# Patient Record
Sex: MALE | Race: WHITE | NOT HISPANIC OR LATINO | Employment: FULL TIME | URBAN - METROPOLITAN AREA
[De-identification: names, ages, dates, MRNs, and addresses within clinical notes are randomized per-mention and may not be internally consistent; named-entity substitution may affect disease eponyms.]

---

## 2015-01-19 LAB — CRC RECOMMENDATION EXT: NORMAL

## 2017-05-29 ENCOUNTER — OFFICE VISIT (OUTPATIENT)
Dept: FAMILY MEDICINE | Facility: CLINIC | Age: 62
End: 2017-05-29
Payer: COMMERCIAL

## 2017-05-29 VITALS — TEMPERATURE: 99 F | BODY MASS INDEX: 42.94 KG/M2 | WEIGHT: 299.94 LBS | HEIGHT: 70 IN

## 2017-05-29 DIAGNOSIS — Z00.00 ROUTINE CHECK-UP: Primary | ICD-10-CM

## 2017-05-29 PROCEDURE — 99396 PREV VISIT EST AGE 40-64: CPT | Mod: S$GLB,,, | Performed by: FAMILY MEDICINE

## 2017-05-29 PROCEDURE — 99999 PR PBB SHADOW E&M-EST. PATIENT-LVL II: CPT | Mod: PBBFAC,,, | Performed by: FAMILY MEDICINE

## 2017-05-29 RX ORDER — MONTELUKAST SODIUM 10 MG/1
10 TABLET ORAL NIGHTLY
Qty: 30 TABLET | Refills: 0 | Status: SHIPPED | OUTPATIENT
Start: 2017-05-29 | End: 2017-07-27 | Stop reason: SDUPTHER

## 2017-05-29 NOTE — PROGRESS NOTES
The patient presents today for general health evaluation and counseling      Past Medical History:  Past Medical History:   Diagnosis Date    Diabetes mellitus, type 2     Hypertension     Morbid obesity      Past Surgical History:   Procedure Laterality Date    HERNIA REPAIR      KNEE ARTHROSCOPY W/ DEBRIDEMENT       Review of patient's allergies indicates:   Allergen Reactions    Penicillins      Current Outpatient Prescriptions on File Prior to Visit   Medication Sig Dispense Refill    aspirin (ECOTRIN) 81 MG EC tablet Take 81 mg by mouth once daily.        enalapril (VASOTEC) 20 MG tablet TAKE 1 TABLET ONCE A DAY 30 tablet 11    metformin (GLUCOPHAGE) 500 MG tablet TAKE 2 TABLETS TWICE A DAY WITH FOOD 120 tablet 11    testosterone cypionate (DEPOTESTOTERONE CYPIONATE) 100 mg/mL injection Inject 200 mg into the muscle every 14 (fourteen) days.      hyoscyamine (ANASPAZ,LEVSIN) 0.125 mg Tab Take 1 tablet (125 mcg total) by mouth 3 (three) times daily. 30 tablet 0     No current facility-administered medications on file prior to visit.      Social History     Social History    Marital status:      Spouse name: N/A    Number of children: N/A    Years of education: N/A     Occupational History      u     Social History Main Topics    Smoking status: Never Smoker    Smokeless tobacco: Never Used    Alcohol use No    Drug use: No    Sexual activity: Yes     Partners: Female     Other Topics Concern    Not on file     Social History Narrative    . 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     Family History   Problem Relation Age of Onset    Heart disease Mother     Diabetes Father     Heart disease Father     Hypertension Father     Diabetes Maternal Grandmother     Cancer Maternal Grandfather          ROS:GENERAL: No fever, chills, fatigability or weight loss.  SKIN: No rashes, itching or changes in color or texture of skin.  HEAD: No headaches or recent head trauma.EYES: Visual  acuity fine. No photophobia, ocular pain or diplopia.EARS: Denies ear pain, discharge or vertigo.NOSE: No loss of smell, no epistaxis or postnasal drip.MOUTH & THROAT: No hoarseness or change in voice. No excessive gum bleeding.NODES: Denies swollen glands.  CHEST: Denies BATRES, cyanosis, wheezing, cough and sputum production.  CARDIOVASCULAR: Denies chest pain, PND, orthopnea or reduced exercise tolerance.  ABDOMEN: Appetite fine. No weight loss. Denies diarrhea, abdominal pain, hematemesis or blood in stool.  URINARY: No flank pain, dysuria or hematuria.  PERIPHERAL VASCULAR: No claudication or cyanosis.  MUSCULOSKELETAL: See above.  NEUROLOGIC: No history of seizures, paralysis, alteration of gait or coordination.  PE:   HEAD: Normocephalic, atraumatic.EYES: PERRL. EOMI.   EARS: TM's intact. Light reflex normal. No retraction or perforation.   NOSE: Mucosa pink. Airway clear.MOUTH & THROAT: No tonsillar enlargement. No pharyngeal erythema or exudate. No stridor.  NODES: No cervical, axillary or inguinal lymph node enlargement.  CHEST: Lungs clear to auscultation.  CARDIOVASCULAR: Normal S1, S2. No rubs, murmurs or gallops.  ABDOMEN: Bowel sounds normal. Not distended. Soft. No tenderness or masses.  MUSCULOSKELETAL: No palpable abnormality  NEUROLOGIC: Cranial Nerves: II-XII grossly intact.  Motor: 5/5 strength major flexors/extensors.  DTR's: Knees, Ankles 2+ and equal bilaterally; downgoing toes.  Sensory: Intact to light touch distally.  Gait & Posture: Normal gait and fine motion. No cerebellar signs.     Impression:Routine health check  Plan:Lab eval  Rec diet and ex recs  Rev age appropriate screenings      follows and checks ZHENG

## 2017-05-31 LAB
ALBUMIN SERPL-MCNC: 4.1 G/DL (ref 3.6–5.1)
ALBUMIN/GLOB SERPL: 1.8 (CALC) (ref 1–2.5)
ALP SERPL-CCNC: 58 U/L (ref 40–115)
ALT SERPL-CCNC: 18 U/L (ref 9–46)
AST SERPL-CCNC: 16 U/L (ref 10–35)
BASOPHILS # BLD AUTO: 58 CELLS/UL (ref 0–200)
BASOPHILS NFR BLD AUTO: 0.8 %
BILIRUB SERPL-MCNC: 0.5 MG/DL (ref 0.2–1.2)
BUN SERPL-MCNC: 20 MG/DL (ref 7–25)
BUN/CREAT SERPL: ABNORMAL (CALC) (ref 6–22)
CALCIUM SERPL-MCNC: 9.3 MG/DL (ref 8.6–10.3)
CHLORIDE SERPL-SCNC: 100 MMOL/L (ref 98–110)
CHOLEST SERPL-MCNC: 135 MG/DL (ref 125–200)
CHOLEST/HDLC SERPL: 3.2 (CALC)
CO2 SERPL-SCNC: 28 MMOL/L (ref 20–31)
CREAT SERPL-MCNC: 1.08 MG/DL (ref 0.7–1.25)
EOSINOPHIL # BLD AUTO: 79 CELLS/UL (ref 15–500)
EOSINOPHIL NFR BLD AUTO: 1.1 %
ERYTHROCYTE [DISTWIDTH] IN BLOOD BY AUTOMATED COUNT: 17.6 % (ref 11–15)
GFR SERPL CREATININE-BSD FRML MDRD: 73 ML/MIN/1.73M2
GLOBULIN SER CALC-MCNC: 2.3 G/DL (CALC) (ref 1.9–3.7)
GLUCOSE SERPL-MCNC: 126 MG/DL (ref 65–99)
HBA1C MFR BLD: 6.3 % OF TOTAL HGB
HCT VFR BLD AUTO: 47.6 % (ref 38.5–50)
HCV AB S/CO SERPL IA: 0.01
HCV AB SERPL QL IA: NORMAL
HDLC SERPL-MCNC: 42 MG/DL
HGB BLD-MCNC: 15.4 G/DL (ref 13.2–17.1)
LDLC SERPL CALC-MCNC: 83 MG/DL (CALC)
LYMPHOCYTES # BLD AUTO: 2765 CELLS/UL (ref 850–3900)
LYMPHOCYTES NFR BLD AUTO: 38.4 %
MCH RBC QN AUTO: 25.4 PG (ref 27–33)
MCHC RBC AUTO-ENTMCNC: 32.4 G/DL (ref 32–36)
MCV RBC AUTO: 78.3 FL (ref 80–100)
MONOCYTES # BLD AUTO: 792 CELLS/UL (ref 200–950)
MONOCYTES NFR BLD AUTO: 11 %
NEUTROPHILS # BLD AUTO: 3506 CELLS/UL (ref 1500–7800)
NEUTROPHILS NFR BLD AUTO: 48.7 %
NONHDLC SERPL-MCNC: 93 MG/DL (CALC)
PLATELET # BLD AUTO: 163 THOUSAND/UL (ref 140–400)
PMV BLD REES-ECKER: 8.1 FL (ref 7.5–12.5)
POTASSIUM SERPL-SCNC: 4.3 MMOL/L (ref 3.5–5.3)
PROT SERPL-MCNC: 6.4 G/DL (ref 6.1–8.1)
RBC # BLD AUTO: 6.08 MILLION/UL (ref 4.2–5.8)
SODIUM SERPL-SCNC: 135 MMOL/L (ref 135–146)
TRIGL SERPL-MCNC: 49 MG/DL
TSH SERPL-ACNC: 2.59 MIU/L (ref 0.4–4.5)
WBC # BLD AUTO: 7.2 THOUSAND/UL (ref 3.8–10.8)

## 2017-06-20 RX ORDER — METFORMIN HYDROCHLORIDE 500 MG/1
TABLET ORAL
Qty: 360 TABLET | Refills: 3 | Status: SHIPPED | OUTPATIENT
Start: 2017-06-20 | End: 2018-07-13 | Stop reason: SDUPTHER

## 2017-06-20 RX ORDER — ENALAPRIL MALEATE 20 MG/1
20 TABLET ORAL DAILY
Qty: 90 TABLET | Refills: 3 | Status: SHIPPED | OUTPATIENT
Start: 2017-06-20 | End: 2018-07-13 | Stop reason: SDUPTHER

## 2017-07-27 RX ORDER — MONTELUKAST SODIUM 10 MG/1
10 TABLET ORAL NIGHTLY
Qty: 30 TABLET | Refills: 12 | Status: SHIPPED | OUTPATIENT
Start: 2017-07-27 | End: 2017-08-07

## 2017-08-07 ENCOUNTER — OFFICE VISIT (OUTPATIENT)
Dept: FAMILY MEDICINE | Facility: CLINIC | Age: 62
End: 2017-08-07
Payer: COMMERCIAL

## 2017-08-07 VITALS
BODY MASS INDEX: 42.23 KG/M2 | SYSTOLIC BLOOD PRESSURE: 108 MMHG | WEIGHT: 295 LBS | DIASTOLIC BLOOD PRESSURE: 57 MMHG | HEART RATE: 70 BPM | HEIGHT: 70 IN | TEMPERATURE: 99 F

## 2017-08-07 DIAGNOSIS — R19.7 DIARRHEA, UNSPECIFIED TYPE: Primary | ICD-10-CM

## 2017-08-07 DIAGNOSIS — R10.9 ABDOMINAL CRAMPING: ICD-10-CM

## 2017-08-07 PROCEDURE — 99213 OFFICE O/P EST LOW 20 MIN: CPT | Mod: S$GLB,,, | Performed by: NURSE PRACTITIONER

## 2017-08-07 PROCEDURE — 99999 PR PBB SHADOW E&M-EST. PATIENT-LVL III: CPT | Mod: PBBFAC,,, | Performed by: NURSE PRACTITIONER

## 2017-08-07 PROCEDURE — 3008F BODY MASS INDEX DOCD: CPT | Mod: S$GLB,,, | Performed by: NURSE PRACTITIONER

## 2017-08-07 RX ORDER — DIPHENOXYLATE HYDROCHLORIDE AND ATROPINE SULFATE 2.5; .025 MG/1; MG/1
1 TABLET ORAL 4 TIMES DAILY PRN
Qty: 40 TABLET | Refills: 0 | Status: SHIPPED | OUTPATIENT
Start: 2017-08-07 | End: 2017-08-17

## 2017-08-07 NOTE — PROGRESS NOTES
Subjective:       Patient ID: Lazarus Zamudio is a 62 y.o. male.    Chief Complaint: Abdominal Pain and Diarrhea    Diarrhea    This is a new problem. The current episode started yesterday. The problem occurs 5 to 10 times per day. The problem has been unchanged. The patient states that diarrhea awakens him from sleep. Associated symptoms include abdominal pain (cramping). Pertinent negatives include no arthralgias, bloating, chills, coughing, fever, headaches, increased  flatus, myalgias, sweats, URI, vomiting or weight loss. Nothing aggravates the symptoms. There are no known risk factors. He has tried anti-motility drug for the symptoms. The treatment provided no relief. There is no history of bowel resection, inflammatory bowel disease, irritable bowel syndrome, malabsorption, a recent abdominal surgery or short gut syndrome.     Past Medical History:   Diagnosis Date    Diabetes mellitus, type 2     Hypertension     Morbid obesity      Social History     Social History    Marital status:      Spouse name: N/A    Number of children: N/A    Years of education: N/A     Occupational History      [employer]     Social History Main Topics    Smoking status: Never Smoker    Smokeless tobacco: Never Used    Alcohol use No    Drug use: No    Sexual activity: Yes     Partners: Female     Social History Narrative    . 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     Past Surgical History:   Procedure Laterality Date    HERNIA REPAIR      KNEE ARTHROSCOPY W/ DEBRIDEMENT         Review of Systems   Constitutional: Negative.  Negative for chills, fever and weight loss.   HENT: Negative.    Eyes: Negative.    Respiratory: Negative.  Negative for cough.    Cardiovascular: Negative.    Gastrointestinal: Positive for abdominal pain (cramping) and diarrhea. Negative for bloating, flatus and vomiting.   Endocrine: Negative.    Genitourinary: Negative.    Musculoskeletal: Negative.  Negative for arthralgias and  myalgias.   Skin: Negative.    Allergic/Immunologic: Negative.    Neurological: Negative.  Negative for headaches.   Psychiatric/Behavioral: Negative.        Objective:      Physical Exam   Constitutional: He is oriented to person, place, and time. He appears well-developed and well-nourished.   HENT:   Head: Normocephalic.   Right Ear: External ear normal.   Left Ear: External ear normal.   Nose: Nose normal.   Mouth/Throat: Oropharynx is clear and moist.   Eyes: Conjunctivae are normal. Pupils are equal, round, and reactive to light.   Neck: Normal range of motion. Neck supple.   Cardiovascular: Normal rate, regular rhythm and normal heart sounds.    Pulmonary/Chest: Effort normal and breath sounds normal.   Abdominal: Soft. Bowel sounds are normal. There is generalized tenderness. There is no rigidity, no rebound, no guarding, no CVA tenderness, no tenderness at McBurney's point and negative Fair's sign.   Musculoskeletal: Normal range of motion.   Neurological: He is alert and oriented to person, place, and time.   Skin: Skin is warm and dry. Capillary refill takes 2 to 3 seconds.   Psychiatric: He has a normal mood and affect. His behavior is normal. Judgment and thought content normal.   Nursing note and vitals reviewed.      Assessment:       1. Diarrhea, unspecified type    2. Abdominal cramping        Plan:           Lazarus was seen today for abdominal pain and diarrhea.    Diagnoses and all orders for this visit:  Abdominal cramping  Diarrhea, unspecified type  -     Stool Exam-Ova,Cysts,Parasites; Future  -     CULTURE, STOOL; Future  -     Giardia / Cryptosporidum, EIA; Future  -     WBC, Stool; Future  -     Rotavirus antigen, stool; Future  -     Occult blood x 1, stool; Future  Hydrate well  Lomotil request sent to PCP to approve  Report to ER immediately if symptoms worsen

## 2017-08-09 ENCOUNTER — OFFICE VISIT (OUTPATIENT)
Dept: FAMILY MEDICINE | Facility: CLINIC | Age: 62
End: 2017-08-09
Payer: COMMERCIAL

## 2017-08-09 VITALS
SYSTOLIC BLOOD PRESSURE: 139 MMHG | WEIGHT: 290.56 LBS | HEART RATE: 61 BPM | HEIGHT: 70 IN | TEMPERATURE: 98 F | BODY MASS INDEX: 41.6 KG/M2 | DIASTOLIC BLOOD PRESSURE: 75 MMHG

## 2017-08-09 DIAGNOSIS — R10.9 ABDOMINAL CRAMPING: Primary | ICD-10-CM

## 2017-08-09 DIAGNOSIS — R19.7 DIARRHEA, UNSPECIFIED TYPE: ICD-10-CM

## 2017-08-09 PROCEDURE — 87427 SHIGA-LIKE TOXIN AG IA: CPT | Mod: 59

## 2017-08-09 PROCEDURE — 87425 ROTAVIRUS AG IA: CPT

## 2017-08-09 PROCEDURE — 82272 OCCULT BLD FECES 1-3 TESTS: CPT

## 2017-08-09 PROCEDURE — 87209 SMEAR COMPLEX STAIN: CPT

## 2017-08-09 PROCEDURE — 3008F BODY MASS INDEX DOCD: CPT | Mod: S$GLB,,, | Performed by: NURSE PRACTITIONER

## 2017-08-09 PROCEDURE — 87046 STOOL CULTR AEROBIC BACT EA: CPT | Mod: 59

## 2017-08-09 PROCEDURE — 3075F SYST BP GE 130 - 139MM HG: CPT | Mod: S$GLB,,, | Performed by: NURSE PRACTITIONER

## 2017-08-09 PROCEDURE — 87329 GIARDIA AG IA: CPT

## 2017-08-09 PROCEDURE — 87045 FECES CULTURE AEROBIC BACT: CPT

## 2017-08-09 PROCEDURE — 89055 LEUKOCYTE ASSESSMENT FECAL: CPT

## 2017-08-09 PROCEDURE — 99213 OFFICE O/P EST LOW 20 MIN: CPT | Mod: S$GLB,,, | Performed by: NURSE PRACTITIONER

## 2017-08-09 PROCEDURE — 99999 PR PBB SHADOW E&M-EST. PATIENT-LVL III: CPT | Mod: PBBFAC,,, | Performed by: NURSE PRACTITIONER

## 2017-08-09 PROCEDURE — 3078F DIAST BP <80 MM HG: CPT | Mod: S$GLB,,, | Performed by: NURSE PRACTITIONER

## 2017-08-09 RX ORDER — DICYCLOMINE HYDROCHLORIDE 20 MG/1
20 TABLET ORAL 2 TIMES DAILY
Qty: 20 TABLET | Refills: 0 | Status: SHIPPED | OUTPATIENT
Start: 2017-08-09 | End: 2017-08-19

## 2017-08-09 RX ORDER — METRONIDAZOLE 500 MG/1
500 TABLET ORAL EVERY 8 HOURS
Qty: 21 TABLET | Refills: 0 | Status: SHIPPED | OUTPATIENT
Start: 2017-08-09 | End: 2017-08-16

## 2017-08-09 RX ORDER — ONDANSETRON HYDROCHLORIDE 8 MG/1
8 TABLET, FILM COATED ORAL EVERY 8 HOURS PRN
Qty: 15 TABLET | Refills: 0 | Status: SHIPPED | OUTPATIENT
Start: 2017-08-09 | End: 2017-08-14

## 2017-08-09 NOTE — PROGRESS NOTES
Subjective:       Patient ID: Lazarus Zamudio is a 62 y.o. male.    Chief Complaint: Diarrhea and Abdominal Pain    Diarrhea    This is a new problem. The current episode started in the past 7 days (Seen on Monday for this; has not brought stools ordered at previous visit). The problem occurs 2 to 4 times per day. The problem has been unchanged. The stool consistency is described as watery. The patient states that diarrhea does not awaken him from sleep. Associated symptoms include abdominal pain (cramping). Pertinent negatives include no arthralgias, bloating, chills, coughing, fever, headaches, increased  flatus, myalgias, sweats, URI, vomiting or weight loss. Associated symptoms comments: nausea. Nothing aggravates the symptoms. There are no known risk factors. He has tried anti-motility drug for the symptoms. The treatment provided mild relief. There is no history of bowel resection, inflammatory bowel disease, irritable bowel syndrome, malabsorption, a recent abdominal surgery or short gut syndrome.     Past Medical History:   Diagnosis Date    Diabetes mellitus, type 2     Hypertension     Morbid obesity      Social History     Social History    Marital status:      Spouse name: N/A    Number of children: N/A    Years of education: N/A     Occupational History      [employer]     Social History Main Topics    Smoking status: Never Smoker    Smokeless tobacco: Never Used    Alcohol use No    Drug use: No    Sexual activity: Yes     Partners: Female     Social History Narrative    . 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     Past Surgical History:   Procedure Laterality Date    HERNIA REPAIR      KNEE ARTHROSCOPY W/ DEBRIDEMENT         Review of Systems   Constitutional: Negative.  Negative for chills, fever and weight loss.   HENT: Negative.    Eyes: Negative.    Respiratory: Negative.  Negative for cough.    Cardiovascular: Negative.    Gastrointestinal: Positive for abdominal pain  (cramping) and nausea. Negative for bloating, flatus and vomiting.   Endocrine: Negative.    Genitourinary: Negative.    Musculoskeletal: Negative.  Negative for arthralgias and myalgias.   Skin: Negative.    Allergic/Immunologic: Negative.    Neurological: Negative.  Negative for headaches.   Psychiatric/Behavioral: Negative.        Objective:      Physical Exam   Constitutional: He is oriented to person, place, and time. He appears well-developed and well-nourished.   HENT:   Head: Normocephalic.   Right Ear: External ear normal.   Left Ear: External ear normal.   Nose: Nose normal.   Mouth/Throat: Oropharynx is clear and moist.   Eyes: Conjunctivae are normal. Pupils are equal, round, and reactive to light.   Neck: Normal range of motion. Neck supple.   Cardiovascular: Normal rate, regular rhythm and normal heart sounds.    Pulmonary/Chest: Effort normal and breath sounds normal.   Abdominal: Soft. Bowel sounds are normal. There is no tenderness.   Musculoskeletal: Normal range of motion.   Neurological: He is alert and oriented to person, place, and time.   Skin: Skin is warm and dry. Capillary refill takes 2 to 3 seconds.   Psychiatric: He has a normal mood and affect. His behavior is normal. Judgment and thought content normal.   Nursing note and vitals reviewed.      Assessment:       1. Abdominal cramping    2. Diarrhea, unspecified type        Plan:       Lazarus was seen today for diarrhea and abdominal pain.    Diagnoses and all orders for this visit:    Abdominal cramping  Diarrhea, unspecified type  -     metronidazole (FLAGYL) 500 MG tablet; Take 1 tablet (500 mg total) by mouth every 8 (eight) hours.  -     dicyclomine (BENTYL) 20 mg tablet; Take 1 tablet (20 mg total) by mouth 2 (two) times daily.  -     ondansetron (ZOFRAN) 8 MG tablet; Take 1 tablet (8 mg total) by mouth every 8 (eight) hours as needed.  Hydrate well  Report to ER immediately if symptoms worsen

## 2017-08-10 LAB
CRYPTOSP AG STL QL IA: NEGATIVE
G LAMBLIA AG STL QL IA: NEGATIVE
O+P STL TRI STN: NORMAL
OB PNL STL: NEGATIVE
RV AG STL QL IA.RAPID: NEGATIVE
WBC #/AREA STL HPF: NORMAL /[HPF]

## 2017-08-11 LAB
E COLI SXT1 STL QL IA: NEGATIVE
E COLI SXT2 STL QL IA: NEGATIVE

## 2017-08-12 LAB — BACTERIA STL CULT: NORMAL

## 2017-10-25 ENCOUNTER — OFFICE VISIT (OUTPATIENT)
Dept: FAMILY MEDICINE | Facility: CLINIC | Age: 62
End: 2017-10-25
Payer: COMMERCIAL

## 2017-10-25 VITALS
BODY MASS INDEX: 41.66 KG/M2 | WEIGHT: 291 LBS | DIASTOLIC BLOOD PRESSURE: 66 MMHG | HEART RATE: 59 BPM | SYSTOLIC BLOOD PRESSURE: 139 MMHG | HEIGHT: 70 IN

## 2017-10-25 DIAGNOSIS — M54.31 SCIATICA OF RIGHT SIDE: Primary | ICD-10-CM

## 2017-10-25 PROCEDURE — 99213 OFFICE O/P EST LOW 20 MIN: CPT | Mod: S$GLB,,, | Performed by: FAMILY MEDICINE

## 2017-10-25 PROCEDURE — 99999 PR PBB SHADOW E&M-EST. PATIENT-LVL II: CPT | Mod: PBBFAC,,, | Performed by: FAMILY MEDICINE

## 2017-10-25 RX ORDER — DICLOFENAC SODIUM 75 MG/1
75 TABLET, DELAYED RELEASE ORAL 2 TIMES DAILY
Qty: 60 TABLET | Refills: 2 | Status: SHIPPED | OUTPATIENT
Start: 2017-10-25 | End: 2018-03-19

## 2017-10-25 RX ORDER — METHYLPREDNISOLONE 4 MG/1
TABLET ORAL
Qty: 1 PACKAGE | Refills: 0 | Status: SHIPPED | OUTPATIENT
Start: 2017-10-25 | End: 2017-11-15

## 2017-10-25 NOTE — PROGRESS NOTES
The patient presents with tender painfulnrt lb rad to buttocks for the past 2 days    but no trauma but stepped out of tub and felt sudden pain   ROS:  General: No fever or sig wt change  HEENT:No other PND eye pain or dc  Respiratory: No cough wheezing  PE: vital signs noted  HEENT: Normocephalic,with no recent trauma,PERRLA,EOMI,conjunctiva injected with no exudate.Nasopharynx is injected and edematous.External otic canal edematous and injected TM dull  Neck:Supple without adenopathy  Chest:Clear bilateral breath sounds    Heart:Regular rhthym without murmer  Abdomen:Soft, non tender,no masses, no hepatosplenomegaly  Extremeties and Neurologic:Grossly within normal limits    Rt SI neg SLR   Impression:SCiaitica    Plan:  Nsaid, medrol dospk

## 2017-10-30 ENCOUNTER — OFFICE VISIT (OUTPATIENT)
Dept: FAMILY MEDICINE | Facility: CLINIC | Age: 62
End: 2017-10-30
Payer: COMMERCIAL

## 2017-10-30 VITALS
HEIGHT: 70 IN | SYSTOLIC BLOOD PRESSURE: 138 MMHG | WEIGHT: 293.19 LBS | DIASTOLIC BLOOD PRESSURE: 73 MMHG | BODY MASS INDEX: 41.97 KG/M2 | HEART RATE: 78 BPM

## 2017-10-30 DIAGNOSIS — M54.30 SCIATICA, UNSPECIFIED LATERALITY: Primary | ICD-10-CM

## 2017-10-30 PROCEDURE — 99999 PR PBB SHADOW E&M-EST. PATIENT-LVL II: CPT | Mod: PBBFAC,,, | Performed by: FAMILY MEDICINE

## 2017-10-30 PROCEDURE — 99213 OFFICE O/P EST LOW 20 MIN: CPT | Mod: S$GLB,,, | Performed by: FAMILY MEDICINE

## 2017-10-30 RX ORDER — GABAPENTIN 300 MG/1
300 CAPSULE ORAL 3 TIMES DAILY
Qty: 90 CAPSULE | Refills: 2 | Status: SHIPPED | OUTPATIENT
Start: 2017-10-30 | End: 2018-01-25 | Stop reason: SDUPTHER

## 2017-10-30 NOTE — PROGRESS NOTES
The patient presents with persistent tender painful rt lb rad to buttocks for the past 5 days    but no trauma but stepped out of tub and felt sudden pain   ROS:  General: No fever or sig wt change  HEENT:No other PND eye pain or dc  Respiratory: No cough wheezing  PE: vital signs noted  HEENT: Normocephalic,with no recent trauma,PERRLA,EOMI,conjunctiva injected with no exudate.Nasopharynx is injected and edematous.External otic canal edematous and injected TM dull  Neck:Supple without adenopathy  Chest:Clear bilateral breath sounds    Heart:Regular rhthym without murmer  Abdomen:Soft, non tender,no masses, no hepatosplenomegaly  Extremeties and Neurologic:Grossly within normal limits    Rt SI neg SLR   Impression:SCiaitica    Plan:  Nsaid, medrol dospk

## 2018-01-02 ENCOUNTER — OFFICE VISIT (OUTPATIENT)
Dept: FAMILY MEDICINE | Facility: CLINIC | Age: 63
End: 2018-01-02
Payer: COMMERCIAL

## 2018-01-02 VITALS
SYSTOLIC BLOOD PRESSURE: 126 MMHG | HEART RATE: 59 BPM | HEIGHT: 70 IN | BODY MASS INDEX: 41.97 KG/M2 | WEIGHT: 293.19 LBS | DIASTOLIC BLOOD PRESSURE: 59 MMHG | TEMPERATURE: 98 F

## 2018-01-02 DIAGNOSIS — I83.90 VARICOSE VEIN OF LEG: Primary | ICD-10-CM

## 2018-01-02 PROCEDURE — 99999 PR PBB SHADOW E&M-EST. PATIENT-LVL II: CPT | Mod: PBBFAC,,, | Performed by: FAMILY MEDICINE

## 2018-01-02 PROCEDURE — 99213 OFFICE O/P EST LOW 20 MIN: CPT | Mod: S$GLB,,, | Performed by: FAMILY MEDICINE

## 2018-01-02 NOTE — PROGRESS NOTES
The patient presents with non tender painful LE varicosies  p several week ROS:  General: No fever or sig wt change  HEENT:No other PND eye pain or dc  Respiratory: No cough wheezing  PE: vital signs noted  HEENT: Normocephalic,with no recent trauma,PERRLA,EOMI,conjunctiva injected with no exudate.Nasopharynx is injected and edematous.External otic canal edematous and injected TM dull  Neck:Supple without adenopathy  Chest:Clear bilateral breath sounds   Heart:Regular rhthym without murmer  Abdomen:Soft, non tender,no masses, no hepatosplenomegaly  Extremeties Large varicose veins    Impression:Varicosities   Plan:Rev exercise elevation etc and if worsens rec [facility] con

## 2018-01-25 RX ORDER — GABAPENTIN 300 MG/1
300 CAPSULE ORAL 3 TIMES DAILY
Qty: 90 CAPSULE | Refills: 4 | Status: SHIPPED | OUTPATIENT
Start: 2018-01-25 | End: 2018-03-19

## 2018-03-19 ENCOUNTER — OFFICE VISIT (OUTPATIENT)
Dept: FAMILY MEDICINE | Facility: CLINIC | Age: 63
End: 2018-03-19
Payer: COMMERCIAL

## 2018-03-19 VITALS
HEIGHT: 70 IN | DIASTOLIC BLOOD PRESSURE: 72 MMHG | BODY MASS INDEX: 42.23 KG/M2 | HEART RATE: 60 BPM | WEIGHT: 295 LBS | SYSTOLIC BLOOD PRESSURE: 138 MMHG | TEMPERATURE: 98 F

## 2018-03-19 DIAGNOSIS — J01.10 ACUTE NON-RECURRENT FRONTAL SINUSITIS: Primary | ICD-10-CM

## 2018-03-19 PROCEDURE — 99999 PR PBB SHADOW E&M-EST. PATIENT-LVL IV: CPT | Mod: PBBFAC,,, | Performed by: NURSE PRACTITIONER

## 2018-03-19 PROCEDURE — 96372 THER/PROPH/DIAG INJ SC/IM: CPT | Mod: S$GLB,,, | Performed by: FAMILY MEDICINE

## 2018-03-19 PROCEDURE — 99213 OFFICE O/P EST LOW 20 MIN: CPT | Mod: 25,S$GLB,, | Performed by: NURSE PRACTITIONER

## 2018-03-19 PROCEDURE — 3078F DIAST BP <80 MM HG: CPT | Mod: CPTII,S$GLB,, | Performed by: NURSE PRACTITIONER

## 2018-03-19 PROCEDURE — 3075F SYST BP GE 130 - 139MM HG: CPT | Mod: CPTII,S$GLB,, | Performed by: NURSE PRACTITIONER

## 2018-03-19 RX ORDER — AZITHROMYCIN 250 MG/1
250 TABLET, FILM COATED ORAL DAILY
Qty: 6 TABLET | Refills: 0 | Status: SHIPPED | OUTPATIENT
Start: 2018-03-19 | End: 2018-03-24

## 2018-03-19 RX ORDER — MONTELUKAST SODIUM 10 MG/1
10 TABLET ORAL NIGHTLY
Qty: 30 TABLET | Refills: 0 | Status: SHIPPED | OUTPATIENT
Start: 2018-03-19 | End: 2018-04-15 | Stop reason: SDUPTHER

## 2018-03-19 RX ORDER — CODEINE PHOSPHATE AND GUAIFENESIN 10; 100 MG/5ML; MG/5ML
5 SOLUTION ORAL EVERY 6 HOURS PRN
Qty: 240 ML | Refills: 0 | Status: SHIPPED | OUTPATIENT
Start: 2018-03-19 | End: 2018-03-29

## 2018-03-19 RX ORDER — DEXAMETHASONE SODIUM PHOSPHATE 4 MG/ML
8 INJECTION, SOLUTION INTRA-ARTICULAR; INTRALESIONAL; INTRAMUSCULAR; INTRAVENOUS; SOFT TISSUE
Status: COMPLETED | OUTPATIENT
Start: 2018-03-19 | End: 2018-03-19

## 2018-03-19 RX ADMIN — DEXAMETHASONE SODIUM PHOSPHATE 8 MG: 4 INJECTION, SOLUTION INTRA-ARTICULAR; INTRALESIONAL; INTRAMUSCULAR; INTRAVENOUS; SOFT TISSUE at 10:03

## 2018-03-19 NOTE — PROGRESS NOTES
"Subjective:      Patient ID: Lazarus Zamudio is a 62 y.o. male.    Chief Complaint: Cough and Nasal Congestion    Sinusitis   This is a new problem. Episode onset: 1 and a half weeks. The problem has been gradually worsening since onset. There has been no fever. Associated symptoms include congestion, coughing, ear pain, headaches, a hoarse voice, neck pain, sinus pressure, a sore throat and swollen glands. Pertinent negatives include no chills, diaphoresis or shortness of breath. Treatments tried: OTC meds. The treatment provided mild relief.     Review of Systems   Constitutional: Negative for chills, diaphoresis, fatigue and fever.   HENT: Positive for congestion, ear pain, hoarse voice, postnasal drip, rhinorrhea, sinus pain, sinus pressure and sore throat. Dental problem: dental pain.    Respiratory: Positive for cough. Negative for shortness of breath and wheezing.    Cardiovascular: Negative for chest pain, palpitations and leg swelling.   Musculoskeletal: Positive for neck pain.   Skin: Negative for rash and wound.   Neurological: Positive for headaches. Negative for weakness and numbness.   Psychiatric/Behavioral: The patient is not nervous/anxious.        Objective:     /72   Pulse 60   Temp 98.4 °F (36.9 °C) (Oral)   Ht 5' 10" (1.778 m)   Wt 133.8 kg (295 lb)   BMI 42.33 kg/m²     Physical Exam   Constitutional: He is oriented to person, place, and time. He appears well-developed and well-nourished.   HENT:   Head: Normocephalic.   Right Ear: Tympanic membrane is erythematous (dull).   Left Ear: Tympanic membrane is erythematous (dull).   Nose: Mucosal edema and rhinorrhea (nasal mucosa erythematous and boggy) present. Right sinus exhibits frontal sinus tenderness. Right sinus exhibits no maxillary sinus tenderness. Left sinus exhibits frontal sinus tenderness. Left sinus exhibits no maxillary sinus tenderness.   Mouth/Throat: Posterior oropharyngeal edema and posterior oropharyngeal " erythema (clear, post nasal drainage noted to posterior oropharynx) present. No oropharyngeal exudate.   Eyes: Conjunctivae and lids are normal. Pupils are equal, round, and reactive to light.   Neck: Normal range of motion and full passive range of motion without pain. Neck supple.   Cardiovascular: Normal rate, regular rhythm and normal heart sounds.    Pulmonary/Chest: Effort normal and breath sounds normal. No respiratory distress. He has no decreased breath sounds. He has no wheezes. He has no rhonchi. He has no rales.   Lymphadenopathy:     He has cervical adenopathy.   Neurological: He is alert and oriented to person, place, and time.   Skin: Skin is warm and dry. No rash noted.   Psychiatric: He has a normal mood and affect. His behavior is normal. Judgment and thought content normal.   Vitals reviewed.    Assessment:     1. Acute non-recurrent frontal sinusitis        Plan:     Problem List Items Addressed This Visit     None      Visit Diagnoses     Acute non-recurrent frontal sinusitis    -  Primary    Relevant Medications    guaifenesin-codeine 100-10 mg/5 ml (TUSSI-ORGANIDIN NR)  mg/5 mL syrup    montelukast (SINGULAIR) 10 mg tablet    dexamethasone injection 8 mg (Completed)    azithromycin (ZITHROMAX Z-MEGAN) 250 MG tablet      Increase fluid intake  Rest  Tylenol/Motrin as needed for headache/pain  Mucinex (guaifenesin) twice daily to loosen secretions  Humidified air  Lozenges and/or chloraseptic spray as needed for sore throat  Warm salt water gargles as needed for sore throat  Symptomatic care discussed and educational handout provided  Report to ER if symptoms worsen    Follow-up if symptoms worsen or fail to improve.

## 2018-03-19 NOTE — PATIENT INSTRUCTIONS

## 2018-04-15 DIAGNOSIS — J01.10 ACUTE NON-RECURRENT FRONTAL SINUSITIS: ICD-10-CM

## 2018-04-16 RX ORDER — MONTELUKAST SODIUM 10 MG/1
TABLET ORAL
Qty: 30 TABLET | Refills: 0 | Status: SHIPPED | OUTPATIENT
Start: 2018-04-16 | End: 2019-01-02

## 2018-04-20 ENCOUNTER — PATIENT OUTREACH (OUTPATIENT)
Dept: ADMINISTRATIVE | Facility: HOSPITAL | Age: 63
End: 2018-04-20

## 2018-07-13 ENCOUNTER — TELEPHONE (OUTPATIENT)
Dept: FAMILY MEDICINE | Facility: CLINIC | Age: 63
End: 2018-07-13

## 2018-07-13 DIAGNOSIS — Z00.00 ROUTINE ADULT HEALTH MAINTENANCE: Primary | ICD-10-CM

## 2018-07-13 NOTE — TELEPHONE ENCOUNTER
----- Message from Mona Koch sent at 7/13/2018  8:57 AM CDT -----  Contact: pt   Pt states that he need orders in for labs. Pt states that he need the whole work up and want orders sent to [facility].    [phone] (home)

## 2018-07-13 NOTE — TELEPHONE ENCOUNTER
Left message on voice mail that message will be sent to Dr Colindres and he will address once he returns to clinic on Monday

## 2018-07-16 RX ORDER — METFORMIN HYDROCHLORIDE 500 MG/1
TABLET ORAL
Qty: 360 TABLET | Refills: 3 | Status: SHIPPED | OUTPATIENT
Start: 2018-07-16 | End: 2019-07-09 | Stop reason: SDUPTHER

## 2018-07-16 RX ORDER — ENALAPRIL MALEATE 20 MG/1
20 TABLET ORAL DAILY
Qty: 90 TABLET | Refills: 3 | Status: SHIPPED | OUTPATIENT
Start: 2018-07-16 | End: 2019-07-09 | Stop reason: SDUPTHER

## 2018-07-18 ENCOUNTER — PATIENT OUTREACH (OUTPATIENT)
Dept: ADMINISTRATIVE | Facility: HOSPITAL | Age: 63
End: 2018-07-18

## 2018-07-19 ENCOUNTER — OFFICE VISIT (OUTPATIENT)
Dept: FAMILY MEDICINE | Facility: CLINIC | Age: 63
End: 2018-07-19
Payer: COMMERCIAL

## 2018-07-19 VITALS
BODY MASS INDEX: 42.14 KG/M2 | WEIGHT: 294.38 LBS | SYSTOLIC BLOOD PRESSURE: 115 MMHG | DIASTOLIC BLOOD PRESSURE: 66 MMHG | HEART RATE: 67 BPM | HEIGHT: 70 IN | TEMPERATURE: 99 F

## 2018-07-19 DIAGNOSIS — Z00.00 ROUTINE CHECK-UP: Primary | ICD-10-CM

## 2018-07-19 PROCEDURE — 99999 PR PBB SHADOW E&M-EST. PATIENT-LVL III: CPT | Mod: PBBFAC,,, | Performed by: FAMILY MEDICINE

## 2018-07-19 PROCEDURE — 3078F DIAST BP <80 MM HG: CPT | Mod: CPTII,S$GLB,, | Performed by: FAMILY MEDICINE

## 2018-07-19 PROCEDURE — 99396 PREV VISIT EST AGE 40-64: CPT | Mod: S$GLB,,, | Performed by: FAMILY MEDICINE

## 2018-07-19 PROCEDURE — 3074F SYST BP LT 130 MM HG: CPT | Mod: CPTII,S$GLB,, | Performed by: FAMILY MEDICINE

## 2018-07-19 NOTE — PROGRESS NOTES
The patient presents today for general health evaluation and counseling      Past Medical History:  Past Medical History:   Diagnosis Date    Diabetes mellitus, type 2     Hypertension     Morbid obesity      Past Surgical History:   Procedure Laterality Date    HERNIA REPAIR      KNEE ARTHROSCOPY W/ DEBRIDEMENT       Review of patient's allergies indicates:   Allergen Reactions    Penicillins      Current Outpatient Prescriptions on File Prior to Visit   Medication Sig Dispense Refill    aspirin (ECOTRIN) 81 MG EC tablet Take 81 mg by mouth once daily.        enalapril (VASOTEC) 20 MG tablet TAKE 1 TABLET (20 MG TOTAL) BY MOUTH ONCE DAILY. 90 tablet 3    metFORMIN (GLUCOPHAGE) 500 MG tablet TAKE 2 TABLETS TWICE A DAY WITH FOOD 360 tablet 3    multivitamin with minerals tablet Take 1 tablet by mouth once daily.      testosterone cypionate (DEPOTESTOTERONE CYPIONATE) 100 mg/mL injection Inject 200 mg into the muscle every 14 (fourteen) days.      montelukast (SINGULAIR) 10 mg tablet TAKE 1 TABLET EVERY EVENING 30 tablet 0     No current facility-administered medications on file prior to visit.      Social History     Social History    Marital status:      Spouse name: N/A    Number of children: N/A    Years of education: N/A     Occupational History      [employer]     Social History Main Topics    Smoking status: Never Smoker    Smokeless tobacco: Never Used    Alcohol use No    Drug use: No    Sexual activity: Yes     Partners: Female     Other Topics Concern    Not on file     Social History Narrative    . 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     Family History   Problem Relation Age of Onset    Heart disease Mother     Diabetes Father     Heart disease Father     Hypertension Father     Diabetes Maternal Grandmother     Cancer Maternal Grandfather          ROS:GENERAL: No fever, chills, fatigability or weight loss.  SKIN: No rashes, itching or changes in color or texture of  skin.  HEAD: No headaches or recent head trauma.EYES: Visual acuity fine. No photophobia, ocular pain or diplopia.EARS: Denies ear pain, discharge or vertigo.NOSE: No loss of smell, no epistaxis or postnasal drip.MOUTH & THROAT: No hoarseness or change in voice. No excessive gum bleeding.NODES: Denies swollen glands.  CHEST: Denies BATRES, cyanosis, wheezing, cough and sputum production.  CARDIOVASCULAR: Denies chest pain, PND, orthopnea or reduced exercise tolerance.  ABDOMEN: Appetite fine. No weight loss. Denies diarrhea, abdominal pain, hematemesis or blood in stool.  URINARY: No flank pain, dysuria or hematuria.  PERIPHERAL VASCULAR: No claudication or cyanosis.  MUSCULOSKELETAL: See above.  NEUROLOGIC: No history of seizures, paralysis, alteration of gait or coordination.  PE:   HEAD: Normocephalic, atraumatic.EYES: PERRL. EOMI.   EARS: TM's intact. Light reflex normal. No retraction or perforation.   NOSE: Mucosa pink. Airway clear.MOUTH & THROAT: No tonsillar enlargement. No pharyngeal erythema or exudate. No stridor.  NODES: No cervical, axillary or inguinal lymph node enlargement.  CHEST: Lungs clear to auscultation.  CARDIOVASCULAR: Normal S1, S2. No rubs, murmurs or gallops.  ABDOMEN: Bowel sounds normal. Not distended. Soft. No tenderness or masses.  MUSCULOSKELETAL: No palpable abnormality  NEUROLOGIC: Cranial Nerves: II-XII grossly intact.  Motor: 5/5 strength major flexors/extensors.  DTR's: Knees, Ankles 2+ and equal bilaterally; downgoing toes.  Sensory: Intact to light touch distally.  Gait & Posture: Normal gait and fine motion. No cerebellar signs.     Impression:Routine health check  Plan:Lab eval  Rec diet and ex recs  Rev age appropriate screenings    Had ZHENG at [facility]

## 2018-07-21 LAB
ALBUMIN SERPL-MCNC: 4.3 G/DL (ref 3.6–5.1)
ALBUMIN/GLOB SERPL: 1.7 (CALC) (ref 1–2.5)
ALP SERPL-CCNC: 64 U/L (ref 40–115)
ALT SERPL-CCNC: 23 U/L (ref 9–46)
AST SERPL-CCNC: 18 U/L (ref 10–35)
BASOPHILS # BLD AUTO: 52 CELLS/UL (ref 0–200)
BASOPHILS NFR BLD AUTO: 0.8 %
BILIRUB SERPL-MCNC: 0.6 MG/DL (ref 0.2–1.2)
BUN SERPL-MCNC: 23 MG/DL (ref 7–25)
BUN/CREAT SERPL: ABNORMAL (CALC) (ref 6–22)
CALCIUM SERPL-MCNC: 9.4 MG/DL (ref 8.6–10.3)
CHLORIDE SERPL-SCNC: 98 MMOL/L (ref 98–110)
CHOLEST SERPL-MCNC: 145 MG/DL
CHOLEST/HDLC SERPL: 3.1 (CALC)
CO2 SERPL-SCNC: 31 MMOL/L (ref 20–31)
CREAT SERPL-MCNC: 1.08 MG/DL (ref 0.7–1.25)
EOSINOPHIL # BLD AUTO: 111 CELLS/UL (ref 15–500)
EOSINOPHIL NFR BLD AUTO: 1.7 %
ERYTHROCYTE [DISTWIDTH] IN BLOOD BY AUTOMATED COUNT: 13.8 % (ref 11–15)
GFR SERPL CREATININE-BSD FRML MDRD: 72 ML/MIN/1.73M2
GLOBULIN SER CALC-MCNC: 2.5 G/DL (CALC) (ref 1.9–3.7)
GLUCOSE SERPL-MCNC: 135 MG/DL (ref 65–99)
HBA1C MFR BLD: 6.3 % OF TOTAL HGB
HCT VFR BLD AUTO: 48.3 % (ref 38.5–50)
HDLC SERPL-MCNC: 47 MG/DL
HGB BLD-MCNC: 15.2 G/DL (ref 13.2–17.1)
LDLC SERPL CALC-MCNC: 86 MG/DL (CALC)
LYMPHOCYTES # BLD AUTO: 2600 CELLS/UL (ref 850–3900)
LYMPHOCYTES NFR BLD AUTO: 40 %
MCH RBC QN AUTO: 25.6 PG (ref 27–33)
MCHC RBC AUTO-ENTMCNC: 31.5 G/DL (ref 32–36)
MCV RBC AUTO: 81.3 FL (ref 80–100)
MONOCYTES # BLD AUTO: 637 CELLS/UL (ref 200–950)
MONOCYTES NFR BLD AUTO: 9.8 %
NEUTROPHILS # BLD AUTO: 3101 CELLS/UL (ref 1500–7800)
NEUTROPHILS NFR BLD AUTO: 47.7 %
NONHDLC SERPL-MCNC: 98 MG/DL (CALC)
PLATELET # BLD AUTO: 176 THOUSAND/UL (ref 140–400)
PMV BLD REES-ECKER: 9.1 FL (ref 7.5–12.5)
POTASSIUM SERPL-SCNC: 4.4 MMOL/L (ref 3.5–5.3)
PROT SERPL-MCNC: 6.8 G/DL (ref 6.1–8.1)
RBC # BLD AUTO: 5.94 MILLION/UL (ref 4.2–5.8)
SODIUM SERPL-SCNC: 134 MMOL/L (ref 135–146)
TRIGL SERPL-MCNC: 41 MG/DL
TSH SERPL-ACNC: 2.3 MIU/L (ref 0.4–4.5)
WBC # BLD AUTO: 6.5 THOUSAND/UL (ref 3.8–10.8)

## 2018-08-29 ENCOUNTER — TELEPHONE (OUTPATIENT)
Dept: FAMILY MEDICINE | Facility: CLINIC | Age: 63
End: 2018-08-29

## 2018-08-29 NOTE — TELEPHONE ENCOUNTER
----- Message from Karen Koch sent at 8/29/2018 11:52 AM CDT -----  Contact: Patient   Patient wants to know if his paperwork is ready for . Please call him at [phone].    Thanks  Td

## 2019-01-02 ENCOUNTER — OFFICE VISIT (OUTPATIENT)
Dept: FAMILY MEDICINE | Facility: CLINIC | Age: 64
End: 2019-01-02
Payer: COMMERCIAL

## 2019-01-02 VITALS
TEMPERATURE: 98 F | DIASTOLIC BLOOD PRESSURE: 63 MMHG | SYSTOLIC BLOOD PRESSURE: 137 MMHG | BODY MASS INDEX: 42.8 KG/M2 | WEIGHT: 299 LBS | HEIGHT: 70 IN | HEART RATE: 58 BPM

## 2019-01-02 DIAGNOSIS — J01.10 ACUTE FRONTAL SINUSITIS, RECURRENCE NOT SPECIFIED: Primary | ICD-10-CM

## 2019-01-02 PROCEDURE — 3078F DIAST BP <80 MM HG: CPT | Mod: CPTII,S$GLB,, | Performed by: NURSE PRACTITIONER

## 2019-01-02 PROCEDURE — 3075F SYST BP GE 130 - 139MM HG: CPT | Mod: CPTII,S$GLB,, | Performed by: NURSE PRACTITIONER

## 2019-01-02 PROCEDURE — 99999 PR PBB SHADOW E&M-EST. PATIENT-LVL IV: CPT | Mod: PBBFAC,,, | Performed by: NURSE PRACTITIONER

## 2019-01-02 PROCEDURE — 3008F PR BODY MASS INDEX (BMI) DOCUMENTED: ICD-10-PCS | Mod: CPTII,S$GLB,, | Performed by: NURSE PRACTITIONER

## 2019-01-02 PROCEDURE — 99213 OFFICE O/P EST LOW 20 MIN: CPT | Mod: 25,S$GLB,, | Performed by: NURSE PRACTITIONER

## 2019-01-02 PROCEDURE — 99999 PR PBB SHADOW E&M-EST. PATIENT-LVL IV: ICD-10-PCS | Mod: PBBFAC,,, | Performed by: NURSE PRACTITIONER

## 2019-01-02 PROCEDURE — 3008F BODY MASS INDEX DOCD: CPT | Mod: CPTII,S$GLB,, | Performed by: NURSE PRACTITIONER

## 2019-01-02 PROCEDURE — 3075F PR MOST RECENT SYSTOLIC BLOOD PRESS GE 130-139MM HG: ICD-10-PCS | Mod: CPTII,S$GLB,, | Performed by: NURSE PRACTITIONER

## 2019-01-02 PROCEDURE — 3078F PR MOST RECENT DIASTOLIC BLOOD PRESSURE < 80 MM HG: ICD-10-PCS | Mod: CPTII,S$GLB,, | Performed by: NURSE PRACTITIONER

## 2019-01-02 PROCEDURE — 96372 THER/PROPH/DIAG INJ SC/IM: CPT | Mod: S$GLB,,, | Performed by: NURSE PRACTITIONER

## 2019-01-02 PROCEDURE — 96372 PR INJECTION,THERAP/PROPH/DIAG2ST, IM OR SUBCUT: ICD-10-PCS | Mod: S$GLB,,, | Performed by: NURSE PRACTITIONER

## 2019-01-02 PROCEDURE — 99213 PR OFFICE/OUTPT VISIT, EST, LEVL III, 20-29 MIN: ICD-10-PCS | Mod: 25,S$GLB,, | Performed by: NURSE PRACTITIONER

## 2019-01-02 RX ORDER — GUAIFENESIN 600 MG/1
1200 TABLET, EXTENDED RELEASE ORAL 2 TIMES DAILY
Qty: 40 TABLET | Refills: 0 | COMMUNITY
Start: 2019-01-02 | End: 2019-01-12

## 2019-01-02 RX ORDER — BENZONATATE 100 MG/1
100 CAPSULE ORAL 3 TIMES DAILY PRN
Qty: 30 CAPSULE | Refills: 0 | Status: SHIPPED | OUTPATIENT
Start: 2019-01-02 | End: 2019-01-12

## 2019-01-02 RX ORDER — AZITHROMYCIN 250 MG/1
250 TABLET, FILM COATED ORAL DAILY
Qty: 6 TABLET | Refills: 0 | Status: SHIPPED | OUTPATIENT
Start: 2019-01-02 | End: 2019-01-07

## 2019-01-02 RX ORDER — DEXAMETHASONE SODIUM PHOSPHATE 4 MG/ML
8 INJECTION, SOLUTION INTRA-ARTICULAR; INTRALESIONAL; INTRAMUSCULAR; INTRAVENOUS; SOFT TISSUE
Status: COMPLETED | OUTPATIENT
Start: 2019-01-02 | End: 2019-01-02

## 2019-01-02 RX ADMIN — DEXAMETHASONE SODIUM PHOSPHATE 8 MG: 4 INJECTION, SOLUTION INTRA-ARTICULAR; INTRALESIONAL; INTRAMUSCULAR; INTRAVENOUS; SOFT TISSUE at 11:01

## 2019-01-02 NOTE — PROGRESS NOTES
"Subjective:      Patient ID: Lazarus Zamudio is a 63 y.o. male.    Chief Complaint: Cough    Cough   This is a new problem. The current episode started 1 to 4 weeks ago (2 weeks). The problem has been gradually worsening. The cough is productive of sputum. Associated symptoms include ear congestion, ear pain, headaches, nasal congestion, postnasal drip, rhinorrhea and a sore throat. Pertinent negatives include no chest pain, chills, fever, rash, shortness of breath or wheezing. He has tried prescription cough suppressant (DayQuil NyQuil) for the symptoms. The treatment provided mild relief.     Review of Systems   Constitutional: Negative for chills, fatigue and fever.   HENT: Positive for congestion, ear pain, postnasal drip, rhinorrhea, sinus pressure, sinus pain and sore throat.    Eyes: Negative.    Respiratory: Positive for cough. Negative for shortness of breath and wheezing.    Cardiovascular: Negative for chest pain, palpitations and leg swelling.   Gastrointestinal: Negative.    Endocrine: Negative.    Genitourinary: Negative.    Musculoskeletal: Negative.    Skin: Negative for rash and wound.   Neurological: Positive for headaches.   Hematological: Negative.    Psychiatric/Behavioral: The patient is not nervous/anxious.        Objective:     /63   Pulse (!) 58   Temp 98.4 °F (36.9 °C) (Oral)   Ht 5' 10" (1.778 m)   Wt 135.6 kg (299 lb)   BMI 42.90 kg/m²     Physical Exam   Constitutional: He is oriented to person, place, and time. He appears well-developed and well-nourished.   HENT:   Head: Normocephalic.   Right Ear: Tympanic membrane is injected.   Left Ear: A middle ear effusion (serous) is present.   Nose: Mucosal edema and rhinorrhea (nasal mucosa erythematous and boggy) present. Right sinus exhibits frontal sinus tenderness. Right sinus exhibits no maxillary sinus tenderness. Left sinus exhibits frontal sinus tenderness. Left sinus exhibits no maxillary sinus tenderness. "   Mouth/Throat: Posterior oropharyngeal edema and posterior oropharyngeal erythema (clear, post nasal drainage noted to posterior oropharynx) present. No oropharyngeal exudate.   Eyes: Conjunctivae and lids are normal. Pupils are equal, round, and reactive to light.   Neck: Normal range of motion and full passive range of motion without pain. Neck supple.   Cardiovascular: Normal rate, regular rhythm and normal heart sounds.   Pulmonary/Chest: Effort normal and breath sounds normal. No respiratory distress. He has no decreased breath sounds. He has no wheezes. He has no rhonchi. He has no rales.   Lymphadenopathy:     He has cervical adenopathy.        Right cervical: Deep cervical adenopathy present.        Left cervical: Deep cervical adenopathy present.   Neurological: He is alert and oriented to person, place, and time.   Skin: Skin is warm and dry. No rash noted.   Psychiatric: He has a normal mood and affect. His behavior is normal. Judgment and thought content normal.     Assessment:     1. Acute frontal sinusitis, recurrence not specified        Plan:     Problem List Items Addressed This Visit     None      Visit Diagnoses     Acute frontal sinusitis, recurrence not specified    -  Primary    Relevant Medications    azithromycin (ZITHROMAX Z-MEGAN) 250 MG tablet    dexamethasone injection 8 mg (Completed)    guaiFENesin (MUCINEX) 600 mg 12 hr tablet    benzonatate (TESSALON) 100 MG capsule      Symptomatic care discussed   Report to ER if symptoms worsen    Follow-up if symptoms worsen or fail to improve.        Parts of this note was dictated using voice recognition software. Please excuse any grammatical or typographical errors.

## 2019-01-02 NOTE — PATIENT INSTRUCTIONS

## 2019-02-04 ENCOUNTER — HOSPITAL ENCOUNTER (OUTPATIENT)
Dept: RADIOLOGY | Facility: HOSPITAL | Age: 64
Discharge: HOME | End: 2019-02-04
Attending: NURSE PRACTITIONER
Payer: COMMERCIAL

## 2019-02-04 ENCOUNTER — TELEPHONE (OUTPATIENT)
Dept: FAMILY MEDICINE | Facility: CLINIC | Age: 64
End: 2019-02-04

## 2019-02-04 ENCOUNTER — OFFICE VISIT (OUTPATIENT)
Dept: FAMILY MEDICINE | Facility: CLINIC | Age: 64
End: 2019-02-04
Payer: COMMERCIAL

## 2019-02-04 VITALS
HEART RATE: 61 BPM | SYSTOLIC BLOOD PRESSURE: 142 MMHG | DIASTOLIC BLOOD PRESSURE: 72 MMHG | HEIGHT: 70 IN | WEIGHT: 299 LBS | TEMPERATURE: 98 F | BODY MASS INDEX: 42.8 KG/M2

## 2019-02-04 DIAGNOSIS — S49.92XD INJURY OF LEFT SHOULDER, SUBSEQUENT ENCOUNTER: ICD-10-CM

## 2019-02-04 DIAGNOSIS — M25.512 ACUTE PAIN OF LEFT SHOULDER: ICD-10-CM

## 2019-02-04 DIAGNOSIS — S49.92XD INJURY OF LEFT SHOULDER, SUBSEQUENT ENCOUNTER: Primary | ICD-10-CM

## 2019-02-04 DIAGNOSIS — T14.8XXA BRUISING: ICD-10-CM

## 2019-02-04 PROCEDURE — 73030 X-RAY EXAM OF SHOULDER: CPT | Mod: TC,PO,LT

## 2019-02-04 PROCEDURE — 99213 OFFICE O/P EST LOW 20 MIN: CPT | Mod: S$GLB,,, | Performed by: NURSE PRACTITIONER

## 2019-02-04 PROCEDURE — 99999 PR PBB SHADOW E&M-EST. PATIENT-LVL IV: ICD-10-PCS | Mod: PBBFAC,,, | Performed by: NURSE PRACTITIONER

## 2019-02-04 PROCEDURE — 3077F PR MOST RECENT SYSTOLIC BLOOD PRESSURE >= 140 MM HG: ICD-10-PCS | Mod: CPTII,S$GLB,, | Performed by: NURSE PRACTITIONER

## 2019-02-04 PROCEDURE — 3078F DIAST BP <80 MM HG: CPT | Mod: CPTII,S$GLB,, | Performed by: NURSE PRACTITIONER

## 2019-02-04 PROCEDURE — 3078F PR MOST RECENT DIASTOLIC BLOOD PRESSURE < 80 MM HG: ICD-10-PCS | Mod: CPTII,S$GLB,, | Performed by: NURSE PRACTITIONER

## 2019-02-04 PROCEDURE — 99213 PR OFFICE/OUTPT VISIT, EST, LEVL III, 20-29 MIN: ICD-10-PCS | Mod: S$GLB,,, | Performed by: NURSE PRACTITIONER

## 2019-02-04 PROCEDURE — 3008F BODY MASS INDEX DOCD: CPT | Mod: CPTII,S$GLB,, | Performed by: NURSE PRACTITIONER

## 2019-02-04 PROCEDURE — 73030 X-RAY EXAM OF SHOULDER: CPT | Mod: 26,LT,, | Performed by: RADIOLOGY

## 2019-02-04 PROCEDURE — 99999 PR PBB SHADOW E&M-EST. PATIENT-LVL IV: CPT | Mod: PBBFAC,,, | Performed by: NURSE PRACTITIONER

## 2019-02-04 PROCEDURE — 3077F SYST BP >= 140 MM HG: CPT | Mod: CPTII,S$GLB,, | Performed by: NURSE PRACTITIONER

## 2019-02-04 PROCEDURE — 73030 XR SHOULDER COMPLETE 2 OR MORE VIEWS LEFT: ICD-10-PCS | Mod: 26,LT,, | Performed by: RADIOLOGY

## 2019-02-04 PROCEDURE — 3008F PR BODY MASS INDEX (BMI) DOCUMENTED: ICD-10-PCS | Mod: CPTII,S$GLB,, | Performed by: NURSE PRACTITIONER

## 2019-02-04 RX ORDER — CYCLOBENZAPRINE HCL 10 MG
10 TABLET ORAL 3 TIMES DAILY PRN
Qty: 30 TABLET | Refills: 0 | Status: SHIPPED | OUTPATIENT
Start: 2019-02-04 | End: 2019-02-14

## 2019-02-04 RX ORDER — DICLOFENAC SODIUM 75 MG/1
75 TABLET, DELAYED RELEASE ORAL 2 TIMES DAILY
Qty: 28 TABLET | Refills: 0 | Status: SHIPPED | OUTPATIENT
Start: 2019-02-04 | End: 2019-02-18

## 2019-02-04 NOTE — PATIENT INSTRUCTIONS
No lifting, strenuous activity x 1 w  Alternate ice and heat to shoulder  Ice to bruised area  Consider MRI, orthopedics  Report to ER immediately if symptoms worsen

## 2019-02-04 NOTE — TELEPHONE ENCOUNTER
----- Message from Salome Arana NP sent at 2/4/2019  2:02 PM CST -----  Xray shows degenerative changes, possible calcifications. No evidence of fracture or dislocation. Recommend MRI shoulder for further evaluation.

## 2019-02-04 NOTE — PROGRESS NOTES
Subjective:       Patient ID: Lazarus Zamudio is a 63 y.o. male.    Chief Complaint: Shoulder Injury (left )    Shoulder Injury    The incident occurred at work. The left shoulder is affected. The incident occurred 2 days ago. The injury mechanism was overhead work. The quality of the pain is described as aching (pulling ). The pain does not radiate. The pain is moderate. Pertinent negatives include no chest pain, muscle weakness, numbness or tingling. Associated symptoms comments: Bruising to left upper arm. Nothing aggravates the symptoms. He has tried nothing (Declines medication for pain) for the symptoms. The treatment provided no relief.     Past Medical History:   Diagnosis Date    Diabetes mellitus, type 2     Hypertension     Morbid obesity      Social History     Socioeconomic History    Marital status:      Spouse name: Not on file    Number of children: Not on file    Years of education: Not on file    Highest education level: Not on file   Social Needs    Financial resource strain: Not on file    Food insecurity - worry: Not on file    Food insecurity - inability: Not on file    Transportation needs - medical: Not on file    Transportation needs - non-medical: Not on file   Occupational History     Employer:  [employer]   Tobacco Use    Smoking status: Never Smoker    Smokeless tobacco: Never Used   Substance and Sexual Activity    Alcohol use: No    Drug use: No    Sexual activity: Yes     Partners: Female   Other Topics Concern    Not on file   Social History Narrative    . 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     Past Surgical History:   Procedure Laterality Date    HERNIA REPAIR      KNEE ARTHROSCOPY W/ DEBRIDEMENT         Review of Systems   Constitutional: Negative.    HENT: Negative.    Eyes: Negative.    Respiratory: Negative.    Cardiovascular: Negative.  Negative for chest pain.   Gastrointestinal: Negative.    Endocrine: Negative.    Genitourinary: Negative.     Musculoskeletal: Positive for arthralgias (left shoulder injury, pain).   Skin: Negative.    Allergic/Immunologic: Negative.    Neurological: Negative.  Negative for tingling and numbness.   Psychiatric/Behavioral: Negative.        Objective:      Physical Exam   Constitutional: He is oriented to person, place, and time. He appears well-developed and well-nourished.   HENT:   Head: Normocephalic.   Right Ear: External ear normal.   Left Ear: External ear normal.   Mouth/Throat: Oropharynx is clear and moist.   Eyes: Conjunctivae are normal. Pupils are equal, round, and reactive to light.   Neck: Normal range of motion. Neck supple.   Cardiovascular: Normal rate, regular rhythm and normal heart sounds.   Pulmonary/Chest: Effort normal and breath sounds normal.   Abdominal: Soft. Bowel sounds are normal.   Musculoskeletal: Normal range of motion.        Left shoulder: He exhibits pain. He exhibits normal range of motion, no tenderness, no bony tenderness, no swelling, no effusion, no crepitus, no deformity, no laceration, no spasm, normal pulse and normal strength.        Arms:  Neurological: He is alert and oriented to person, place, and time.   Skin: Skin is warm and dry. Capillary refill takes 2 to 3 seconds.   Psychiatric: He has a normal mood and affect. His behavior is normal. Judgment and thought content normal.   Nursing note and vitals reviewed.      Assessment:       1. Injury of left shoulder, subsequent encounter    2. Acute pain of left shoulder    3. Bruising        Plan:           Lazarus was seen today for shoulder injury.    Diagnoses and all orders for this visit:    Injury of left shoulder, subsequent encounter  Acute pain of left shoulder  Bruising  -     X-Ray Shoulder 2 or More Views Left; Future  -     cyclobenzaprine (FLEXERIL) 10 MG tablet; Take 1 tablet (10 mg total) by mouth 3 (three) times daily as needed.  -     diclofenac (VOLTAREN) 75 MG EC tablet; Take 1 tablet (75 mg total) by mouth  2 (two) times daily. for 14 days  No lifting, strenuous activity x 1 w  Alternate ice and heat to shoulder  Ice to bruised area  Consider MRI, orthopedics  Report to ER immediately if symptoms worsen

## 2019-02-11 ENCOUNTER — HOSPITAL ENCOUNTER (OUTPATIENT)
Dept: RADIOLOGY | Facility: HOSPITAL | Age: 64
Discharge: HOME | End: 2019-02-11
Attending: NURSE PRACTITIONER
Payer: COMMERCIAL

## 2019-02-11 ENCOUNTER — TELEPHONE (OUTPATIENT)
Dept: FAMILY MEDICINE | Facility: CLINIC | Age: 64
End: 2019-02-11

## 2019-02-11 DIAGNOSIS — S49.92XD INJURY OF LEFT SHOULDER, SUBSEQUENT ENCOUNTER: ICD-10-CM

## 2019-02-11 DIAGNOSIS — M25.512 LEFT SHOULDER PAIN, UNSPECIFIED CHRONICITY: Primary | ICD-10-CM

## 2019-02-11 PROCEDURE — 73221 MRI JOINT UPR EXTREM W/O DYE: CPT | Mod: TC,PO,LT

## 2019-02-11 PROCEDURE — 73221 MRI SHOULDER WITHOUT CONTRAST LEFT: ICD-10-PCS | Mod: 26,LT,, | Performed by: RADIOLOGY

## 2019-02-11 PROCEDURE — 73221 MRI JOINT UPR EXTREM W/O DYE: CPT | Mod: 26,LT,, | Performed by: RADIOLOGY

## 2019-02-11 NOTE — TELEPHONE ENCOUNTER
----- Message from Salome Arana NP sent at 2/11/2019 12:46 PM CST -----  Study incomplete, however possible tendinosis or tear. Recommend orthopedics.

## 2019-02-12 ENCOUNTER — TELEPHONE (OUTPATIENT)
Dept: ORTHOPEDICS | Facility: CLINIC | Age: 64
End: 2019-02-12

## 2019-02-13 ENCOUNTER — OFFICE VISIT (OUTPATIENT)
Dept: ORTHOPEDICS | Facility: CLINIC | Age: 64
End: 2019-02-13
Payer: COMMERCIAL

## 2019-02-13 VITALS — WEIGHT: 299 LBS | HEIGHT: 70 IN | BODY MASS INDEX: 42.8 KG/M2

## 2019-02-13 DIAGNOSIS — S46.112A LABRAL TEAR OF LONG HEAD OF LEFT BICEPS TENDON, INITIAL ENCOUNTER: Primary | ICD-10-CM

## 2019-02-13 PROCEDURE — 99243 PR OFFICE CONSULTATION,LEVEL III: ICD-10-PCS | Mod: S$GLB,,, | Performed by: NURSE PRACTITIONER

## 2019-02-13 PROCEDURE — 99243 OFF/OP CNSLTJ NEW/EST LOW 30: CPT | Mod: S$GLB,,, | Performed by: NURSE PRACTITIONER

## 2019-02-13 PROCEDURE — 99999 PR PBB SHADOW E&M-EST. PATIENT-LVL III: ICD-10-PCS | Mod: PBBFAC,,, | Performed by: NURSE PRACTITIONER

## 2019-02-13 PROCEDURE — 99999 PR PBB SHADOW E&M-EST. PATIENT-LVL III: CPT | Mod: PBBFAC,,, | Performed by: NURSE PRACTITIONER

## 2019-02-13 RX ORDER — PIROXICAM 20 MG/1
CAPSULE ORAL
Refills: 0 | COMMUNITY
Start: 2019-01-15 | End: 2020-04-29

## 2019-02-13 NOTE — LETTER
February 13, 2019      [place]  [id number] [facility] [address]           [place] [facility]  [address] [facility] [address]  Phone: [phone]          Patient: Lazarus Zamudio   MR Number: [id number]   YOB: 1955   Date of Visit: 2/13/2019       Dear Salome Arana:    Thank you for referring Lazarus Zamudio to me for evaluation. Attached you will find relevant portions of my assessment and plan of care.    If you have questions, please do not hesitate to call me. I look forward to following Lazarus Zamudio along with you.    Sincerely,    Bruna Hinds, APRN    Enclosure  CC:  No Recipients    If you would like to receive this communication electronically, please contact [email or web address] or [phone] to request more information on [employer] Link access.    For providers and/or their staff who would like to refer a patient to [facility], please contact us through our one-stop-shop provider referral line, [place] [facility], at [phone].    If you feel you have received this communication in error or would no longer like to receive these types of communications, please e-mail [email or web address]

## 2019-02-13 NOTE — PROGRESS NOTES
DATE: 2/13/2019  PATIENT: Lazarus Zamudio  REFERRING MD:   CHIEF COMPLAINT:   Chief Complaint   Patient presents with    Left Shoulder - Pain       HISTORY:  Lazarus Zamudio is a 63 y.o. male  who presents for initial evaluation of his left shoulder pain, he is right hand dominant.  He is a new patient to me referred by Salome Arana NP for orthopedic evaluation.  He reports he was picking up a marble slab and felt a pop in his shoulder.  He noted bruising 2 days later.  He saw primary care and had xray and MRI which revealed a torn long head of the biceps tendon.  The MRI could not be fully completed as he was in too much pain to lay with his arm straight.  He has pain with flexion of his forearm and supination.  He reports ice makes the pain worse but heat provides moderate relief.  Patient's spouse is in attendance today and participating in the history portion of the exam.  He would like to avoid surgery.  He is retired but still working and does a lot of wrist repetitive motions.        PAST MEDICAL/SURGICAL HISTORY:  Past Medical History:   Diagnosis Date    Diabetes mellitus, type 2     Hypertension     Morbid obesity      Past Surgical History:   Procedure Laterality Date    HERNIA REPAIR      KNEE ARTHROSCOPY W/ DEBRIDEMENT         Current Medications:   Current Outpatient Medications:     aspirin (ECOTRIN) 81 MG EC tablet, Take 81 mg by mouth once daily.  , Disp: , Rfl:     cyclobenzaprine (FLEXERIL) 10 MG tablet, Take 1 tablet (10 mg total) by mouth 3 (three) times daily as needed., Disp: 30 tablet, Rfl: 0    diclofenac (VOLTAREN) 75 MG EC tablet, Take 1 tablet (75 mg total) by mouth 2 (two) times daily. for 14 days, Disp: 28 tablet, Rfl: 0    enalapril (VASOTEC) 20 MG tablet, TAKE 1 TABLET (20 MG TOTAL) BY MOUTH ONCE DAILY., Disp: 90 tablet, Rfl: 3    metFORMIN (GLUCOPHAGE) 500 MG tablet, TAKE 2 TABLETS TWICE A DAY WITH FOOD, Disp: 360 tablet, Rfl: 3    multivitamin with  "minerals tablet, Take 1 tablet by mouth once daily., Disp: , Rfl:     testosterone cypionate (DEPOTESTOTERONE CYPIONATE) 100 mg/mL injection, Inject 200 mg into the muscle every 14 (fourteen) days., Disp: , Rfl:     Family History: family history was reviewed and is noncontributory  Social History:   Social History     Socioeconomic History    Marital status:      Spouse name: Not on file    Number of children: Not on file    Years of education: Not on file    Highest education level: Not on file   Social Needs    Financial resource strain: Not on file    Food insecurity - worry: Not on file    Food insecurity - inability: Not on file    Transportation needs - medical: Not on file    Transportation needs - non-medical: Not on file   Occupational History     Employer:  [employer]   Tobacco Use    Smoking status: Never Smoker    Smokeless tobacco: Never Used   Substance and Sexual Activity    Alcohol use: No    Drug use: No    Sexual activity: Yes     Partners: Female   Other Topics Concern    Not on file   Social History Narrative    . 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       ROS:  Constitution: Negative for chills, fever, and sweats. Negative for unexplained weight loss.  Eyes: no redness, no discharge  Ears: no ear pain or tinnitus  Cardiovascular: Negative for chest pain, irregular heartbeat, leg swelling and palpitations.   Respiratory: Negative for cough and shortness of breath.   Gastrointestinal: Negative for abdominal pain, nausea and vomiting.   Genitourinary: Negative for bladder incontinence and dysuria.   Neurological: Negative for numbness.   Psychiatric/Behavioral: Negative for behavior changes.   Endocrine: Negative for palpitations.   Hematologic/Lymphatic: Negative for bleeding disorders.  Skin: Negative for pruritis or rash.       PHYSICAL EXAM:  Ht 5' 10" (1.778 m)   Wt 135.6 kg (299 lb)   BMI 42.90 kg/m²   Lazarus Zamudio is a well developed, well nourished male in no " acute distress. Physical examination of the left shoulder evaluated the following:    Inspection, palpation and ROM of the cervical spine  Disc compression testing bilaterally  Inspection for swelling, ecchymosis, erythema, deformity and atrophy  Tenderness to palpation of the soft tissue and bony structures  Active and passive range of motion  Sensation of the shoulder and upper extremity  Motor strength in the deltoid, supraspinatus, internal rotators and external rotators  Impingement, apprehension, relocation and Speed's tests  Upper extremity vascular exam (skin temp,color, capillary refill)  Inspection for pseudomotor signs    Remarkable findings included:  No edema, effusion, resolving ecchymosis about the inner upper arm,  no obvious deformity  Nontender to palpation   ROM full   Strength 5/5 with resisted flexion of elbow and supination  No gross instability  Sensation intact  Skin warm, dry, intact      IMAGING:   X-ray obtained Left shoulder performed 02/04/19 and MRI 02/11/19 personally reviewed with patient. Radiologist report as follows:   Degenerative changes at the acromioclavicular joint.  Glenohumeral joint is intact without evidence of fracture or dislocation.  There appears to be amorphous appearing calcification projecting along the inferior aspect of the glenohumeral articulation.  Soft tissues are normal.    MRI   The patient was unable to tolerate the examination and elected to terminate the study prematurely. Single axial sequence was obtained. Study is largely nondiagnostic.    The long head of the biceps tendon appears to be torn. There is increased signal noted at the insertion of the anterior leading edge of the supraspinatus which may represent tendinosis or tear. Probable subscapularis tendinosis. Some AC joint   arthrosis noted.     ASSESSMENT:   Left shoulder pain, tear of long head of biceps, DOI 01/31/19    PLAN:  The nature of the diagnosis, using models and diagrams when  appropriate, was explained to the patient and his wife in detail. Treatment option discussed included non-operative measures of rest,  modification of activities, over the counter pain/antiinflammatory relief and physica/occupational therapy.  More aggressive treatment options include referal to Dr Monsalve.  All questions answered and the patient wishes to proceed today with a referral to physical therapy, he has used [facility] in the past and would like to use them again.  He will follow up after 2 weeks of therapy to check on his progress and for reevaluation.  Call if no improvement or worsening of symptoms.

## 2019-03-04 ENCOUNTER — OFFICE VISIT (OUTPATIENT)
Dept: ORTHOPEDICS | Facility: CLINIC | Age: 64
End: 2019-03-04
Payer: COMMERCIAL

## 2019-03-04 VITALS — WEIGHT: 299 LBS | BODY MASS INDEX: 42.8 KG/M2 | HEIGHT: 70 IN

## 2019-03-04 DIAGNOSIS — S46.112A LABRAL TEAR OF LONG HEAD OF LEFT BICEPS TENDON, INITIAL ENCOUNTER: Primary | ICD-10-CM

## 2019-03-04 PROCEDURE — 99999 PR PBB SHADOW E&M-EST. PATIENT-LVL II: CPT | Mod: PBBFAC,,, | Performed by: NURSE PRACTITIONER

## 2019-03-04 PROCEDURE — 99212 PR OFFICE/OUTPT VISIT, EST, LEVL II, 10-19 MIN: ICD-10-PCS | Mod: S$GLB,,, | Performed by: NURSE PRACTITIONER

## 2019-03-04 PROCEDURE — 99999 PR PBB SHADOW E&M-EST. PATIENT-LVL II: ICD-10-PCS | Mod: PBBFAC,,, | Performed by: NURSE PRACTITIONER

## 2019-03-04 PROCEDURE — 99212 OFFICE O/P EST SF 10 MIN: CPT | Mod: S$GLB,,, | Performed by: NURSE PRACTITIONER

## 2019-03-04 NOTE — PROGRESS NOTES
"DATE: 3/4/2019  PATIENT: Lazarus Zamudio  REFERRING MD:   CHIEF COMPLAINT:   Chief Complaint   Patient presents with    Left Shoulder - Pain       HISTORY:  Lazarus Zamudio is a 63 y.o. male  who presents for follow up evaluation of his left shoulder pain, he is right hand dominant.  He reports no pain today, 0/10, he has finished his formal therapy.  He reports the swelling or "knot" in his shoulder has resolved.        (previous note)  He is a new patient to me referred by Salome Arana NP for orthopedic evaluation.  He reports he was picking up a marble slab and felt a pop in his shoulder.  He noted bruising 2 days later.  He saw primary care and had xray and MRI which revealed a torn long head of the biceps tendon.  The MRI could not be fully completed as he was in too much pain to lay with his arm straight.  He has pain with flexion of his forearm and supination.  He reports ice makes the pain worse but heat provides moderate relief.  Patient's spouse is in attendance today and participating in the history portion of the exam.  He would like to avoid surgery.  He is retired but still working and does a lot of wrist repetitive motions.        PAST MEDICAL/SURGICAL HISTORY:  Past Medical History:   Diagnosis Date    Diabetes mellitus, type 2     Hypertension     Morbid obesity      Past Surgical History:   Procedure Laterality Date    HERNIA REPAIR      KNEE ARTHROSCOPY W/ DEBRIDEMENT         Current Medications:   Current Outpatient Medications:     aspirin (ECOTRIN) 81 MG EC tablet, Take 81 mg by mouth once daily.  , Disp: , Rfl:     enalapril (VASOTEC) 20 MG tablet, TAKE 1 TABLET (20 MG TOTAL) BY MOUTH ONCE DAILY., Disp: 90 tablet, Rfl: 3    metFORMIN (GLUCOPHAGE) 500 MG tablet, TAKE 2 TABLETS TWICE A DAY WITH FOOD, Disp: 360 tablet, Rfl: 3    multivitamin with minerals tablet, Take 1 tablet by mouth once daily., Disp: , Rfl:     piroxicam (FELDENE) 20 MG capsule, TAKE 1 CAPSULE " "EVERY DAY WITH FOOD, Disp: , Rfl: 0    testosterone cypionate (DEPOTESTOTERONE CYPIONATE) 100 mg/mL injection, Inject 200 mg into the muscle every 14 (fourteen) days., Disp: , Rfl:     Family History: family history was reviewed and is noncontributory  Social History:   Social History     Socioeconomic History    Marital status:      Spouse name: Not on file    Number of children: Not on file    Years of education: Not on file    Highest education level: Not on file   Social Needs    Financial resource strain: Not on file    Food insecurity - worry: Not on file    Food insecurity - inability: Not on file    Transportation needs - medical: Not on file    Transportation needs - non-medical: Not on file   Occupational History     Employer:  [employer]   Tobacco Use    Smoking status: Never Smoker    Smokeless tobacco: Never Used   Substance and Sexual Activity    Alcohol use: No    Drug use: No    Sexual activity: Yes     Partners: Female   Other Topics Concern    Not on file   Social History Narrative    . 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       ROS:  Constitution: Negative for chills, fever, and sweats. Negative for unexplained weight loss.  Eyes: no redness, no discharge  Ears: no ear pain or tinnitus  Cardiovascular: Negative for chest pain, irregular heartbeat, leg swelling and palpitations.   Respiratory: Negative for cough and shortness of breath.   Gastrointestinal: Negative for abdominal pain, nausea and vomiting.   Genitourinary: Negative for bladder incontinence and dysuria.   Neurological: Negative for numbness.   Psychiatric/Behavioral: Negative for behavior changes.   Endocrine: Negative for palpitations.   Hematologic/Lymphatic: Negative for bleeding disorders.  Skin: Negative for pruritis or rash.       PHYSICAL EXAM:  Ht 5' 10" (1.778 m)   Wt 135.6 kg (299 lb)   BMI 42.90 kg/m²   Lazarus Zamudio is a well developed, well nourished male in no acute distress. Physical " examination of the left shoulder evaluated the following:    Inspection, palpation and ROM of the cervical spine  Disc compression testing bilaterally  Inspection for swelling, ecchymosis, erythema, deformity and atrophy  Tenderness to palpation of the soft tissue and bony structures  Active and passive range of motion  Sensation of the shoulder and upper extremity  Motor strength in the deltoid, supraspinatus, internal rotators and external rotators  Impingement, apprehension, relocation and Speed's tests  Upper extremity vascular exam (skin temp,color, capillary refill)  Inspection for pseudomotor signs    Remarkable findings included:  No edema, effusion, resolving ecchymosis about the inner upper arm,  no obvious deformity  Nontender to palpation   ROM full   Strength 5/5 with resisted flexion of elbow and supination  No gross instability  Sensation intact  Skin warm, dry, intact      IMAGING:   X-ray obtained Left shoulder performed 02/04/19 and MRI 02/11/19 personally reviewed with patient. Radiologist report as follows:   Degenerative changes at the acromioclavicular joint.  Glenohumeral joint is intact without evidence of fracture or dislocation.  There appears to be amorphous appearing calcification projecting along the inferior aspect of the glenohumeral articulation.  Soft tissues are normal.    MRI   The patient was unable to tolerate the examination and elected to terminate the study prematurely. Single axial sequence was obtained. Study is largely nondiagnostic.    The long head of the biceps tendon appears to be torn. There is increased signal noted at the insertion of the anterior leading edge of the supraspinatus which may represent tendinosis or tear. Probable subscapularis tendinosis. Some AC joint   arthrosis noted.     ASSESSMENT:   Resolved Left shoulder pain, tear of long head of biceps, DOI 01/31/19    PLAN:  The nature of the diagnosis, using models and diagrams when appropriate, was  explained to the patient and in detail.  Mr Zamudio's initial symptoms have completely resolved, he is happy with his outcome from formal therapy.  He may continue exercises at home. Return to clinic as needed.

## 2019-05-30 ENCOUNTER — TELEPHONE (OUTPATIENT)
Dept: FAMILY MEDICINE | Facility: CLINIC | Age: 64
End: 2019-05-30

## 2019-05-30 DIAGNOSIS — Z12.5 PROSTATE CANCER SCREENING: ICD-10-CM

## 2019-05-30 DIAGNOSIS — Z00.00 ROUTINE ADULT HEALTH MAINTENANCE: Primary | ICD-10-CM

## 2019-05-30 NOTE — TELEPHONE ENCOUNTER
----- Message from Tramaine Champagne sent at 5/30/2019 11:21 AM CDT -----  ..Type:  Patient Returning Call    Who Called:pt  Who Left Message for Patient:Kathe  Does the patient know what this is regarding?:orders for labs   Would the patient rather a call back or a response via [facility]? Call back   Best Call Back Number:[phone]  Additional Information: pt is requesting orders for labs .

## 2019-07-06 LAB
ALBUMIN SERPL-MCNC: 4.4 G/DL (ref 3.6–5.1)
ALBUMIN/GLOB SERPL: 1.8 (CALC) (ref 1–2.5)
ALP SERPL-CCNC: 73 U/L (ref 40–115)
ALT SERPL-CCNC: 26 U/L (ref 9–46)
AST SERPL-CCNC: 20 U/L (ref 10–35)
BASOPHILS # BLD AUTO: 59 CELLS/UL (ref 0–200)
BASOPHILS NFR BLD AUTO: 0.9 %
BILIRUB SERPL-MCNC: 0.4 MG/DL (ref 0.2–1.2)
BUN SERPL-MCNC: 23 MG/DL (ref 7–25)
BUN/CREAT SERPL: ABNORMAL (CALC) (ref 6–22)
CALCIUM SERPL-MCNC: 9.8 MG/DL (ref 8.6–10.3)
CHLORIDE SERPL-SCNC: 100 MMOL/L (ref 98–110)
CHOLEST SERPL-MCNC: 159 MG/DL
CHOLEST/HDLC SERPL: 3.5 (CALC)
CO2 SERPL-SCNC: 30 MMOL/L (ref 20–32)
CREAT SERPL-MCNC: 1.14 MG/DL (ref 0.7–1.25)
EOSINOPHIL # BLD AUTO: 132 CELLS/UL (ref 15–500)
EOSINOPHIL NFR BLD AUTO: 2 %
ERYTHROCYTE [DISTWIDTH] IN BLOOD BY AUTOMATED COUNT: 15.4 % (ref 11–15)
GFRSERPLBLD MDRD-ARVRAT: 67 ML/MIN/1.73M2
GLOBULIN SER CALC-MCNC: 2.5 G/DL (CALC) (ref 1.9–3.7)
GLUCOSE SERPL-MCNC: 131 MG/DL (ref 65–99)
HCT VFR BLD AUTO: 48.2 % (ref 38.5–50)
HDLC SERPL-MCNC: 45 MG/DL
HGB BLD-MCNC: 15.3 G/DL (ref 13.2–17.1)
LDLC SERPL CALC-MCNC: 100 MG/DL (CALC)
LYMPHOCYTES # BLD AUTO: 2482 CELLS/UL (ref 850–3900)
LYMPHOCYTES NFR BLD AUTO: 37.6 %
MCH RBC QN AUTO: 25 PG (ref 27–33)
MCHC RBC AUTO-ENTMCNC: 31.7 G/DL (ref 32–36)
MCV RBC AUTO: 78.9 FL (ref 80–100)
MONOCYTES # BLD AUTO: 772 CELLS/UL (ref 200–950)
MONOCYTES NFR BLD AUTO: 11.7 %
NEUTROPHILS # BLD AUTO: 3155 CELLS/UL (ref 1500–7800)
NEUTROPHILS NFR BLD AUTO: 47.8 %
NONHDLC SERPL-MCNC: 114 MG/DL (CALC)
PLATELET # BLD AUTO: 177 THOUSAND/UL (ref 140–400)
PMV BLD REES-ECKER: 9 FL (ref 7.5–12.5)
POTASSIUM SERPL-SCNC: 4.9 MMOL/L (ref 3.5–5.3)
PROT SERPL-MCNC: 6.9 G/DL (ref 6.1–8.1)
PSA SERPL-MCNC: 0.7 NG/ML
RBC # BLD AUTO: 6.11 MILLION/UL (ref 4.2–5.8)
SODIUM SERPL-SCNC: 136 MMOL/L (ref 135–146)
TRIGL SERPL-MCNC: 56 MG/DL
TSH SERPL-ACNC: 2.89 MIU/L (ref 0.4–4.5)
WBC # BLD AUTO: 6.6 THOUSAND/UL (ref 3.8–10.8)

## 2019-07-08 ENCOUNTER — TELEPHONE (OUTPATIENT)
Dept: FAMILY MEDICINE | Facility: CLINIC | Age: 64
End: 2019-07-08

## 2019-07-08 DIAGNOSIS — R73.01 ELEVATED FASTING GLUCOSE: Primary | ICD-10-CM

## 2019-07-08 NOTE — TELEPHONE ENCOUNTER
----- Message from Lazarus Colindres MD sent at 7/7/2019  9:00 AM CDT -----  PSA nl .7 sugar sl el if fasting but at some point we need to repeat a1c  kidney liver cholesterol and thyroid tests normal

## 2019-07-09 LAB — HBA1C MFR BLD: 6.6 % OF TOTAL HGB

## 2019-07-09 RX ORDER — METFORMIN HYDROCHLORIDE 500 MG/1
TABLET ORAL
Qty: 360 TABLET | Refills: 3 | Status: SHIPPED | OUTPATIENT
Start: 2019-07-09 | End: 2020-07-09

## 2019-07-09 RX ORDER — ENALAPRIL MALEATE 20 MG/1
20 TABLET ORAL DAILY
Qty: 90 TABLET | Refills: 3 | Status: SHIPPED | OUTPATIENT
Start: 2019-07-09 | End: 2020-07-09

## 2019-10-23 NOTE — TELEPHONE ENCOUNTER
Clear yellow drainage noted. Specimens obtained from lab as ordered.    Pt requested MRI to be done at [facility].

## 2020-01-06 ENCOUNTER — OFFICE VISIT (OUTPATIENT)
Dept: FAMILY MEDICINE | Facility: CLINIC | Age: 65
End: 2020-01-06
Payer: COMMERCIAL

## 2020-01-06 VITALS
WEIGHT: 301 LBS | BODY MASS INDEX: 43.09 KG/M2 | TEMPERATURE: 98 F | HEIGHT: 70 IN | DIASTOLIC BLOOD PRESSURE: 69 MMHG | SYSTOLIC BLOOD PRESSURE: 135 MMHG | HEART RATE: 68 BPM

## 2020-01-06 DIAGNOSIS — Z00.00 ROUTINE CHECK-UP: Primary | ICD-10-CM

## 2020-01-06 PROCEDURE — 99999 PR PBB SHADOW E&M-EST. PATIENT-LVL III: CPT | Mod: PBBFAC,,, | Performed by: FAMILY MEDICINE

## 2020-01-06 PROCEDURE — 99397 PER PM REEVAL EST PAT 65+ YR: CPT | Mod: S$PBB,,, | Performed by: FAMILY MEDICINE

## 2020-01-06 PROCEDURE — 99213 OFFICE O/P EST LOW 20 MIN: CPT | Mod: PBBFAC,PO | Performed by: FAMILY MEDICINE

## 2020-01-06 PROCEDURE — 99999 PR PBB SHADOW E&M-EST. PATIENT-LVL III: ICD-10-PCS | Mod: PBBFAC,,, | Performed by: FAMILY MEDICINE

## 2020-01-06 PROCEDURE — 99397 PR PREVENTIVE VISIT,EST,65 & OVER: ICD-10-PCS | Mod: S$PBB,,, | Performed by: FAMILY MEDICINE

## 2020-01-06 RX ORDER — MONTELUKAST SODIUM 10 MG/1
TABLET ORAL
COMMUNITY
End: 2020-04-29

## 2020-01-06 RX ORDER — HYDROCODONE BITARTRATE AND ACETAMINOPHEN 7.5; 325 MG/1; MG/1
TABLET ORAL
COMMUNITY
End: 2020-04-29

## 2020-01-06 NOTE — PROGRESS NOTES
The patient presents today for general health evaluation and counseling      Past Medical History:  Past Medical History:   Diagnosis Date    Diabetes mellitus, type 2     Hypertension     Morbid obesity      Past Surgical History:   Procedure Laterality Date    HERNIA REPAIR      KNEE ARTHROSCOPY W/ DEBRIDEMENT       Review of patient's allergies indicates:   Allergen Reactions    Penicillins      Current Outpatient Medications on File Prior to Visit   Medication Sig Dispense Refill    aspirin (ECOTRIN) 81 MG EC tablet Take 81 mg by mouth once daily.        enalapril (VASOTEC) 20 MG tablet TAKE 1 TABLET (20 MG TOTAL) BY MOUTH ONCE DAILY. 90 tablet 3    metFORMIN (GLUCOPHAGE) 500 MG tablet TAKE 2 TABLETS TWICE A DAY WITH FOOD 360 tablet 3    multivitamin with minerals tablet Take 1 tablet by mouth once daily.      piroxicam (FELDENE) 20 MG capsule TAKE 1 CAPSULE EVERY DAY WITH FOOD  0    testosterone cypionate (DEPOTESTOTERONE CYPIONATE) 100 mg/mL injection Inject 200 mg into the muscle every 14 (fourteen) days.       No current facility-administered medications on file prior to visit.      Social History     Socioeconomic History    Marital status:      Spouse name: Not on file    Number of children: Not on file    Years of education: Not on file    Highest education level: Not on file   Occupational History     Employer:  [employer]   Social Needs    Financial resource strain: Not on file    Food insecurity:     Worry: Not on file     Inability: Not on file    Transportation needs:     Medical: Not on file     Non-medical: Not on file   Tobacco Use    Smoking status: Never Smoker    Smokeless tobacco: Never Used   Substance and Sexual Activity    Alcohol use: No    Drug use: No    Sexual activity: Yes     Partners: Female   Lifestyle    Physical activity:     Days per week: Not on file     Minutes per session: Not on file    Stress: Not on file   Relationships    Social connections:      Talks on phone: Not on file     Gets together: Not on file     Attends Oriental orthodox service: Not on file     Active member of club or organization: Not on file     Attends meetings of clubs or organizations: Not on file     Relationship status: Not on file   Other Topics Concern    Not on file   Social History Narrative    . 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     Family History   Problem Relation Age of Onset    Heart disease Mother     Diabetes Father     Heart disease Father     Hypertension Father     Diabetes Maternal Grandmother     Cancer Maternal Grandfather          ROS:GENERAL: No fever, chills, fatigability or weight loss.  SKIN: No rashes, itching or changes in color or texture of skin.  HEAD: No headaches or recent head trauma.EYES: Visual acuity fine. No photophobia, ocular pain or diplopia.EARS: Denies ear pain, discharge or vertigo.NOSE: No loss of smell, no epistaxis or postnasal drip.MOUTH & THROAT: No hoarseness or change in voice. No excessive gum bleeding.NODES: Denies swollen glands.  CHEST: Denies BATRES, cyanosis, wheezing, cough and sputum production.  CARDIOVASCULAR: Denies chest pain, PND, orthopnea or reduced exercise tolerance.  ABDOMEN: Appetite fine. No weight loss. Denies diarrhea, abdominal pain, hematemesis or blood in stool.  URINARY: No flank pain, dysuria or hematuria.  PERIPHERAL VASCULAR: No claudication or cyanosis.  MUSCULOSKELETAL: See above.  NEUROLOGIC: No history of seizures, paralysis, alteration of gait or coordination.  PE:    HEAD: Normocephalic, atraumatic.EYES: PERRL. EOMI.   EARS: TM's intact. Light reflex normal. No retraction or perforation.   NOSE: Mucosa pink. Airway clear.MOUTH & THROAT: No tonsillar enlargement. No pharyngeal erythema or exudate. No stridor.  NODES: No cervical, axillary or inguinal lymph node enlargement.  CHEST: Lungs clear to auscultation.  CARDIOVASCULAR: Normal S1, S2. No rubs, murmurs or gallops.  ABDOMEN: Bowel sounds normal. Not  distended. Soft. No tenderness or masses.  MUSCULOSKELETAL: No palpable abnormality  NEUROLOGIC: Cranial Nerves: II-XII grossly intact.  Motor: 5/5 strength major flexors/extensors.  DTR's: Knees, Ankles 2+ and equal bilaterally; downgoing toes.  Sensory: Intact to light touch distally.  Gait & Posture: Normal gait and fine motion. No cerebellar signs.     Impression:Routine health check  Plan:Lab eval  Rec diet and ex recs  Rev age appropriate screenings    Health Maintenance Due   Topic Date Due    Pneumococcal Vaccine (65+ Low/Medium Risk) (1 of 2 - PCV13) 03/27/2019

## 2020-04-28 ENCOUNTER — TELEPHONE (OUTPATIENT)
Dept: FAMILY MEDICINE | Facility: CLINIC | Age: 65
End: 2020-04-28

## 2020-04-28 NOTE — TELEPHONE ENCOUNTER
Returned call to patient, patient states that he stepped on a nail this morning and needs a tetanus shot, an appointment was scheduled for the patient for tomorrow morning at 8 am with Salome Arana NP.  Patient verbalized understanding of date and time of appointment.

## 2020-04-28 NOTE — TELEPHONE ENCOUNTER
----- Message from Vee Ibanez sent at 4/28/2020 11:29 AM CDT -----  Contact: self/[phone]  Would like to consult with nurse regarding steeping on a nail, patient would like to know if he need to come in for a shot. Please call back at [phone]. Thanks/ar

## 2020-04-29 ENCOUNTER — OFFICE VISIT (OUTPATIENT)
Dept: FAMILY MEDICINE | Facility: CLINIC | Age: 65
End: 2020-04-29
Payer: MEDICARE

## 2020-04-29 VITALS
BODY MASS INDEX: 42.36 KG/M2 | HEIGHT: 70 IN | DIASTOLIC BLOOD PRESSURE: 75 MMHG | WEIGHT: 295.88 LBS | TEMPERATURE: 98 F | HEART RATE: 61 BPM | SYSTOLIC BLOOD PRESSURE: 150 MMHG

## 2020-04-29 DIAGNOSIS — T14.8XXA PUNCTURE WOUND: Primary | ICD-10-CM

## 2020-04-29 PROCEDURE — 99213 OFFICE O/P EST LOW 20 MIN: CPT | Mod: S$PBB,,, | Performed by: NURSE PRACTITIONER

## 2020-04-29 PROCEDURE — 99213 OFFICE O/P EST LOW 20 MIN: CPT | Mod: PBBFAC,PO | Performed by: NURSE PRACTITIONER

## 2020-04-29 PROCEDURE — 99999 PR PBB SHADOW E&M-EST. PATIENT-LVL III: ICD-10-PCS | Mod: PBBFAC,,, | Performed by: NURSE PRACTITIONER

## 2020-04-29 PROCEDURE — 99999 PR PBB SHADOW E&M-EST. PATIENT-LVL III: CPT | Mod: PBBFAC,,, | Performed by: NURSE PRACTITIONER

## 2020-04-29 PROCEDURE — 99213 PR OFFICE/OUTPT VISIT, EST, LEVL III, 20-29 MIN: ICD-10-PCS | Mod: S$PBB,,, | Performed by: NURSE PRACTITIONER

## 2020-04-29 RX ORDER — MUPIROCIN 20 MG/G
OINTMENT TOPICAL 3 TIMES DAILY
Qty: 22 G | Refills: 0 | Status: SHIPPED | OUTPATIENT
Start: 2020-04-29 | End: 2020-05-06

## 2020-04-29 NOTE — PROGRESS NOTES
Subjective:       Patient ID: Lazarus Zamudio is a 65 y.o. male.    Chief Complaint: Puncture Wound (left foot)    Foot Injury    The incident occurred 12 to 24 hours ago. Injury mechanism: stepped on nail yesteday; left foot. The pain is present in the left foot. The pain is at a severity of 0/10. The patient is experiencing no pain. Pertinent negatives include no inability to bear weight, loss of motion, loss of sensation, muscle weakness, numbness or tingling. He reports no foreign bodies present. Nothing aggravates the symptoms. He has tried nothing for the symptoms. The treatment provided significant relief.     Past Medical History:   Diagnosis Date    Diabetes mellitus, type 2     Hypertension     Morbid obesity      Social History     Socioeconomic History    Marital status:      Spouse name: Not on file    Number of children: Not on file    Years of education: Not on file    Highest education level: Not on file   Occupational History     Employer:  [employer]   Social Needs    Financial resource strain: Not on file    Food insecurity:     Worry: Not on file     Inability: Not on file    Transportation needs:     Medical: Not on file     Non-medical: Not on file   Tobacco Use    Smoking status: Never Smoker    Smokeless tobacco: Never Used   Substance and Sexual Activity    Alcohol use: No    Drug use: No    Sexual activity: Yes     Partners: Female   Lifestyle    Physical activity:     Days per week: Not on file     Minutes per session: Not on file    Stress: Not on file   Relationships    Social connections:     Talks on phone: Not on file     Gets together: Not on file     Attends Restorationism service: Not on file     Active member of club or organization: Not on file     Attends meetings of clubs or organizations: Not on file     Relationship status: Not on file   Other Topics Concern    Not on file   Social History Narrative    . 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     Past  Surgical History:   Procedure Laterality Date    HERNIA REPAIR      KNEE ARTHROSCOPY W/ DEBRIDEMENT         Review of Systems   Constitutional: Negative.    HENT: Negative.    Eyes: Negative.    Respiratory: Negative.    Cardiovascular: Negative.    Gastrointestinal: Negative.    Endocrine: Negative.    Genitourinary: Negative.    Musculoskeletal: Negative.    Skin: Positive for wound (Small puncture wound to left foot).   Allergic/Immunologic: Negative.    Neurological: Negative.  Negative for tingling and numbness.   Psychiatric/Behavioral: Negative.        Objective:      Physical Exam   Constitutional: He is oriented to person, place, and time. He appears well-developed and well-nourished.   HENT:   Head: Normocephalic.   Right Ear: External ear normal.   Left Ear: External ear normal.   Nose: Nose normal.   Mouth/Throat: Oropharynx is clear and moist.   Eyes: Pupils are equal, round, and reactive to light. Conjunctivae are normal.   Neck: Normal range of motion. Neck supple.   Cardiovascular: Normal rate, regular rhythm and normal heart sounds.   Pulmonary/Chest: Effort normal and breath sounds normal.   Abdominal: Soft. Bowel sounds are normal.   Musculoskeletal: Normal range of motion.        Feet:    Neurological: He is alert and oriented to person, place, and time.   Skin: Skin is warm and dry. Capillary refill takes 2 to 3 seconds.   Psychiatric: He has a normal mood and affect. His behavior is normal. Judgment and thought content normal.   Nursing note and vitals reviewed.      Assessment:       1. Puncture wound        Plan:           Lazarus was seen today for puncture wound.    Diagnoses and all orders for this visit:    Puncture wound  -     mupirocin (BACTROBAN) 2 % ointment; Apply topically 3 (three) times daily. for 7 days  Keep area clean and dry   Clean with mild antibacterial soap and water  Apply bactroban as prescribed  Tetanus UTD  Report to ER immediately if symptoms worsen

## 2020-04-29 NOTE — PATIENT INSTRUCTIONS
Keep area clean and dry   Clean with mild antibacterial soap and water  Apply bactroban as prescribed  Tetanus UTD  Report to ER immediately if symptoms worsen

## 2020-06-12 ENCOUNTER — OFFICE VISIT (OUTPATIENT)
Dept: FAMILY MEDICINE | Facility: CLINIC | Age: 65
End: 2020-06-12
Payer: MEDICARE

## 2020-06-12 VITALS
TEMPERATURE: 98 F | DIASTOLIC BLOOD PRESSURE: 80 MMHG | HEART RATE: 63 BPM | WEIGHT: 296.38 LBS | BODY MASS INDEX: 42.43 KG/M2 | HEIGHT: 70 IN | SYSTOLIC BLOOD PRESSURE: 160 MMHG

## 2020-06-12 DIAGNOSIS — T30.0 BURN: Primary | ICD-10-CM

## 2020-06-12 DIAGNOSIS — L08.9 SKIN INFECTION: ICD-10-CM

## 2020-06-12 DIAGNOSIS — I15.2 HYPERTENSION ASSOCIATED WITH DIABETES: ICD-10-CM

## 2020-06-12 DIAGNOSIS — E11.59 HYPERTENSION ASSOCIATED WITH DIABETES: ICD-10-CM

## 2020-06-12 PROCEDURE — 99214 OFFICE O/P EST MOD 30 MIN: CPT | Mod: PBBFAC,PO | Performed by: NURSE PRACTITIONER

## 2020-06-12 PROCEDURE — 99213 OFFICE O/P EST LOW 20 MIN: CPT | Mod: S$PBB,,, | Performed by: NURSE PRACTITIONER

## 2020-06-12 PROCEDURE — 99999 PR PBB SHADOW E&M-EST. PATIENT-LVL IV: ICD-10-PCS | Mod: PBBFAC,,, | Performed by: NURSE PRACTITIONER

## 2020-06-12 PROCEDURE — 99999 PR PBB SHADOW E&M-EST. PATIENT-LVL IV: CPT | Mod: PBBFAC,,, | Performed by: NURSE PRACTITIONER

## 2020-06-12 PROCEDURE — 99213 PR OFFICE/OUTPT VISIT, EST, LEVL III, 20-29 MIN: ICD-10-PCS | Mod: S$PBB,,, | Performed by: NURSE PRACTITIONER

## 2020-06-12 RX ORDER — MUPIROCIN 20 MG/G
OINTMENT TOPICAL 3 TIMES DAILY
COMMUNITY
End: 2020-08-12

## 2020-06-12 RX ORDER — SILVER SULFADIAZINE 10 G/1000G
CREAM TOPICAL DAILY
Qty: 50 G | Refills: 0 | Status: SHIPPED | OUTPATIENT
Start: 2020-06-12 | End: 2020-06-22

## 2020-06-12 RX ORDER — DOXYCYCLINE HYCLATE 100 MG
100 TABLET ORAL 2 TIMES DAILY
Qty: 14 TABLET | Refills: 0 | Status: SHIPPED | OUTPATIENT
Start: 2020-06-12 | End: 2020-06-19

## 2020-06-12 NOTE — PATIENT INSTRUCTIONS
Clean with wound /or mild antibacterial soap and water  Motrin OTC as directed  Monitor BP daily; keep log x 1 w  Return log to clinic for review  Report to ER immediately if symptoms worsen

## 2020-06-12 NOTE — PROGRESS NOTES
"Subjective:       Patient ID: Lazarus Zamudio is a 65 y.o. male.    Chief Complaint: Burn (left leg)    Burn   The incident occurred 3 to 5 days ago. The burns occurred at home. The burns occurred while working on a project. The burns were a result of contact with a hot surface. The burns are located on the left lower leg. The pain is moderate. He has tried salve for the symptoms. The treatment provided no relief.   Pt has HTN; BP elevated today. Pt states, "I just took my medicine 5 minutes before I got in here,"; states BP controlled at home. Denies CP, SOB, dizziness, HA. Pt has no other complaints today.  Past Medical History:   Diagnosis Date    Diabetes mellitus, type 2     Hypertension     Morbid obesity      Social History     Socioeconomic History    Marital status:      Spouse name: Not on file    Number of children: Not on file    Years of education: Not on file    Highest education level: Not on file   Occupational History     Employer:  [employer]   Social Needs    Financial resource strain: Not on file    Food insecurity:     Worry: Not on file     Inability: Not on file    Transportation needs:     Medical: Not on file     Non-medical: Not on file   Tobacco Use    Smoking status: Never Smoker    Smokeless tobacco: Never Used   Substance and Sexual Activity    Alcohol use: No    Drug use: No    Sexual activity: Yes     Partners: Female   Lifestyle    Physical activity:     Days per week: Not on file     Minutes per session: Not on file    Stress: Not on file   Relationships    Social connections:     Talks on phone: Not on file     Gets together: Not on file     Attends Yazidism service: Not on file     Active member of club or organization: Not on file     Attends meetings of clubs or organizations: Not on file     Relationship status: Not on file   Other Topics Concern    Not on file   Social History Narrative    . Student housing at [employer].     Past Surgical " History:   Procedure Laterality Date    HERNIA REPAIR      KNEE ARTHROSCOPY W/ DEBRIDEMENT         Review of Systems   Constitutional: Negative.    HENT: Negative.    Eyes: Negative.    Respiratory: Negative.    Cardiovascular: Negative.    Gastrointestinal: Negative.    Endocrine: Negative.    Genitourinary: Negative.    Musculoskeletal: Negative.    Skin: Positive for wound (Burn to left lower leg).   Allergic/Immunologic: Negative.    Neurological: Negative.    Psychiatric/Behavioral: Negative.        Objective:      Physical Exam   Constitutional: He is oriented to person, place, and time. He appears well-developed and well-nourished.   HENT:   Head: Normocephalic.   Right Ear: External ear normal.   Left Ear: External ear normal.   Nose: Nose normal.   Mouth/Throat: Oropharynx is clear and moist.   Eyes: Pupils are equal, round, and reactive to light. Conjunctivae are normal.   Neck: Normal range of motion. Neck supple.   Cardiovascular: Normal rate, regular rhythm and normal heart sounds.   Pulmonary/Chest: Effort normal and breath sounds normal.   Abdominal: Soft. Bowel sounds are normal.   Musculoskeletal: Normal range of motion.   Neurological: He is alert and oriented to person, place, and time.   Skin: Skin is warm and dry. Burn (left lower leg; refer to media tab photo) noted.   Psychiatric: He has a normal mood and affect. His behavior is normal. Judgment and thought content normal.   Nursing note and vitals reviewed.      Assessment:       1. Burn    2. Skin infection    3. Hypertension associated with diabetes        Plan:           Lazarus was seen today for burn.    Diagnoses and all orders for this visit:    Burn  Skin infection  -     silver sulfADIAZINE 1% (SILVADENE) 1 % cream; Apply topically once daily. for 10 days  -     doxycycline (VIBRA-TABS) 100 MG tablet; Take 1 tablet (100 mg total) by mouth 2 (two) times daily. for 7 days  TD UTD  Clean with wound /or mild antibacterial soap  and water  Motrin OTC as directed    Hypertension associated with diabetes  Continue current medication  Monitor BP daily; keep log x 1 w  Return log to clinic for review    Report to ER immediately if symptoms worsen

## 2020-07-02 ENCOUNTER — PATIENT OUTREACH (OUTPATIENT)
Dept: ADMINISTRATIVE | Facility: HOSPITAL | Age: 65
End: 2020-07-02

## 2020-07-09 RX ORDER — METFORMIN HYDROCHLORIDE 500 MG/1
TABLET ORAL
Qty: 360 TABLET | Refills: 0 | Status: SHIPPED | OUTPATIENT
Start: 2020-07-09 | End: 2020-08-12 | Stop reason: SINTOL

## 2020-07-09 RX ORDER — ENALAPRIL MALEATE 20 MG/1
20 TABLET ORAL DAILY
Qty: 90 TABLET | Refills: 0 | Status: SHIPPED | OUTPATIENT
Start: 2020-07-09 | End: 2020-10-05

## 2020-07-12 RX ORDER — ENALAPRIL MALEATE 20 MG/1
20 TABLET ORAL DAILY
Qty: 90 TABLET | Refills: 0 | Status: CANCELLED | OUTPATIENT
Start: 2020-07-12

## 2020-07-13 NOTE — TELEPHONE ENCOUNTER
Message sent to patient, meds were filled on 7/9/20 receipt confirmed by [facility], also to ask patient to change pcp at pharmacy to Dr Barrett  
no

## 2020-07-14 ENCOUNTER — PATIENT OUTREACH (OUTPATIENT)
Dept: ADMINISTRATIVE | Facility: OTHER | Age: 65
End: 2020-07-14

## 2020-08-11 NOTE — PROGRESS NOTES
This note is specifically for wellness visit performed today.     WELLNESS EXAM      Patient ID: Lazarus Zamudio is a 65 y.o. male.  has a past medical history of Diabetes mellitus, type 2, Hypertension, and Morbid obesity.     Chief Complaint:  Encounter for wellness exam    Well Adult Physical: Patient here for a comprehensive physical exam.The patient reports metformin problems.  Patient having GI upset and is intolerant to the lower dose.  August 2020:  Reviewed  Diabetes Management Status  Statin: Not taking  ACE/ARB: Taking    Screening or Prevention Patient's value Goal Complete/Controlled?   HgA1C Testing and Control   Lab Results   Component Value Date    HGBA1C 6.6 (H) 07/09/2019      Annually/Less than 8% No   Lipid profile : 07/05/2019 Annually No   LDL control Lab Results   Component Value Date    LDLCALC 100 (H) 07/05/2019    Annually/Less than 100 mg/dl  No   Nephropathy screening Lab Results   Component Value Date    LABMICR 7.0 08/20/2015     Lab Results   Component Value Date    PROTEINUA Negative 11/01/2007    Annually No   Blood pressure BP Readings from Last 1 Encounters:   08/12/20 138/88    Less than 140/90 Yes   Dilated retinal exam : 06/23/2020 Annually Yes   Foot exam   Most Recent Foot Exam Date: Not Found Annually No       Do you take any herbs or supplements that were not prescribed by a doctor? no   Are you taking calcium supplements? no      History:  Dr. Melvin for hypotestosteronism.    Date last PSA: Reviewed  Lab Results   Component Value Date    PSA 0.61 11/18/2014      No history of STDs    Health Maintenance Topics with due status: Not Due       Topic Last Completion Date    TETANUS VACCINE 10/18/2011    Colorectal Cancer Screening 08/09/2017    Eye Exam 06/23/2020    Influenza Vaccine Not Due        ==============================================  History reviewed.     Health Maintenance Due   Topic Date Due    Low Dose Statin  03/27/1975    Shingles Vaccine (1 of 2)  03/27/2004    Foot Exam  04/11/2017    Pneumococcal Vaccine (65+ Low/Medium Risk) (1 of 2 - PCV13) 03/27/2019    Hemoglobin A1c  01/09/2020    PROSTATE-SPECIFIC ANTIGEN  07/05/2020    Lipid Panel  07/05/2020    Patient refused vaccines today.    Past Medical History:  Past Medical History:   Diagnosis Date    Diabetes mellitus, type 2     Hypertension     Morbid obesity      Past Surgical History:   Procedure Laterality Date    HERNIA REPAIR      KNEE ARTHROSCOPY W/ DEBRIDEMENT       Review of patient's allergies indicates:   Allergen Reactions    Metformin     Penicillins      Current Outpatient Medications on File Prior to Visit   Medication Sig Dispense Refill    aspirin (ECOTRIN) 81 MG EC tablet Take 81 mg by mouth once daily.        enalapril (VASOTEC) 20 MG tablet TAKE 1 TABLET (20 MG TOTAL) BY MOUTH ONCE DAILY. 90 tablet 0    multivitamin with minerals tablet Take 1 tablet by mouth once daily.      testosterone cypionate (DEPOTESTOTERONE CYPIONATE) 100 mg/mL injection Inject 200 mg into the muscle every 14 (fourteen) days.      [DISCONTINUED] metFORMIN (GLUCOPHAGE) 500 MG tablet TAKE 2 TABLETS TWICE A DAY WITH FOOD 360 tablet 0    [DISCONTINUED] mupirocin (BACTROBAN) 2 % ointment Apply topically 3 (three) times daily.       No current facility-administered medications on file prior to visit.      Social History     Socioeconomic History    Marital status:      Spouse name: Not on file    Number of children: Not on file    Years of education: Not on file    Highest education level: Not on file   Occupational History     Employer:  [employer]   Social Needs    Financial resource strain: Not very hard    Food insecurity     Worry: Never true     Inability: Never true    Transportation needs     Medical: No     Non-medical: No   Tobacco Use    Smoking status: Never Smoker    Smokeless tobacco: Never Used   Substance and Sexual Activity    Alcohol use: No    Drug use: No    Sexual  activity: Yes     Partners: Female   Lifestyle    Physical activity     Days per week: 5 days     Minutes per session: 30 min    Stress: Only a little   Relationships    Social connections     Talks on phone: More than three times a week     Gets together: More than three times a week     Attends Confucianist service: More than 4 times per year     Active member of club or organization: No     Attends meetings of clubs or organizations: Never     Relationship status:    Other Topics Concern    Not on file   Social History Narrative    . 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     Family History   Problem Relation Age of Onset    Heart disease Mother     Diabetes Father     Heart disease Father     Hypertension Father     Diabetes Maternal Grandmother     Cancer Maternal Grandfather        Vitals:    08/12/20 0859   BP: 138/88   Pulse:    Temp:       Body mass index is 42.47 kg/m².     Review of Systems   Constitutional: Negative for chills, fatigue, fever and unexpected weight change.   HENT: Negative for ear pain and sore throat.    Eyes: Negative for redness and visual disturbance.   Respiratory: Negative for cough and shortness of breath.    Cardiovascular: Negative for chest pain and palpitations.   Gastrointestinal: Positive for diarrhea (With metformin) and nausea. Negative for vomiting.   Endocrine: Negative for cold intolerance and heat intolerance.   Genitourinary: Negative for difficulty urinating and hematuria.   Musculoskeletal: Negative for arthralgias and myalgias.   Skin: Negative for rash and wound.   Allergic/Immunologic: Negative for environmental allergies and food allergies.   Neurological: Negative for weakness and headaches.   Hematological: Negative for adenopathy. Does not bruise/bleed easily.   Psychiatric/Behavioral: Negative for sleep disturbance. The patient is not nervous/anxious.           Objective:      Physical Exam  Vitals signs and nursing note reviewed.    Constitutional:       General: He is not in acute distress.     Appearance: He is well-developed.   HENT:      Head: Normocephalic and atraumatic.   Eyes:      Pupils: Pupils are equal, round, and reactive to light.   Neck:      Musculoskeletal: Normal range of motion and neck supple.   Cardiovascular:      Rate and Rhythm: Normal rate and regular rhythm.      Heart sounds: Normal heart sounds. No murmur.   Pulmonary:      Effort: Pulmonary effort is normal. No respiratory distress.      Breath sounds: Normal breath sounds. No wheezing.   Abdominal:      General: Bowel sounds are normal. There is no distension.      Palpations: Abdomen is soft.   Musculoskeletal: Normal range of motion.   Skin:     General: Skin is warm and dry.      Capillary Refill: Capillary refill takes less than 2 seconds.   Neurological:      Mental Status: He is alert and oriented to person, place, and time.      Cranial Nerves: No cranial nerve deficit.   Psychiatric:         Behavior: Behavior normal.          Assessment / Plan:      1.  Routine health exam-patient here for annual wellness exam.  Labs ordered.  Health maintenance was reviewed and ordered.    Diabetes:  Stop metformin.  Start OZEMPIC.  Update labs today. Monitor hemoglobin A1c.  Discussed diabetic diet and exercise protocol.  Continue medications.  Monitor for side effects.  Discussed checking blood glucose.  Discussed symptoms to monitor for and to notify me immediately if persistent or worsening.  Follow up with Ophthalmology/Optometry and Podiatry.    Complete history and physical was completed today.  Complete and thorough medication reconciliation was performed.  Discussed risks and benefits of medications.  Advised patient on orders and health maintenance.  We discussed old records and old labs if available.  Will request any records not available through epic.  Continue current medications listed on your summary sheet.    All questions were answered. Patient had no  further concerns. Advised of diagnoses and plan. Follow up as planned or return sooner if symptoms persist or worsen.     Orders Placed This Encounter   Procedures    CBC Without Differential     Standing Status:   Future     Number of Occurrences:   1     Standing Expiration Date:   10/11/2021    Comprehensive metabolic panel     Standing Status:   Future     Number of Occurrences:   1     Standing Expiration Date:   10/11/2021    TSH     Standing Status:   Future     Number of Occurrences:   1     Standing Expiration Date:   10/11/2021    Urinalysis    PSA, Screening     Standing Status:   Future     Number of Occurrences:   1     Standing Expiration Date:   10/11/2021    Hemoglobin A1C     Standing Status:   Future     Number of Occurrences:   1     Standing Expiration Date:   10/11/2021    Testosterone, Free     Standing Status:   Future     Number of Occurrences:   1     Standing Expiration Date:   8/13/2021    Lipid Panel     Standing Status:   Future     Number of Occurrences:   1     Standing Expiration Date:   10/11/2021       Medications Ordered This Encounter   Medications    semaglutide (OZEMPIC) 0.25 mg or 0.5 mg(2 mg/1.5 mL) PnIj     Sig: Inject 0.25 mg into the skin once a week for 30 days, THEN 0.5 mg once a week.     Dispense:  1 Syringe     Refill:  1        aCsper Barrett MD

## 2020-08-12 ENCOUNTER — OFFICE VISIT (OUTPATIENT)
Dept: FAMILY MEDICINE | Facility: CLINIC | Age: 65
End: 2020-08-12
Payer: MEDICARE

## 2020-08-12 VITALS
HEIGHT: 70 IN | TEMPERATURE: 98 F | WEIGHT: 296 LBS | DIASTOLIC BLOOD PRESSURE: 88 MMHG | HEART RATE: 61 BPM | BODY MASS INDEX: 42.37 KG/M2 | SYSTOLIC BLOOD PRESSURE: 138 MMHG

## 2020-08-12 DIAGNOSIS — Z13.220 ENCOUNTER FOR LIPID SCREENING FOR CARDIOVASCULAR DISEASE: ICD-10-CM

## 2020-08-12 DIAGNOSIS — E11.59 HYPERTENSION ASSOCIATED WITH DIABETES: ICD-10-CM

## 2020-08-12 DIAGNOSIS — Z00.01 ENCOUNTER FOR GENERAL ADULT MEDICAL EXAMINATION WITH ABNORMAL FINDINGS: Primary | ICD-10-CM

## 2020-08-12 DIAGNOSIS — Z79.899 ENCOUNTER FOR LONG-TERM (CURRENT) USE OF MEDICATIONS: ICD-10-CM

## 2020-08-12 DIAGNOSIS — I15.2 HYPERTENSION ASSOCIATED WITH DIABETES: ICD-10-CM

## 2020-08-12 DIAGNOSIS — Z13.6 ENCOUNTER FOR LIPID SCREENING FOR CARDIOVASCULAR DISEASE: ICD-10-CM

## 2020-08-12 DIAGNOSIS — Z12.5 PROSTATE CANCER SCREENING: ICD-10-CM

## 2020-08-12 PROBLEM — G47.33 OBSTRUCTIVE SLEEP APNEA SYNDROME: Status: ACTIVE | Noted: 2020-08-12

## 2020-08-12 PROBLEM — G47.10 HYPERSOMNIA WITH SLEEP APNEA: Status: ACTIVE | Noted: 2020-08-12

## 2020-08-12 PROBLEM — I10 ESSENTIAL HYPERTENSION: Status: ACTIVE | Noted: 2018-10-03

## 2020-08-12 PROBLEM — G47.30 HYPERSOMNIA WITH SLEEP APNEA: Status: ACTIVE | Noted: 2020-08-12

## 2020-08-12 PROCEDURE — 99397 PER PM REEVAL EST PAT 65+ YR: CPT | Mod: S$PBB,,, | Performed by: FAMILY MEDICINE

## 2020-08-12 PROCEDURE — 99999 PR PBB SHADOW E&M-EST. PATIENT-LVL IV: ICD-10-PCS | Mod: PBBFAC,,, | Performed by: FAMILY MEDICINE

## 2020-08-12 PROCEDURE — 99397 PR PREVENTIVE VISIT,EST,65 & OVER: ICD-10-PCS | Mod: S$PBB,,, | Performed by: FAMILY MEDICINE

## 2020-08-12 PROCEDURE — 99999 PR PBB SHADOW E&M-EST. PATIENT-LVL IV: CPT | Mod: PBBFAC,,, | Performed by: FAMILY MEDICINE

## 2020-08-12 PROCEDURE — 99214 OFFICE O/P EST MOD 30 MIN: CPT | Mod: PBBFAC,PO | Performed by: FAMILY MEDICINE

## 2020-08-12 RX ORDER — SEMAGLUTIDE 1.34 MG/ML
INJECTION, SOLUTION SUBCUTANEOUS
Qty: 1 SYRINGE | Refills: 1 | Status: SHIPPED | OUTPATIENT
Start: 2020-08-12 | End: 2020-08-17 | Stop reason: SDUPTHER

## 2020-08-12 NOTE — PATIENT INSTRUCTIONS
Follow up in about 6 months (around 2/12/2021), or if symptoms worsen or fail to improve, for Med refills, LAB RESULTS.     If no improvement in symptoms or symptoms worsen, please be advised to call MD, follow-up at clinic and/or go to ER if becomes severe.    Casper Barrett M.D.        We Offer TELEHEALTH & Same Day Appointments!   Book your Telehealth appointment with me through my nurse or   Clinic appointments on [facility]!    [address] [place] [address]    Office: [phone]   FAX: [phone]    Check out my Facebook Page and Follow Me at: [email or web address]    Check out my website at [employer] by clicking on: [email or web address]    To Schedule appointments online, go to [facility]: [email or web address]

## 2020-08-12 NOTE — PROGRESS NOTES
Patient did not leave urine specimen this morning stating that he would leave a specimen once he has his labs drawn.

## 2020-08-14 DIAGNOSIS — R71.8 MICROCYTOSIS: ICD-10-CM

## 2020-08-14 DIAGNOSIS — E11.65 TYPE 2 DIABETES MELLITUS WITH HYPERGLYCEMIA, WITHOUT LONG-TERM CURRENT USE OF INSULIN: Primary | ICD-10-CM

## 2020-08-14 LAB
ALBUMIN SERPL-MCNC: 4.3 G/DL (ref 3.6–5.1)
ALBUMIN/GLOB SERPL: 1.8 (CALC) (ref 1–2.5)
ALP SERPL-CCNC: 64 U/L (ref 35–144)
ALT SERPL-CCNC: 24 U/L (ref 9–46)
AST SERPL-CCNC: 20 U/L (ref 10–35)
BILIRUB SERPL-MCNC: 0.6 MG/DL (ref 0.2–1.2)
BUN SERPL-MCNC: 21 MG/DL (ref 7–25)
BUN/CREAT SERPL: ABNORMAL (CALC) (ref 6–22)
CALCIUM SERPL-MCNC: 9.6 MG/DL (ref 8.6–10.3)
CHLORIDE SERPL-SCNC: 101 MMOL/L (ref 98–110)
CHOLEST SERPL-MCNC: 160 MG/DL
CHOLEST/HDLC SERPL: 3.6 (CALC)
CO2 SERPL-SCNC: 27 MMOL/L (ref 20–32)
CREAT SERPL-MCNC: 0.98 MG/DL (ref 0.7–1.25)
GFRSERPLBLD MDRD-ARVRAT: 80 ML/MIN/1.73M2
GLOBULIN SER CALC-MCNC: 2.4 G/DL (CALC) (ref 1.9–3.7)
GLUCOSE SERPL-MCNC: 123 MG/DL (ref 65–99)
HBA1C MFR BLD: 6.8 % OF TOTAL HGB
HDLC SERPL-MCNC: 44 MG/DL
LDLC SERPL CALC-MCNC: 102 MG/DL (CALC)
NONHDLC SERPL-MCNC: 116 MG/DL (CALC)
POTASSIUM SERPL-SCNC: 4.6 MMOL/L (ref 3.5–5.3)
PROT SERPL-MCNC: 6.7 G/DL (ref 6.1–8.1)
PSA SERPL-MCNC: 1.7 NG/ML
SODIUM SERPL-SCNC: 137 MMOL/L (ref 135–146)
TRIGL SERPL-MCNC: 58 MG/DL
TSH SERPL-ACNC: 2.42 MIU/L (ref 0.4–4.5)

## 2020-08-14 NOTE — PROGRESS NOTES
#CALL THE PATIENT# to discuss results/see if they have questions and document verification. Make FU appt if needed.    #Pend me the orders in my interpretation below.#    I have sent a message to them with the interpretation (see below).  See if they have any questions and make a follow-up appointment if not already schedule and if needed.    Also please address any outstanding health maintenance that may be due: Low Dose Statin due on 03/27/1975  Shingles Vaccine(1 of 2) due on 03/27/2004  Foot Exam due on 04/11/2017  Pneumococcal Vaccine (65+ Low/Medium Risk)(1 of 2 - PCV13) due on 03/27/2019  Hemoglobin A1c due on 01/09/2020  ====================================    My interpretation that was sent to them through [facility]:    Lazarus, I have reviewed your recent blood work.     Your complete blood count is abnormal.  Further evaluation with iron studies are recommended.  However no anemia which is a good thing.  Your metabolic panel which shows your glucose, kidney function, electrolytes, and liver function is within normal limits except for elevated glucose, see A1c below.   Thyroid studies are normal.   Your cholesterol is stable and within normal.    Your PSA screening is normal.  Your hemoglobin A1c is elevated from previous year ago at 6.8 which correlates to diabetes.  Please start OZEMPIC as discussed.  This test is gold standard screening test for diabetes.  It is a measures 3 months of your average blood sugar.    Repeat A1c in three months.  Will check iron studies at that time.  Make sure patient has follow-up appointment scheduled    Repeat all other labs in one year.

## 2020-08-17 DIAGNOSIS — I15.2 HYPERTENSION ASSOCIATED WITH DIABETES: ICD-10-CM

## 2020-08-17 DIAGNOSIS — E11.59 HYPERTENSION ASSOCIATED WITH DIABETES: ICD-10-CM

## 2020-08-17 LAB
ERYTHROCYTE [DISTWIDTH] IN BLOOD BY AUTOMATED COUNT: 15.6 % (ref 11–15)
HCT VFR BLD AUTO: 46.3 % (ref 38.5–50)
HGB BLD-MCNC: 14.3 G/DL (ref 13.2–17.1)
MCH RBC QN AUTO: 24.3 PG (ref 27–33)
MCHC RBC AUTO-ENTMCNC: 30.9 G/DL (ref 32–36)
MCV RBC AUTO: 78.6 FL (ref 80–100)
PLATELET # BLD AUTO: 166 THOUSAND/UL (ref 140–400)
PMV BLD REES-ECKER: 9.4 FL (ref 7.5–12.5)
RBC # BLD AUTO: 5.89 MILLION/UL (ref 4.2–5.8)
TESTOST FREE SERPL-MCNC: 57.4 PG/ML (ref 46–224)
WBC # BLD AUTO: 6.9 THOUSAND/UL (ref 3.8–10.8)

## 2020-08-17 RX ORDER — SEMAGLUTIDE 1.34 MG/ML
INJECTION, SOLUTION SUBCUTANEOUS
Qty: 9 ML | Refills: 3 | Status: SHIPPED | OUTPATIENT
Start: 2020-08-17 | End: 2020-09-07

## 2020-09-04 DIAGNOSIS — E11.59 HYPERTENSION ASSOCIATED WITH DIABETES: ICD-10-CM

## 2020-09-04 DIAGNOSIS — I15.2 HYPERTENSION ASSOCIATED WITH DIABETES: ICD-10-CM

## 2020-09-07 RX ORDER — SEMAGLUTIDE 1.34 MG/ML
0.5 INJECTION, SOLUTION SUBCUTANEOUS WEEKLY
Qty: 1 SYRINGE | Refills: 2 | Status: SHIPPED | OUTPATIENT
Start: 2020-09-07 | End: 2020-10-07

## 2020-09-08 NOTE — TELEPHONE ENCOUNTER
Spoke with patient and he states that he has been on vacation, so he has not yet started this medication.  I told him to let us know if he has any issues once he begins this medication.

## 2020-10-05 ENCOUNTER — TELEPHONE (OUTPATIENT)
Dept: FAMILY MEDICINE | Facility: CLINIC | Age: 65
End: 2020-10-05

## 2020-10-05 DIAGNOSIS — I15.2 HYPERTENSION ASSOCIATED WITH DIABETES: ICD-10-CM

## 2020-10-05 DIAGNOSIS — E11.59 HYPERTENSION ASSOCIATED WITH DIABETES: ICD-10-CM

## 2020-10-05 DIAGNOSIS — E11.65 TYPE 2 DIABETES MELLITUS WITH HYPERGLYCEMIA, WITHOUT LONG-TERM CURRENT USE OF INSULIN: Primary | ICD-10-CM

## 2020-10-05 RX ORDER — METFORMIN HYDROCHLORIDE 500 MG/1
TABLET ORAL
Qty: 360 TABLET | Refills: 0 | OUTPATIENT
Start: 2020-10-05

## 2020-10-05 RX ORDER — ENALAPRIL MALEATE 20 MG/1
20 TABLET ORAL DAILY
Qty: 90 TABLET | Refills: 0 | Status: SHIPPED | OUTPATIENT
Start: 2020-10-05 | End: 2021-01-05

## 2020-10-05 NOTE — TELEPHONE ENCOUNTER
----- Message from Karen Koch sent at 10/5/2020  7:59 AM CDT -----  Contact: Lazarus  Type:  Sooner Apoointment Request    Caller is requesting a sooner appointment.  Caller declined first available appointment listed below.  Caller will not accept being placed on the waitlist and is requesting a message be sent to doctor.  Name of Caller: Lazarus  When is the first available appointment? 11/3/2020  Symptoms: ER follow up  Would the patient rather a call back or a response via [facility]? Call back   Best Call Back Number: Please call him at [phone]  Additional Information: Patient only wants to see Dr. Barrett

## 2020-10-05 NOTE — TELEPHONE ENCOUNTER
Called and spoke with the patient, patient reports that he had a varicose vein burst on Sunday and went to [facility] er and was told to be seen by his PCP today, unable to scheduled with Dr. Barrett due to no available appointment today, patient accepted an appointment with Salome Arana NP for tomorrow.

## 2020-10-06 ENCOUNTER — OFFICE VISIT (OUTPATIENT)
Dept: FAMILY MEDICINE | Facility: CLINIC | Age: 65
End: 2020-10-06
Payer: MEDICARE

## 2020-10-06 ENCOUNTER — TELEPHONE (OUTPATIENT)
Dept: FAMILY MEDICINE | Facility: CLINIC | Age: 65
End: 2020-10-06

## 2020-10-06 VITALS
WEIGHT: 295.81 LBS | TEMPERATURE: 98 F | HEIGHT: 70 IN | BODY MASS INDEX: 42.35 KG/M2 | DIASTOLIC BLOOD PRESSURE: 84 MMHG | SYSTOLIC BLOOD PRESSURE: 138 MMHG | HEART RATE: 64 BPM

## 2020-10-06 DIAGNOSIS — I83.899 RUPTURED VARICOSE VEIN: ICD-10-CM

## 2020-10-06 DIAGNOSIS — I83.892 BLEEDING FROM VARICOSE VEINS OF LEFT LOWER EXTREMITY: ICD-10-CM

## 2020-10-06 DIAGNOSIS — Z09 FOLLOW UP: Primary | ICD-10-CM

## 2020-10-06 PROCEDURE — 99999 PR PBB SHADOW E&M-EST. PATIENT-LVL V: ICD-10-PCS | Mod: PBBFAC,,, | Performed by: NURSE PRACTITIONER

## 2020-10-06 PROCEDURE — 99213 OFFICE O/P EST LOW 20 MIN: CPT | Mod: S$PBB,,, | Performed by: NURSE PRACTITIONER

## 2020-10-06 PROCEDURE — 99213 PR OFFICE/OUTPT VISIT, EST, LEVL III, 20-29 MIN: ICD-10-PCS | Mod: S$PBB,,, | Performed by: NURSE PRACTITIONER

## 2020-10-06 PROCEDURE — 99215 OFFICE O/P EST HI 40 MIN: CPT | Mod: PBBFAC,PO | Performed by: NURSE PRACTITIONER

## 2020-10-06 PROCEDURE — 99999 PR PBB SHADOW E&M-EST. PATIENT-LVL V: CPT | Mod: PBBFAC,,, | Performed by: NURSE PRACTITIONER

## 2020-10-06 RX ORDER — METFORMIN HYDROCHLORIDE 500 MG/1
1000 TABLET ORAL
COMMUNITY
End: 2020-10-19 | Stop reason: SDUPTHER

## 2020-10-06 RX ORDER — SILDENAFIL 100 MG/1
20 TABLET, FILM COATED ORAL
COMMUNITY
End: 2023-02-14

## 2020-10-06 NOTE — PATIENT INSTRUCTIONS
Keep area clean and dry  Apply pressure to area if bleeding restarts and report to ER immediately  Report to ER immediately if symptoms worsen

## 2020-10-06 NOTE — PROGRESS NOTES
"Subjective:       Patient ID: Lazarus Zamudio is a 65 y.o. male.    Chief Complaint: Varicose Veins (left leg varicose vein ruptured on sunday, went to [facility] er placed dermabond on it)  Pt scheduled visit today for ER follow up, varicose vein rupture. Pt states spontaneous bleeding to left lower leg began on Sunday; denies any injury, pain; states, "I was just putting on my socks and blood started shooting out of my leg." Pt states went to ER, however bleeded had ceased; states began to bleed again while in the ER; states pressure applied to stop bleeding then dermabond, ACE wrap; states has had no bleeding since ER discharge. Pt has no other complaints today.  Past Medical History:   Diagnosis Date    Diabetes mellitus, type 2     Hypertension     Morbid obesity      Social History     Socioeconomic History    Marital status:      Spouse name: Not on file    Number of children: Not on file    Years of education: Not on file    Highest education level: Not on file   Occupational History     Employer:  [employer]   Social Needs    Financial resource strain: Not very hard    Food insecurity     Worry: Never true     Inability: Never true    Transportation needs     Medical: No     Non-medical: No   Tobacco Use    Smoking status: Never Smoker    Smokeless tobacco: Never Used   Substance and Sexual Activity    Alcohol use: No    Drug use: No    Sexual activity: Yes     Partners: Female   Lifestyle    Physical activity     Days per week: 5 days     Minutes per session: 30 min    Stress: Only a little   Relationships    Social connections     Talks on phone: More than three times a week     Gets together: More than three times a week     Attends Congregational service: More than 4 times per year     Active member of club or organization: No     Attends meetings of clubs or organizations: Never     Relationship status:    Other Topics Concern    Not on file   Social History Narrative    . 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     Past Surgical History:   Procedure Laterality Date    HERNIA REPAIR      KNEE ARTHROSCOPY W/ DEBRIDEMENT         HPI  Review of Systems   Constitutional: Negative.    HENT: Negative.    Eyes: Negative.    Respiratory: Negative.    Cardiovascular:        Varicose vein rupture to LLE   Gastrointestinal: Negative.    Endocrine: Negative.    Genitourinary: Negative.    Musculoskeletal: Negative.    Integumentary:  Negative.   Allergic/Immunologic: Negative.    Neurological: Negative.    Psychiatric/Behavioral: Negative.          Objective:      Physical Exam  Vitals signs and nursing note reviewed.   Constitutional:       Appearance: Normal appearance.   HENT:      Head: Normocephalic.      Right Ear: Tympanic membrane, ear canal and external ear normal.      Left Ear: Tympanic membrane, ear canal and external ear normal.      Nose: Nose normal.      Mouth/Throat:      Mouth: Mucous membranes are moist.      Pharynx: Oropharynx is clear.   Eyes:      Conjunctiva/sclera: Conjunctivae normal.      Pupils: Pupils are equal, round, and reactive to light.   Neck:      Musculoskeletal: Normal range of motion and neck supple.   Cardiovascular:      Rate and Rhythm: Normal rate and regular rhythm.      Pulses: Normal pulses.      Heart sounds: Normal heart sounds.   Pulmonary:      Effort: Pulmonary effort is normal.      Breath sounds: Normal breath sounds.   Abdominal:      General: Bowel sounds are normal.      Palpations: Abdomen is soft.   Musculoskeletal: Normal range of motion.        Legs:    Skin:     General: Skin is warm and dry.      Capillary Refill: Capillary refill takes 2 to 3 seconds.   Neurological:      Mental Status: He is alert and oriented to person, place, and time.   Psychiatric:         Mood and Affect: Mood normal.         Behavior: Behavior normal.         Thought Content: Thought content normal.         Judgment: Judgment normal.         Assessment:       1.  Follow up    2. Bleeding from varicose veins of left lower extremity    3. Ruptured varicose vein        Plan:         Lazarus was seen today for varicose veins.    Diagnoses and all orders for this visit:    Follow up  Bleeding from varicose veins of left lower extremity  Ruptured varicose vein  -     Ambulatory referral/consult to Vascular Surgery; Future  Report to ER immediately if symptoms return or worsen

## 2020-10-06 NOTE — TELEPHONE ENCOUNTER
Patient has referral for vascular surgery, for bleeding varicose veins, was scheduled at [facility] but patient does not want to go that far.  He reports he called the [facility] and was told he can see Dr Greenwood, message sent to Maritza Feldman MA, with Vascular, for information on provider seeing patient for veins.

## 2020-10-06 NOTE — TELEPHONE ENCOUNTER
Patient informed vein patients are only seen in [place], or with the provider he is scheduled with, he will keep 11/5 appointment

## 2020-10-06 NOTE — TELEPHONE ENCOUNTER
----- Message from Ella Dia sent at 10/6/2020 11:18 AM CDT -----  Contact: [phone] self  Would like to consult with nurse regarding an fax over for another doctors office. Please call back [phone]. Thanks

## 2020-10-07 ENCOUNTER — TELEPHONE (OUTPATIENT)
Dept: VASCULAR SURGERY | Facility: CLINIC | Age: 65
End: 2020-10-07

## 2020-10-07 NOTE — TELEPHONE ENCOUNTER
----- Message from Pretty Grimaldo sent at 10/7/2020  1:50 PM CDT -----  Contact: Self  Patient had a referral in to see vas surg but [place].  He [place] in [place] and wants to see our doctor, I couldn't flip the appt from the other doctor.  Please call this patient back at [phone] if no answer please call [phone] and leave a message.  Thank you

## 2020-10-09 NOTE — TELEPHONE ENCOUNTER
Reason for visit on [place] is varicose veins,   Dr Alvarado is not seeing vein patients at this time.   Left message on voicemail requesting a call back.  [facility] Seo also an option.

## 2020-10-12 ENCOUNTER — PATIENT MESSAGE (OUTPATIENT)
Dept: FAMILY MEDICINE | Facility: CLINIC | Age: 65
End: 2020-10-12

## 2020-10-12 DIAGNOSIS — I83.899 RUPTURED VARICOSE VEIN: ICD-10-CM

## 2020-10-12 DIAGNOSIS — I83.892 BLEEDING FROM VARICOSE VEINS OF LEFT LOWER EXTREMITY: Primary | ICD-10-CM

## 2020-10-12 NOTE — TELEPHONE ENCOUNTER
Spoke with patient to let him know that we faxed his referral to Dr. Ihsan Cuello's office as requested.  I also gave the patient the phone number so he can call and schedule his appointment.

## 2020-10-12 NOTE — TELEPHONE ENCOUNTER
Patient wants to see a vascular doctor in [place] or [place].  See pended referral for a doctor he is requesting.  We got him scheduled on the [facility], but patient wants sooner and closer appointment.

## 2020-10-13 ENCOUNTER — TELEPHONE (OUTPATIENT)
Dept: FAMILY MEDICINE | Facility: CLINIC | Age: 65
End: 2020-10-13

## 2020-10-13 NOTE — TELEPHONE ENCOUNTER
----- Message from Aure Culver sent at 10/13/2020  9:44 AM CDT -----  Please call pt @ [phone] regarding order for Vascular Surgery, please fax order to [phone]/Anisha

## 2020-10-13 NOTE — TELEPHONE ENCOUNTER
Returned call to patient, patient requests taht his last office visit be faxed to the vascular surgeon's office at the number listed below, last office visit faxed to [facility] at  [phone].

## 2020-10-16 ENCOUNTER — TELEPHONE (OUTPATIENT)
Dept: VASCULAR SURGERY | Facility: CLINIC | Age: 65
End: 2020-10-16

## 2020-10-16 DIAGNOSIS — I87.2 VENOUS INSUFFICIENCY: Primary | ICD-10-CM

## 2020-10-19 DIAGNOSIS — E11.65 TYPE 2 DIABETES MELLITUS WITH HYPERGLYCEMIA, WITHOUT LONG-TERM CURRENT USE OF INSULIN: Primary | ICD-10-CM

## 2020-10-19 RX ORDER — SEMAGLUTIDE 1.34 MG/ML
INJECTION, SOLUTION SUBCUTANEOUS
COMMUNITY
Start: 2020-10-14 | End: 2021-08-08

## 2020-10-19 RX ORDER — METFORMIN HYDROCHLORIDE 500 MG/1
1000 TABLET ORAL
Qty: 180 TABLET | Refills: 4 | Status: SHIPPED | OUTPATIENT
Start: 2020-10-19 | End: 2021-02-12

## 2020-10-19 NOTE — TELEPHONE ENCOUNTER
Is patient supposed to continue Metformin now that he is on Ozempic?  If yes see pended prescription.

## 2020-11-20 ENCOUNTER — LAB VISIT (OUTPATIENT)
Dept: LAB | Facility: HOSPITAL | Age: 65
End: 2020-11-20
Attending: FAMILY MEDICINE
Payer: MEDICARE

## 2020-11-20 DIAGNOSIS — R71.8 MICROCYTOSIS: ICD-10-CM

## 2020-11-20 DIAGNOSIS — E11.65 TYPE 2 DIABETES MELLITUS WITH HYPERGLYCEMIA, WITHOUT LONG-TERM CURRENT USE OF INSULIN: ICD-10-CM

## 2020-11-20 LAB
BASOPHILS # BLD AUTO: 0.06 K/UL (ref 0–0.2)
BASOPHILS NFR BLD: 0.7 % (ref 0–1.9)
DIFFERENTIAL METHOD: ABNORMAL
EOSINOPHIL # BLD AUTO: 0.1 K/UL (ref 0–0.5)
EOSINOPHIL NFR BLD: 0.9 % (ref 0–8)
ERYTHROCYTE [DISTWIDTH] IN BLOOD BY AUTOMATED COUNT: 17.4 % (ref 11.5–14.5)
HCT VFR BLD AUTO: 49.4 % (ref 40–54)
HGB BLD-MCNC: 14.5 G/DL (ref 14–18)
IMM GRANULOCYTES # BLD AUTO: 0.02 K/UL (ref 0–0.04)
IMM GRANULOCYTES NFR BLD AUTO: 0.2 % (ref 0–0.5)
LYMPHOCYTES # BLD AUTO: 2.8 K/UL (ref 1–4.8)
LYMPHOCYTES NFR BLD: 31.1 % (ref 18–48)
MCH RBC QN AUTO: 24 PG (ref 27–31)
MCHC RBC AUTO-ENTMCNC: 29.4 G/DL (ref 32–36)
MCV RBC AUTO: 82 FL (ref 82–98)
MONOCYTES # BLD AUTO: 0.7 K/UL (ref 0.3–1)
MONOCYTES NFR BLD: 8.4 % (ref 4–15)
NEUTROPHILS # BLD AUTO: 5.2 K/UL (ref 1.8–7.7)
NEUTROPHILS NFR BLD: 58.7 % (ref 38–73)
NRBC BLD-RTO: 0 /100 WBC
PLATELET # BLD AUTO: 202 K/UL (ref 150–350)
PMV BLD AUTO: 10.2 FL (ref 9.2–12.9)
RBC # BLD AUTO: 6.04 M/UL (ref 4.6–6.2)
WBC # BLD AUTO: 8.83 K/UL (ref 3.9–12.7)

## 2020-11-20 PROCEDURE — 83036 HEMOGLOBIN GLYCOSYLATED A1C: CPT

## 2020-11-20 PROCEDURE — 82728 ASSAY OF FERRITIN: CPT

## 2020-11-20 PROCEDURE — 36415 COLL VENOUS BLD VENIPUNCTURE: CPT | Mod: PO

## 2020-11-20 PROCEDURE — 85025 COMPLETE CBC W/AUTO DIFF WBC: CPT

## 2020-11-20 PROCEDURE — 83540 ASSAY OF IRON: CPT

## 2020-11-21 DIAGNOSIS — E61.1 IRON DEFICIENCY: Primary | ICD-10-CM

## 2020-11-21 DIAGNOSIS — Z79.899 ENCOUNTER FOR LONG-TERM (CURRENT) USE OF MEDICATIONS: ICD-10-CM

## 2020-11-21 LAB
ESTIMATED AVG GLUCOSE: 160 MG/DL (ref 68–131)
FERRITIN SERPL-MCNC: 11 NG/ML (ref 20–300)
HBA1C MFR BLD HPLC: 7.2 % (ref 4–5.6)
IRON SERPL-MCNC: 43 UG/DL (ref 45–160)
SATURATED IRON: 8 % (ref 20–50)
TOTAL IRON BINDING CAPACITY: 562 UG/DL (ref 250–450)
TRANSFERRIN SERPL-MCNC: 380 MG/DL (ref 200–375)

## 2020-11-21 RX ORDER — FERROUS SULFATE 325(65) MG
325 TABLET ORAL
Qty: 90 TABLET | Refills: 4 | Status: SHIPPED | OUTPATIENT
Start: 2020-11-21 | End: 2022-01-23 | Stop reason: SDUPTHER

## 2020-11-21 RX ORDER — ASPIRIN AND DIPYRIDAMOLE 25; 200 MG/1; MG/1
CAPSULE, EXTENDED RELEASE ORAL
COMMUNITY
End: 2022-02-14

## 2020-11-21 RX ORDER — TESTOSTERONE CYPIONATE 200 MG/ML
INJECTION, SOLUTION INTRAMUSCULAR
COMMUNITY
Start: 2020-10-19 | End: 2020-11-21

## 2020-11-21 NOTE — PROGRESS NOTES
Please CALL patient with results and Document verification.   [phone]  IRON LEVELS ARE LOW.  START SUPPLEMENTATION WITH OVER-THE-COUNTER IRON 325 MG DAILY.    RECHECK LEVELS IN THREE MONTHS WITH CBC, IRON.    A1C IS SLIGHTLY ELEVATED FROM PREVIOUS.  HOWEVER STILL BELOW GOAL OF LESS THAN EIGHT.    RECHECK A1C ALSO IN THREE MONTHS.

## 2021-01-05 DIAGNOSIS — E11.59 HYPERTENSION ASSOCIATED WITH DIABETES: ICD-10-CM

## 2021-01-05 DIAGNOSIS — I15.2 HYPERTENSION ASSOCIATED WITH DIABETES: ICD-10-CM

## 2021-01-05 RX ORDER — ENALAPRIL MALEATE 20 MG/1
TABLET ORAL
Qty: 90 TABLET | Refills: 0 | Status: SHIPPED | OUTPATIENT
Start: 2021-01-05 | End: 2021-04-05 | Stop reason: SDUPTHER

## 2021-02-12 ENCOUNTER — OFFICE VISIT (OUTPATIENT)
Dept: FAMILY MEDICINE | Facility: CLINIC | Age: 66
End: 2021-02-12
Payer: MEDICARE

## 2021-02-12 VITALS
DIASTOLIC BLOOD PRESSURE: 72 MMHG | WEIGHT: 295 LBS | HEART RATE: 61 BPM | HEIGHT: 70 IN | SYSTOLIC BLOOD PRESSURE: 138 MMHG | BODY MASS INDEX: 42.23 KG/M2 | TEMPERATURE: 98 F

## 2021-02-12 DIAGNOSIS — J34.89 NASAL DRAINAGE: ICD-10-CM

## 2021-02-12 DIAGNOSIS — I15.2 HYPERTENSION ASSOCIATED WITH DIABETES: Chronic | ICD-10-CM

## 2021-02-12 DIAGNOSIS — Z79.899 ENCOUNTER FOR LONG-TERM (CURRENT) USE OF MEDICATIONS: ICD-10-CM

## 2021-02-12 DIAGNOSIS — E11.65 TYPE 2 DIABETES MELLITUS WITH HYPERGLYCEMIA, WITHOUT LONG-TERM CURRENT USE OF INSULIN: Primary | ICD-10-CM

## 2021-02-12 DIAGNOSIS — E11.59 HYPERTENSION ASSOCIATED WITH DIABETES: Chronic | ICD-10-CM

## 2021-02-12 PROBLEM — I87.2 VENOUS STASIS DERMATITIS OF LEFT LOWER EXTREMITY: Status: ACTIVE | Noted: 2021-02-04

## 2021-02-12 PROCEDURE — 99214 OFFICE O/P EST MOD 30 MIN: CPT | Mod: S$PBB,,, | Performed by: FAMILY MEDICINE

## 2021-02-12 PROCEDURE — 99214 PR OFFICE/OUTPT VISIT, EST, LEVL IV, 30-39 MIN: ICD-10-PCS | Mod: S$PBB,,, | Performed by: FAMILY MEDICINE

## 2021-02-12 PROCEDURE — 99999 PR PBB SHADOW E&M-EST. PATIENT-LVL IV: ICD-10-PCS | Mod: PBBFAC,,, | Performed by: FAMILY MEDICINE

## 2021-02-12 PROCEDURE — 99999 PR PBB SHADOW E&M-EST. PATIENT-LVL IV: CPT | Mod: PBBFAC,,, | Performed by: FAMILY MEDICINE

## 2021-02-12 PROCEDURE — 99214 OFFICE O/P EST MOD 30 MIN: CPT | Mod: PBBFAC,PO | Performed by: FAMILY MEDICINE

## 2021-02-12 RX ORDER — MONTELUKAST SODIUM 10 MG/1
10 TABLET ORAL NIGHTLY
Qty: 30 TABLET | Refills: 0 | Status: SHIPPED | OUTPATIENT
Start: 2021-02-12 | End: 2021-03-07

## 2021-02-17 LAB
ALBUMIN SERPL-MCNC: 4.4 G/DL (ref 3.6–5.1)
ALBUMIN/CREAT UR: 10 MCG/MG CREAT
ALBUMIN/GLOB SERPL: 1.7 (CALC) (ref 1–2.5)
ALP SERPL-CCNC: 66 U/L (ref 35–144)
ALT SERPL-CCNC: 30 U/L (ref 9–46)
APPEARANCE UR: ABNORMAL
AST SERPL-CCNC: 22 U/L (ref 10–35)
BACTERIA #/AREA URNS HPF: ABNORMAL /HPF
BILIRUB SERPL-MCNC: 0.6 MG/DL (ref 0.2–1.2)
BILIRUB UR QL STRIP: NEGATIVE
BUN SERPL-MCNC: 24 MG/DL (ref 7–25)
BUN/CREAT SERPL: ABNORMAL (CALC) (ref 6–22)
CALCIUM SERPL-MCNC: 9.4 MG/DL (ref 8.6–10.3)
CAOX CRY #/AREA URNS HPF: ABNORMAL /HPF
CHLORIDE SERPL-SCNC: 100 MMOL/L (ref 98–110)
CO2 SERPL-SCNC: 31 MMOL/L (ref 20–32)
COLOR UR: YELLOW
CREAT SERPL-MCNC: 0.99 MG/DL (ref 0.7–1.25)
CREAT UR-MCNC: 173 MG/DL (ref 20–320)
GFRSERPLBLD MDRD-ARVRAT: 79 ML/MIN/1.73M2
GLOBULIN SER CALC-MCNC: 2.6 G/DL (CALC) (ref 1.9–3.7)
GLUCOSE SERPL-MCNC: 148 MG/DL (ref 65–99)
GLUCOSE UR QL STRIP: NEGATIVE
HBA1C MFR BLD: 6.6 % OF TOTAL HGB
HGB UR QL STRIP: NEGATIVE
HYALINE CASTS #/AREA URNS LPF: ABNORMAL /LPF
KETONES UR QL STRIP: NEGATIVE
LEUKOCYTE ESTERASE UR QL STRIP: NEGATIVE
MICROALBUMIN UR-MCNC: 1.7 MG/DL
NITRITE UR QL STRIP: NEGATIVE
PH UR STRIP: 5.5 [PH] (ref 5–8)
POTASSIUM SERPL-SCNC: 4.6 MMOL/L (ref 3.5–5.3)
PROT SERPL-MCNC: 7 G/DL (ref 6.1–8.1)
PROT UR QL STRIP: NEGATIVE
RBC #/AREA URNS HPF: ABNORMAL /HPF
SODIUM SERPL-SCNC: 137 MMOL/L (ref 135–146)
SP GR UR STRIP: 1.03 (ref 1–1.03)
SQUAMOUS #/AREA URNS HPF: ABNORMAL /HPF
WBC #/AREA URNS HPF: ABNORMAL /HPF

## 2021-03-07 DIAGNOSIS — J34.89 NASAL DRAINAGE: ICD-10-CM

## 2021-03-07 RX ORDER — MONTELUKAST SODIUM 10 MG/1
TABLET ORAL
Qty: 30 TABLET | Refills: 12 | Status: SHIPPED | OUTPATIENT
Start: 2021-03-07 | End: 2022-02-14

## 2021-04-05 DIAGNOSIS — E11.59 HYPERTENSION ASSOCIATED WITH DIABETES: ICD-10-CM

## 2021-04-05 DIAGNOSIS — I15.2 HYPERTENSION ASSOCIATED WITH DIABETES: ICD-10-CM

## 2021-04-05 RX ORDER — ENALAPRIL MALEATE 20 MG/1
20 TABLET ORAL DAILY
Qty: 90 TABLET | Refills: 4 | Status: SHIPPED | OUTPATIENT
Start: 2021-04-05 | End: 2022-06-27

## 2021-05-13 ENCOUNTER — PATIENT MESSAGE (OUTPATIENT)
Dept: FAMILY MEDICINE | Facility: CLINIC | Age: 66
End: 2021-05-13

## 2021-05-14 ENCOUNTER — LAB VISIT (OUTPATIENT)
Dept: LAB | Facility: HOSPITAL | Age: 66
End: 2021-05-14
Attending: FAMILY MEDICINE
Payer: MEDICARE

## 2021-05-14 DIAGNOSIS — E61.1 IRON DEFICIENCY: ICD-10-CM

## 2021-05-14 DIAGNOSIS — Z79.899 ENCOUNTER FOR LONG-TERM (CURRENT) USE OF MEDICATIONS: ICD-10-CM

## 2021-05-14 LAB
BASOPHILS # BLD AUTO: 0.06 K/UL (ref 0–0.2)
BASOPHILS NFR BLD: 0.9 % (ref 0–1.9)
DIFFERENTIAL METHOD: ABNORMAL
EOSINOPHIL # BLD AUTO: 0.1 K/UL (ref 0–0.5)
EOSINOPHIL NFR BLD: 2 % (ref 0–8)
ERYTHROCYTE [DISTWIDTH] IN BLOOD BY AUTOMATED COUNT: 14.4 % (ref 11.5–14.5)
ESTIMATED AVG GLUCOSE: 151 MG/DL (ref 68–131)
FERRITIN SERPL-MCNC: 57 NG/ML (ref 20–300)
HBA1C MFR BLD: 6.9 % (ref 4–5.6)
HCT VFR BLD AUTO: 58.3 % (ref 40–54)
HGB BLD-MCNC: 18.7 G/DL (ref 14–18)
IMM GRANULOCYTES # BLD AUTO: 0.05 K/UL (ref 0–0.04)
IMM GRANULOCYTES NFR BLD AUTO: 0.7 % (ref 0–0.5)
IRON SERPL-MCNC: 114 UG/DL (ref 45–160)
LYMPHOCYTES # BLD AUTO: 2.6 K/UL (ref 1–4.8)
LYMPHOCYTES NFR BLD: 37.1 % (ref 18–48)
MCH RBC QN AUTO: 30.3 PG (ref 27–31)
MCHC RBC AUTO-ENTMCNC: 32.1 G/DL (ref 32–36)
MCV RBC AUTO: 94 FL (ref 82–98)
MONOCYTES # BLD AUTO: 0.7 K/UL (ref 0.3–1)
MONOCYTES NFR BLD: 10.1 % (ref 4–15)
NEUTROPHILS # BLD AUTO: 3.5 K/UL (ref 1.8–7.7)
NEUTROPHILS NFR BLD: 49.2 % (ref 38–73)
NRBC BLD-RTO: 0 /100 WBC
PLATELET # BLD AUTO: 141 K/UL (ref 150–450)
PMV BLD AUTO: 9.4 FL (ref 9.2–12.9)
RBC # BLD AUTO: 6.18 M/UL (ref 4.6–6.2)
SATURATED IRON: 26 % (ref 20–50)
TOTAL IRON BINDING CAPACITY: 441 UG/DL (ref 250–450)
TRANSFERRIN SERPL-MCNC: 298 MG/DL (ref 200–375)
WBC # BLD AUTO: 7.04 K/UL (ref 3.9–12.7)

## 2021-05-14 PROCEDURE — 36415 COLL VENOUS BLD VENIPUNCTURE: CPT | Mod: PO | Performed by: FAMILY MEDICINE

## 2021-05-14 PROCEDURE — 83540 ASSAY OF IRON: CPT | Performed by: FAMILY MEDICINE

## 2021-05-14 PROCEDURE — 82728 ASSAY OF FERRITIN: CPT | Performed by: FAMILY MEDICINE

## 2021-05-14 PROCEDURE — 85025 COMPLETE CBC W/AUTO DIFF WBC: CPT | Performed by: FAMILY MEDICINE

## 2021-05-14 PROCEDURE — 83036 HEMOGLOBIN GLYCOSYLATED A1C: CPT | Performed by: FAMILY MEDICINE

## 2021-05-17 ENCOUNTER — TELEPHONE (OUTPATIENT)
Dept: FAMILY MEDICINE | Facility: CLINIC | Age: 66
End: 2021-05-17

## 2021-07-05 ENCOUNTER — PATIENT MESSAGE (OUTPATIENT)
Dept: FAMILY MEDICINE | Facility: CLINIC | Age: 66
End: 2021-07-05

## 2021-07-06 ENCOUNTER — HOSPITAL ENCOUNTER (OUTPATIENT)
Dept: RADIOLOGY | Facility: HOSPITAL | Age: 66
Discharge: HOME | End: 2021-07-06
Attending: PODIATRIST
Payer: MEDICARE

## 2021-07-06 ENCOUNTER — OFFICE VISIT (OUTPATIENT)
Dept: FAMILY MEDICINE | Facility: CLINIC | Age: 66
End: 2021-07-06
Payer: MEDICARE

## 2021-07-06 ENCOUNTER — OFFICE VISIT (OUTPATIENT)
Dept: PODIATRY | Facility: CLINIC | Age: 66
End: 2021-07-06
Payer: MEDICARE

## 2021-07-06 VITALS
HEART RATE: 63 BPM | WEIGHT: 295 LBS | SYSTOLIC BLOOD PRESSURE: 164 MMHG | DIASTOLIC BLOOD PRESSURE: 85 MMHG | BODY MASS INDEX: 42.23 KG/M2 | HEIGHT: 70 IN

## 2021-07-06 VITALS
DIASTOLIC BLOOD PRESSURE: 80 MMHG | RESPIRATION RATE: 20 BRPM | SYSTOLIC BLOOD PRESSURE: 154 MMHG | OXYGEN SATURATION: 99 % | TEMPERATURE: 98 F | HEIGHT: 70 IN | BODY MASS INDEX: 42.36 KG/M2 | HEART RATE: 59 BPM | WEIGHT: 295.88 LBS

## 2021-07-06 DIAGNOSIS — T14.8XXA HEMATOMA: ICD-10-CM

## 2021-07-06 DIAGNOSIS — S90.121A HEMATOMA OF TOE OF RIGHT FOOT, INITIAL ENCOUNTER: Primary | ICD-10-CM

## 2021-07-06 DIAGNOSIS — S90.211A CONTUSION OF RIGHT GREAT TOE WITH DAMAGE TO NAIL, INITIAL ENCOUNTER: Primary | ICD-10-CM

## 2021-07-06 DIAGNOSIS — Z09 FOLLOW UP: Primary | ICD-10-CM

## 2021-07-06 DIAGNOSIS — S90.211A CONTUSION OF RIGHT GREAT TOE WITH DAMAGE TO NAIL, INITIAL ENCOUNTER: ICD-10-CM

## 2021-07-06 DIAGNOSIS — S99.921D INJURY OF TOE ON RIGHT FOOT, SUBSEQUENT ENCOUNTER: ICD-10-CM

## 2021-07-06 PROCEDURE — 73630 XR FOOT COMPLETE 3 VIEW RIGHT: ICD-10-PCS | Mod: 26,RT,, | Performed by: RADIOLOGY

## 2021-07-06 PROCEDURE — 11730 PR REMOVAL OF NAIL PLATE: ICD-10-PCS | Mod: T5,S$PBB,, | Performed by: PODIATRIST

## 2021-07-06 PROCEDURE — 11730 AVULSION NAIL PLATE SIMPLE 1: CPT | Mod: T5,S$PBB,, | Performed by: PODIATRIST

## 2021-07-06 PROCEDURE — 73630 X-RAY EXAM OF FOOT: CPT | Mod: 26,RT,, | Performed by: RADIOLOGY

## 2021-07-06 PROCEDURE — 73630 X-RAY EXAM OF FOOT: CPT | Mod: TC,PO,RT

## 2021-07-06 PROCEDURE — 99215 OFFICE O/P EST HI 40 MIN: CPT | Mod: PBBFAC,PO | Performed by: NURSE PRACTITIONER

## 2021-07-06 PROCEDURE — 99213 OFFICE O/P EST LOW 20 MIN: CPT | Mod: PBBFAC,27,PO | Performed by: PODIATRIST

## 2021-07-06 PROCEDURE — 99213 OFFICE O/P EST LOW 20 MIN: CPT | Mod: S$PBB,,, | Performed by: NURSE PRACTITIONER

## 2021-07-06 PROCEDURE — 99999 PR PBB SHADOW E&M-EST. PATIENT-LVL III: CPT | Mod: PBBFAC,,, | Performed by: PODIATRIST

## 2021-07-06 PROCEDURE — 99999 PR PBB SHADOW E&M-EST. PATIENT-LVL V: ICD-10-PCS | Mod: PBBFAC,,, | Performed by: NURSE PRACTITIONER

## 2021-07-06 PROCEDURE — 99999 PR PBB SHADOW E&M-EST. PATIENT-LVL III: ICD-10-PCS | Mod: PBBFAC,,, | Performed by: PODIATRIST

## 2021-07-06 PROCEDURE — 11730 AVULSION NAIL PLATE SIMPLE 1: CPT | Mod: T5,PBBFAC,PO | Performed by: PODIATRIST

## 2021-07-06 PROCEDURE — 99204 OFFICE O/P NEW MOD 45 MIN: CPT | Mod: 25,S$PBB,, | Performed by: PODIATRIST

## 2021-07-06 PROCEDURE — 99213 PR OFFICE/OUTPT VISIT, EST, LEVL III, 20-29 MIN: ICD-10-PCS | Mod: S$PBB,,, | Performed by: NURSE PRACTITIONER

## 2021-07-06 PROCEDURE — 99204 PR OFFICE/OUTPT VISIT, NEW, LEVL IV, 45-59 MIN: ICD-10-PCS | Mod: 25,S$PBB,, | Performed by: PODIATRIST

## 2021-07-06 PROCEDURE — 99999 PR PBB SHADOW E&M-EST. PATIENT-LVL V: CPT | Mod: PBBFAC,,, | Performed by: NURSE PRACTITIONER

## 2021-07-06 RX ORDER — CEPHALEXIN 500 MG/1
500 CAPSULE ORAL 4 TIMES DAILY
COMMUNITY
Start: 2021-07-04 | End: 2021-08-12

## 2021-07-06 RX ORDER — HYDROCODONE BITARTRATE AND ACETAMINOPHEN 7.5; 325 MG/1; MG/1
1 TABLET ORAL
COMMUNITY
Start: 2021-07-04 | End: 2021-08-12

## 2021-07-20 ENCOUNTER — PATIENT OUTREACH (OUTPATIENT)
Dept: ADMINISTRATIVE | Facility: OTHER | Age: 66
End: 2021-07-20

## 2021-07-21 ENCOUNTER — OFFICE VISIT (OUTPATIENT)
Dept: PODIATRY | Facility: CLINIC | Age: 66
End: 2021-07-21
Payer: MEDICARE

## 2021-07-21 DIAGNOSIS — S90.121D: ICD-10-CM

## 2021-07-21 DIAGNOSIS — Z87.828: Primary | ICD-10-CM

## 2021-07-21 PROCEDURE — 99999 PR PBB SHADOW E&M-EST. PATIENT-LVL II: CPT | Mod: PBBFAC,,, | Performed by: PODIATRIST

## 2021-07-21 PROCEDURE — 99212 PR OFFICE/OUTPT VISIT, EST, LEVL II, 10-19 MIN: ICD-10-PCS | Mod: S$PBB,,, | Performed by: PODIATRIST

## 2021-07-21 PROCEDURE — 99999 PR PBB SHADOW E&M-EST. PATIENT-LVL II: ICD-10-PCS | Mod: PBBFAC,,, | Performed by: PODIATRIST

## 2021-07-21 PROCEDURE — 99212 OFFICE O/P EST SF 10 MIN: CPT | Mod: S$PBB,,, | Performed by: PODIATRIST

## 2021-07-21 PROCEDURE — 99212 OFFICE O/P EST SF 10 MIN: CPT | Mod: PBBFAC | Performed by: PODIATRIST

## 2021-08-12 ENCOUNTER — OFFICE VISIT (OUTPATIENT)
Dept: FAMILY MEDICINE | Facility: CLINIC | Age: 66
End: 2021-08-12
Payer: MEDICARE

## 2021-08-12 VITALS
HEIGHT: 70 IN | SYSTOLIC BLOOD PRESSURE: 138 MMHG | BODY MASS INDEX: 42.49 KG/M2 | WEIGHT: 296.81 LBS | OXYGEN SATURATION: 97 % | DIASTOLIC BLOOD PRESSURE: 80 MMHG | RESPIRATION RATE: 16 BRPM | TEMPERATURE: 98 F | HEART RATE: 62 BPM

## 2021-08-12 DIAGNOSIS — Z79.899 ENCOUNTER FOR LONG-TERM (CURRENT) USE OF MEDICATIONS: ICD-10-CM

## 2021-08-12 DIAGNOSIS — Z13.220 ENCOUNTER FOR LIPID SCREENING FOR CARDIOVASCULAR DISEASE: ICD-10-CM

## 2021-08-12 DIAGNOSIS — E11.65 TYPE 2 DIABETES MELLITUS WITH HYPERGLYCEMIA, WITHOUT LONG-TERM CURRENT USE OF INSULIN: Chronic | ICD-10-CM

## 2021-08-12 DIAGNOSIS — E11.59 HYPERTENSION ASSOCIATED WITH DIABETES: Chronic | ICD-10-CM

## 2021-08-12 DIAGNOSIS — Z86.010 HISTORY OF COLON POLYPS: ICD-10-CM

## 2021-08-12 DIAGNOSIS — Z12.5 ENCOUNTER FOR PROSTATE CANCER SCREENING: ICD-10-CM

## 2021-08-12 DIAGNOSIS — I15.2 HYPERTENSION ASSOCIATED WITH DIABETES: Chronic | ICD-10-CM

## 2021-08-12 DIAGNOSIS — Z00.00 WELL ADULT EXAM: Primary | ICD-10-CM

## 2021-08-12 DIAGNOSIS — Z13.6 ENCOUNTER FOR LIPID SCREENING FOR CARDIOVASCULAR DISEASE: ICD-10-CM

## 2021-08-12 DIAGNOSIS — Z12.11 COLON CANCER SCREENING: ICD-10-CM

## 2021-08-12 DIAGNOSIS — E66.2 CLASS 3 OBESITY WITH ALVEOLAR HYPOVENTILATION, SERIOUS COMORBIDITY, AND BODY MASS INDEX (BMI) OF 40.0 TO 44.9 IN ADULT: ICD-10-CM

## 2021-08-12 PROBLEM — E66.813 CLASS 3 OBESITY WITH ALVEOLAR HYPOVENTILATION, SERIOUS COMORBIDITY, AND BODY MASS INDEX (BMI) OF 40.0 TO 44.9 IN ADULT: Status: ACTIVE | Noted: 2021-08-12

## 2021-08-12 PROCEDURE — 99999 PR PBB SHADOW E&M-EST. PATIENT-LVL IV: CPT | Mod: PBBFAC,,, | Performed by: FAMILY MEDICINE

## 2021-08-12 PROCEDURE — 99397 PR PREVENTIVE VISIT,EST,65 & OVER: ICD-10-PCS | Mod: S$PBB,,, | Performed by: FAMILY MEDICINE

## 2021-08-12 PROCEDURE — 99397 PER PM REEVAL EST PAT 65+ YR: CPT | Mod: S$PBB,,, | Performed by: FAMILY MEDICINE

## 2021-08-12 PROCEDURE — 99214 OFFICE O/P EST MOD 30 MIN: CPT | Mod: PBBFAC,PO | Performed by: FAMILY MEDICINE

## 2021-08-12 PROCEDURE — 99999 PR PBB SHADOW E&M-EST. PATIENT-LVL IV: ICD-10-PCS | Mod: PBBFAC,,, | Performed by: FAMILY MEDICINE

## 2021-08-19 LAB
ALBUMIN SERPL-MCNC: 4.5 G/DL (ref 3.6–5.1)
ALBUMIN/GLOB SERPL: 1.9 (CALC) (ref 1–2.5)
ALP SERPL-CCNC: 72 U/L (ref 35–144)
ALT SERPL-CCNC: 33 U/L (ref 9–46)
AST SERPL-CCNC: 27 U/L (ref 10–35)
BILIRUB SERPL-MCNC: 0.9 MG/DL (ref 0.2–1.2)
BUN SERPL-MCNC: 18 MG/DL (ref 7–25)
BUN/CREAT SERPL: ABNORMAL (CALC) (ref 6–22)
CALCIUM SERPL-MCNC: 9.7 MG/DL (ref 8.6–10.3)
CHLORIDE SERPL-SCNC: 99 MMOL/L (ref 98–110)
CHOLEST SERPL-MCNC: 174 MG/DL
CHOLEST/HDLC SERPL: 3.6 (CALC)
CO2 SERPL-SCNC: 29 MMOL/L (ref 20–32)
CREAT SERPL-MCNC: 1.06 MG/DL (ref 0.7–1.25)
ERYTHROCYTE [DISTWIDTH] IN BLOOD BY AUTOMATED COUNT: 12.3 % (ref 11–15)
GLOBULIN SER CALC-MCNC: 2.4 G/DL (CALC) (ref 1.9–3.7)
GLUCOSE SERPL-MCNC: 141 MG/DL (ref 65–99)
HBA1C MFR BLD: 7.1 % OF TOTAL HGB
HCT VFR BLD AUTO: 54.9 % (ref 38.5–50)
HDLC SERPL-MCNC: 48 MG/DL
HGB BLD-MCNC: 18.7 G/DL (ref 13.2–17.1)
LDLC SERPL CALC-MCNC: 110 MG/DL (CALC)
MCH RBC QN AUTO: 31.2 PG (ref 27–33)
MCHC RBC AUTO-ENTMCNC: 34.1 G/DL (ref 32–36)
MCV RBC AUTO: 91.5 FL (ref 80–100)
NONHDLC SERPL-MCNC: 126 MG/DL (CALC)
PLATELET # BLD AUTO: 134 THOUSAND/UL (ref 140–400)
PMV BLD REES-ECKER: 9 FL (ref 7.5–12.5)
POTASSIUM SERPL-SCNC: 4.5 MMOL/L (ref 3.5–5.3)
PROT SERPL-MCNC: 6.9 G/DL (ref 6.1–8.1)
PSA SERPL-MCNC: 0.6 NG/ML
RBC # BLD AUTO: 6 MILLION/UL (ref 4.2–5.8)
SODIUM SERPL-SCNC: 136 MMOL/L (ref 135–146)
TRIGL SERPL-MCNC: 71 MG/DL
TSH SERPL-ACNC: 3.35 MIU/L (ref 0.4–4.5)
WBC # BLD AUTO: 6.5 THOUSAND/UL (ref 3.8–10.8)

## 2021-08-21 DIAGNOSIS — E11.69 HYPERLIPIDEMIA ASSOCIATED WITH TYPE 2 DIABETES MELLITUS: Primary | ICD-10-CM

## 2021-08-21 DIAGNOSIS — E78.5 HYPERLIPIDEMIA ASSOCIATED WITH TYPE 2 DIABETES MELLITUS: Primary | ICD-10-CM

## 2021-08-21 RX ORDER — ATORVASTATIN CALCIUM 40 MG/1
40 TABLET, FILM COATED ORAL DAILY
Qty: 90 TABLET | Refills: 4 | Status: SHIPPED | OUTPATIENT
Start: 2021-08-21 | End: 2022-10-19

## 2021-11-02 ENCOUNTER — OFFICE VISIT (OUTPATIENT)
Dept: PODIATRY | Facility: CLINIC | Age: 66
End: 2021-11-02
Payer: MEDICARE

## 2021-11-02 VITALS — HEIGHT: 70 IN | WEIGHT: 296 LBS | BODY MASS INDEX: 42.37 KG/M2

## 2021-11-02 DIAGNOSIS — L60.8 NONTRAUMATIC AVULSION OF TOENAIL: Primary | ICD-10-CM

## 2021-11-02 DIAGNOSIS — E11.65 TYPE 2 DIABETES MELLITUS WITH HYPERGLYCEMIA, WITHOUT LONG-TERM CURRENT USE OF INSULIN: ICD-10-CM

## 2021-11-02 PROCEDURE — 99213 PR OFFICE/OUTPT VISIT, EST, LEVL III, 20-29 MIN: ICD-10-PCS | Mod: 25,S$PBB,, | Performed by: PODIATRIST

## 2021-11-02 PROCEDURE — 11730 AVULSION NAIL PLATE SIMPLE 1: CPT | Mod: PBBFAC | Performed by: PODIATRIST

## 2021-11-02 PROCEDURE — 11730 NAIL REMOVAL: ICD-10-PCS | Mod: TA,S$PBB,, | Performed by: PODIATRIST

## 2021-11-02 PROCEDURE — 99213 OFFICE O/P EST LOW 20 MIN: CPT | Mod: 25,S$PBB,, | Performed by: PODIATRIST

## 2021-11-02 PROCEDURE — 99999 PR PBB SHADOW E&M-EST. PATIENT-LVL III: CPT | Mod: PBBFAC,,, | Performed by: PODIATRIST

## 2021-11-02 PROCEDURE — 99999 PR PBB SHADOW E&M-EST. PATIENT-LVL III: ICD-10-PCS | Mod: PBBFAC,,, | Performed by: PODIATRIST

## 2021-11-02 PROCEDURE — 99213 OFFICE O/P EST LOW 20 MIN: CPT | Mod: PBBFAC | Performed by: PODIATRIST

## 2022-01-12 ENCOUNTER — PATIENT MESSAGE (OUTPATIENT)
Dept: ENDOSCOPY | Facility: HOSPITAL | Age: 67
End: 2022-01-12
Payer: COMMERCIAL

## 2022-01-23 DIAGNOSIS — E61.1 IRON DEFICIENCY: ICD-10-CM

## 2022-01-23 RX ORDER — FERROUS SULFATE 325(65) MG
TABLET ORAL
Qty: 90 TABLET | Refills: 4 | Status: SHIPPED | OUTPATIENT
Start: 2022-01-23 | End: 2022-02-14

## 2022-02-03 ENCOUNTER — OFFICE VISIT (OUTPATIENT)
Dept: FAMILY MEDICINE | Facility: CLINIC | Age: 67
End: 2022-02-03
Payer: MEDICARE

## 2022-02-03 VITALS
WEIGHT: 286.19 LBS | HEART RATE: 63 BPM | BODY MASS INDEX: 40.97 KG/M2 | DIASTOLIC BLOOD PRESSURE: 71 MMHG | HEIGHT: 70 IN | SYSTOLIC BLOOD PRESSURE: 134 MMHG | OXYGEN SATURATION: 99 % | TEMPERATURE: 99 F

## 2022-02-03 DIAGNOSIS — R05.9 COUGH: ICD-10-CM

## 2022-02-03 DIAGNOSIS — J32.9 SINUSITIS, UNSPECIFIED CHRONICITY, UNSPECIFIED LOCATION: Primary | ICD-10-CM

## 2022-02-03 DIAGNOSIS — R51.9 HEAD ACHE: ICD-10-CM

## 2022-02-03 LAB
SARS-COV-2 RNA RESP QL NAA+PROBE: DETECTED
SARS-COV-2- CYCLE NUMBER: 24

## 2022-02-03 PROCEDURE — U0005 INFEC AGEN DETEC AMPLI PROBE: HCPCS | Performed by: NURSE PRACTITIONER

## 2022-02-03 PROCEDURE — 99214 OFFICE O/P EST MOD 30 MIN: CPT | Mod: PBBFAC,PO | Performed by: NURSE PRACTITIONER

## 2022-02-03 PROCEDURE — 99213 OFFICE O/P EST LOW 20 MIN: CPT | Mod: S$PBB,CR,, | Performed by: NURSE PRACTITIONER

## 2022-02-03 PROCEDURE — 99999 PR PBB SHADOW E&M-EST. PATIENT-LVL IV: ICD-10-PCS | Mod: PBBFAC,,, | Performed by: NURSE PRACTITIONER

## 2022-02-03 PROCEDURE — 96372 THER/PROPH/DIAG INJ SC/IM: CPT | Mod: PBBFAC,PO

## 2022-02-03 PROCEDURE — U0003 INFECTIOUS AGENT DETECTION BY NUCLEIC ACID (DNA OR RNA); SEVERE ACUTE RESPIRATORY SYNDROME CORONAVIRUS 2 (SARS-COV-2) (CORONAVIRUS DISEASE [COVID-19]), AMPLIFIED PROBE TECHNIQUE, MAKING USE OF HIGH THROUGHPUT TECHNOLOGIES AS DESCRIBED BY CMS-2020-01-R: HCPCS | Performed by: NURSE PRACTITIONER

## 2022-02-03 PROCEDURE — 99213 PR OFFICE/OUTPT VISIT, EST, LEVL III, 20-29 MIN: ICD-10-PCS | Mod: S$PBB,CR,, | Performed by: NURSE PRACTITIONER

## 2022-02-03 PROCEDURE — 99999 PR PBB SHADOW E&M-EST. PATIENT-LVL IV: CPT | Mod: PBBFAC,,, | Performed by: NURSE PRACTITIONER

## 2022-02-03 RX ORDER — AZITHROMYCIN 250 MG/1
TABLET, FILM COATED ORAL
Qty: 6 TABLET | Refills: 0 | Status: SHIPPED | OUTPATIENT
Start: 2022-02-03 | End: 2022-02-08

## 2022-02-03 RX ORDER — DEXAMETHASONE SODIUM PHOSPHATE 4 MG/ML
4 INJECTION, SOLUTION INTRA-ARTICULAR; INTRALESIONAL; INTRAMUSCULAR; INTRAVENOUS; SOFT TISSUE ONCE
Status: COMPLETED | OUTPATIENT
Start: 2022-02-03 | End: 2022-02-03

## 2022-02-03 RX ORDER — AZELASTINE 1 MG/ML
1 SPRAY, METERED NASAL 2 TIMES DAILY
Qty: 30 ML | Refills: 0 | Status: SHIPPED | OUTPATIENT
Start: 2022-02-03 | End: 2022-02-25

## 2022-02-03 RX ORDER — BENZONATATE 200 MG/1
200 CAPSULE ORAL 3 TIMES DAILY PRN
Qty: 30 CAPSULE | Refills: 0 | Status: SHIPPED | OUTPATIENT
Start: 2022-02-03 | End: 2022-02-13

## 2022-02-03 RX ADMIN — DEXAMETHASONE SODIUM PHOSPHATE 4 MG: 4 INJECTION INTRA-ARTICULAR; INTRALESIONAL; INTRAMUSCULAR; INTRAVENOUS; SOFT TISSUE at 11:02

## 2022-02-03 NOTE — PROGRESS NOTES
Subjective:       Patient ID: Lazarus Zamudio is a 66 y.o. male.    Chief Complaint: Sinus Problem (X2 weeks) and Cough (X2 weeks)    Sinus Problem  This is a new problem. The current episode started 1 to 4 weeks ago. The problem is unchanged. There has been no fever. The pain is mild. Associated symptoms include congestion, headaches and sinus pressure. Pertinent negatives include no chills, coughing, diaphoresis, ear pain, hoarse voice, neck pain, shortness of breath, sneezing, sore throat or swollen glands. Past treatments include nothing. The treatment provided no relief.     Past Medical History:   Diagnosis Date    Diabetes mellitus, type 2     Hypertension     Morbid obesity      Social History     Socioeconomic History    Marital status:    Occupational History     Employer:  [employer]   Tobacco Use    Smoking status: Never Smoker    Smokeless tobacco: Never Used   Substance and Sexual Activity    Alcohol use: No    Drug use: No    Sexual activity: Yes     Partners: Female   Social History Narrative    . 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     Past Surgical History:   Procedure Laterality Date    HERNIA REPAIR      KNEE ARTHROSCOPY W/ DEBRIDEMENT         Review of Systems   Constitutional: Negative.  Negative for chills and diaphoresis.   HENT: Positive for nasal congestion, postnasal drip, rhinorrhea and sinus pressure/congestion. Negative for ear pain, hoarse voice, sneezing and sore throat.    Eyes: Negative.    Respiratory: Negative.  Negative for cough and shortness of breath.    Cardiovascular: Negative.    Gastrointestinal: Negative.    Endocrine: Negative.    Genitourinary: Negative.    Musculoskeletal: Negative.  Negative for neck pain.   Integumentary:  Negative.   Allergic/Immunologic: Negative.    Neurological: Positive for headaches.   Psychiatric/Behavioral: Negative.          Objective:      Physical Exam  Vitals and nursing note reviewed.   Constitutional:        Appearance: Normal appearance.   HENT:      Head: Normocephalic.      Right Ear: Hearing, tympanic membrane, ear canal and external ear normal.      Left Ear: Hearing, tympanic membrane, ear canal and external ear normal.      Nose: Congestion and rhinorrhea present.      Right Sinus: Maxillary sinus tenderness and frontal sinus tenderness present.      Left Sinus: Maxillary sinus tenderness and frontal sinus tenderness present.      Mouth/Throat:      Mouth: Mucous membranes are moist.      Pharynx: Oropharynx is clear.   Eyes:      Conjunctiva/sclera: Conjunctivae normal.      Pupils: Pupils are equal, round, and reactive to light.   Cardiovascular:      Rate and Rhythm: Normal rate and regular rhythm.      Pulses: Normal pulses.      Heart sounds: Normal heart sounds.   Pulmonary:      Effort: Pulmonary effort is normal.      Breath sounds: Normal breath sounds.   Abdominal:      General: Bowel sounds are normal.      Palpations: Abdomen is soft.   Musculoskeletal:         General: Normal range of motion.      Cervical back: Normal range of motion and neck supple.   Skin:     General: Skin is warm and dry.      Capillary Refill: Capillary refill takes 2 to 3 seconds.   Neurological:      Mental Status: He is alert and oriented to person, place, and time.   Psychiatric:         Mood and Affect: Mood normal.         Behavior: Behavior normal.         Thought Content: Thought content normal.         Judgment: Judgment normal.         Assessment:       Problem List Items Addressed This Visit    None     Visit Diagnoses     Sinusitis, unspecified chronicity, unspecified location    -  Primary    Cough        Relevant Orders    COVID-19 Routine Screening    Head ache        Relevant Orders    COVID-19 Routine Screening          Plan:           Lazarus was seen today for sinus problem and cough.    Diagnoses and all orders for this visit:    Sinusitis, unspecified chronicity, unspecified location  Cough  Head ache  -      COVID-19 Routine Screening; Future  -     azithromycin (Z-MEGAN) 250 MG tablet; Take 2 tablets by mouth on day 1; Take 1 tablet by mouth on days 2-5  -     dexamethasone injection 4 mg  -     azelastine (ASTELIN) 137 mcg (0.1 %) nasal spray; 1 spray (137 mcg total) by Nasal route 2 (two) times daily. for 7 days  -     benzonatate (TESSALON) 200 MG capsule; Take 1 capsule (200 mg total) by mouth 3 (three) times daily as needed.  Hydrate well  Rest  COVID-19 home instructions given  Report to ER immediately if symptoms worsen

## 2022-02-03 NOTE — PATIENT INSTRUCTIONS
Hydrate well  Rest  Decadron can increase BG; monitor carefully  Report to ER immediately if symptoms worsen

## 2022-02-04 DIAGNOSIS — U07.1 COVID-19 VIRUS DETECTED: ICD-10-CM

## 2022-02-14 ENCOUNTER — LAB VISIT (OUTPATIENT)
Dept: LAB | Facility: HOSPITAL | Age: 67
End: 2022-02-14
Attending: FAMILY MEDICINE
Payer: MEDICARE

## 2022-02-14 ENCOUNTER — OFFICE VISIT (OUTPATIENT)
Dept: FAMILY MEDICINE | Facility: CLINIC | Age: 67
End: 2022-02-14
Payer: MEDICARE

## 2022-02-14 VITALS
RESPIRATION RATE: 17 BRPM | BODY MASS INDEX: 40.32 KG/M2 | OXYGEN SATURATION: 94 % | HEIGHT: 70 IN | HEART RATE: 79 BPM | SYSTOLIC BLOOD PRESSURE: 136 MMHG | TEMPERATURE: 99 F | WEIGHT: 281.63 LBS | DIASTOLIC BLOOD PRESSURE: 82 MMHG

## 2022-02-14 DIAGNOSIS — Z79.899 ENCOUNTER FOR LONG-TERM (CURRENT) USE OF MEDICATIONS: ICD-10-CM

## 2022-02-14 DIAGNOSIS — I15.2 HYPERTENSION ASSOCIATED WITH DIABETES: ICD-10-CM

## 2022-02-14 DIAGNOSIS — Z13.220 ENCOUNTER FOR LIPID SCREENING FOR CARDIOVASCULAR DISEASE: ICD-10-CM

## 2022-02-14 DIAGNOSIS — E11.59 HYPERTENSION ASSOCIATED WITH DIABETES: ICD-10-CM

## 2022-02-14 DIAGNOSIS — Z13.6 ENCOUNTER FOR LIPID SCREENING FOR CARDIOVASCULAR DISEASE: ICD-10-CM

## 2022-02-14 DIAGNOSIS — M54.32 SCIATICA, LEFT SIDE: ICD-10-CM

## 2022-02-14 DIAGNOSIS — E11.65 TYPE 2 DIABETES MELLITUS WITH HYPERGLYCEMIA, WITHOUT LONG-TERM CURRENT USE OF INSULIN: ICD-10-CM

## 2022-02-14 DIAGNOSIS — Z00.00 WELL ADULT EXAM: Primary | ICD-10-CM

## 2022-02-14 DIAGNOSIS — Z00.00 WELL ADULT EXAM: ICD-10-CM

## 2022-02-14 DIAGNOSIS — Z23 NEED FOR PNEUMOCOCCAL VACCINE: ICD-10-CM

## 2022-02-14 LAB
ALBUMIN SERPL BCP-MCNC: 3.7 G/DL (ref 3.5–5.2)
ALP SERPL-CCNC: 98 U/L (ref 55–135)
ALT SERPL W/O P-5'-P-CCNC: 36 U/L (ref 10–44)
ANION GAP SERPL CALC-SCNC: 10 MMOL/L (ref 8–16)
AST SERPL-CCNC: 25 U/L (ref 10–40)
BILIRUB SERPL-MCNC: 0.9 MG/DL (ref 0.1–1)
BUN SERPL-MCNC: 20 MG/DL (ref 8–23)
CALCIUM SERPL-MCNC: 9.7 MG/DL (ref 8.7–10.5)
CHLORIDE SERPL-SCNC: 101 MMOL/L (ref 95–110)
CHOLEST SERPL-MCNC: 120 MG/DL (ref 120–199)
CHOLEST/HDLC SERPL: 2.7 {RATIO} (ref 2–5)
CO2 SERPL-SCNC: 26 MMOL/L (ref 23–29)
CREAT SERPL-MCNC: 0.8 MG/DL (ref 0.5–1.4)
EST. GFR  (AFRICAN AMERICAN): >60 ML/MIN/1.73 M^2
EST. GFR  (NON AFRICAN AMERICAN): >60 ML/MIN/1.73 M^2
ESTIMATED AVG GLUCOSE: 166 MG/DL (ref 68–131)
GLUCOSE SERPL-MCNC: 137 MG/DL (ref 70–110)
HBA1C MFR BLD: 7.4 % (ref 4–5.6)
HDLC SERPL-MCNC: 44 MG/DL (ref 40–75)
HDLC SERPL: 36.7 % (ref 20–50)
HGB BLD-MCNC: 15.7 G/DL (ref 14–18)
LDLC SERPL CALC-MCNC: 68 MG/DL (ref 63–159)
NONHDLC SERPL-MCNC: 76 MG/DL
POTASSIUM SERPL-SCNC: 4.8 MMOL/L (ref 3.5–5.1)
PROT SERPL-MCNC: 6.8 G/DL (ref 6–8.4)
SODIUM SERPL-SCNC: 137 MMOL/L (ref 136–145)
TRIGL SERPL-MCNC: 40 MG/DL (ref 30–150)

## 2022-02-14 PROCEDURE — 99999 PR PBB SHADOW E&M-EST. PATIENT-LVL IV: ICD-10-PCS | Mod: PBBFAC,,, | Performed by: FAMILY MEDICINE

## 2022-02-14 PROCEDURE — 80053 COMPREHEN METABOLIC PANEL: CPT | Performed by: FAMILY MEDICINE

## 2022-02-14 PROCEDURE — 99397 PR PREVENTIVE VISIT,EST,65 & OVER: ICD-10-PCS | Mod: S$PBB,,, | Performed by: FAMILY MEDICINE

## 2022-02-14 PROCEDURE — 99397 PER PM REEVAL EST PAT 65+ YR: CPT | Mod: S$PBB,,, | Performed by: FAMILY MEDICINE

## 2022-02-14 PROCEDURE — 83036 HEMOGLOBIN GLYCOSYLATED A1C: CPT | Performed by: FAMILY MEDICINE

## 2022-02-14 PROCEDURE — 80061 LIPID PANEL: CPT | Performed by: FAMILY MEDICINE

## 2022-02-14 PROCEDURE — 99214 OFFICE O/P EST MOD 30 MIN: CPT | Mod: PBBFAC,PO,25 | Performed by: FAMILY MEDICINE

## 2022-02-14 PROCEDURE — 85018 HEMOGLOBIN: CPT | Performed by: FAMILY MEDICINE

## 2022-02-14 PROCEDURE — G0009 ADMIN PNEUMOCOCCAL VACCINE: HCPCS | Mod: PBBFAC,PO

## 2022-02-14 PROCEDURE — 36415 COLL VENOUS BLD VENIPUNCTURE: CPT | Mod: PO | Performed by: FAMILY MEDICINE

## 2022-02-14 PROCEDURE — 99999 PR PBB SHADOW E&M-EST. PATIENT-LVL IV: CPT | Mod: PBBFAC,,, | Performed by: FAMILY MEDICINE

## 2022-02-14 RX ORDER — CYCLOBENZAPRINE HCL 10 MG
10 TABLET ORAL NIGHTLY
Qty: 30 TABLET | Refills: 0 | Status: SHIPPED | OUTPATIENT
Start: 2022-02-14 | End: 2022-03-14

## 2022-02-14 NOTE — PROGRESS NOTES
This note is specifically for wellness visit performed today.     WELLNESS EXAM      Patient ID: Lazarus Zamudio is a 66 y.o. male.  has a past medical history of Diabetes mellitus, type 2, Hypertension, and Morbid obesity.     Chief Complaint:  Encounter for wellness exam    Well Adult Physical: Patient here for a comprehensive physical exam.The patient reports chronic conditions.  Well controlled on current medication regimen.  The patient's last visit with me was on 8/12/2021.    February 2022:  Patient is doing well on Ozempic.  He reports he continues to have weight loss.  He did have a recent episode of COVID that was mild.  Patient recovered with only residual cough and some fatigue.  He is currently having some left-sided buttock pain that is radiating into the left leg.  It is worse at night.  Denies any injury or trauma.  He has tried some over-the-counter medication without relief.  He recently had steroids last month.  BMI Readings from Last 10 Encounters:   02/14/22 40.41 kg/m²   02/03/22 41.07 kg/m²   11/02/21 42.47 kg/m²   08/12/21 42.59 kg/m²   07/06/21 42.33 kg/m²   07/06/21 42.46 kg/m²   02/12/21 42.33 kg/m²   10/06/20 42.44 kg/m²   08/12/20 42.47 kg/m²   06/12/20 42.53 kg/m²       He reports previously on metformin with problems.  Patient having GI upset and is intolerant to the lower dose.  August 2021:  Reviewed.  Denies history of personal or family history of thyroid cancer, MEN syndrome, pancreatitis.  August 2020:  Reviewed  Diabetes Management Status   Diabetes Management Status  Statin: Taking  ACE/ARB: Taking  Screening or Prevention Patient's value Goal Complete/Controlled?   HgA1C Testing and Control   Lab Results   Component Value Date    HGBA1C 7.1 (H) 08/18/2021      Annually/Less than 8% Yes   Lipid profile : 08/18/2021 Annually Yes   LDL control Lab Results   Component Value Date    LDLCALC 110 (H) 08/18/2021    Annually/Less than 100 mg/dl  No   Nephropathy screening Lab  Results   Component Value Date    LABMICR 7.0 08/20/2015     Lab Results   Component Value Date    PROTEINUA NEGATIVE 02/15/2021     No results found for: UTPCR   Annually Yes   Blood pressure BP Readings from Last 1 Encounters:   02/14/22 136/82    Less than 140/90 Yes   Dilated retinal exam : 06/23/2020 Annually No   Foot exam   : 02/12/2021 Annually No       Hypertension:  CHRONIC. STABLE. BP Reviewed.  Compliant with BP medications. No SE reported.   (-) CP, SOB, palpitations, dizziness, lightheadedness, HA, arm numbness, tingling or weakness, syncope.  Creatinine   Date Value Ref Range Status   08/18/2021 1.06 0.70 - 1.25 mg/dL Final     Comment:     For patients >49 years of age, the reference limit  for Creatinine is approximately 13% higher for people  identified as -American.          Hyperlipidemia:  Compliant with Lipitor.  CHRONIC. STABLE. Lab analysis reviewed.   (-) CP, SOB, abdominal pain, N/V/D, constipation, jaundice, skin changes.  (-) Myalgias  Lab Results   Component Value Date    CHOL 174 08/18/2021    CHOL 160 08/13/2020    CHOL 159 07/05/2019     Lab Results   Component Value Date    HDL 48 08/18/2021    HDL 44 08/13/2020    HDL 45 07/05/2019     Lab Results   Component Value Date    LDLCALC 110 (H) 08/18/2021    LDLCALC 102 (H) 08/13/2020    LDLCALC 100 (H) 07/05/2019     Lab Results   Component Value Date    TRIG 71 08/18/2021    TRIG 58 08/13/2020    TRIG 56 07/05/2019     Lab Results   Component Value Date    CHOLHDL 3.6 08/18/2021    CHOLHDL 3.6 08/13/2020    CHOLHDL 3.5 07/05/2019     Lab Results   Component Value Date    TOTALCHOLEST 3.8 11/18/2014    TOTALCHOLEST 4.0 12/31/2013    TOTALCHOLEST 3.8 03/11/2005     Lab Results   Component Value Date    ALT 33 08/18/2021    AST 27 08/18/2021    ALKPHOS 73 07/05/2019    BILITOT 0.9 08/18/2021     ======================================================  The 10-year ASCVD risk score (Hannah CLEARY Jr., et al., 2013) is: 31.3%    Values used  to calculate the score:      Age: 66 years      Sex: Male      Is Non- : No      Diabetic: Yes      Tobacco smoker: No      Systolic Blood Pressure: 136 mmHg      Is BP treated: Yes      HDL Cholesterol: 48 mg/dL      Total Cholesterol: 174 mg/dL    Do you take any herbs or supplements that were not prescribed by a doctor? no   Are you taking calcium supplements? no    History:  Dr. Melvin for hypotestosteronism.  Date last PSA: Reviewed.  Managed Dr. Melvin  Lab Results   Component Value Date    PSA 0.61 11/18/2014      No history of STDs    Colorectal cancer screening:  Patient had colonoscopy in 2015 by Dr. Alfred  Tissue Pathology (01/19/2015 8:02 AM CST)  Tissue Pathology (01/19/2015 8:02 AM CST)   Component Value Ref Range Performed At Pathologist Signature   Test requested see belowComment: TISSUE PATHOLOGY   [facility]     Pathologist SEE BELOW  Comment:   Mary Cantor MD,  board certified in Anatomic Pathology,  [phone]  (electronic signature)     [facility]     Notes SEE BELOW  Comment:   The CPT codes provided are for informational  purposes only.  [facility] assumes no  responsibility or liability for the accuracy of  the CPT codes provided.  The CPT codes listed are  not payor specific and should not be relied on  for billing purposes.  CPT coding is the sole  responsibility of the billing entity.     [facility]     A) Source SEE BELOWComment: Ascending colon   [facility]     A) DESCRIPTION SEE BELOW  Comment:   Ascending colon polyp, received in formalin,  verified as to patient name and consists of  multiple pieces of granular, tan-brown material  and fecal material that are 1.3 x 0.7 x 0.2 cm in  aggregate.  The specimen is entirely submitted in  one cassette.     [facility]     A) Diagnosis SEE BELOW  Comment:   Tubular adenoma.  No high grade dysplasia or malignancy identified.     [facility]     A) Micro Description SEE BELOW  Comment:   Microscopic  examination supports the above  diagnosis.     [facility]     B) Source SEE BELOWComment: Descending colon   [facility]     B) Description SEE BELOW  Comment:   Descending colon, received in formalin, verified  as to patient name and consists of a tan-brown  polyp, that is .2 x .2 x .2 cm and green pieces  of fecal material.  The specimen is entirely  submitted in one cassette.     [facility]     B) Diagnosis SEE BELOW  Comment:   Cauterized colonic mucosa with no pathologic  alteration.  No dysplasia or malignancy identified.     [facility]     B) Micro Description SEE BELOW  Comment:   Microscopic examination supports the above  diagnosis.     [facility]     C) Source SEE BELOWComment: Recto-sigmoid polyp   [facility]     C) Description SEE BELOW  Comment:   Recto-sigmoid, received in formalin, verified as  to patient name and consists of two tan-brown  pieces of tissue, that are .4 x .2 x .2 cm in  aggregate. The specimen is entirely submitted in  one cassette.  CT     [facility]     C) Diagnosis SEE BELOW  Comment:   Cauterized colonic mucosa with no pathologic  alteration,  No dysplasia or malignancy seen.  Code 0  88305 x 3     [facility]) Micro Description SEE BELOW  Comment:   Microscopic examination supports the above  diagnosis.     [facility]           Health Maintenance Topics with due status: Not Due       Topic Last Completion Date    Colorectal Cancer Screening 08/09/2017    PROSTATE-SPECIFIC ANTIGEN 08/18/2021    Lipid Panel 08/18/2021    Low Dose Statin 02/14/2022        ==============================================  History reviewed.     Health Maintenance Due   Topic Date Due    Shingles Vaccine (1 of 2) Never done    COVID-19 Vaccine (2 - Booster for [place] series) 06/03/2021    Eye Exam  06/23/2021    TETANUS VACCINE  10/18/2021    Foot Exam  02/12/2022    Diabetes Urine Screening  02/15/2022    Hemoglobin A1c  02/18/2022    Patient refused vaccines today.    Past Medical  History:  Past Medical History:   Diagnosis Date    Diabetes mellitus, type 2     Hypertension     Morbid obesity      Past Surgical History:   Procedure Laterality Date    HERNIA REPAIR      KNEE ARTHROSCOPY W/ DEBRIDEMENT      VASECTOMY       Review of patient's allergies indicates:   Allergen Reactions    Metformin      Intolerant     Penicillins Rash     Current Outpatient Medications on File Prior to Visit   Medication Sig Dispense Refill    aspirin (ECOTRIN) 81 MG EC tablet Take 81 mg by mouth once daily.      atorvastatin (LIPITOR) 40 MG tablet Take 1 tablet (40 mg total) by mouth once daily. 90 tablet 4    azelastine (ASTELIN) 137 mcg (0.1 %) nasal spray 1 spray (137 mcg total) by Nasal route 2 (two) times daily. for 7 days 30 mL 0    [] benzonatate (TESSALON) 200 MG capsule Take 1 capsule (200 mg total) by mouth 3 (three) times daily as needed. 30 capsule 0    enalapril (VASOTEC) 20 MG tablet Take 1 tablet (20 mg total) by mouth once daily. 90 tablet 4    multivitamin with minerals tablet Take 1 tablet by mouth once daily.      semaglutide (OZEMPIC) 1 mg/dose (4 mg/3 mL) Inject 1 mg into the skin every 7 days. 3 pen 4    sildenafiL (VIAGRA) 100 MG tablet Take 20 mg by mouth.      testosterone cypionate (DEPOTESTOTERONE CYPIONATE) 100 mg/mL injection Inject 200 mg into the muscle every 14 (fourteen) days.      [DISCONTINUED] dipyridamole-aspirin 200-25 mg (AGGRENOX)  mg CM12 aspirin      [DISCONTINUED] ferrous sulfate (FEOSOL) 325 mg (65 mg iron) Tab tablet TAKE 1 TABLET BY MOUTH EVERY DAY WITH BREAKFAST 90 tablet 4    [DISCONTINUED] montelukast (SINGULAIR) 10 mg tablet TAKE 1 TABLET BY MOUTH EVERY DAY IN THE EVENING 30 tablet 12     No current facility-administered medications on file prior to visit.     Social History     Socioeconomic History    Marital status:    Occupational History     Employer:  [employer]   Tobacco Use    Smoking status: Never Smoker    Smokeless  tobacco: Never Used   Substance and Sexual Activity    Alcohol use: No    Drug use: No    Sexual activity: Yes     Partners: Female   Social History Narrative    . 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     Family History   Problem Relation Age of Onset    Heart disease Mother     Vision loss Mother     Diabetes Father     Heart disease Father     Hypertension Father     Diabetes Maternal Grandmother     Cancer Maternal Grandfather        Vitals:    02/14/22 1012   BP: 136/82   Pulse:    Resp:    Temp:       Body mass index is 40.41 kg/m².     Review of Systems   Constitutional: Negative for chills, fatigue, fever and unexpected weight change.   HENT: Negative for ear pain and sore throat.    Eyes: Negative for redness and visual disturbance.   Respiratory: Negative for cough and shortness of breath.    Cardiovascular: Negative for chest pain and palpitations.   Gastrointestinal: Positive for diarrhea resolved with stopping metformin and nausea. Negative for vomiting.   Endocrine: Negative for cold intolerance and heat intolerance.   Genitourinary: Negative for difficulty urinating and hematuria.   Musculoskeletal: Negative for arthralgias and myalgias.   Skin: Negative for rash and wound.   Allergic/Immunologic: Negative for environmental allergies and food allergies.   Neurological: Negative for weakness and headaches.   Hematological: Negative for adenopathy. Does not bruise/bleed easily.   Psychiatric/Behavioral: Negative for sleep disturbance. The patient is not nervous/anxious.           Objective:      Physical Exam  Vitals signs and nursing note reviewed.   Constitutional:       General: He is not in acute distress.     Appearance: He is well-developed.   HENT:      Head: Normocephalic and atraumatic.   Eyes:      Pupils: Pupils are equal, round, and reactive to light.   Neck:      Musculoskeletal: Normal range of motion and neck supple.   Cardiovascular:      Rate and Rhythm: Normal rate and  regular rhythm.      Heart sounds: Normal heart sounds. No murmur.   Pulmonary:      Effort: Pulmonary effort is normal. No respiratory distress.      Breath sounds: Normal breath sounds. No wheezing.   Abdominal:      General: Bowel sounds are normal. There is no distension.      Palpations: Abdomen is soft.   Musculoskeletal: Normal range of motion.   Skin:     General: Skin is warm and dry.      Capillary Refill: Capillary refill takes less than 2 seconds.   Neurological:      Mental Status: He is alert and oriented to person, place, and time.      Cranial Nerves: No cranial nerve deficit.   Psychiatric:         Behavior: Behavior normal.          Assessment / Plan:      1.  Routine health exam-patient here for annual wellness exam.  Labs ordered.  Health maintenance was reviewed and ordered.  Hyperlipidemia with ASCVD:  Update fasting lipid panel to see if improved.  Continue statin medication.  LDL goal less than 70 with his comorbid conditions.Counseled on hyperlipidemia disease course, healthy diet and increased need for exercise.  Please be advised of the risk of cardiovascular disease, increase stroke and heart attack risk with uncontrolled/untreated hyperlipidemia.     Patient voiced understanding and understood the treatment plan. All questions were answered.     Left-sided sciatica:  Avoid steroids at this time.  Start Flexeril at bedtime.  Continue with anti-inflammatory over-the-counter.  Consider physical therapy if no improvement.  Patient may ultimately need Medrol Dosepak or imaging with x-ray.  He will message me if no improvement.   - risk of corticosteroids reviewed (elevated BP/glucose, insomnia, psychosis, bone loss, etc) and patient expressed understanding      BMI 40:  Continue Ozempic.  patient has lost approximately 30 pounds.  Continue lifestyle modification with diet and exercise.    Diabetes:  Stop metformin.  Continue OZEMPIC to 1mg.  Update labs today. Monitor hemoglobin A1c.   Discussed diabetic diet and exercise protocol.  Continue medications.  Monitor for side effects.  Discussed checking blood glucose.  Discussed symptoms to monitor for and to notify me immediately if persistent or worsening.  Follow up with Ophthalmology/Optometry and Podiatry.    Hypertension:  Well controlled.  Continue current medication regimen.Counseled on importance of hypertension disease course, I recommend ongoing Education for DASH-diet and exercise.  Counseled on medication regimen importance of treating high blood pressure.  Please be advised of risk of untreated blood pressure as discussed.  Please advised of ER precautions were given for symptoms of hypertensive urgency and emergency.    Hypogonadism:  Chronic.  Stable.  Managed by Urology.  Discussed risk and benefits of TR T.     Complete history and physical was completed today.  Complete and thorough medication reconciliation was performed.  Discussed risks and benefits of medications.  Advised patient on orders and health maintenance.  We discussed old records and old labs if available.  Will request any records not available through epic.  Continue current medications listed on your summary sheet.    All questions were answered. Patient had no further concerns. Advised of diagnoses and plan. Follow up as planned or return sooner if symptoms persist or worsen.     Orders Placed This Encounter   Procedures    (In Office Administered) Pneumococcal Polysaccharide Vaccine (23 Valent) (SQ/IM)    Hemoglobin     Standing Status:   Standing     Number of Occurrences:   99     Standing Expiration Date:   3/12/2041    Comprehensive Metabolic Panel     Standing Status:   Standing     Number of Occurrences:   99     Standing Expiration Date:   3/12/2041    Lipid Panel     Standing Status:   Standing     Number of Occurrences:   99     Standing Expiration Date:   3/12/2041    Hemoglobin A1C     Standing Status:   Standing     Number of Occurrences:   99      Standing Expiration Date:   3/12/2041    Microalbumin/Creatinine Ratio, Urine     Standing Status:   Future     Number of Occurrences:   1     Standing Expiration Date:   4/15/2023     Order Specific Question:   Specimen Source     Answer:   Urine       Medications Ordered This Encounter   Medications    cyclobenzaprine (FLEXERIL) 10 MG tablet     Sig: Take 1 tablet (10 mg total) by mouth every evening.     Dispense:  30 tablet     Refill:  0      Future Appointments     Date Provider Specialty Appt Notes    2/14/2022  Lab ,    8/12/2022  Lab .    2/14/2023 Casper Barrett MD Family Medicine 1 year f/u          Casper Barrett MD

## 2022-02-14 NOTE — PATIENT INSTRUCTIONS
Follow up in about 1 year (around 2/14/2023), or if symptoms worsen or fail to improve, for Annual Wellness Exam.   Patient Education       Semaglutide (humberto a GLOO tide)   Brand Names: US Ozempic (0.25 or 0.5 MG/DOSE); Ozempic (1 MG/DOSE); Rybelsus; Wegovy   Brand Names: Shyla Ozempic (0.25 or 0.5 MG/DOSE); Ozempic (1 MG/DOSE); Rybelsus   Warning   · This drug has been shown to cause thyroid cancer in some animals. It is not known if this happens in humans. If thyroid cancer happens, it may be deadly if not found and treated early. Call your doctor right away if you have a neck mass, trouble breathing, trouble swallowing, or have hoarseness that will not go away.  · Do not use this drug if you have a health problem called Multiple Endocrine Neoplasia syndrome type 2 (MEN 2), or if you or a family member have had thyroid cancer.    What is this drug used for?   Ozempic prefilled pens and Rybelsus tablets:   · It is used to lower blood sugar in patients with high blood sugar (diabetes).  Ozempic prefilled pens:   · It is used to lower the chance of heart attack, stroke, and death in some people.  Wegovy prefilled pens:   · It is used to help with weight loss in certain people.  What do I need to tell my doctor BEFORE I take this drug?   For all uses of this drug:   · If you are allergic to this drug; any part of this drug; or any other drugs, foods, or substances. Tell your doctor about the allergy and what signs you had.  · If you have ever had pancreatitis.  · If you have or have ever had depression or thoughts of suicide.  · If you are using another drug that has the same drug in it.  · If you are using another drug like this one. If you are not sure, ask your doctor or pharmacist.  If using for high blood sugar:   · If you have type 1 diabetes. Do not use this drug to treat type 1 diabetes.  This is not a list of all drugs or health problems that interact with this drug.  Tell your doctor and pharmacist about all  of your drugs (prescription or OTC, natural products, vitamins) and health problems. You must check to make sure that it is safe for you to take this drug with all of your drugs and health problems. Do not start, stop, or change the dose of any drug without checking with your doctor.  What are some things I need to know or do while I take this drug?   For all uses of this drug:   · Tell all of your health care providers that you take this drug. This includes your doctors, nurses, pharmacists, and dentists.  · Follow the diet and workout plan that your doctor told you about.  · Have blood work checked as you have been told by the doctor. Talk with the doctor.  · Talk with your doctor before you drink alcohol.  · Kidney problems have happened. Sometimes, these may need to be treated in the hospital or with dialysis.  · If you cannot drink liquids by mouth or if you have upset stomach, throwing up, or diarrhea that does not go away; you need to avoid getting dehydrated. Contact your doctor to find out what to do. Dehydration may lead to new or worse kidney problems.  · Do not share pen or cartridge devices with another person even if the needle has been changed. Sharing these devices may pass infections from one person to another. This includes infections you may not know you have.  · If you are planning on getting pregnant, talk with your doctor. You may need to stop taking this drug at least 2 months before getting pregnant.  If using for high blood sugar:   · Wear disease medical alert ID (identification).  · Check your blood sugar as you have been told by your doctor.  · Do not drive if your blood sugar has been low. There is a greater chance of you having a crash.  · It may be harder to control blood sugar during times of stress such as fever, infection, injury, or surgery. A change in physical activity, exercise, or diet may also affect blood sugar.  · Tell your doctor if you are pregnant, plan on getting  pregnant, or are breast-feeding. You will need to talk about the benefits and risks to you and the baby.  If using for weight loss:   · If you have high blood sugar (diabetes), you will need to watch your blood sugar closely.  · Weight loss during pregnancy may cause harm to the unborn baby. If you get pregnant while taking this drug or if you want to get pregnant, call your doctor right away.  · Tell your doctor if you are breast-feeding. You will need to talk about any risks to your baby.  What are some side effects that I need to call my doctor about right away?   WARNING/CAUTION: Even though it may be rare, some people may have very bad and sometimes deadly side effects when taking a drug. Tell your doctor or get medical help right away if you have any of the following signs or symptoms that may be related to a very bad side effect:  For all uses of this drug:   · Signs of an allergic reaction, like rash; hives; itching; red, swollen, blistered, or peeling skin with or without fever; wheezing; tightness in the chest or throat; trouble breathing, swallowing, or talking; unusual hoarseness; or swelling of the mouth, face, lips, tongue, or throat.  · Signs of kidney problems like unable to pass urine, change in how much urine is passed, blood in the urine, or a big weight gain.  · Signs of gallstones like sudden pain in the upper right belly area, right shoulder area, or between the shoulder blades; yellow skin or eyes; or fever with chills.  · Very bad dizziness or passing out.  · A fast heartbeat.  · Change in eyesight.  · Low blood sugar can happen. The chance may be raised when this drug is used with other drugs for diabetes. Signs may be dizziness, headache, feeling sleepy or weak, shaking, fast heartbeat, confusion, hunger, or sweating. Call your doctor right away if you have any of these signs. Follow what you have been told to do for low blood sugar. This may include taking glucose tablets, liquid glucose,  or some fruit juices.  · Severe and sometimes deadly pancreas problems (pancreatitis) have happened with this drug. Call your doctor right away if you have severe stomach pain, severe back pain, or severe upset stomach or throwing up.  If using for weight loss:   · New or worse behavior or mood changes like depression or thoughts of suicide.  What are some other side effects of this drug?   All drugs may cause side effects. However, many people have no side effects or only have minor side effects. Call your doctor or get medical help if any of these side effects or any other side effects bother you or do not go away:  If using for high blood sugar:   · Constipation, diarrhea, stomach pain, upset stomach, or throwing up.  Tablets:   · Not hungry.  If using for weight loss:   · Constipation, diarrhea, stomach pain, upset stomach, or throwing up.  · Headache.  · Feeling dizzy, tired, or weak.  · Burping.  · Gas.  These are not all of the side effects that may occur. If you have questions about side effects, call your doctor. Call your doctor for medical advice about side effects.  You may report side effects to your national health agency.  You may report side effects to the FDA at 1-454.259.4687. You may also report side effects at https://www.fda.gov/medwatch.  How is this drug best taken?   Use this drug as ordered by your doctor. Read all information given to you. Follow all instructions closely.  Tablets:   · Take at least 30 minutes before the first food, drink, or drugs of the day.  · Take with plain water only. Do not take with more than 4 ounces (120 mL) of water.  · Swallow whole. Do not chew, break, or crush.  · Keep taking this drug as you have been told by your doctor or other health care provider, even if you feel well.  Prefilled syringes or pen:   · It is given as a shot into the fatty part of the skin on the top of the thigh, belly area, or upper arm.  · If you will be giving yourself the shot, your  doctor or nurse will teach you how to give the shot.  · Take with or without food.  · Take the same day each week.  · Move the site where you give the shot with each shot.  · Do not use if the solution is cloudy, leaking, or has particles.  · Do not use if solution changes color.  · Wash your hands before and after use.  · Keep taking this drug as you have been told by your doctor or other health care provider, even if you feel well.  · Throw away needles in a needle/sharp disposal box. Do not reuse needles or other items. When the box is full, follow all local rules for getting rid of it. Talk with a doctor or pharmacist if you have any questions.  If using for high blood sugar:   · Attach new needle before each dose.  · Take off the needle after each shot. Do not store this device with the needle on it.  · Put the cap back on after you are done using your dose.  · If you are also using insulin, you may inject this drug and the insulin in the same area of the body but not right next to each other.  · Do not mix this drug in the same syringe with insulin.  If using for weight loss:   · Each container is for one use only. Use right after opening. Throw away any part of the opened container after the dose is given.  What do I do if I miss a dose?   Tablets:   · Skip the missed dose and go back to your normal time.  · Do not take 2 doses at the same time or extra doses.  Prefilled syringes or pen:   · Take a missed dose as soon as you think about it and go back to your normal time.  · If it is less than 48 hours until your next dose, skip the missed and go back to your normal time.  · Do not take 2 doses within 48 hours of each other.  · If you miss 2 doses, call your doctor.  How do I store and/or throw out this drug?   Tablets:   · Store in the original container at room temperature.  · Store in a dry place. Do not store in a bathroom.  Prefilled syringes or pen:   · Store unopened pens in a refrigerator. Do not  freeze.  · Do not use if it has been frozen.  Ozempic prefilled pens:   · After opening, store in the refrigerator or at room temperature. Throw away any part not used after 56 days.  · Protect from heat and light.  Wegovy prefilled pens:   · You may store unopened containers at room temperature. If you store at room temperature, throw away any part not used after 28 days.  · Store in the original container to protect from light.  All products:   · Keep all drugs in a safe place. Keep all drugs out of the reach of children and pets.  · Throw away unused or  drugs. Do not flush down a toilet or pour down a drain unless you are told to do so. Check with your pharmacist if you have questions about the best way to throw out drugs. There may be drug take-back programs in your area.  General drug facts   · If your symptoms or health problems do not get better or if they become worse, call your doctor.  · Do not share your drugs with others and do not take anyone else's drugs.  · Some drugs may have another patient information leaflet. If you have any questions about this drug, please talk with your doctor, nurse, pharmacist, or other health care provider.  · This drug comes with an extra patient fact sheet called a Medication Guide. Read it with care. Read it again each time this drug is refilled. If you have any questions about this drug, please talk with the doctor, pharmacist, or other health care provider.  · If you think there has been an overdose, call your poison control center or get medical care right away. Be ready to tell or show what was taken, how much, and when it happened.    Consumer Information Use and Disclaimer   This generalized information is a limited summary of diagnosis, treatment, and/or medication information. It is not meant to be comprehensive and should be used as a tool to help the user understand and/or assess potential diagnostic and treatment options. It does NOT include all  information about conditions, treatments, medications, side effects, or risks that may apply to a specific patient. It is not intended to be medical advice or a substitute for the medical advice, diagnosis, or treatment of a health care provider based on the health care provider's examination and assessment of a patient's specific and unique circumstances. Patients must speak with a health care provider for complete information about their health, medical questions, and treatment options, including any risks or benefits regarding use of medications. This information does not endorse any treatments or medications as safe, effective, or approved for treating a specific patient. UpTo[employer], Inc. and its affiliates disclaim any warranty or liability relating to this information or the use thereof. The use of this information is governed by the Terms of Use, available at https://www.Splash.Mozido/en/solutions/betNOWicomp/about/doretha.  Last Reviewed Date   2021-06-15  Copyright   © 2021 UpToDate, Inc. and its affiliates and/or licensors. All rights reserved.  Patient Education       Cyclobenzaprine (sye kloe KILLIAN za preen)   Brand Names: US Amrix; Fexmid   Brand Names: Shyla AG-Cyclobenzaprine; APO-Cyclobenzaprine; Auro-Cyclobenzaprine; DOM-Cyclobenzaprine; Flexeril; JAMP-Cyclobenzaprine; MYLAN-Cyclobenzaprine [DSC]; PMS-Cyclobenzaprine; ELIDIA-Cyclobenzaprine; TEVA-Cyclobenzaprine   What is this drug used for?   · It is used to relax muscles.    What do I need to tell my doctor BEFORE I take this drug?   · If you are allergic to this drug; any part of this drug; or any other drugs, foods, or substances. Tell your doctor about the allergy and what signs you had.  · If you have any of these health problems: Heart block or other heartbeat that is not normal, heart failure (weak heart), liver disease, or an overactive thyroid gland.  · If you have had a recent heart attack.  · If you have taken certain drugs for depression or  Parkinson's disease in the last 14 days. This includes isocarboxazid, phenelzine, tranylcypromine, selegiline, or rasagiline. Very high blood pressure may happen.  · If you are taking any of these drugs: Linezolid or methylene blue.  This is not a list of all drugs or health problems that interact with this drug.  Tell your doctor and pharmacist about all of your drugs (prescription or OTC, natural products, vitamins) and health problems. You must check to make sure that it is safe for you to take this drug with all of your drugs and health problems. Do not start, stop, or change the dose of any drug without checking with your doctor.  What are some things I need to know or do while I take this drug?   All products:   · Tell all of your health care providers that you take this drug. This includes your doctors, nurses, pharmacists, and dentists.  · Avoid driving and doing other tasks or actions that call for you to be alert until you see how this drug affects you.  · Be careful in hot weather or while being active. Drink lots of fluids to stop fluid loss.  · Talk with your doctor before you use alcohol, marijuana or other forms of cannabis, or prescription or OTC drugs that may slow your actions.  · Do not take this drug for longer than you were told by your doctor.  · This drug is used with rest, PT (physical therapy), pain drugs, and other therapies.  · Tell your doctor if you are pregnant, plan on getting pregnant, or are breast-feeding. You will need to talk about the benefits and risks to you and the baby.  Tablets:   · If you are 65 or older, use this drug with care. You could have more side effects.  Extended-release capsules:   · Do not take this drug if you are 65 or older. Talk with your doctor.  What are some side effects that I need to call my doctor about right away?   WARNING/CAUTION: Even though it may be rare, some people may have very bad and sometimes deadly side effects when taking a drug. Tell  your doctor or get medical help right away if you have any of the following signs or symptoms that may be related to a very bad side effect:  · Signs of an allergic reaction, like rash; hives; itching; red, swollen, blistered, or peeling skin with or without fever; wheezing; tightness in the chest or throat; trouble breathing, swallowing, or talking; unusual hoarseness; or swelling of the mouth, face, lips, tongue, or throat.  · Fast or abnormal heartbeat.  · A severe and sometimes deadly problem called serotonin syndrome may happen if you take this drug with certain other drugs. Call your doctor right away if you have agitation; change in balance; confusion; hallucinations; fever; fast or abnormal heartbeat; flushing; muscle twitching or stiffness; seizures; shivering or shaking; sweating a lot; severe diarrhea, upset stomach, or throwing up; or severe headache.  What are some other side effects of this drug?   All drugs may cause side effects. However, many people have no side effects or only have minor side effects. Call your doctor or get medical help if any of these side effects or any other side effects bother you or do not go away:  · Feeling dizzy, sleepy, tired, or weak.  · Dry mouth.  · Constipation.  · Upset stomach.  These are not all of the side effects that may occur. If you have questions about side effects, call your doctor. Call your doctor for medical advice about side effects.  You may report side effects to your national health agency.  You may report side effects to the FDA at 1-268.139.7973. You may also report side effects at https://www.fda.gov/medwatch.  How is this drug best taken?   Use this drug as ordered by your doctor. Read all information given to you. Follow all instructions closely.  All products:   · Take with or without food.  Extended-release capsules:   · Take this drug at the same time of day.  · Swallow whole. Do not chew or crush.  · If you cannot swallow this drug whole, you  may sprinkle the contents on applesauce. If you do this, swallow the mixture right away without chewing.  · Rinse mouth to make sure all contents have been swallowed.  · Throw away any part of opened capsule left over after the dose is given.  What do I do if I miss a dose?   Tablets:   · If you take this drug on a regular basis, take a missed dose as soon as you think about it.  · If it is close to the time for your next dose, skip the missed dose and go back to your normal time.  · Do not take 2 doses at the same time or extra doses.  · Many times this drug is taken on an as needed basis. Do not take more often than told by the doctor.  Extended-release capsules:   · Take a missed dose as soon as you think about it.  · If it is close to the time for your next dose, skip the missed dose and go back to your normal time.  · Do not take 2 doses at the same time or extra doses.  How do I store and/or throw out this drug?   · Store at room temperature in a dry place. Do not store in a bathroom.  · Keep all drugs in a safe place. Keep all drugs out of the reach of children and pets.  · Throw away unused or  drugs. Do not flush down a toilet or pour down a drain unless you are told to do so. Check with your pharmacist if you have questions about the best way to throw out drugs. There may be drug take-back programs in your area.    General drug facts   · If your symptoms or health problems do not get better or if they become worse, call your doctor.  · Do not share your drugs with others and do not take anyone else's drugs.  · Some drugs may have another patient information leaflet. If you have any questions about this drug, please talk with your doctor, nurse, pharmacist, or other health care provider.  · Some drugs may have another patient information leaflet. Check with your pharmacist. If you have any questions about this drug, please talk with your doctor, nurse, pharmacist, or other health care provider.  · If  you think there has been an overdose, call your poison control center or get medical care right away. Be ready to tell or show what was taken, how much, and when it happened.    Consumer Information Use and Disclaimer   This generalized information is a limited summary of diagnosis, treatment, and/or medication information. It is not meant to be comprehensive and should be used as a tool to help the user understand and/or assess potential diagnostic and treatment options. It does NOT include all information about conditions, treatments, medications, side effects, or risks that may apply to a specific patient. It is not intended to be medical advice or a substitute for the medical advice, diagnosis, or treatment of a health care provider based on the health care provider's examination and assessment of a patient's specific and unique circumstances. Patients must speak with a health care provider for complete information about their health, medical questions, and treatment options, including any risks or benefits regarding use of medications. This information does not endorse any treatments or medications as safe, effective, or approved for treating a specific patient. UpToDate, Inc. and its affiliates disclaim any warranty or liability relating to this information or the use thereof. The use of this information is governed by the Terms of Use, available at https://www.St[employer] Sail[employer]/en/solutions/lexicomp/about/doretha.  Last Reviewed Date   2020-09-29  Copyright   © 2021 UpToDate, Inc. and its affiliates and/or licensors. All rights reserved.    Dear patient,   As a result of recent federal legislation (The Federal Cures Act), you may receive lab or pathology results from your visit in your [facility] account before your physician is able to contact you. Your physician or their representative will relay the results to you with their recommendations at their soonest availability.     If no improvement in symptoms or  symptoms worsen, please be advised to call MD, follow-up at clinic and/or go to ER if becomes severe.    Casper Barrett M.D.        We Offer TELEHEALTH & Same Day Appointments!   Book your Telehealth appointment with me through my nurse or   Clinic appointments on [employer]!    [address] [place] [address]    Office: [phone]   FAX: [phone]    Check out my Facebook Page and Follow Me at: [email or web address]    Check out my website at [employer] by clicking on: [email or web address]    To Schedule appointments online, go to [employer]: [email or web address]

## 2022-02-15 ENCOUNTER — PATIENT MESSAGE (OUTPATIENT)
Dept: FAMILY MEDICINE | Facility: CLINIC | Age: 67
End: 2022-02-15
Payer: COMMERCIAL

## 2022-02-15 NOTE — PROGRESS NOTES
Make follow-up lab appointment per recommendation below.  Check to see if patient has seen the results through my chart.  If not then,  #CALL THE PATIENT# to discuss results/see if they have questions and document verification of contact. Make F/U appt if needed. [phone]    #My interpretation that was sent to them through [employer]:  Lazarus, I have reviewed your recent blood work.     Your hemoglobin is normal, no anemia.    Your metabolic panel which shows your glucose, kidney function, electrolytes, and liver function is normal except for elevated glucose.  See A1c..   Your cholesterol is improved on atorvastatin.    Your hemoglobin A1c is elevated from previous.  Continue Ozempic at 1 milligram weekly.  Start a low carb diet for better glucose control.  Increase exercise.  This test is gold standard screening test for diabetes.  It is a measures 3 months of your average blood sugar.    Repeat A1c in three months.  Repeat annual wellness labs in one year.  =========================  Also please address any outstanding health maintenance that may be due: Shingles Vaccine(1 of 2) Never done  COVID-19 Vaccine(2 - Booster for [place] series) due on 06/03/2021  Eye Exam due on 06/23/2021  TETANUS VACCINE due on 10/18/2021  Foot Exam due on 02/12/2022

## 2022-02-28 ENCOUNTER — PATIENT MESSAGE (OUTPATIENT)
Dept: FAMILY MEDICINE | Facility: CLINIC | Age: 67
End: 2022-02-28
Payer: COMMERCIAL

## 2022-03-01 ENCOUNTER — OFFICE VISIT (OUTPATIENT)
Dept: FAMILY MEDICINE | Facility: CLINIC | Age: 67
End: 2022-03-01
Payer: MEDICARE

## 2022-03-01 VITALS
HEIGHT: 70 IN | BODY MASS INDEX: 40.27 KG/M2 | HEART RATE: 77 BPM | WEIGHT: 281.31 LBS | DIASTOLIC BLOOD PRESSURE: 76 MMHG | TEMPERATURE: 98 F | OXYGEN SATURATION: 100 % | SYSTOLIC BLOOD PRESSURE: 130 MMHG

## 2022-03-01 DIAGNOSIS — K21.9 GASTROESOPHAGEAL REFLUX DISEASE, UNSPECIFIED WHETHER ESOPHAGITIS PRESENT: ICD-10-CM

## 2022-03-01 DIAGNOSIS — R10.32 LLQ PAIN: ICD-10-CM

## 2022-03-01 DIAGNOSIS — R19.7 DIARRHEA, UNSPECIFIED TYPE: ICD-10-CM

## 2022-03-01 DIAGNOSIS — K57.92 DIVERTICULITIS: Primary | ICD-10-CM

## 2022-03-01 DIAGNOSIS — N50.812 PAIN IN LEFT TESTICLE: ICD-10-CM

## 2022-03-01 PROCEDURE — 99214 OFFICE O/P EST MOD 30 MIN: CPT | Mod: S$PBB,,, | Performed by: NURSE PRACTITIONER

## 2022-03-01 PROCEDURE — 99999 PR PBB SHADOW E&M-EST. PATIENT-LVL V: CPT | Mod: PBBFAC,,, | Performed by: NURSE PRACTITIONER

## 2022-03-01 PROCEDURE — 87086 URINE CULTURE/COLONY COUNT: CPT | Performed by: NURSE PRACTITIONER

## 2022-03-01 PROCEDURE — 81002 URINALYSIS NONAUTO W/O SCOPE: CPT | Mod: PO | Performed by: NURSE PRACTITIONER

## 2022-03-01 PROCEDURE — 99215 OFFICE O/P EST HI 40 MIN: CPT | Mod: PBBFAC,PO | Performed by: NURSE PRACTITIONER

## 2022-03-01 PROCEDURE — 99999 PR PBB SHADOW E&M-EST. PATIENT-LVL V: ICD-10-PCS | Mod: PBBFAC,,, | Performed by: NURSE PRACTITIONER

## 2022-03-01 PROCEDURE — 99214 PR OFFICE/OUTPT VISIT, EST, LEVL IV, 30-39 MIN: ICD-10-PCS | Mod: S$PBB,,, | Performed by: NURSE PRACTITIONER

## 2022-03-01 RX ORDER — SUCRALFATE 1 G/1
1 TABLET ORAL 4 TIMES DAILY
Qty: 28 TABLET | Refills: 0 | Status: SHIPPED | OUTPATIENT
Start: 2022-03-01 | End: 2022-03-08

## 2022-03-01 RX ORDER — METRONIDAZOLE 500 MG/1
500 TABLET ORAL EVERY 8 HOURS
Qty: 21 TABLET | Refills: 0 | Status: SHIPPED | OUTPATIENT
Start: 2022-03-01 | End: 2022-03-08

## 2022-03-01 RX ORDER — CIPROFLOXACIN 500 MG/1
500 TABLET ORAL 2 TIMES DAILY
Qty: 14 TABLET | Refills: 0 | Status: SHIPPED | OUTPATIENT
Start: 2022-03-01 | End: 2022-03-08

## 2022-03-01 NOTE — PROGRESS NOTES
Subjective:       Patient ID: Lazarus Zamudio is a 66 y.o. male.    Chief Complaint: Leg Pain (Left leg, inside groin ) and Diarrhea (sunday)    Abdominal Pain  This is a new problem. The current episode started in the past 7 days. The onset quality is sudden. The problem occurs daily. The problem has been waxing and waning. The pain is located in the LLQ. The pain is at a severity of 5/10. The pain is moderate. The quality of the pain is aching. The abdominal pain radiates to the scrotum. Associated symptoms include belching and diarrhea. Pertinent negatives include no anorexia, arthralgias, constipation, dysuria, fever, flatus, frequency, headaches, hematochezia, hematuria, melena, myalgias, nausea, vomiting or weight loss. Nothing aggravates the pain. The pain is relieved by nothing. He has tried nothing for the symptoms. The treatment provided no relief. There is no history of abdominal surgery, colon cancer, Crohn's disease, gallstones, GERD, irritable bowel syndrome, pancreatitis, PUD or ulcerative colitis. Patient's medical history includes kidney stones. Patient's medical history does not include UTI.   Testicle Pain  The patient's primary symptoms include scrotal swelling and testicular pain. The patient's pertinent negatives include no genital injury, genital itching, genital lesions, pelvic pain, penile discharge, penile pain or priapism. Primary symptoms comment: Left testicle. This is a new problem. The current episode started in the past 7 days. The problem occurs daily. The problem has been unchanged. Associated symptoms include abdominal pain and diarrhea. Pertinent negatives include no anorexia, chest pain, chills, constipation, coughing, discolored urine, dysuria, fever, flank pain, frequency, headaches, hematuria, hesitancy, joint pain, joint swelling, nausea, painful intercourse, rash, shortness of breath, sore throat, urgency, urinary retention or vomiting. The testicular pain affects the  left testicle. There is swelling in the left testicle. The color of the testicles is normal. Nothing aggravates the symptoms. He has tried nothing for the symptoms. The treatment provided no relief. He is sexually active. No, his partner does not have an STD. His past medical history is significant for kidney stones. There is no history of BPH, chlamydia, cryptorchidism, erectile aid use, erectile dysfunction, a femoral hernia, gonorrhea, herpes simplex, HIV, an inguinal hernia, prostatitis, sickle cell disease, syphilis or varicocele.     Past Medical History:   Diagnosis Date    Diabetes mellitus, type 2     Hypertension     Morbid obesity      Social History     Socioeconomic History    Marital status:    Occupational History     Employer:  [employer]   Tobacco Use    Smoking status: Never Smoker    Smokeless tobacco: Never Used   Substance and Sexual Activity    Alcohol use: No    Drug use: No    Sexual activity: Yes     Partners: Female   Social History Narrative    . 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     Past Surgical History:   Procedure Laterality Date    HERNIA REPAIR      KNEE ARTHROSCOPY W/ DEBRIDEMENT      VASECTOMY         Review of Systems   Constitutional: Negative.  Negative for chills, fever and weight loss.   HENT: Negative.  Negative for sore throat.    Eyes: Negative.    Respiratory: Negative.  Negative for cough and shortness of breath.    Cardiovascular: Negative.  Negative for chest pain.   Gastrointestinal: Positive for abdominal pain and diarrhea. Negative for anorexia, constipation, flatus, hematochezia, melena, nausea and vomiting.   Endocrine: Negative.    Genitourinary: Positive for scrotal swelling and testicular pain. Negative for discharge, dysuria, flank pain, frequency, hematuria, hesitancy, pelvic pain, penile pain and urgency.   Musculoskeletal: Negative.  Negative for arthralgias, joint pain and myalgias.   Integumentary:  Negative for rash. Negative.    Allergic/Immunologic: Negative.    Neurological: Negative.  Negative for headaches.   Psychiatric/Behavioral: Negative.          Objective:      Physical Exam  Vitals and nursing note reviewed.   Constitutional:       Appearance: Normal appearance.   HENT:      Head: Normocephalic.      Right Ear: Tympanic membrane, ear canal and external ear normal.      Left Ear: Tympanic membrane, ear canal and external ear normal.      Nose: Nose normal.      Mouth/Throat:      Mouth: Mucous membranes are moist.      Pharynx: Oropharynx is clear.   Eyes:      Conjunctiva/sclera: Conjunctivae normal.      Pupils: Pupils are equal, round, and reactive to light.   Cardiovascular:      Rate and Rhythm: Normal rate and regular rhythm.      Pulses: Normal pulses.      Heart sounds: Normal heart sounds.   Pulmonary:      Effort: Pulmonary effort is normal.      Breath sounds: Normal breath sounds.   Abdominal:      General: Bowel sounds are normal.      Palpations: Abdomen is soft.      Tenderness: There is abdominal tenderness in the left lower quadrant. There is no right CVA tenderness, left CVA tenderness, guarding or rebound. Negative signs include Fair's sign, Rovsing's sign, McBurney's sign, psoas sign and obturator sign.   Genitourinary:     Testes:         Left: Mass, tenderness, swelling, testicular hydrocele or varicocele not present. Left testis is descended. Cremasteric reflex is present.    Musculoskeletal:         General: Normal range of motion.      Cervical back: Normal range of motion and neck supple.   Skin:     General: Skin is warm and dry.      Capillary Refill: Capillary refill takes 2 to 3 seconds.   Neurological:      Mental Status: He is alert and oriented to person, place, and time.   Psychiatric:         Mood and Affect: Mood normal.         Behavior: Behavior normal.         Thought Content: Thought content normal.         Judgment: Judgment normal.         Assessment:       Problem List Items Addressed  This Visit    None     Visit Diagnoses     Diverticulitis    -  Primary    LLQ pain        Diarrhea, unspecified type        Relevant Orders    CULTURE, STOOL    Stool Exam-Ova,Cysts,Parasites    WBC, Stool    Occult blood x 1, stool    Giardia / Cryptosporidum, EIA    H. pylori antigen, stool    Gastroesophageal reflux disease, unspecified whether esophagitis present        Pain in left testicle        Relevant Orders    Urine culture    Urinalysis    US Scrotum And Testicles          Plan:           Lazarus was seen today for leg pain and diarrhea.    Diagnoses and all orders for this visit:    Diverticulitis  LLQ pain  Diarrhea, unspecified type  -     CULTURE, STOOL; Future  -     Stool Exam-Ova,Cysts,Parasites; Future  -     WBC, Stool; Future  -     Occult blood x 1, stool; Future  -     Giardia / Cryptosporidum, EIA; Future  -     H. pylori antigen, stool; Future  -     ciprofloxacin HCl (CIPRO) 500 MG tablet; Take 1 tablet (500 mg total) by mouth 2 (two) times daily. for 7 days  -     metroNIDAZOLE (FLAGYL) 500 MG tablet; Take 1 tablet (500 mg total) by mouth every 8 (eight) hours. for 7 days  Hydrate well  Rest  Imodium OTC as directed    Gastroesophageal reflux disease, unspecified whether esophagitis present       -     sucralfate (CARAFATE) 1 gram tablet; Take 1 tablet (1 g total) by mouth 4 (four) times daily. for 7 days  -     H. pylori antigen, stool; Future    Pain in left testicle  -     Urine culture; Future  -     Urinalysis  -     US Scrotum And Testicles; Future  -     Urine culture    Report to ER immediately if symptoms worsen or persist

## 2022-03-01 NOTE — PATIENT INSTRUCTIONS
Hydrate well  Rest  Imodium OTC as directed  Report to ER immediately if symptoms worsen or persist

## 2022-03-03 ENCOUNTER — LAB VISIT (OUTPATIENT)
Dept: LAB | Facility: HOSPITAL | Age: 67
End: 2022-03-03
Attending: NURSE PRACTITIONER
Payer: MEDICARE

## 2022-03-03 DIAGNOSIS — R19.7 DIARRHEA, UNSPECIFIED TYPE: ICD-10-CM

## 2022-03-03 LAB
BILIRUB UR QL STRIP: NEGATIVE
CLARITY UR: CLEAR
COLOR UR: YELLOW
GLUCOSE UR QL STRIP: NEGATIVE
HGB UR QL STRIP: ABNORMAL
KETONES UR QL STRIP: NEGATIVE
LEUKOCYTE ESTERASE UR QL STRIP: NEGATIVE
NITRITE UR QL STRIP: NEGATIVE
OB PNL STL: POSITIVE
PH UR STRIP: 6 [PH] (ref 5–8)
PROT UR QL STRIP: NEGATIVE
SP GR UR STRIP: 1.02 (ref 1–1.03)
URN SPEC COLLECT METH UR: ABNORMAL
WBC #/AREA STL HPF: NORMAL /[HPF]

## 2022-03-03 PROCEDURE — 87427 SHIGA-LIKE TOXIN AG IA: CPT | Mod: 59 | Performed by: NURSE PRACTITIONER

## 2022-03-03 PROCEDURE — 87329 GIARDIA AG IA: CPT | Performed by: NURSE PRACTITIONER

## 2022-03-03 PROCEDURE — 87045 FECES CULTURE AEROBIC BACT: CPT | Performed by: NURSE PRACTITIONER

## 2022-03-03 PROCEDURE — 87046 STOOL CULTR AEROBIC BACT EA: CPT | Performed by: NURSE PRACTITIONER

## 2022-03-03 PROCEDURE — 82272 OCCULT BLD FECES 1-3 TESTS: CPT | Performed by: NURSE PRACTITIONER

## 2022-03-03 PROCEDURE — 87338 HPYLORI STOOL AG IA: CPT | Performed by: NURSE PRACTITIONER

## 2022-03-03 PROCEDURE — 89055 LEUKOCYTE ASSESSMENT FECAL: CPT | Performed by: NURSE PRACTITIONER

## 2022-03-04 ENCOUNTER — HOSPITAL ENCOUNTER (OUTPATIENT)
Dept: RADIOLOGY | Facility: HOSPITAL | Age: 67
Discharge: HOME | End: 2022-03-04
Attending: NURSE PRACTITIONER
Payer: MEDICARE

## 2022-03-04 ENCOUNTER — TELEPHONE (OUTPATIENT)
Dept: FAMILY MEDICINE | Facility: CLINIC | Age: 67
End: 2022-03-04
Payer: COMMERCIAL

## 2022-03-04 DIAGNOSIS — R19.5 POSITIVE OCCULT STOOL BLOOD TEST: Primary | ICD-10-CM

## 2022-03-04 DIAGNOSIS — N50.812 PAIN IN LEFT TESTICLE: ICD-10-CM

## 2022-03-04 LAB
BACTERIA UR CULT: NO GROWTH
CRYPTOSP AG STL QL IA: NEGATIVE
G LAMBLIA AG STL QL IA: NEGATIVE

## 2022-03-04 PROCEDURE — 76870 US EXAM SCROTUM: CPT | Mod: 26,,, | Performed by: RADIOLOGY

## 2022-03-04 PROCEDURE — 76870 US SCROTUM AND TESTICLES: ICD-10-PCS | Mod: 26,,, | Performed by: RADIOLOGY

## 2022-03-04 PROCEDURE — 76870 US EXAM SCROTUM: CPT | Mod: TC,PO

## 2022-03-04 NOTE — TELEPHONE ENCOUNTER
----- Message from Salome Arana NP sent at 3/4/2022 12:21 PM CST -----  Stool positive for blood; GI referral in for further evaluation. Otherwise, stools negative so far. Awaiting additional results.

## 2022-03-07 ENCOUNTER — TELEPHONE (OUTPATIENT)
Dept: FAMILY MEDICINE | Facility: CLINIC | Age: 67
End: 2022-03-07
Payer: COMMERCIAL

## 2022-03-07 LAB
BACTERIA STL CULT: NORMAL
BACTERIA STL CULT: NORMAL
O+P STL MICRO: NORMAL

## 2022-03-07 NOTE — TELEPHONE ENCOUNTER
----- Message from Elisha Briceno sent at 3/7/2022  2:19 PM CST -----  Type:  Patient Returning Call    Who Called:pt  Who Left Message for Patient:nurse  Does the patient know what this is regarding?:he doesn't know  Would the patient rather a call back or a response via [facility]? Call   Best Call Back Number:[phone]  Additional Information: #    Thank you

## 2022-03-08 LAB
H PYLORI AG STL QL IA: NORMAL
SPECIMEN SOURCE: NORMAL

## 2022-03-13 DIAGNOSIS — M54.32 SCIATICA, LEFT SIDE: ICD-10-CM

## 2022-03-13 NOTE — TELEPHONE ENCOUNTER
No new care gaps identified.  Powered by [employer]. Reference number: [id number].   3/13/2022 9:43:19 AM CDT

## 2022-03-14 RX ORDER — CYCLOBENZAPRINE HCL 10 MG
TABLET ORAL
Qty: 30 TABLET | Refills: 0 | Status: SHIPPED | OUTPATIENT
Start: 2022-03-14 | End: 2022-03-18 | Stop reason: SDUPTHER

## 2022-03-18 ENCOUNTER — HOSPITAL ENCOUNTER (OUTPATIENT)
Dept: RADIOLOGY | Facility: HOSPITAL | Age: 67
Discharge: HOME | End: 2022-03-18
Attending: FAMILY MEDICINE
Payer: MEDICARE

## 2022-03-18 ENCOUNTER — PATIENT MESSAGE (OUTPATIENT)
Dept: FAMILY MEDICINE | Facility: CLINIC | Age: 67
End: 2022-03-18
Payer: COMMERCIAL

## 2022-03-18 ENCOUNTER — OFFICE VISIT (OUTPATIENT)
Dept: FAMILY MEDICINE | Facility: CLINIC | Age: 67
End: 2022-03-18
Payer: MEDICARE

## 2022-03-18 VITALS
HEART RATE: 80 BPM | WEIGHT: 279.63 LBS | TEMPERATURE: 98 F | OXYGEN SATURATION: 97 % | DIASTOLIC BLOOD PRESSURE: 86 MMHG | SYSTOLIC BLOOD PRESSURE: 138 MMHG | HEIGHT: 70 IN | BODY MASS INDEX: 40.03 KG/M2

## 2022-03-18 DIAGNOSIS — M79.605 LEFT LEG PAIN: ICD-10-CM

## 2022-03-18 DIAGNOSIS — M54.32 SCIATICA, LEFT SIDE: Primary | ICD-10-CM

## 2022-03-18 DIAGNOSIS — M54.32 SCIATICA, LEFT SIDE: ICD-10-CM

## 2022-03-18 PROCEDURE — 99999 PR PBB SHADOW E&M-EST. PATIENT-LVL V: ICD-10-PCS | Mod: PBBFAC,,, | Performed by: FAMILY MEDICINE

## 2022-03-18 PROCEDURE — 72100 XR LUMBAR SPINE AP AND LATERAL: ICD-10-PCS | Mod: 26,,, | Performed by: RADIOLOGY

## 2022-03-18 PROCEDURE — 99214 OFFICE O/P EST MOD 30 MIN: CPT | Mod: S$PBB,,, | Performed by: FAMILY MEDICINE

## 2022-03-18 PROCEDURE — 99999 PR PBB SHADOW E&M-EST. PATIENT-LVL V: CPT | Mod: PBBFAC,,, | Performed by: FAMILY MEDICINE

## 2022-03-18 PROCEDURE — 93971 US LOWER EXTREMITY VEINS LEFT: ICD-10-PCS | Mod: 26,LT,, | Performed by: RADIOLOGY

## 2022-03-18 PROCEDURE — 93971 EXTREMITY STUDY: CPT | Mod: 26,LT,, | Performed by: RADIOLOGY

## 2022-03-18 PROCEDURE — 72100 X-RAY EXAM L-S SPINE 2/3 VWS: CPT | Mod: TC,PO

## 2022-03-18 PROCEDURE — 93971 EXTREMITY STUDY: CPT | Mod: TC,PO,LT

## 2022-03-18 PROCEDURE — 99214 PR OFFICE/OUTPT VISIT, EST, LEVL IV, 30-39 MIN: ICD-10-PCS | Mod: S$PBB,,, | Performed by: FAMILY MEDICINE

## 2022-03-18 PROCEDURE — 72100 X-RAY EXAM L-S SPINE 2/3 VWS: CPT | Mod: 26,,, | Performed by: RADIOLOGY

## 2022-03-18 PROCEDURE — 99215 OFFICE O/P EST HI 40 MIN: CPT | Mod: PBBFAC,25,PO | Performed by: FAMILY MEDICINE

## 2022-03-18 RX ORDER — IBUPROFEN 800 MG/1
800 TABLET ORAL EVERY 8 HOURS PRN
Qty: 60 TABLET | Refills: 0 | Status: SHIPPED | OUTPATIENT
Start: 2022-03-18 | End: 2022-04-18

## 2022-03-18 RX ORDER — METHYLPREDNISOLONE 4 MG/1
TABLET ORAL
Qty: 21 TABLET | Refills: 0 | Status: SHIPPED | OUTPATIENT
Start: 2022-03-18 | End: 2022-08-15

## 2022-03-18 RX ORDER — CYCLOBENZAPRINE HCL 10 MG
10 TABLET ORAL NIGHTLY
Qty: 30 TABLET | Refills: 0 | Status: SHIPPED | OUTPATIENT
Start: 2022-03-18 | End: 2022-08-15

## 2022-03-18 NOTE — TELEPHONE ENCOUNTER
I have signed for the following orders AND/OR meds.  Please call the patient and ask the patient to schedule the testing AND/OR inform about any medications that were sent.      Orders Placed This Encounter   Procedures    X-Ray Lumbar Spine AP And Lateral     Standing Status:   Future     Standing Expiration Date:   3/18/2023     Order Specific Question:   May the Radiologist modify the order per protocol to meet the clinical needs of the patient?     Answer:   Yes

## 2022-03-18 NOTE — ASSESSMENT & PLAN NOTE
Stat ultrasound of the left leg is negative for DVT.  X-ray shows degenerative disc disease.  I recommend patient start ibuprofen, Medrol Dosepak and attend physical therapy.  Follow-up sooner if no improvement.  Referral to pain clinic just in case he needs further imaging with MRI and or interventional pain techniques for steroid injections etc..    Blood pressure and blood sugar are well controlled at this time. - risk of corticosteroids reviewed (elevated BP/glucose, insomnia, psychosis, bone loss, etc) and patient expressed understanding

## 2022-03-18 NOTE — PROGRESS NOTES
PLAN:      Problem List Items Addressed This Visit     Sciatica, left side - Primary     Stat ultrasound of the left leg is negative for DVT.  X-ray shows degenerative disc disease.  I recommend patient start ibuprofen, Medrol Dosepak and attend physical therapy.  Follow-up sooner if no improvement.  Referral to pain clinic just in case he needs further imaging with MRI and or interventional pain techniques for steroid injections etc..    Blood pressure and blood sugar are well controlled at this time. - risk of corticosteroids reviewed (elevated BP/glucose, insomnia, psychosis, bone loss, etc) and patient expressed understanding             Relevant Medications    methylPREDNISolone (MEDROL DOSEPACK) 4 mg tablet    ibuprofen (ADVIL,MOTRIN) 800 MG tablet    cyclobenzaprine (FLEXERIL) 10 MG tablet    Other Relevant Orders    Ambulatory referral/consult to Physical/Occupational Therapy    Ambulatory referral/consult to Pain Clinic    Left leg pain    Relevant Orders    US Lower Extremity Veins Left (Completed)        Future Appointments     Date Provider Specialty Appt Notes    4/29/2022 Satya Solo MD Pain Medicine Sciatica, left side     8/12/2022  Lab .    2/14/2023 Casper Barrett MD Family Medicine 1 year f/u         Medication Management for assessment above:   Medication List with Changes/Refills   New Medications    IBUPROFEN (ADVIL,MOTRIN) 800 MG TABLET    Take 1 tablet (800 mg total) by mouth every 8 (eight) hours as needed for Pain.    METHYLPREDNISOLONE (MEDROL DOSEPACK) 4 MG TABLET    follow package directions   Current Medications    ASPIRIN (ECOTRIN) 81 MG EC TABLET    Take 81 mg by mouth once daily.    ATORVASTATIN (LIPITOR) 40 MG TABLET    Take 1 tablet (40 mg total) by mouth once daily.    AZELASTINE (ASTELIN) 137 MCG (0.1 %) NASAL SPRAY    USE 1 SPRAY (137 MCG TOTAL) BY NASAL ROUTE 2 (TWO) TIMES DAILY. FOR 7 DAYS    ENALAPRIL (VASOTEC) 20 MG TABLET    Take 1 tablet (20 mg total) by mouth  once daily.    MULTIVITAMIN WITH MINERALS TABLET    Take 1 tablet by mouth once daily.    SEMAGLUTIDE (OZEMPIC) 1 MG/DOSE (4 MG/3 ML)    Inject 1 mg into the skin every 7 days.    SILDENAFIL (VIAGRA) 100 MG TABLET    Take 20 mg by mouth.    TESTOSTERONE CYPIONATE (DEPOTESTOTERONE CYPIONATE) 100 MG/ML INJECTION    Inject 200 mg into the muscle every 14 (fourteen) days.   Changed and/or Refilled Medications    Modified Medication Previous Medication    CYCLOBENZAPRINE (FLEXERIL) 10 MG TABLET cyclobenzaprine (FLEXERIL) 10 MG tablet       Take 1 tablet (10 mg total) by mouth every evening.    TAKE 1 TABLET BY MOUTH EVERY DAY IN THE EVENING       Casper Barrett M.D.  ==========================================================================  Subjective:   Patient ID: Lazarus Zamudio is a 66 y.o. male.  has a past medical history of Diabetes mellitus, type 2, Hypertension, and Morbid obesity.   Chief Complaint: discuss xray results       Problem List Items Addressed This Visit     Sciatica, left side - Primary    Overview     Subacute.  Approximately four weeks.  Patient tried Flexeril with some improvement.  Still having significant left-sided pain radiating from his back/hip down into his knee area.  X-ray today is abnormal in reviewed with the patient.  Patient is also reporting some left inner groin pain related to a previous vein surgery.           Current Assessment & Plan     Stat ultrasound of the left leg is negative for DVT.  X-ray shows degenerative disc disease.  I recommend patient start ibuprofen, Medrol Dosepak and attend physical therapy.  Follow-up sooner if no improvement.  Referral to pain clinic just in case he needs further imaging with MRI and or interventional pain techniques for steroid injections etc..    Blood pressure and blood sugar are well controlled at this time. - risk of corticosteroids reviewed (elevated BP/glucose, insomnia, psychosis, bone loss, etc) and patient expressed  understanding             Left leg pain           Review of patient's allergies indicates:   Allergen Reactions    Metformin      Intolerant     Penicillins Rash     Current Outpatient Medications   Medication Instructions    aspirin (ECOTRIN) 81 mg, Oral, Daily,      atorvastatin (LIPITOR) 40 mg, Oral, Daily    azelastine (ASTELIN) 137 mcg (0.1 %) nasal spray USE 1 SPRAY (137 MCG TOTAL) BY NASAL ROUTE 2 (TWO) TIMES DAILY. FOR 7 DAYS    cyclobenzaprine (FLEXERIL) 10 mg, Oral, Nightly    enalapril (VASOTEC) 20 mg, Oral, Daily    ibuprofen (ADVIL,MOTRIN) 800 mg, Oral, Every 8 hours PRN    methylPREDNISolone (MEDROL DOSEPACK) 4 mg tablet follow package directions    multivitamin with minerals tablet 1 tablet, Oral, Daily    semaglutide (OZEMPIC) 1 mg, Subcutaneous, Every 7 days    sildenafiL (VIAGRA) 20 mg, Oral    testosterone cypionate (DEPOTESTOTERONE CYPIONATE) 200 mg, Every 14 days      I have reviewed the PMH, social history, FamilyHx, surgical history, allergies and medications documented / confirmed by the patient at the time of this visit.  Review of Systems   Constitutional: Negative for chills, fatigue, fever and unexpected weight change.   HENT: Negative for ear pain and sore throat.    Eyes: Negative for redness and visual disturbance.   Respiratory: Negative for cough and shortness of breath.    Cardiovascular: Negative for chest pain and palpitations.   Gastrointestinal: Negative for nausea and vomiting.   Endocrine: Negative for cold intolerance and heat intolerance.   Genitourinary: Negative for difficulty urinating and hematuria.   Musculoskeletal: Positive for arthralgias, back pain and myalgias.   Skin: Negative for rash and wound.   Allergic/Immunologic: Negative for environmental allergies and food allergies.   Neurological: Negative for weakness and headaches.   Hematological: Negative for adenopathy. Does not bruise/bleed easily.   Psychiatric/Behavioral: Negative for sleep  "disturbance. The patient is not nervous/anxious.      Objective:   /86   Pulse 80   Temp 97.8 °F (36.6 °C) (Other (see comments))   Ht 5' 10" (1.778 m)   Wt 126.8 kg (279 lb 9.6 oz)   SpO2 97%   BMI 40.12 kg/m²   Physical Exam  Vitals and nursing note reviewed.   Constitutional:       General: He is not in acute distress.     Appearance: He is well-developed. He is obese. He is not diaphoretic.   HENT:      Head: Normocephalic and atraumatic.      Right Ear: External ear normal.      Left Ear: External ear normal.      Nose: Nose normal. No rhinorrhea.   Eyes:      Extraocular Movements: Extraocular movements intact.      Pupils: Pupils are equal, round, and reactive to light.   Cardiovascular:      Rate and Rhythm: Normal rate.      Pulses: Normal pulses.   Pulmonary:      Effort: Pulmonary effort is normal. No respiratory distress.      Breath sounds: Normal breath sounds.   Abdominal:      General: Bowel sounds are normal.      Palpations: Abdomen is soft.   Musculoskeletal:         General: Tenderness and deformity present. Normal range of motion.      Cervical back: Normal range of motion and neck supple.      Thoracic back: No spasms, tenderness or bony tenderness.      Lumbar back: Spasms and tenderness present. No bony tenderness. Negative right straight leg raise test and negative left straight leg raise test.      Left hip: Bony tenderness and crepitus present. No tenderness. Normal strength.      Right lower leg: Edema present.      Left lower leg: Edema present.        Legs:       Comments: Area of tenderness to palpation   Skin:     General: Skin is warm and dry.      Capillary Refill: Capillary refill takes less than 2 seconds.      Findings: No rash.   Neurological:      General: No focal deficit present.      Mental Status: He is alert and oriented to person, place, and time.   Psychiatric:         Attention and Perception: He is attentive.         Mood and Affect: Mood normal. Mood is " not anxious or depressed. Affect is not labile, blunt, angry or inappropriate.         Speech: He is communicative. Speech is not rapid and pressured, delayed, slurred or tangential.         Behavior: Behavior normal. Behavior is not agitated, slowed, aggressive, withdrawn, hyperactive or combative.         Thought Content: Thought content normal. Thought content is not paranoid or delusional. Thought content does not include homicidal or suicidal ideation. Thought content does not include homicidal or suicidal plan.         Cognition and Memory: Memory is not impaired.         Judgment: Judgment normal. Judgment is not impulsive or inappropriate.        Patient is wearing a mask which limits physical exam due to COVID restrictions.   US Lower Extremity Veins Left  Narrative: EXAMINATION:  US LOWER EXTREMITY VEINS LEFT    CLINICAL HISTORY:  Pain in left leg    TECHNIQUE:  Duplex and color flow Doppler evaluation and graded compression of the left lower extremity veins was performed.    COMPARISON:  None    FINDINGS:  Left thigh veins: The common femoral, femoral, popliteal, upper greater saphenous, and deep femoral veins are patent and free of thrombus. The veins are normally compressible and have normal phasic flow and augmentation response.    Left calf veins: The visualized calf veins are patent.    Contralateral CFV: The contralateral (right) common femoral vein is patent and free of thrombus.    Miscellaneous: None  Impression: No evidence of deep venous thrombosis in the left lower extremity.    Electronically signed by: Johnson Saucedo MD  Date:    03/18/2022  Time:    15:40  X-Ray Lumbar Spine AP And Lateral  Narrative: EXAMINATION:  XR LUMBAR SPINE AP AND LATERAL    CLINICAL HISTORY:  Sciatica, left side    TECHNIQUE:  AP, lateral and spot images were performed of the lumbar spine.    COMPARISON:  None    FINDINGS:  Vertebral body heights and alignment are within normal limits.  Prominent multilevel bridging  disc osteophyte complexes are present suggesting diffuse idiopathic skeletal hyperostosis.  There is mild generalized disc height loss seen throughout the lumbar spine.  No acute fractures or subluxations are demonstrated.  The remaining visualized osseous and soft tissue structures demonstrate no appreciable abnormality.  Impression: Degenerative findings as above    Electronically signed by: John Maddox MD  Date:    03/18/2022  Time:    14:47      Assessment:     1. Sciatica, left side    2. Left leg pain      MDM:   Moderate complexity.  Moderate risk.  Total time: 31 minutes.  This includes total time spent on the encounter, which includes face to face time and non-face to face time preparing to see the patient (eg, review of previous medical records, tests), Obtaining and/or reviewing separately obtained history, documenting clinical information in the electronic or other health record, independently interpreting results (not separately reported)/communicating results to the patient/family/caregiver, and/or care coordination (not separately reported).    I have Reviewed and summarized old records.  I have performed thorough medication reconciliation today and discussed risk and benefits of medications.  I have reviewed ultrasound, x-ray, labs and discussed with patient.  All questions were answered.  I am requesting old records and will review them once they are available.    I have signed for the following orders AND/OR meds.  Orders Placed This Encounter   Procedures    US Lower Extremity Veins Left     Standing Status:   Future     Number of Occurrences:   1     Standing Expiration Date:   3/18/2023    Ambulatory referral/consult to Physical/Occupational Therapy     Standing Status:   Future     Standing Expiration Date:   4/18/2023     Referral Priority:   Routine     Referral Type:   Physical Medicine     Referral Reason:   Specialty Services Required     Number of Visits Requested:   1     Ambulatory referral/consult to Pain Clinic     Standing Status:   Future     Standing Expiration Date:   4/18/2023     Referral Priority:   Routine     Referral Type:   Consultation     Referral Reason:   Specialty Services Required     Referred to Provider:   Satya Solo MD     Requested Specialty:   Pain Medicine     Number of Visits Requested:   1     Medications Ordered This Encounter   Medications    cyclobenzaprine (FLEXERIL) 10 MG tablet     Sig: Take 1 tablet (10 mg total) by mouth every evening.     Dispense:  30 tablet     Refill:  0     DX Code Needed  .    ibuprofen (ADVIL,MOTRIN) 800 MG tablet     Sig: Take 1 tablet (800 mg total) by mouth every 8 (eight) hours as needed for Pain.     Dispense:  60 tablet     Refill:  0    methylPREDNISolone (MEDROL DOSEPACK) 4 mg tablet     Sig: follow package directions     Dispense:  21 tablet     Refill:  0        Follow up if symptoms worsen or fail to improve.    If no improvement in symptoms or symptoms worsen, advised to call/follow-up at clinic or go to ER. Patient voiced understanding and all questions/concerns were addressed.   DISCLAIMER: This note was compiled by using a speech recognition dictation system and therefore please be aware that typographical / speech recognition errors can and do occur.  Please contact me if you see any errors specifically.    Casper Barrett M.D.       Office: [phone] [address] [place] [address]  FAX: [phone]

## 2022-03-18 NOTE — PROGRESS NOTES
1st check to see if patient has seen the results.  If not then  CALL patient with results and Document verification.  Schedule follow-up if needed.  [phone]  Multiple degenerative findings noted in the x-ray of the lumbar spine as discussed at office visit.  Proceed with treatment with anti-inflammatory, steroid pack and physical therapy.  Follow-up sooner if no improvement.  Appointment has been made with Dr. Solo in one month if it is not better.

## 2022-03-18 NOTE — PROGRESS NOTES
1st check to see if patient has seen the results.  If not then  CALL patient with results and Document verification.  Schedule follow-up if needed.  [phone]  There is no blood clot in the left leg.

## 2022-03-18 NOTE — PATIENT INSTRUCTIONS
Follow up if symptoms worsen or fail to improve.     Dear patient,   As a result of recent federal legislation (The Federal Cures Act), you may receive lab or pathology results from your visit in your [facility] account before your physician is able to contact you. Your physician or their representative will relay the results to you with their recommendations at their soonest availability.     If no improvement in symptoms or symptoms worsen, please be advised to call MD, follow-up at clinic and/or go to ER if becomes severe.    Casper Barrett M.D.        We Offer TELEHEALTH & Same Day Appointments!   Book your Telehealth appointment with me through my nurse or   Clinic appointments on [employer]!    [address] [place] [address]    Office: [phone]   FAX: [phone]    Check out my Facebook Page and Follow Me at: [email or web address]    Check out my website at [facility] by clicking on: [email or web address]    To Schedule appointments online, go to [facility] [employer] [facility]: [email or web address]

## 2022-03-22 ENCOUNTER — CLINICAL SUPPORT (OUTPATIENT)
Dept: REHABILITATION | Facility: HOSPITAL | Age: 67
End: 2022-03-22
Attending: FAMILY MEDICINE
Payer: MEDICARE

## 2022-03-22 DIAGNOSIS — M54.50 CHRONIC LEFT-SIDED LOW BACK PAIN WITHOUT SCIATICA: ICD-10-CM

## 2022-03-22 DIAGNOSIS — M53.86 DECREASED ROM OF LUMBAR SPINE: ICD-10-CM

## 2022-03-22 DIAGNOSIS — M62.81 MUSCLE WEAKNESS OF LOWER EXTREMITY: ICD-10-CM

## 2022-03-22 DIAGNOSIS — M54.32 SCIATICA, LEFT SIDE: ICD-10-CM

## 2022-03-22 DIAGNOSIS — G89.29 CHRONIC LEFT-SIDED LOW BACK PAIN WITHOUT SCIATICA: ICD-10-CM

## 2022-03-22 DIAGNOSIS — R26.89 DECREASED FUNCTIONAL MOBILITY: ICD-10-CM

## 2022-03-22 PROCEDURE — 97162 PT EVAL MOD COMPLEX 30 MIN: CPT | Mod: PN

## 2022-03-22 NOTE — PLAN OF CARE
[facility]   Physical Therapy Initial Evaluation     Date: 3/22/2022   Name: Lazarus Zamudio  [facility] Number: [id number]    Therapy Diagnosis:   Encounter Diagnoses   Name Primary?    Sciatica, left side     Chronic left-sided low back pain without sciatica     Decreased functional mobility     Muscle weakness of lower extremity     Decreased ROM of lumbar spine      Physician: Casper Barrett MD    Physician Orders: PT Eval and Treat   Medical Diagnosis from Referral: M54.32 (ICD-10-CM) - Sciatica, left side  Evaluation Date: 3/22/2022  Authorization Period Expiration: 03/18/2023  Plan of Care Expiration: 5/22/2022  Progress Note Due: 4/22/2022  Visit # / Visits authorized: 1/ 1   FOTO: 1/ 3 (eval)     Precautions: Standard and Diabetes    Time In: 9:15 am  Time Out: 10:00 am  Total Appointment Time (timed & untimed codes): 45 minutes      SUBJECTIVE   Date of onset: 3 weeks ago    History of current condition - Lazarus reports: he was awaken by pain in left hip reported throbbing/shooting pain that radiated down anterior thigh and groin region region 3 weeks ago.  Patient had ultrasound to rule out blood clots and received x-rays and prescribed steroids which has helped decrease intensity of his pain.  At this time complains of intermittent pain in left hip/buttock and adductor region especially when stepping up or flexing hip or lying on right side. Would like to return to working in his yard, walking, going to casino.      Falls: no    Imaging, x-rays: 3/18/2022  FINDINGS:  Vertebral body heights and alignment are within normal limits.  Prominent multilevel bridging disc osteophyte complexes are present suggesting diffuse idiopathic skeletal hyperostosis.  There is mild generalized disc height loss seen throughout the lumbar spine.  No acute fractures or subluxations are demonstrated.  The remaining visualized osseous and soft tissue structures demonstrate no appreciable  abnormality.    Prior Therapy: yes, for same symptoms years ago  Social History: 4 steps to enter home lives with their family  Occupation: retired  Prior Level of Function: independent  Current Level of Function: limited tolerance to walking    Pain:  Current 0/10, worst 4/10, best 0/10   Location: left buttock and left thigh, low back  Description: Aching, Dull, Grabbing and Tight  Aggravating Factors: lying on right side, hip flexion  Easing Factors: rest    Patients goals: return to prior level of function     Medical History:   Past Medical History:   Diagnosis Date    Diabetes mellitus, type 2     Hypertension     Morbid obesity        Surgical History:   Lazarus Zamudio  has a past surgical history that includes Hernia repair; Knee arthroscopy w/ debridement; and Vasectomy.    Medications:   Lazarus has a current medication list which includes the following prescription(s): aspirin, atorvastatin, azelastine, cyclobenzaprine, enalapril, ibuprofen, methylprednisolone, multivitamin with minerals, semaglutide, sildenafil, and testosterone cypionate.    Allergies:   Review of patient's allergies indicates:   Allergen Reactions    Metformin      Intolerant     Penicillins Rash          OBJECTIVE     Observation: Patient presents to therapy ambulating with slight antalgic gait, decreased LROM    Lumbar Range of Motion:    Degrees Pain   Flexion Reach mid shin   tightness        Extension 5 degrees   tightness        Left Side Bending Reach upper thigh tightness        Right Side Bending Reach upper thigh tightness        Left rotation   25 deg no        Right Rotation   25 deg Tightness/pain along medial aspect of left knee             Lower Extremity Strength  Right LE  Left LE    Knee extension: 5/5 Knee extension: 4/5   Knee flexion: 5/5 Knee flexion: 5/5   Hip flexion: 4+/5 Hip flexion: 4-/5   Hip extension:  4-/5 Hip extension: 4-/5   Hip abduction: 4-/5 Hip abduction: 3+/5   Ankle dorsiflexion: 5/5  Ankle dorsiflexion: 5/5   Ankle plantarflexion: 5/5 Ankle plantarflexion: 5/5         Special Tests:  -Repeated Flexion: negative  -Repeated Ext: negative  -Piriformis Test: positive on left  -GREGOR: positive bilaterally      DTR:   Right Left Comment   Patellar (L3-4) 2+ 2+    Achilles (S1) 2+ 2+        Neuro Dynamic Testing:    Sciatic nerve:      SLR: R = negative     L = negative      Slump: R = negative     L = negative       Femoral Nerve:    Femoral nerve test: positive on left      Joint Mobility: hypomobile PA testing throughout lumbar spine and sacrum    Palpation: tightness and tenderness noted in lumbar paraspinals, gluteus medius, richard, and piriformis, left hip flexors    Sensation: intact to light touch    Flexibility:    90/90 hamstring test: R = 30 degrees ; L = 30 degrees   Son test: R = tightness in quads ; L = tightness in quads and psoas      Limitation/Restriction for FOTO Lumbar Spine Survey    Therapist reviewed FOTO scores for Lazarus Zamudio on 3/22/2022.   FOTO documents entered into [employer] - see Media section.    Limitation Score: 40%         TREATMENT     Total Treatment time (time-based codes) separate from Evaluation: 0 minutes     Lazarus received the treatments listed below:       therapeutic exercises to develop strength, endurance, ROM, flexibility, posture and core stabilization for (0)  minutes including:  Instructed on HEP    Possible next visit: recumbent bike, hamstring stretch, piriformis stretch, hip flexor stretch, pelvic tilts, bridging, hip abduction, hip adduction, sit to stand, pallof press     manual therapy techniques applied for (0) minutes, including:        PATIENT EDUCATION AND HOME EXERCISES     Education provided:   - role of PT and goals for therapy  - HEP    Written Home Exercises Provided: yes. Exercises were reviewed and Lazarus was able to demonstrate them prior to the end of the session.  Lazarus demonstrated good  understanding of the education  provided. See EMR under Patient Instructions for exercises provided during therapy sessions.    ASSESSMENT     Lazarus is a 66 y.o. male referred to outpatient Physical Therapy with a medical diagnosis of left sided sciatica. Patient presents with signs and symptoms consistent with the diagnosis.  He is noted to have decreased LROM, decreased LE strength, decreased core stabilization strength, decreased LE flexibility, decreased joint mobility, and tight/tender surrounding musculature causing difficulty performing step ups and prolonged walking.  Pt would benefit from manual therapy, LE stretching and strengthening, core stabilization exercises, and modalities to decrease pain, improve functional mobility, and return to prior level of function.        Patient prognosis is Good.   Patientt will benefit from skilled outpatient Physical Therapy to address the deficits stated above and in the chart below, provide patient /family education, and to maximize patientt's level of independence.     Plan of care discussed with patient: Yes  Patient's spiritual, cultural and educational needs considered and patient is agreeable to the plan of care and goals as stated below:     Anticipated Barriers for therapy: none    Medical Necessity is demonstrated by the following  History  Co-morbidities and personal factors that may impact the plan of care Co-morbidities:   diabetes, high BMI and HTN    Personal Factors:   no deficits     moderate   Examination  Body Structures and Functions, activity limitations and participation restrictions that may impact the plan of care Body Regions:   back  lower extremities    Body Systems:    ROM  strength  gait  transfers  transitions    Participation Restrictions:   Climbing steps, prolonged standing and walking      Activity limitations:   Learning and applying knowledge  no deficits    General Tasks and Commands  no deficits    Communication  no deficits    Mobility  lifting and carrying  objects    Self care  no deficits    Domestic Life  shopping  doing house work (cleaning house, washing dishes, laundry)    Interactions/Relationships  no deficits    Life Areas  no deficits    Community and Social Life  community life  recreation and leisure         moderate   Clinical Presentation evolving clinical presentation with changing clinical characteristics moderate   Decision Making/ Complexity Score: moderate     Goals:  Short Term Goals: 4 weeks  1. Patient will be compliant with HEP to promote the independent management of current diagnosis.  2. Patient will increase lumbar forward bending to reach lower shin level for function of dressing.  3. Patient will improve hip flexor flexibility to normal during Son testing.    4. Patient will report a decrease in complaints of left hip and leg pain to 4/10 during ascending steps.      Long Term Goals:  8 weeks  1. Patient will improve bilateral hip strength to 5/5 to improve tolerance to normal house work activities for self care independence.  2. Patient will increase lumbar forward bending to reach ankle level for function of dressing.  3. Patient will report a decrease in complaints of left hip and leg pain to 1/10 during ascending steps.  4. Patient will improve FOTO limitation status from 40% to 27% placing the patient in the 20-40% impaired, limited, or restricted category indicating increased functional mobility.      PLAN   Plan of care Certification: 3/22/2022 to 5/22/2022.    Outpatient Physical Therapy 2 times weekly for 8 weeks to include the following interventions: Electrical Stimulation IFC, Gait Training, Manual Therapy, Moist Heat/ Ice, Neuromuscular Re-ed, Patient Education, Therapeutic Activities, Therapeutic Exercise and IASTM, vacuum cupping, dry needling, and kinesiotaping.     Lazarus Segundo, PT      I CERTIFY THE NEED FOR THESE SERVICES FURNISHED UNDER THIS PLAN OF TREATMENT AND WHILE UNDER MY CARE   Physician's comments:      Physician's Signature: ___________________________________________________

## 2022-03-28 ENCOUNTER — CLINICAL SUPPORT (OUTPATIENT)
Dept: REHABILITATION | Facility: HOSPITAL | Age: 67
End: 2022-03-28
Payer: MEDICARE

## 2022-03-28 DIAGNOSIS — M53.86 DECREASED ROM OF LUMBAR SPINE: ICD-10-CM

## 2022-03-28 DIAGNOSIS — G89.29 CHRONIC LEFT-SIDED LOW BACK PAIN WITHOUT SCIATICA: Primary | ICD-10-CM

## 2022-03-28 DIAGNOSIS — R26.89 DECREASED FUNCTIONAL MOBILITY: ICD-10-CM

## 2022-03-28 DIAGNOSIS — M62.81 MUSCLE WEAKNESS OF LOWER EXTREMITY: ICD-10-CM

## 2022-03-28 DIAGNOSIS — M54.50 CHRONIC LEFT-SIDED LOW BACK PAIN WITHOUT SCIATICA: Primary | ICD-10-CM

## 2022-03-28 PROCEDURE — 97110 THERAPEUTIC EXERCISES: CPT | Mod: PN

## 2022-03-28 PROCEDURE — 97140 MANUAL THERAPY 1/> REGIONS: CPT | Mod: PN

## 2022-03-28 NOTE — PROGRESS NOTES
[facility]   Physical Therapy Treatment Note     Name: Lazarus Zamudio  Clinic Number: [id number]    Therapy Diagnosis:   Encounter Diagnoses   Name Primary?    Chronic left-sided low back pain without sciatica Yes    Decreased functional mobility     Muscle weakness of lower extremity     Decreased ROM of lumbar spine      Physician: Casper Barrett MD    Visit Date: 3/28/2022    Physician Orders: PT Eval and Treat   Medical Diagnosis from Referral: M54.32 (ICD-10-CM) - Sciatica, left side  Evaluation Date: 3/22/2022  Authorization Period Expiration: 12/31/2022  Plan of Care Expiration: 5/22/2022  Progress Note Due: 4/22/2022  Visit # / Visits authorized: 1/ 20 (1/1 eval)   FOTO: 1/ 3 (eval)   PTA Visit #: 0/5     Precautions: Standard and Diabetes    Time In: 9:15 am  Time Out: 10:00 am  Total Billable Time: 45 minutes      SUBJECTIVE     Pt reports: he has been able to perform HEP with only complaint is tightness with hip flexor/quad stretch.  He was compliant with home exercise program.  Response to previous treatment: initial evaluation  Functional change: too soon to tell    Pain: 4/10  Location: medial aspect of left thigh, knee     OBJECTIVE     Objective Measures updated at progress report unless specified.       TREATMENT       Lazarus received the treatments listed below:      therapeutic exercises to develop strength, endurance, ROM, flexibility, posture and core stabilization for (35)  minutes including:  Nu-Step x 5 minutes (seat 5, level 2)  Hamstring stretch long sitting 2 x 20 seconds  Supine figure 4 stretch 2 x 20 seconds  Hip flexor/quad stretch 2 x 20 seconds  Pelvic tilt 15 x 3 second hold  Bridging 2 x 10  Supine hip adduction with ball squeeze 2 x 10 with 3 second hold  pallof press BTB 2 x 10  Sit to stand from chair 2 x 10 with 3kg ball     Possible next visit: recumbent bike, hip abduction, hip adduction, sit to stand, shuttle exercises, lat pull  down, side stepping      manual therapy techniques applied for (8) minutes, including:   -STM and IASTM to left hip adductors, quads, and hamstrings      PATIENT EDUCATION AND HOME EXERCISES     Home Exercises Provided and Patient Education Provided     Education/Self-Care provided:   Patient educated on biomechanical justification for therapeutic exercise and importance of compliance with HEP in order to improve overall impairments and QOL    Patient was educated on all the above exercise prior/during/after for proper posture, positioning, and execution for safe performance with home exercise program.    Patient educated on the importance of improved core and lower extremity strength in order to improve alignment of the spine and lower extremities with static positions and dynamic movement.    Patient educated on the importance of strong core and lower extremity musculature in order to improve both static and dynamic balance, improve gait mechanics, reduce fall risk and improve household and community mobility.       Written Home Exercises Provided: Patient instructed to cont prior HEP. Exercises were reviewed and Lazarus was able to demonstrate them prior to the end of the session.  Lazarus demonstrated good  understanding of the education provided. See EMR under Patient Instructions for exercises provided during therapy sessions    ASSESSMENT     Lazarus is able to begin progression towards goals with addition of therapeutic exercises for LE flexibility and strengthening as well as core stabilization exercises.  He is noted to have tightness and tenderness along left hip adductors during manual therapy.  Patient continues to have complaints of increased left LE pain during prolonged walking activities this weekend while at [employer].      Lazarus Is progressing well towards his goals.   Pt prognosis is Good.     Pt will continue to benefit from skilled outpatient physical therapy to address the deficits listed  in the problem list box on initial evaluation, provide pt/family education and to maximize pt's level of independence in the home and community environment.     Pt's spiritual, cultural and educational needs considered and pt agreeable to plan of care and goals.     Anticipated barriers to physical therapy: none    Goals:   Short Term Goals: 4 weeks  1. Patient will be compliant with HEP to promote the independent management of current diagnosis. In Progress, Not Met  2. Patient will increase lumbar forward bending to reach lower shin level for function of dressing. In Progress, Not Met  3. Patient will improve hip flexor flexibility to normal during Son testing. In Progress, Not Met  4. Patient will report a decrease in complaints of left hip and leg pain to 4/10 during ascending steps. In Progress, Not Met        Long Term Goals:  8 weeks  1. Patient will improve bilateral hip strength to 5/5 to improve tolerance to normal house work activities for self care independence. In Progress, Not Met  2. Patient will increase lumbar forward bending to reach ankle level for function of dressing. In Progress, Not Met  3. Patient will report a decrease in complaints of left hip and leg pain to 1/10 during ascending steps. In Progress, Not Met  4. Patient will improve FOTO limitation status from 40% to 27% placing the patient in the 20-40% impaired, limited, or restricted category indicating increased functional mobility. In Progress, Not Met         PLAN     Continue with PT POC and progress exercises as tolerated.     Lazarus Segundo, PT

## 2022-03-30 ENCOUNTER — CLINICAL SUPPORT (OUTPATIENT)
Dept: REHABILITATION | Facility: HOSPITAL | Age: 67
End: 2022-03-30
Payer: MEDICARE

## 2022-03-30 DIAGNOSIS — G89.29 CHRONIC LEFT-SIDED LOW BACK PAIN WITHOUT SCIATICA: Primary | ICD-10-CM

## 2022-03-30 DIAGNOSIS — M62.81 MUSCLE WEAKNESS OF LOWER EXTREMITY: ICD-10-CM

## 2022-03-30 DIAGNOSIS — R26.89 DECREASED FUNCTIONAL MOBILITY: ICD-10-CM

## 2022-03-30 DIAGNOSIS — M53.86 DECREASED ROM OF LUMBAR SPINE: ICD-10-CM

## 2022-03-30 DIAGNOSIS — M54.50 CHRONIC LEFT-SIDED LOW BACK PAIN WITHOUT SCIATICA: Primary | ICD-10-CM

## 2022-03-30 PROCEDURE — 97110 THERAPEUTIC EXERCISES: CPT | Mod: PN

## 2022-03-30 PROCEDURE — 97140 MANUAL THERAPY 1/> REGIONS: CPT | Mod: PN

## 2022-03-30 NOTE — PROGRESS NOTES
[facility]   Physical Therapy Treatment Note     Name: Lazarus Zamudio  Clinic Number: [id number]    Therapy Diagnosis:   Encounter Diagnoses   Name Primary?    Chronic left-sided low back pain without sciatica Yes    Decreased functional mobility     Muscle weakness of lower extremity     Decreased ROM of lumbar spine      Physician: Casper Barrett MD    Visit Date: 3/30/2022    Physician Orders: PT Eval and Treat   Medical Diagnosis from Referral: M54.32 (ICD-10-CM) - Sciatica, left side  Evaluation Date: 3/22/2022  Authorization Period Expiration: 12/31/2022  Plan of Care Expiration: 5/22/2022  Progress Note Due: 4/22/2022  Visit # / Visits authorized: 2/ 20 (1/1 eval)   FOTO: 1/ 3 (eval)   PTA Visit #: 0/5     Precautions: Standard and Diabetes    Time In: 8:20 am  Time Out: 9:00 am  Total Billable Time: 45 minutes      SUBJECTIVE     Pt reports: he is having minimal pain this morning, but did have some discomfort along adductors while transferring his left leg into his truck this morning.  He was compliant with home exercise program.  Response to previous treatment: decreased pain  Functional change: too soon to tell    Pain: 2/10  Location: medial aspect of left thigh, knee     OBJECTIVE     Objective Measures updated at progress report unless specified.       TREATMENT       Lazarus received the treatments listed below:      therapeutic exercises to develop strength, endurance, ROM, flexibility, posture and core stabilization for (35)  minutes including:  Nu-Step x 5 minutes (seat 5, level 3)  Seated Hamstring stretch  2 x 30 seconds  Supine figure 4 stretch 2 x 20 seconds  Hip flexor/quad stretch 2 x 30 seconds  Pelvic tilt 20 x 3 second hold  Bridging 3 x 10  Supine hip adduction with ball squeeze 3 x 10 with 3 second hold  pallof press BTB 2 x 10  Sit to stand from chair 3 x 10 with 3kg ball  Lat pull down 20# 3 x 10     Possible next visit: recumbent bike, hip  abduction, hip adduction, sit to stand, shuttle exercises, lat pull down, side stepping      manual therapy techniques applied for (10) minutes, including:   -STM and IASTM to left hip adductors, quads, and hamstrings      PATIENT EDUCATION AND HOME EXERCISES     Home Exercises Provided and Patient Education Provided     Education/Self-Care provided:   Patient educated on biomechanical justification for therapeutic exercise and importance of compliance with HEP in order to improve overall impairments and QOL    Patient was educated on all the above exercise prior/during/after for proper posture, positioning, and execution for safe performance with home exercise program.    Patient educated on the importance of improved core and lower extremity strength in order to improve alignment of the spine and lower extremities with static positions and dynamic movement.    Patient educated on the importance of strong core and lower extremity musculature in order to improve both static and dynamic balance, improve gait mechanics, reduce fall risk and improve household and community mobility.       Written Home Exercises Provided: Patient instructed to cont prior HEP. Exercises were reviewed and Lazarus was able to demonstrate them prior to the end of the session.  Lazarus demonstrated good  understanding of the education provided. See EMR under Patient Instructions for exercises provided during therapy sessions    ASSESSMENT     Lazarus has improved tolerance to LE stretching exercises and is progressed with strengthening exercises with addition of lat pull downs and progression of repetitions to established exercises.  Patient will continue with progression with addition of standing exercises next visit to improve functional strength.      Lazarus Is progressing well towards his goals.   Pt prognosis is Good.     Pt will continue to benefit from skilled outpatient physical therapy to address the deficits listed in the  problem list box on initial evaluation, provide pt/family education and to maximize pt's level of independence in the home and community environment.     Pt's spiritual, cultural and educational needs considered and pt agreeable to plan of care and goals.     Anticipated barriers to physical therapy: none    Goals:   Short Term Goals: 4 weeks  1. Patient will be compliant with HEP to promote the independent management of current diagnosis. In Progress, Not Met  2. Patient will increase lumbar forward bending to reach lower shin level for function of dressing. In Progress, Not Met  3. Patient will improve hip flexor flexibility to normal during Son testing. In Progress, Not Met  4. Patient will report a decrease in complaints of left hip and leg pain to 4/10 during ascending steps. In Progress, Not Met        Long Term Goals:  8 weeks  1. Patient will improve bilateral hip strength to 5/5 to improve tolerance to normal house work activities for self care independence. In Progress, Not Met  2. Patient will increase lumbar forward bending to reach ankle level for function of dressing. In Progress, Not Met  3. Patient will report a decrease in complaints of left hip and leg pain to 1/10 during ascending steps. In Progress, Not Met  4. Patient will improve FOTO limitation status from 40% to 27% placing the patient in the 20-40% impaired, limited, or restricted category indicating increased functional mobility. In Progress, Not Met         PLAN     Continue with PT POC and progress exercises as tolerated.     Lazarus Segundo, PT

## 2022-04-04 ENCOUNTER — CLINICAL SUPPORT (OUTPATIENT)
Dept: REHABILITATION | Facility: HOSPITAL | Age: 67
End: 2022-04-04
Payer: MEDICARE

## 2022-04-04 DIAGNOSIS — M62.81 MUSCLE WEAKNESS OF LOWER EXTREMITY: ICD-10-CM

## 2022-04-04 DIAGNOSIS — R26.89 DECREASED FUNCTIONAL MOBILITY: ICD-10-CM

## 2022-04-04 DIAGNOSIS — M54.50 CHRONIC LEFT-SIDED LOW BACK PAIN WITHOUT SCIATICA: Primary | ICD-10-CM

## 2022-04-04 DIAGNOSIS — M53.86 DECREASED ROM OF LUMBAR SPINE: ICD-10-CM

## 2022-04-04 DIAGNOSIS — G89.29 CHRONIC LEFT-SIDED LOW BACK PAIN WITHOUT SCIATICA: Primary | ICD-10-CM

## 2022-04-04 PROCEDURE — 97110 THERAPEUTIC EXERCISES: CPT | Mod: PN

## 2022-04-04 NOTE — PROGRESS NOTES
[facility]   Physical Therapy Treatment Note     Name: Lazarus Zamudio  Clinic Number: [id number]    Therapy Diagnosis:   Encounter Diagnoses   Name Primary?    Chronic left-sided low back pain without sciatica Yes    Decreased functional mobility     Muscle weakness of lower extremity     Decreased ROM of lumbar spine      Physician: Casper Barrett MD    Visit Date: 4/4/2022    Physician Orders: PT Eval and Treat   Medical Diagnosis from Referral: M54.32 (ICD-10-CM) - Sciatica, left side  Evaluation Date: 3/22/2022  Authorization Period Expiration: 12/31/2022  Plan of Care Expiration: 5/22/2022  Progress Note Due: 4/22/2022  Visit # / Visits authorized: 3/ 20    FOTO: 1/ 3 (eval)   PTA Visit #: 0/5     Precautions: Standard and Diabetes    Time In: 10:45 am  Time Out: 11:30 am  Total Billable Time: 45 minutes      SUBJECTIVE     Pt reports: he had improved tolerance to transferring leg into truck this morning.  He did a lot of standing this weekend and was able to tolerate activity well.   He was compliant with home exercise program.  Response to previous treatment: decreased pain  Functional change: improved tolerance to transferring leg into truck    Pain: 3/10  Location: medial aspect of left thigh, knee     OBJECTIVE     Objective Measures updated at progress report unless specified.       TREATMENT       Lazarus received the treatments listed below:      therapeutic exercises to develop strength, endurance, ROM, flexibility, posture and core stabilization for (40)  minutes including:  Nu-Step x 5 minutes (seat 5, level 3)  Seated Hamstring stretch  2 x 30 seconds  Supine figure 4 stretch 3 x 20 seconds  Hip flexor/quad stretch 2 x 30 seconds  Pelvic tilt 20 x 3 second hold  Bridging 3 x 10  Supine hip adduction with ball squeeze 3 x 10 with 3 second hold  Supine clamshell BTB 3 x 10  Seated march with TA 2 x 10  pallof press BTB 2 x 10  Sit to stand from chair 3 x 10  with 3kg ball  Lat pull down 23# 3 x 10     Possible next visit: recumbent bike, hip abduction, hip adduction, sit to stand, shuttle exercises, lat pull down, side stepping      manual therapy techniques applied for (5) minutes, including:   -STM  to left hip adductors, quads, and hamstrings      PATIENT EDUCATION AND HOME EXERCISES     Home Exercises Provided and Patient Education Provided     Education/Self-Care provided:   Patient educated on biomechanical justification for therapeutic exercise and importance of compliance with HEP in order to improve overall impairments and QOL    Patient was educated on all the above exercise prior/during/after for proper posture, positioning, and execution for safe performance with home exercise program.    Patient educated on the importance of improved core and lower extremity strength in order to improve alignment of the spine and lower extremities with static positions and dynamic movement.    Patient educated on the importance of strong core and lower extremity musculature in order to improve both static and dynamic balance, improve gait mechanics, reduce fall risk and improve household and community mobility.       Written Home Exercises Provided: Patient instructed to cont prior HEP. Exercises were reviewed and Lazarus was able to demonstrate them prior to the end of the session.  Lazarus demonstrated good  understanding of the education provided. See EMR under Patient Instructions for exercises provided during therapy sessions    ASSESSMENT     Lazarus is reporting improved tolerance to transferring his leg into his truck as well as no radicular symptoms in left LE or buttock.  He continues to have tightness and tenderness along adductors of left LE.  Patient performs above exercises and progressions with no increase in pain reported prior to leaving the clinic.     Lazarus Is progressing well towards his goals.   Pt prognosis is Good.     Pt will continue to  benefit from skilled outpatient physical therapy to address the deficits listed in the problem list box on initial evaluation, provide pt/family education and to maximize pt's level of independence in the home and community environment.     Pt's spiritual, cultural and educational needs considered and pt agreeable to plan of care and goals.     Anticipated barriers to physical therapy: none    Goals:   Short Term Goals: 4 weeks  1. Patient will be compliant with HEP to promote the independent management of current diagnosis. In Progress, Not Met  2. Patient will increase lumbar forward bending to reach lower shin level for function of dressing. In Progress, Not Met  3. Patient will improve hip flexor flexibility to normal during Son testing. In Progress, Not Met  4. Patient will report a decrease in complaints of left hip and leg pain to 4/10 during ascending steps. In Progress, Not Met        Long Term Goals:  8 weeks  1. Patient will improve bilateral hip strength to 5/5 to improve tolerance to normal house work activities for self care independence. In Progress, Not Met  2. Patient will increase lumbar forward bending to reach ankle level for function of dressing. In Progress, Not Met  3. Patient will report a decrease in complaints of left hip and leg pain to 1/10 during ascending steps. In Progress, Not Met  4. Patient will improve FOTO limitation status from 40% to 27% placing the patient in the 20-40% impaired, limited, or restricted category indicating increased functional mobility. In Progress, Not Met         PLAN     Continue with PT POC and progress exercises as tolerated.     Lazarus Segundo, PT

## 2022-04-06 ENCOUNTER — CLINICAL SUPPORT (OUTPATIENT)
Dept: REHABILITATION | Facility: HOSPITAL | Age: 67
End: 2022-04-06
Payer: MEDICARE

## 2022-04-06 DIAGNOSIS — M54.50 CHRONIC LEFT-SIDED LOW BACK PAIN WITHOUT SCIATICA: Primary | ICD-10-CM

## 2022-04-06 DIAGNOSIS — M53.86 DECREASED ROM OF LUMBAR SPINE: ICD-10-CM

## 2022-04-06 DIAGNOSIS — R26.89 DECREASED FUNCTIONAL MOBILITY: ICD-10-CM

## 2022-04-06 DIAGNOSIS — M62.81 MUSCLE WEAKNESS OF LOWER EXTREMITY: ICD-10-CM

## 2022-04-06 DIAGNOSIS — G89.29 CHRONIC LEFT-SIDED LOW BACK PAIN WITHOUT SCIATICA: Primary | ICD-10-CM

## 2022-04-06 PROCEDURE — 97110 THERAPEUTIC EXERCISES: CPT | Mod: PN,CQ

## 2022-04-06 NOTE — PROGRESS NOTES
[facility]   Physical Therapy Treatment Note     Name: Lazarus Zamudio  [facility] Number: [id number]    Therapy Diagnosis:   Encounter Diagnoses   Name Primary?    Chronic left-sided low back pain without sciatica Yes    Decreased functional mobility     Muscle weakness of lower extremity     Decreased ROM of lumbar spine      Physician: Casper Barrett MD    Visit Date: 4/6/2022    Physician Orders: PT Eval and Treat   Medical Diagnosis from Referral: M54.32 (ICD-10-CM) - Sciatica, left side  Evaluation Date: 3/22/2022  Authorization Period Expiration: 12/31/2022  Plan of Care Expiration: 5/22/2022  Progress Note Due: 4/22/2022  Visit # / Visits authorized: 4/ 20    FOTO: 1/ 3 (eval)   PTA Visit #: 1/5     Precautions: Standard and Diabetes    Time In: 905 am  Time Out: 945 am  Total Billable Time: 40 minutes    SUBJECTIVE     Pt reports: he slipped yesterday and felt like he tweaked it a bit but its better now. He also sat in the baseball stand for a bit which caused increased pain which subsided when he stood up. He's having no pain this morning.    He was compliant with home exercise program.  Response to previous treatment: decreased pain  Functional change: improved tolerance to transferring leg into truck    Pain: 0/10  Location: medial aspect of left thigh, knee    OBJECTIVE     Objective Measures updated at progress report unless specified.     TREATMENT     Lazarus received the treatments listed below:      Therapeutic exercises to develop strength, endurance, ROM, flexibility, posture and core stabilization for (40) minutes including:    Nu-Step x 5 minutes (seat 5, level 3)  Seated Hamstring stretch  2 x 30 seconds    Supine figure 4 stretch 3 x 20 seconds L   Hip flexor/quad stretch 2 x 30 seconds L  Pelvic tilt 30 x 3 second hold  Bridging 3 x 10  Supine hip adduction with ball squeeze 3 x 10 with 3 second hold  Supine clamshell BTB 3 x 10  Seated march with TA 2 x  10  Pallof press BTB 2 x 10  Side stepping x2 laps in crosswalk  Sit to stand from chair 3 x 10 with 3kg (blue) ball  Lat pull down 23# 3 x 10    Bilateral shuttle 2 bands 3 x10   Single leg shuttle 2 bands 3 x10 reps each     Possible next visit: recumbent bike     Manual therapy techniques applied for (00) minutes, including:    STM to left hip adductors, quads, and hamstrings    PATIENT EDUCATION AND HOME EXERCISES     Home Exercises Provided and Patient Education Provided     Education/Self-Care provided:   Patient educated on biomechanical justification for therapeutic exercise and importance of compliance with HEP in order to improve overall impairments and QOL    Patient was educated on all the above exercise prior/during/after for proper posture, positioning, and execution for safe performance with home exercise program.    Patient educated on the importance of improved core and lower extremity strength in order to improve alignment of the spine and lower extremities with static positions and dynamic movement.    Patient educated on the importance of strong core and lower extremity musculature in order to improve both static and dynamic balance, improve gait mechanics, reduce fall risk and improve household and community mobility.       Written Home Exercises Provided: Patient instructed to cont prior HEP. Exercises were reviewed and Lazarus was able to demonstrate them prior to the end of the session.  Lazarus demonstrated good  understanding of the education provided. See EMR under Patient Instructions for exercises provided during therapy sessions    ASSESSMENT     Patient did well with addition of shuttle exercises and had slight instability with side stepping. Overall he had no increase in pain or symptoms noted. Will continue to progress strengthening as able.     Lazarus Is progressing well towards his goals.   Pt prognosis is Good.     Pt will continue to benefit from skilled outpatient physical  therapy to address the deficits listed in the problem list box on initial evaluation, provide pt/family education and to maximize pt's level of independence in the home and community environment.     Pt's spiritual, cultural and educational needs considered and pt agreeable to plan of care and goals.     Anticipated barriers to physical therapy: none    Goals:   Short Term Goals: 4 weeks  1. Patient will be compliant with HEP to promote the independent management of current diagnosis. In Progress, Not Met  2. Patient will increase lumbar forward bending to reach lower shin level for function of dressing. In Progress, Not Met  3. Patient will improve hip flexor flexibility to normal during Son testing. In Progress, Not Met  4. Patient will report a decrease in complaints of left hip and leg pain to 4/10 during ascending steps. In Progress, Not Met        Long Term Goals:  8 weeks  1. Patient will improve bilateral hip strength to 5/5 to improve tolerance to normal house work activities for self care independence. In Progress, Not Met  2. Patient will increase lumbar forward bending to reach ankle level for function of dressing. In Progress, Not Met  3. Patient will report a decrease in complaints of left hip and leg pain to 1/10 during ascending steps. In Progress, Not Met  4. Patient will improve FOTO limitation status from 40% to 27% placing the patient in the 20-40% impaired, limited, or restricted category indicating increased functional mobility. In Progress, Not Met         PLAN     Continue with PT POC and progress exercises as tolerated.     Nadeem Valdivia, PTA

## 2022-04-11 ENCOUNTER — CLINICAL SUPPORT (OUTPATIENT)
Dept: REHABILITATION | Facility: HOSPITAL | Age: 67
End: 2022-04-11
Payer: MEDICARE

## 2022-04-11 DIAGNOSIS — M62.81 MUSCLE WEAKNESS OF LOWER EXTREMITY: ICD-10-CM

## 2022-04-11 DIAGNOSIS — R26.89 DECREASED FUNCTIONAL MOBILITY: ICD-10-CM

## 2022-04-11 DIAGNOSIS — M53.86 DECREASED ROM OF LUMBAR SPINE: ICD-10-CM

## 2022-04-11 DIAGNOSIS — G89.29 CHRONIC LEFT-SIDED LOW BACK PAIN WITHOUT SCIATICA: Primary | ICD-10-CM

## 2022-04-11 DIAGNOSIS — M54.50 CHRONIC LEFT-SIDED LOW BACK PAIN WITHOUT SCIATICA: Primary | ICD-10-CM

## 2022-04-11 PROCEDURE — 97110 THERAPEUTIC EXERCISES: CPT | Mod: PN

## 2022-04-11 NOTE — PROGRESS NOTES
[facility]   Physical Therapy Treatment Note     Name: Lazarus Zamudio  Clinic Number: [id number]    Therapy Diagnosis:   Encounter Diagnoses   Name Primary?    Chronic left-sided low back pain without sciatica Yes    Decreased functional mobility     Muscle weakness of lower extremity     Decreased ROM of lumbar spine      Physician: Casper Barrett MD    Visit Date: 4/11/2022    Physician Orders: PT Eval and Treat   Medical Diagnosis from Referral: M54.32 (ICD-10-CM) - Sciatica, left side  Evaluation Date: 3/22/2022  Authorization Period Expiration: 12/31/2022  Plan of Care Expiration: 5/22/2022  Progress Note Due: 4/22/2022  Visit # / Visits authorized: 5/ 20    FOTO: 2/ 3 (eval, 4/11/22)   PTA Visit #: 0/5     Precautions: Standard and Diabetes    Time In: 9:10 am  Time Out: 9:55 am  Total Billable Time: 45 minutes    SUBJECTIVE     Pt reports: he went to [employer] Saturday morning and had to stand for long period of time.  He reports having some muscle spasms in left adductors during standing at parade, but overall fair tolerance.    He was compliant with home exercise program.  Response to previous treatment: decreased pain  Functional change: improved tolerance to transferring leg into truck    Pain: 0/10  Location: medial aspect of left thigh, knee    OBJECTIVE     Objective Measures updated at progress report unless specified.         Limitation/Restriction for FOTO Lumbar Spine Survey     Therapist reviewed FOTO scores for Lazarus Zamudio on 4/11/2022.   FOTO documents entered into [employer] - see Media section.     Limitation Score: 35%             TREATMENT     Lazarus received the treatments listed below:      Therapeutic exercises to develop strength, endurance, ROM, flexibility, posture and core stabilization for (45) minutes including:    Nu-Step x 5 minutes (seat 5, level 3)  Seated Hamstring stretch  2 x 30 seconds    Supine figure 4 stretch 3  x 20 seconds L   Hip flexor/quad stretch 2 x 30 seconds L  Pelvic tilt 30 x 3 second hold  Bridging 3 x 10  Supine hip adduction with ball squeeze 3 x 10 with 3 second hold  Supine clamshell BTB 3 x 10  Seated march with TA 2 x 10  Pallof press 7# 3 x 10  Side stepping x2 laps in crosswalk YTB around ankles  Sit to stand from chair 3 x 10 with 3kg (blue) ball  Lat pull down 23# 3 x 10    Bilateral shuttle 3 bands 3 x10   Single leg shuttle 3 bands 3 x10 reps each  Step up 1 x 10 each     Possible next visit: recumbent bike     Manual therapy techniques applied for (00) minutes, including:    STM to left hip adductors, quads, and hamstrings    PATIENT EDUCATION AND HOME EXERCISES     Home Exercises Provided and Patient Education Provided     Education/Self-Care provided:   Patient educated on biomechanical justification for therapeutic exercise and importance of compliance with HEP in order to improve overall impairments and QOL    Patient was educated on all the above exercise prior/during/after for proper posture, positioning, and execution for safe performance with home exercise program.    Patient educated on the importance of improved core and lower extremity strength in order to improve alignment of the spine and lower extremities with static positions and dynamic movement.    Patient educated on the importance of strong core and lower extremity musculature in order to improve both static and dynamic balance, improve gait mechanics, reduce fall risk and improve household and community mobility.       Written Home Exercises Provided: Patient instructed to cont prior HEP. Exercises were reviewed and Lazarus was able to demonstrate them prior to the end of the session.  Lazarus demonstrated good  understanding of the education provided. See EMR under Patient Instructions for exercises provided during therapy sessions    ASSESSMENT     Lazarus has decreased tightness and tenderness noted along left adductors  and reporting improved tolerance to transferring leg in/out of vehicle, prolonged standing, but continues to have some pain while climbing on/off tractor or steps.  Patient is progressed to step up exercise and progressed LE strengthening exercises with no increase in pain or muscle spasms reported during or following today's treatment.   Patient is noted to have improved FOTO status as compared to his initial evaluation.      Lazarus Is progressing well towards his goals.   Pt prognosis is Good.     Pt will continue to benefit from skilled outpatient physical therapy to address the deficits listed in the problem list box on initial evaluation, provide pt/family education and to maximize pt's level of independence in the home and community environment.     Pt's spiritual, cultural and educational needs considered and pt agreeable to plan of care and goals.     Anticipated barriers to physical therapy: none    Goals:   Short Term Goals: 4 weeks  1. Patient will be compliant with HEP to promote the independent management of current diagnosis. Met  2. Patient will increase lumbar forward bending to reach lower shin level for function of dressing. In Progress, Not Met  3. Patient will improve hip flexor flexibility to normal during Son testing. In Progress, Not Met  4. Patient will report a decrease in complaints of left hip and leg pain to 4/10 during ascending steps. In Progress, Not Met        Long Term Goals:  8 weeks  1. Patient will improve bilateral hip strength to 5/5 to improve tolerance to normal house work activities for self care independence. In Progress, Not Met  2. Patient will increase lumbar forward bending to reach ankle level for function of dressing. In Progress, Not Met  3. Patient will report a decrease in complaints of left hip and leg pain to 1/10 during ascending steps. In Progress, Not Met  4. Patient will improve FOTO limitation status from 40% to 27% placing the patient in the 20-40%  impaired, limited, or restricted category indicating increased functional mobility. In Progress, Not Met         PLAN     Continue with PT POC and progress exercises as tolerated.     Lazarus Segundo, PT

## 2022-04-12 NOTE — PROGRESS NOTES
[facility]   Physical Therapy Treatment Note     Name: Lazarus Zamudio  [facility] Number: [id number]    Therapy Diagnosis:   Encounter Diagnoses   Name Primary?    Chronic left-sided low back pain without sciatica Yes    Decreased functional mobility     Muscle weakness of lower extremity     Decreased ROM of lumbar spine      Physician: Casper Barrett MD    Visit Date: 4/13/2022    Physician Orders: PT Eval and Treat   Medical Diagnosis from Referral: M54.32 (ICD-10-CM) - Sciatica, left side  Evaluation Date: 3/22/2022  Authorization Period Expiration: 12/31/2022  Plan of Care Expiration: 5/22/2022  Progress Note Due: 4/22/2022  Visit # / Visits authorized: 6/ 20    FOTO: 2/ 3 (eval, 4/11/22)   PTA Visit #: 1/5     Precautions: Standard and Diabetes    Time In: 908 am  Time Out: 946 am  Total Billable Time: 38 minutes    SUBJECTIVE     Pt reports: yesterday evening he picked up some stuff he knows he shouldn't have picked up, which has caused some pain today at a 1/10 to 2/10.    He was compliant with home exercise program.  Response to previous treatment: decreased pain  Functional change: improved tolerance to transferring leg into truck    Pain: 2/10  Location: medial aspect of left thigh, knee    OBJECTIVE     Objective Measures updated at progress report unless specified.      TREATMENT     Lazarus received the treatments listed below:      Therapeutic exercises to develop strength, endurance, ROM, flexibility, posture and core stabilization for (38) minutes including:    Nu-Step x 5 minutes (seat 5, level 3)  Seated hamstring stretch 2 x 30 seconds B    Supine figure 4 stretch 3 x 20 seconds L   Hip flexor/quad stretch 2 x 30 seconds L  Pelvic tilt 30 x 3 second hold  Bridging 3 x 10  Supine hip adduction with ball squeeze 3s x30  Supine clamshell BTB 3 x 10  Long sitting hip adductor stretch 10s x10  Standing march with TA 2 x 10  Sit to stand from chair 3 x 10 with 3kg  (blue) ball  Side stepping x2 laps in crosswalk YTB around ankles  Pallof press 7# 3 x 10  Lat pull down 23# 3 x 10    Bilateral shuttle 3 bands 3 x10 (NP today - pain)  Single leg shuttle 3 bands 3 x10 reps each (NP today - pain)  Step up 2 x 10 each     Possible next visit: recumbent bike     Manual therapy techniques applied for (00) minutes, including:    STM to left hip adductors, quads, and hamstrings    PATIENT EDUCATION AND HOME EXERCISES     Home Exercises Provided and Patient Education Provided     Education/Self-Care provided:   Patient educated on biomechanical justification for therapeutic exercise and importance of compliance with HEP in order to improve overall impairments and QOL    Patient was educated on all the above exercise prior/during/after for proper posture, positioning, and execution for safe performance with home exercise program.    Patient educated on the importance of improved core and lower extremity strength in order to improve alignment of the spine and lower extremities with static positions and dynamic movement.    Patient educated on the importance of strong core and lower extremity musculature in order to improve both static and dynamic balance, improve gait mechanics, reduce fall risk and improve household and community mobility.       Written Home Exercises Provided: Patient instructed to cont prior HEP. Exercises were reviewed and Lazarus was able to demonstrate them prior to the end of the session.  Lazarus demonstrated good  understanding of the education provided. See EMR under Patient Instructions for exercises provided during therapy sessions    ASSESSMENT     Long sitting hip adductor stretch was added this session to address increased pain and tension patient presented to therapy with this session. Patient stated that he would like to not perform the shuttle this date due to increased pain. He did well with progression to standing marching as well as increased  repetitions of step ups. Will progress as tolerated next session.    Lazarus Is progressing well towards his goals.   Pt prognosis is Good.     Pt will continue to benefit from skilled outpatient physical therapy to address the deficits listed in the problem list box on initial evaluation, provide pt/family education and to maximize pt's level of independence in the home and community environment.     Pt's spiritual, cultural and educational needs considered and pt agreeable to plan of care and goals.     Anticipated barriers to physical therapy: none    Goals:   Short Term Goals: 4 weeks  1. Patient will be compliant with HEP to promote the independent management of current diagnosis. Met  2. Patient will increase lumbar forward bending to reach lower shin level for function of dressing. In Progress, Not Met  3. Patient will improve hip flexor flexibility to normal during Son testing. In Progress, Not Met  4. Patient will report a decrease in complaints of left hip and leg pain to 4/10 during ascending steps. In Progress, Not Met        Long Term Goals:  8 weeks  1. Patient will improve bilateral hip strength to 5/5 to improve tolerance to normal house work activities for self care independence. In Progress, Not Met  2. Patient will increase lumbar forward bending to reach ankle level for function of dressing. In Progress, Not Met  3. Patient will report a decrease in complaints of left hip and leg pain to 1/10 during ascending steps. In Progress, Not Met  4. Patient will improve FOTO limitation status from 40% to 27% placing the patient in the 20-40% impaired, limited, or restricted category indicating increased functional mobility. In Progress, Not Met         PLAN     Continue with PT POC and progress exercises as tolerated.     Nadeem Valdivia, PTA

## 2022-04-13 ENCOUNTER — CLINICAL SUPPORT (OUTPATIENT)
Dept: REHABILITATION | Facility: HOSPITAL | Age: 67
End: 2022-04-13
Payer: MEDICARE

## 2022-04-13 DIAGNOSIS — R26.89 DECREASED FUNCTIONAL MOBILITY: ICD-10-CM

## 2022-04-13 DIAGNOSIS — G89.29 CHRONIC LEFT-SIDED LOW BACK PAIN WITHOUT SCIATICA: Primary | ICD-10-CM

## 2022-04-13 DIAGNOSIS — M62.81 MUSCLE WEAKNESS OF LOWER EXTREMITY: ICD-10-CM

## 2022-04-13 DIAGNOSIS — M53.86 DECREASED ROM OF LUMBAR SPINE: ICD-10-CM

## 2022-04-13 DIAGNOSIS — M54.50 CHRONIC LEFT-SIDED LOW BACK PAIN WITHOUT SCIATICA: Primary | ICD-10-CM

## 2022-04-13 PROCEDURE — 97110 THERAPEUTIC EXERCISES: CPT | Mod: PN,CQ

## 2022-04-14 NOTE — PROGRESS NOTES
[facility]   Physical Therapy Treatment Note     Name: Lazarus Zamudio  Clinic Number: [id number]    Therapy Diagnosis:   Encounter Diagnoses   Name Primary?    Chronic left-sided low back pain without sciatica Yes    Decreased functional mobility     Muscle weakness of lower extremity     Decreased ROM of lumbar spine      Physician: Casper Barrett MD    Visit Date: 4/18/2022    Physician Orders: PT Eval and Treat   Medical Diagnosis from Referral: M54.32 (ICD-10-CM) - Sciatica, left side  Evaluation Date: 3/22/2022  Authorization Period Expiration: 12/31/2022  Plan of Care Expiration: 5/22/2022  Progress Note Due: 4/22/2022  Visit # / Visits authorized: 7/ 20    FOTO: 2/ 3 (eval, 4/11/22)   PTA Visit #: 2/5     Precautions: Standard and Diabetes    Time In: 917 am  Time Out: 1000 am  Total Billable Time: 43 minutes    SUBJECTIVE     Pt reports: his inner thigh isn't hurting as bad as it was but it is still hurting toward the knee.    He was compliant with home exercise program.  Response to previous treatment: decreased pain  Functional change: improved tolerance to transferring leg into truck    Pain: 2/10  Location: medial aspect of left thigh, knee     OBJECTIVE     Objective Measures updated at progress report unless specified.      TREATMENT     Lazarus received the treatments listed below:      Therapeutic exercises to develop strength, endurance, ROM, flexibility, posture and core stabilization for (43) minutes including:    Nu-Step x 5 minutes (seat 5, level 3) (NP today - recumbent bike)  Recumbent bike 5 min (Seat: 5, Level: 1)  Seated hamstring stretch 2 x 30 seconds B    Long sitting hip adductor stretch 10s x10  Supine figure 4 stretch 3 x 20 seconds L   Hip flexor/quad stretch 2 x 30 seconds L  Pelvic tilt 30 x 3 second hold  Bridging 3 x 10  Supine hip adduction with ball squeeze 3s x30  Supine clamshell BTB 3 x 10 (NP today)    Standing march with TA 2 x  10  Sit to stand from chair 3 x 10 with 3kg (blue) ball -increase resistance next session  Side stepping x2 laps in crosswalk YTB around ankles   Pallof press 7# 3 x 10  Lat pull down 23# 3 x 10  Bilateral shuttle 3 bands 3 x10  Single leg shuttle 3 bands 3 x10 reps each   Step up 2 x 10 each (NP today)     Possible next visit: progression of current therex     Manual therapy techniques applied for (00) minutes, including:    STM to left hip adductors, quads, and hamstrings    PATIENT EDUCATION AND HOME EXERCISES     Home Exercises Provided and Patient Education Provided     Education/Self-Care provided:   Patient educated on biomechanical justification for therapeutic exercise and importance of compliance with HEP in order to improve overall impairments and QOL    Patient was educated on all the above exercise prior/during/after for proper posture, positioning, and execution for safe performance with home exercise program.    Patient educated on the importance of improved core and lower extremity strength in order to improve alignment of the spine and lower extremities with static positions and dynamic movement.    Patient educated on the importance of strong core and lower extremity musculature in order to improve both static and dynamic balance, improve gait mechanics, reduce fall risk and improve household and community mobility.     Written Home Exercises Provided: Patient instructed to cont prior HEP. Exercises were reviewed and Lazarus was able to demonstrate them prior to the end of the session.  Lazarus demonstrated good  understanding of the education provided. See EMR under Patient Instructions for exercises provided during therapy sessions    ASSESSMENT     Patient was able to resume shuttle this session secondary to decrease in pain/tension. He will be out of therapy for a couple of weeks on vacation but intends to do his exercises while he is away. He may benefit from increased weight with sit to  stands when returning.    Lazarus Is progressing well towards his goals.   Pt prognosis is Good.     Pt will continue to benefit from skilled outpatient physical therapy to address the deficits listed in the problem list box on initial evaluation, provide pt/family education and to maximize pt's level of independence in the home and community environment.     Pt's spiritual, cultural and educational needs considered and pt agreeable to plan of care and goals.     Anticipated barriers to physical therapy: none    Goals:   Short Term Goals: 4 weeks  1. Patient will be compliant with HEP to promote the independent management of current diagnosis. Met  2. Patient will increase lumbar forward bending to reach lower shin level for function of dressing. In Progress, Not Met  3. Patient will improve hip flexor flexibility to normal during Son testing. In Progress, Not Met  4. Patient will report a decrease in complaints of left hip and leg pain to 4/10 during ascending steps. In Progress, Not Met        Long Term Goals:  8 weeks  1. Patient will improve bilateral hip strength to 5/5 to improve tolerance to normal house work activities for self care independence. In Progress, Not Met  2. Patient will increase lumbar forward bending to reach ankle level for function of dressing. In Progress, Not Met  3. Patient will report a decrease in complaints of left hip and leg pain to 1/10 during ascending steps. In Progress, Not Met  4. Patient will improve FOTO limitation status from 40% to 27% placing the patient in the 20-40% impaired, limited, or restricted category indicating increased functional mobility. In Progress, Not Met         PLAN     Continue with PT POC and progress exercises as tolerated.     Nadeem Valdivia, PTA

## 2022-04-18 ENCOUNTER — CLINICAL SUPPORT (OUTPATIENT)
Dept: REHABILITATION | Facility: HOSPITAL | Age: 67
End: 2022-04-18
Payer: MEDICARE

## 2022-04-18 DIAGNOSIS — R26.89 DECREASED FUNCTIONAL MOBILITY: ICD-10-CM

## 2022-04-18 DIAGNOSIS — M62.81 MUSCLE WEAKNESS OF LOWER EXTREMITY: ICD-10-CM

## 2022-04-18 DIAGNOSIS — M53.86 DECREASED ROM OF LUMBAR SPINE: ICD-10-CM

## 2022-04-18 DIAGNOSIS — G89.29 CHRONIC LEFT-SIDED LOW BACK PAIN WITHOUT SCIATICA: Primary | ICD-10-CM

## 2022-04-18 DIAGNOSIS — M54.50 CHRONIC LEFT-SIDED LOW BACK PAIN WITHOUT SCIATICA: Primary | ICD-10-CM

## 2022-04-18 PROCEDURE — 97110 THERAPEUTIC EXERCISES: CPT | Mod: PN,CQ

## 2022-04-22 ENCOUNTER — TELEPHONE (OUTPATIENT)
Dept: PAIN MEDICINE | Facility: CLINIC | Age: 67
End: 2022-04-22
Payer: COMMERCIAL

## 2022-04-26 ENCOUNTER — PATIENT MESSAGE (OUTPATIENT)
Dept: ADMINISTRATIVE | Facility: HOSPITAL | Age: 67
End: 2022-04-26
Payer: COMMERCIAL

## 2022-05-02 ENCOUNTER — CLINICAL SUPPORT (OUTPATIENT)
Dept: REHABILITATION | Facility: HOSPITAL | Age: 67
End: 2022-05-02
Attending: FAMILY MEDICINE
Payer: MEDICARE

## 2022-05-02 DIAGNOSIS — G89.29 CHRONIC LEFT-SIDED LOW BACK PAIN WITHOUT SCIATICA: Primary | ICD-10-CM

## 2022-05-02 DIAGNOSIS — M53.86 DECREASED ROM OF LUMBAR SPINE: ICD-10-CM

## 2022-05-02 DIAGNOSIS — M62.81 MUSCLE WEAKNESS OF LOWER EXTREMITY: ICD-10-CM

## 2022-05-02 DIAGNOSIS — M54.50 CHRONIC LEFT-SIDED LOW BACK PAIN WITHOUT SCIATICA: Primary | ICD-10-CM

## 2022-05-02 DIAGNOSIS — R26.89 DECREASED FUNCTIONAL MOBILITY: ICD-10-CM

## 2022-05-02 PROCEDURE — 97110 THERAPEUTIC EXERCISES: CPT | Mod: PN

## 2022-05-02 NOTE — PROGRESS NOTES
[facility] OUTPATIENT THERAPY AND WELLNESS   Physical Therapy Treatment Note     Name: Lazarus Zamudio  [facility] Number: [id number]    Therapy Diagnosis:   Encounter Diagnoses   Name Primary?    Chronic left-sided low back pain without sciatica Yes    Decreased functional mobility     Muscle weakness of lower extremity     Decreased ROM of lumbar spine      Physician: Casper Barrett MD    Visit Date: 5/2/2022    Physician Orders: PT Eval and Treat   Medical Diagnosis from Referral: M54.32 (ICD-10-CM) - Sciatica, left side  Evaluation Date: 3/22/2022  Authorization Period Expiration: 12/31/2022  Plan of Care Expiration: 5/22/2022  Progress Note Due: 4/22/2022  Visit # / Visits authorized: 8/ 20    FOTO: 3/ 3 (eval, 4/11/22, 5/2/22)   PTA Visit #: 0/5     Precautions: Standard and Diabetes    Time In: 8:30 am  Time Out: 9:15 am  Total Billable Time: 43 minutes    SUBJECTIVE     Pt reports: he went on vacation last week and did a lot of walking.  Patient reports having improved tolerance to prolonged walking but continues to experience intermittent locking and pain in left adductors.      He was compliant with home exercise program.  Response to previous treatment: decreased pain  Functional change: improved tolerance to transferring leg into truck    Pain: 4/10  Location: medial aspect of left thigh, knee     OBJECTIVE     Objective Measures updated at progress report unless specified.     Observation: Patient presents to therapy on this date ambulating with normal gait      Lumbar Range of Motion:     Degrees Pain   Flexion Reach lower shin    Tightness in adductors         Extension 5 degrees    Tightness in adductors         Left Side Bending Reach upper thigh tightness         Right Side Bending Reach upper thigh tightness         Left rotation    35 deg no         Right Rotation    35 deg no               Lower Extremity Strength  Right LE   Left LE     Knee extension: 5/5 Knee extension: 4/5 with pain    Knee flexion: 5/5 Knee flexion: 5/5   Hip flexion: 4+/5 Hip flexion: 4/5   Hip extension:  4-/5 Hip extension: 4-/5   Hip abduction: 4-/5 Hip abduction: 4-/5   Ankle dorsiflexion: 5/5 Ankle dorsiflexion: 5/5   Ankle plantarflexion: 5/5 Ankle plantarflexion: 5/5            Special Tests:  -Repeated Flexion: negative  -Repeated Ext: negative  -Piriformis Test: positive on left  -GREGOR: positive bilaterally        DTR:    Right Left Comment   Patellar (L3-4) 2+ 2+     Achilles (S1) 2+ 2+           Neuro Dynamic Testing:               Sciatic nerve:                                       SLR:    R = negative                                      L = negative                             Slump: R = negative                                      L = negative                                        Femoral Nerve:                          Femoral nerve test: positive on left        Joint Mobility: hypomobile PA testing throughout lumbar spine and sacrum     Palpation: tightness and tenderness noted in left hip flexors, adductors     Sensation: intact to light touch     Flexibility:               90/90 hamstring test: R = 20 degrees ; L = 20 degrees              Son test: R = tightness in quads ; L = tightness in quads and psoas        Limitation/Restriction for FOTO Lumbar Spine Survey     Therapist reviewed FOTO scores for Lazarus Zamudio on 5/2/2022.   FOTO documents entered into [employer] - see Media section.     Limitation Score: 50%              TREATMENT     Lazarus received the treatments listed below:      Therapeutic exercises to develop strength, endurance, ROM, flexibility, posture and core stabilization for (45) minutes including: re-assessment time included    Recumbent bike 5 min (Seat: 7, Level: 1)  Seated hamstring stretch 2 x 30 seconds B    Long sitting hip adductor stretch 10s x10 (NP today)  Supine figure 4 stretch 3 x 20 seconds L   Hip flexor/quad stretch 2 x 30 seconds L  Pelvic tilt 30 x 3 second  hold (NP today)  Bridging 3 x 10  Seated hip adduction with green swiss ball squeeze 3s x30  Supine clamshell BTB 3 x 10 (NP today)    Seated march with TA 2 x 10  Sit to stand from chair 3 x 10 with 4kg (blue) ball   Side stepping x2 laps in crosswalk YTB around ankles (NP today)  Pallof press 7# 3 x 10 (NP today)  Lat pull down 23# 3 x 10 (NP today)  Bilateral shuttle 3 bands 3 x10 (NP today)  Single leg shuttle 3 bands 3 x10 reps each (NP today)  Step up 8 inch step 2 x 10 each   Standing hip adduction RTB 3 x 10  Standing hip extension RTB 3 x 10     Possible next visit: progression of current therex     Manual therapy techniques applied for (00) minutes, including:    STM to left hip adductors, quads, and hamstrings    PATIENT EDUCATION AND HOME EXERCISES     Home Exercises Provided and Patient Education Provided     Education/Self-Care provided:   Patient educated on biomechanical justification for therapeutic exercise and importance of compliance with HEP in order to improve overall impairments and QOL    Patient was educated on all the above exercise prior/during/after for proper posture, positioning, and execution for safe performance with home exercise program.    Patient educated on the importance of improved core and lower extremity strength in order to improve alignment of the spine and lower extremities with static positions and dynamic movement.    Patient educated on the importance of strong core and lower extremity musculature in order to improve both static and dynamic balance, improve gait mechanics, reduce fall risk and improve household and community mobility.     Written Home Exercises Provided: Patient instructed to cont prior HEP. Exercises were reviewed and Lazarus was able to demonstrate them prior to the end of the session.  Lazarus demonstrated good  understanding of the education provided. See EMR under Patient Instructions for exercises provided during therapy sessions    ASSESSMENT      Lazarus returns to therapy on this date following being on vacation last week in which he had increased pain due to prolonged walking.  He is noted to have decreased FOTO status, improved lumbar forward bending, improved hamstring flexibility, continued tightness in hip flexors and adductors, and decreased strength.  He has met STG's #1 and 2.  Patient will continue with current treatment plan with progression of left hip adductor strengthening as tolerated.      Lazarus Is progressing well towards his goals.   Pt prognosis is Good.     Pt will continue to benefit from skilled outpatient physical therapy to address the deficits listed in the problem list box on initial evaluation, provide pt/family education and to maximize pt's level of independence in the home and community environment.     Pt's spiritual, cultural and educational needs considered and pt agreeable to plan of care and goals.     Anticipated barriers to physical therapy: none    Goals:   Short Term Goals: 4 weeks  1. Patient will be compliant with HEP to promote the independent management of current diagnosis. Met  2. Patient will increase lumbar forward bending to reach lower shin level for function of dressing.  Met  3. Patient will improve hip flexor flexibility to normal during Son testing. In Progress, Not Met  4. Patient will report a decrease in complaints of left hip and leg pain to 4/10 during ascending steps. In Progress, Not Met        Long Term Goals:  8 weeks  1. Patient will improve bilateral hip strength to 5/5 to improve tolerance to normal house work activities for self care independence. In Progress, Not Met  2. Patient will increase lumbar forward bending to reach ankle level for function of dressing. In Progress, Not Met  3. Patient will report a decrease in complaints of left hip and leg pain to 1/10 during ascending steps. In Progress, Not Met  4. Patient will improve FOTO limitation status from 40% to 27% placing the  patient in the 20-40% impaired, limited, or restricted category indicating increased functional mobility. In Progress, Not Met         PLAN     Continue with PT POC and progress exercises as tolerated.     Lazarus Segundo, PT

## 2022-05-02 NOTE — PLAN OF CARE
Outpatient Therapy Updated Plan of Care     Visit Date: 5/2/2022  Name: Lazarus Zamudio  Clinic Number: [id number]    Therapy Diagnosis:   Encounter Diagnoses   Name Primary?    Chronic left-sided low back pain without sciatica Yes    Decreased functional mobility     Muscle weakness of lower extremity     Decreased ROM of lumbar spine      Physician: Casper Barrett MD    Physician Orders: PT Eval and Treat   Medical Diagnosis from Referral: M54.32 (ICD-10-CM) - Sciatica, left side  Evaluation Date: 3/22/2022    Total Visits Received: 9  Cancelled Visits: 3  No Show Visits: 0    Current Certification Period:  3/22/22 to 5/22/22  Precautions:  Standard and diabetes  Visits from Evaluation Date:  8  Functional Level Prior to Evaluation:  independent    Subjective     Update: he went on vacation last week and did a lot of walking.  Patient reports having improved tolerance to prolonged walking but continues to experience intermittent locking and pain in left adductors    Objective     Update:   Observation: Patient presents to therapy on this date ambulating with normal gait      Lumbar Range of Motion:     Degrees Pain   Flexion Reach lower shin    Tightness in adductors         Extension 5 degrees    Tightness in adductors         Left Side Bending Reach upper thigh tightness         Right Side Bending Reach upper thigh tightness         Left rotation    35 deg no         Right Rotation    35 deg no               Lower Extremity Strength  Right LE   Left LE     Knee extension: 5/5 Knee extension: 4/5 with pain   Knee flexion: 5/5 Knee flexion: 5/5   Hip flexion: 4+/5 Hip flexion: 4/5   Hip extension:  4-/5 Hip extension: 4-/5   Hip abduction: 4-/5 Hip abduction: 4-/5   Ankle dorsiflexion: 5/5 Ankle dorsiflexion: 5/5   Ankle plantarflexion: 5/5 Ankle plantarflexion: 5/5            Special Tests:  -Repeated Flexion: negative  -Repeated Ext: negative  -Piriformis Test: positive on left  -GREGOR: positive  bilaterally        DTR:    Right Left Comment   Patellar (L3-4) 2+ 2+     Achilles (S1) 2+ 2+           Neuro Dynamic Testing:               Sciatic nerve:                                       SLR:    R = negative                                      L = negative                             Slump: R = negative                                      L = negative                                        Femoral Nerve:                          Femoral nerve test: positive on left        Joint Mobility: hypomobile PA testing throughout lumbar spine and sacrum     Palpation: tightness and tenderness noted in left hip flexors, adductors     Sensation: intact to light touch     Flexibility:               90/90 hamstring test: R = 20 degrees ; L = 20 degrees              Son test: R = tightness in quads ; L = tightness in quads and psoas        Limitation/Restriction for FOTO Lumbar Spine Survey     Therapist reviewed FOTO scores for Lazarus Zamudio on 5/2/2022.   FOTO documents entered into [employer] - see Media section.     Limitation Score: 50%           Assessment     Update: Lazarus returns to therapy on this date following being on vacation last week in which he had increased pain due to prolonged walking.  He is noted to have decreased FOTO status, improved lumbar forward bending, improved hamstring flexibility, continued tightness in hip flexors and adductors, and decreased strength.  He has met STG's #1 and 2.  Patient will continue with current treatment plan with progression of left hip adductor strengthening as tolerated.      Previous Short Term Goals Status:   Initial evaluation  New Short Term Goals Status:     Short Term Goals: 4 weeks  1. Patient will be compliant with HEP to promote the independent management of current diagnosis. Met  2. Patient will increase lumbar forward bending to reach lower shin level for function of dressing.  Met  3. Patient will improve hip flexor flexibility to normal during  Son testing. In Progress, Not Met  4. Patient will report a decrease in complaints of left hip and leg pain to 4/10 during ascending steps. In Progress, Not Met        Long Term Goals:  8 weeks  1. Patient will improve bilateral hip strength to 5/5 to improve tolerance to normal house work activities for self care independence. In Progress, Not Met  2. Patient will increase lumbar forward bending to reach ankle level for function of dressing. In Progress, Not Met  3. Patient will report a decrease in complaints of left hip and leg pain to 1/10 during ascending steps. In Progress, Not Met  4. Patient will improve FOTO limitation status from 40% to 27% placing the patient in the 20-40% impaired, limited, or restricted category indicating increased functional mobility. In Progress, Not Met    Long Term Goal Status:   continue per initial plan of care.  Reasons for Recertification of Therapy:   Continued strength and ROM deficits and limited functional mobility during ADL's    Plan     Updated Certification Period: 5/2/2022 to 7/2/2022  Recommended Treatment Plan: 2 times per week for 8 weeks: Gait Training, Manual Therapy, Moist Heat/ Ice, Neuromuscular Re-ed, Patient Education, Therapeutic Activities, Therapeutic Exercise and IASTM, vacuum cupping, dry needling, and kinesiotaping  Other Recommendations: none    Lazarus Segundo, PT  5/2/2022      I CERTIFY THE NEED FOR THESE SERVICES FURNISHED UNDER THIS PLAN OF TREATMENT AND WHILE UNDER MY CARE    Physician's comments:        Physician's Signature: ___________________________________________________

## 2022-05-04 ENCOUNTER — CLINICAL SUPPORT (OUTPATIENT)
Dept: REHABILITATION | Facility: HOSPITAL | Age: 67
End: 2022-05-04
Attending: FAMILY MEDICINE
Payer: MEDICARE

## 2022-05-04 DIAGNOSIS — M54.50 CHRONIC LEFT-SIDED LOW BACK PAIN WITHOUT SCIATICA: Primary | ICD-10-CM

## 2022-05-04 DIAGNOSIS — R26.89 DECREASED FUNCTIONAL MOBILITY: ICD-10-CM

## 2022-05-04 DIAGNOSIS — G89.29 CHRONIC LEFT-SIDED LOW BACK PAIN WITHOUT SCIATICA: Primary | ICD-10-CM

## 2022-05-04 DIAGNOSIS — M62.81 MUSCLE WEAKNESS OF LOWER EXTREMITY: ICD-10-CM

## 2022-05-04 DIAGNOSIS — M53.86 DECREASED ROM OF LUMBAR SPINE: ICD-10-CM

## 2022-05-04 PROCEDURE — 97110 THERAPEUTIC EXERCISES: CPT | Mod: PN

## 2022-05-04 NOTE — PROGRESS NOTES
[facility]   Physical Therapy Treatment Note     Name: Lazarus Zamudio  [facility] Number: [id number]    Therapy Diagnosis:   Encounter Diagnoses   Name Primary?    Chronic left-sided low back pain without sciatica Yes    Decreased functional mobility     Muscle weakness of lower extremity     Decreased ROM of lumbar spine      Physician: Casper Barrett MD    Visit Date: 5/4/2022    Physician Orders: PT Eval and Treat   Medical Diagnosis from Referral: M54.32 (ICD-10-CM) - Sciatica, left side  Evaluation Date: 3/22/2022  Authorization Period Expiration: 12/31/2022  Plan of Care Expiration: 5/22/2022  Progress Note Due: 4/22/2022  Visit # / Visits authorized: 9/ 20    FOTO: 3/ 3 (eval, 4/11/22, 5/2/22)   PTA Visit #: 0/5     Precautions: Standard and Diabetes    Time In: 8:30 am  Time Out: 9:20 am  Total Billable Time: 50 minutes    SUBJECTIVE     Pt reports: he has decreased pain on this date.  He has improved tolerance to transferring leg in and out of vehicles and prolonged walking.        He was compliant with home exercise program.  Response to previous treatment: decreased pain  Functional change: improved tolerance to transferring leg into truck    Pain: 0/10  Location: medial aspect of left thigh, knee     OBJECTIVE     Objective Measures updated at progress report unless specified.          TREATMENT     Lazarus received the treatments listed below:      Therapeutic exercises to develop strength, endurance, ROM, flexibility, posture and core stabilization for (40) minutes including:     Recumbent bike 5 min (Seat: 7, Level: 3)  Seated hamstring stretch 2 x 30 seconds B    Long sitting hip adductor stretch 10s x10 (NP today)  Supine figure 4 stretch 3 x 20 seconds L   Hip flexor/quad stretch 3 x 30 seconds L  Pelvic tilt 30 x 3 second hold (NP today)  Bridging 3 x 10  Seated hip adduction with green swiss ball squeeze 3s x30  Supine clamshell BTB 3 x 10 (NP today)    Seated  march with TA 2 x 10  Sit to stand from chair 3 x 10 with 4kg (pink) ball   Side stepping x2 laps in crosswalk YTB around ankles (NP today)  Pallof press 7# 3 x 10 (NP today)  Lat pull down 23# 3 x 10 (NP today)  Bilateral shuttle 3 bands 3 x10 (NP today)  Single leg shuttle 3 bands 3 x10 reps each (NP today)  Step up 8 inch step 2 x 10 each   Standing hip adduction RTB 3 x 10  Standing hip extension RTB 3 x 10  Standing hip abduction RTB 3 x 10  Standing hamstring curl (L) 3# 3 x 10     Possible next visit: progression of current therex     Manual therapy techniques applied for (10) minutes, including:    STM to left hip adductors, quads, and hamstrings  PROM to left hip  MWM to improve hip flexion and internal rotation    PATIENT EDUCATION AND HOME EXERCISES     Home Exercises Provided and Patient Education Provided     Education/Self-Care provided:   Patient educated on biomechanical justification for therapeutic exercise and importance of compliance with HEP in order to improve overall impairments and QOL    Patient was educated on all the above exercise prior/during/after for proper posture, positioning, and execution for safe performance with home exercise program.    Patient educated on the importance of improved core and lower extremity strength in order to improve alignment of the spine and lower extremities with static positions and dynamic movement.    Patient educated on the importance of strong core and lower extremity musculature in order to improve both static and dynamic balance, improve gait mechanics, reduce fall risk and improve household and community mobility.     Written Home Exercises Provided: Patient instructed to cont prior HEP. Exercises were reviewed and Lazarus was able to demonstrate them prior to the end of the session.  Lazarus demonstrated good  understanding of the education provided. See EMR under Patient Instructions for exercises provided during therapy sessions    ASSESSMENT      Lazarus continues with progression of standing exercises to improve functional strength of left lower extremity.  He receives manual therapy on this date to improve flexibility of hamstrings, adductors, as well as joint mobilizations and mobilizations with movement to improve joint mobility of left hip.  Patient is progressing towards goals and will continue with current treatment plan.       Lazarus Is progressing well towards his goals.   Pt prognosis is Good.     Pt will continue to benefit from skilled outpatient physical therapy to address the deficits listed in the problem list box on initial evaluation, provide pt/family education and to maximize pt's level of independence in the home and community environment.     Pt's spiritual, cultural and educational needs considered and pt agreeable to plan of care and goals.     Anticipated barriers to physical therapy: none    Goals:   Short Term Goals: 4 weeks  1. Patient will be compliant with HEP to promote the independent management of current diagnosis. Met  2. Patient will increase lumbar forward bending to reach lower shin level for function of dressing.  Met  3. Patient will improve hip flexor flexibility to normal during Son testing. In Progress, Not Met  4. Patient will report a decrease in complaints of left hip and leg pain to 4/10 during ascending steps. In Progress, Not Met        Long Term Goals:  8 weeks  1. Patient will improve bilateral hip strength to 5/5 to improve tolerance to normal house work activities for self care independence. In Progress, Not Met  2. Patient will increase lumbar forward bending to reach ankle level for function of dressing. In Progress, Not Met  3. Patient will report a decrease in complaints of left hip and leg pain to 1/10 during ascending steps. In Progress, Not Met  4. Patient will improve FOTO limitation status from 40% to 27% placing the patient in the 20-40% impaired, limited, or restricted category indicating  increased functional mobility. In Progress, Not Met         PLAN     Continue with PT POC and progress exercises as tolerated.     Lazarus Segundo, PT

## 2022-05-09 ENCOUNTER — CLINICAL SUPPORT (OUTPATIENT)
Dept: REHABILITATION | Facility: HOSPITAL | Age: 67
End: 2022-05-09
Attending: FAMILY MEDICINE
Payer: MEDICARE

## 2022-05-09 DIAGNOSIS — G89.29 CHRONIC LEFT-SIDED LOW BACK PAIN WITHOUT SCIATICA: Primary | ICD-10-CM

## 2022-05-09 DIAGNOSIS — M54.50 CHRONIC LEFT-SIDED LOW BACK PAIN WITHOUT SCIATICA: Primary | ICD-10-CM

## 2022-05-09 DIAGNOSIS — M53.86 DECREASED ROM OF LUMBAR SPINE: ICD-10-CM

## 2022-05-09 DIAGNOSIS — M62.81 MUSCLE WEAKNESS OF LOWER EXTREMITY: ICD-10-CM

## 2022-05-09 DIAGNOSIS — R26.89 DECREASED FUNCTIONAL MOBILITY: ICD-10-CM

## 2022-05-09 PROCEDURE — 97110 THERAPEUTIC EXERCISES: CPT | Mod: PN

## 2022-05-09 NOTE — PROGRESS NOTES
[facility]   Physical Therapy Treatment Note     Name: Lazarus Zamudio  [facility] Number: [id number]    Therapy Diagnosis:   Encounter Diagnoses   Name Primary?    Chronic left-sided low back pain without sciatica Yes    Decreased functional mobility     Muscle weakness of lower extremity     Decreased ROM of lumbar spine      Physician: Casper Barrett MD    Visit Date: 5/9/2022    Physician Orders: PT Eval and Treat   Medical Diagnosis from Referral: M54.32 (ICD-10-CM) - Sciatica, left side  Evaluation Date: 3/22/2022  Authorization Period Expiration: 12/31/2022  Plan of Care Expiration: 7/02/2022  Progress Note Due: 6/02/2022  Visit # / Visits authorized: 10/ 20    FOTO: 3/ 3 (eval, 4/11/22, 5/2/22)   PTA Visit #: 0/5     Precautions: Standard and Diabetes    Time In: 8:20 am  Time Out: 9:10 am  Total Billable Time: 50 minutes    SUBJECTIVE     Pt reports: he built a ramp to enter his house this weekend and had to take his daughter to emergency room.  Patient reports he was seated in emergency room for 5 hours.  He had increased leg pain following these activities.          He was compliant with home exercise program.  Response to previous treatment: decreased pain  Functional change: improved tolerance to transferring leg into truck    Pain: 3/10  Location: medial aspect of left thigh, knee     OBJECTIVE     Objective Measures updated at progress report unless specified.          TREATMENT     Lazarus received the treatments listed below:      Therapeutic exercises to develop strength, endurance, ROM, flexibility, posture and core stabilization for (40) minutes including:     Recumbent bike 5 min (Seat: 7, Level: 3) (NP today)  Treadmill x 5 minutes at 2.0 mph  Seated hamstring stretch 2 x 30 seconds B    Long sitting hip adductor stretch 10s x10 (NP today)  Supine figure 4 stretch 3 x 30 seconds L   Hip flexor/quad stretch 3 x 30 seconds L  Pelvic tilt 30 x 3 second hold (NP  today)  Bridging 3 x 10  Seated hip adduction with green swiss ball squeeze 3s x30  Supine clamshell BTB 3 x 10 (NP today)    Seated march with TA 3 x 10  Sit to stand from chair 3 x 10 with 4kg (pink) ball   Side stepping x2 laps in crosswalk YTB around ankles (NP today)  Pallof press 7# 3 x 10   Lat pull down 23# 3 x 10   Bilateral shuttle 3 bands 3 x10 (NP today)  Single leg shuttle 3 bands 3 x10 reps each (NP today)  Step up 8 inch step 3 x 10   Standing hip adduction RTB 3 x 10  Standing hip extension RTB 3 x 10  Standing hip abduction RTB 3 x 10  Standing hamstring curl (L) 3# 3 x 10     Possible next visit: progression of current therex     Manual therapy techniques applied for (10) minutes, including:    STM to left hip adductors, quads, and hamstrings  PROM to left hip    Long leg distractions      PATIENT EDUCATION AND HOME EXERCISES     Home Exercises Provided and Patient Education Provided     Education/Self-Care provided:   Patient educated on biomechanical justification for therapeutic exercise and importance of compliance with HEP in order to improve overall impairments and QOL    Patient was educated on all the above exercise prior/during/after for proper posture, positioning, and execution for safe performance with home exercise program.    Patient educated on the importance of improved core and lower extremity strength in order to improve alignment of the spine and lower extremities with static positions and dynamic movement.    Patient educated on the importance of strong core and lower extremity musculature in order to improve both static and dynamic balance, improve gait mechanics, reduce fall risk and improve household and community mobility.     Written Home Exercises Provided: Patient instructed to cont prior HEP. Exercises were reviewed and Lazarus was able to demonstrate them prior to the end of the session.  Lazarus demonstrated good  understanding of the education provided. See EMR  under Patient Instructions for exercises provided during therapy sessions    ASSESSMENT     Lazarus has increased pain levels on this date due to building a ramp to his house as well as prolonged sitting at emergency room.  Patient has very limited hip mobility with internal rotation the most limited and increased tenderness and tissue tension noted along adductors.  Patient responds well to manual therapy and exercises with improved gait noted following today's treatment.         Lazarus Is progressing well towards his goals.   Pt prognosis is Good.     Pt will continue to benefit from skilled outpatient physical therapy to address the deficits listed in the problem list box on initial evaluation, provide pt/family education and to maximize pt's level of independence in the home and community environment.     Pt's spiritual, cultural and educational needs considered and pt agreeable to plan of care and goals.     Anticipated barriers to physical therapy: none    Goals:   Short Term Goals: 4 weeks  1. Patient will be compliant with HEP to promote the independent management of current diagnosis. Met  2. Patient will increase lumbar forward bending to reach lower shin level for function of dressing.  Met  3. Patient will improve hip flexor flexibility to normal during Son testing. In Progress, Not Met  4. Patient will report a decrease in complaints of left hip and leg pain to 4/10 during ascending steps. In Progress, Not Met        Long Term Goals:  8 weeks  1. Patient will improve bilateral hip strength to 5/5 to improve tolerance to normal house work activities for self care independence. In Progress, Not Met  2. Patient will increase lumbar forward bending to reach ankle level for function of dressing. In Progress, Not Met  3. Patient will report a decrease in complaints of left hip and leg pain to 1/10 during ascending steps. In Progress, Not Met  4. Patient will improve FOTO limitation status from 40% to 27%  placing the patient in the 20-40% impaired, limited, or restricted category indicating increased functional mobility. In Progress, Not Met         PLAN     Continue with PT POC and progress exercises as tolerated.     Lazarus Segundo, PT

## 2022-05-11 ENCOUNTER — CLINICAL SUPPORT (OUTPATIENT)
Dept: REHABILITATION | Facility: HOSPITAL | Age: 67
End: 2022-05-11
Attending: FAMILY MEDICINE
Payer: MEDICARE

## 2022-05-11 DIAGNOSIS — G89.29 CHRONIC LEFT-SIDED LOW BACK PAIN WITHOUT SCIATICA: Primary | ICD-10-CM

## 2022-05-11 DIAGNOSIS — M53.86 DECREASED ROM OF LUMBAR SPINE: ICD-10-CM

## 2022-05-11 DIAGNOSIS — R26.89 DECREASED FUNCTIONAL MOBILITY: ICD-10-CM

## 2022-05-11 DIAGNOSIS — M54.50 CHRONIC LEFT-SIDED LOW BACK PAIN WITHOUT SCIATICA: Primary | ICD-10-CM

## 2022-05-11 DIAGNOSIS — M62.81 MUSCLE WEAKNESS OF LOWER EXTREMITY: ICD-10-CM

## 2022-05-11 PROCEDURE — 97110 THERAPEUTIC EXERCISES: CPT | Mod: PN

## 2022-05-11 NOTE — PROGRESS NOTES
[facility]   Physical Therapy Treatment Note     Name: Lazarus Zamudio  [facility] Number: [id number]    Therapy Diagnosis:   Encounter Diagnoses   Name Primary?    Chronic left-sided low back pain without sciatica Yes    Decreased functional mobility     Muscle weakness of lower extremity     Decreased ROM of lumbar spine      Physician: Casper Barrett MD    Visit Date: 5/11/2022    Physician Orders: PT Eval and Treat   Medical Diagnosis from Referral: M54.32 (ICD-10-CM) - Sciatica, left side  Evaluation Date: 3/22/2022  Authorization Period Expiration: 12/31/2022  Plan of Care Expiration: 7/02/2022  Progress Note Due: 6/02/2022  Visit # / Visits authorized: 11/ 20    FOTO: 3/ 3 (eval, 4/11/22, 5/2/22)   PTA Visit #: 0/5     Precautions: Standard and Diabetes    Time In: 8:30 am  Time Out: 9:20 am  Total Billable Time: 50 minutes    SUBJECTIVE     Pt reports: having decreased intensity of pain as compared to last visit and reports pain is mainly around medial joint line of left knee and into left adductors.       He was compliant with home exercise program.  Response to previous treatment: no pain in hip for 2-3 hours following last visit  Functional change: improved tolerance to transferring leg into truck    Pain: 2/10  Location: medial aspect of left thigh, knee     OBJECTIVE     Objective Measures updated at progress report unless specified.          TREATMENT     Lazarus received the treatments listed below:      Therapeutic exercises to develop strength, endurance, ROM, flexibility, posture and core stabilization for (40) minutes including:     Recumbent bike 5 min (Seat: 7, Level: 3) (NP today)  Treadmill x 5 minutes at 2.0 mph  Seated hamstring stretch 2 x 30 seconds B    Long sitting hip adductor stretch 10s x10 (NP today)  Supine figure 4 stretch 3 x 30 seconds L   Hip flexor/quad stretch 3 x 30 seconds L  Pelvic tilt 30 x 3 second hold (NP today)  Bridging 3 x  10  Seated hip adduction with green swiss ball squeeze 3s x30  Supine clamshell BTB 3 x 10 (NP today)    Seated march with TA 3 x 10  Sit to stand from chair 3 x 10 with 10# db  Side stepping x2 laps in crosswalk YTB around ankles (NP today)  Pallof press 10# 3 x 10   Lat pull down 27# 3 x 10   Bilateral shuttle 3 bands 3 x10 (NP today)  Single leg shuttle 3 bands 3 x10 reps each (NP today)  Step up 8 inch step 3 x 10   Standing hip adduction RTB 3 x 10  Standing hip extension RTB 3 x 10  Standing hip abduction RTB 3 x 10  Standing hamstring curl (L) 4# 3 x 10     Possible next visit: progression of current therex     Manual therapy techniques applied for (10) minutes, including:    STM to left hip adductors, quads, and hamstrings  PROM to left hip    Long leg distractions      PATIENT EDUCATION AND HOME EXERCISES     Home Exercises Provided and Patient Education Provided     Education/Self-Care provided:   Patient educated on biomechanical justification for therapeutic exercise and importance of compliance with HEP in order to improve overall impairments and QOL    Patient was educated on all the above exercise prior/during/after for proper posture, positioning, and execution for safe performance with home exercise program.    Patient educated on the importance of improved core and lower extremity strength in order to improve alignment of the spine and lower extremities with static positions and dynamic movement.    Patient educated on the importance of strong core and lower extremity musculature in order to improve both static and dynamic balance, improve gait mechanics, reduce fall risk and improve household and community mobility.     Written Home Exercises Provided: Patient instructed to cont prior HEP. Exercises were reviewed and Lazarus was able to demonstrate them prior to the end of the session.  Lazarus demonstrated good  understanding of the education provided. See EMR under Patient Instructions for  exercises provided during therapy sessions    ASSESSMENT     Lazarus had decreased pain following last visit and is noted to have decreased tenderness along hip adductors during manual therapy this morning.  He continues to have significant restrictions in hip internal rotation and abduction ROM, but has responded well to manual therapy.  Patient continues with progression of strengthening exercises without exacerbation of symptoms.  Patient will continue with current treatment plan.          Lazarus Is progressing well towards his goals.   Pt prognosis is Good.     Pt will continue to benefit from skilled outpatient physical therapy to address the deficits listed in the problem list box on initial evaluation, provide pt/family education and to maximize pt's level of independence in the home and community environment.     Pt's spiritual, cultural and educational needs considered and pt agreeable to plan of care and goals.     Anticipated barriers to physical therapy: none    Goals:   Short Term Goals: 4 weeks  1. Patient will be compliant with HEP to promote the independent management of current diagnosis. Met  2. Patient will increase lumbar forward bending to reach lower shin level for function of dressing.  Met  3. Patient will improve hip flexor flexibility to normal during Son testing. In Progress, Not Met  4. Patient will report a decrease in complaints of left hip and leg pain to 4/10 during ascending steps. In Progress, Not Met        Long Term Goals:  8 weeks  1. Patient will improve bilateral hip strength to 5/5 to improve tolerance to normal house work activities for self care independence. In Progress, Not Met  2. Patient will increase lumbar forward bending to reach ankle level for function of dressing. In Progress, Not Met  3. Patient will report a decrease in complaints of left hip and leg pain to 1/10 during ascending steps. In Progress, Not Met  4. Patient will improve FOTO limitation status from  40% to 27% placing the patient in the 20-40% impaired, limited, or restricted category indicating increased functional mobility. In Progress, Not Met         PLAN     Continue with PT POC and progress exercises as tolerated.     Lazarus Segundo, PT

## 2022-05-13 NOTE — PROGRESS NOTES
[facility]   Physical Therapy Treatment Note     Name: Lazarus Zamudio  [facility] Number: [id number]    Therapy Diagnosis:   Encounter Diagnoses   Name Primary?    Chronic left-sided low back pain without sciatica Yes    Decreased functional mobility     Muscle weakness of lower extremity     Decreased ROM of lumbar spine      Physician: Casper Barrett MD    Visit Date: 5/16/2022    Physician Orders: PT Eval and Treat   Medical Diagnosis from Referral: M54.32 (ICD-10-CM) - Sciatica, left side  Evaluation Date: 3/22/2022  Authorization Period Expiration: 12/31/2022  Plan of Care Expiration: 7/02/2022  Progress Note Due: 6/02/2022  Visit # / Visits authorized: 12/ 20    FOTO: 3/ 3 (eval, 4/11/22, 5/2/22)   PTA Visit #: 1/5     Precautions: Standard and Diabetes    Time In: 830 am  Time Out: 910 am  Total Billable Time: 40 minutes    SUBJECTIVE     Pt reports: he's feeling pretty good today, his knee is bothering him today.     He was compliant with home exercise program.  Response to previous treatment: no adverse reactions  Functional change: improved tolerance to transferring leg into truck    Pain: 6/10   Location:  knee     OBJECTIVE     Objective Measures updated at progress report unless specified.      TREATMENT     Lazarus received the treatments listed below:      Therapeutic exercises to develop strength, endurance, ROM, flexibility, posture and core stabilization for (40) minutes including:     Recumbent bike 5 min (Seat: 7, Level: 3) (NP today)  Treadmill x 5 minutes at 2.0 mph  Seated hamstring stretch 2 x 30 seconds B  Seated hip adduction with green swiss ball squeeze 3s x30  Sit to stand from chair 3 x 10 with 10# db    Long sitting hip adductor stretch 10s x10 (NP today)  Supine figure 4 stretch 3 x 30 seconds L   Hip flexor/quad stretch with strap 3 x 30 seconds L  Pelvic tilt 30 x 3 second hold (NP today)  Bridging 3 x 10  Supine clamshell BTB 3 x 10 (NP  today)    Bilateral shuttle 3 bands 3 x10 (NP today)  Single leg shuttle 3 bands 3 x10 reps each (NP today)  Side stepping x2 laps in crosswalk YTB around ankles (NP today)  Pallof press 10# 3 x 10   Lat pull down 27# 3 x 10   Step up 8 inch step 3 x 10   Standing hip adduction RTB 3 x 10  Standing hip extension RTB 3 x 10  Standing hip abduction RTB 3 x 10  Standing hamstring curl (L) 4# 3 x 10  Standing march with TA 3 x 10     Possible next visit: progression of current therex     Manual therapy techniques applied for (00) minutes, including:    STM to left hip adductors, quads, and hamstrings  PROM to left hip    Long leg distractions    PATIENT EDUCATION AND HOME EXERCISES     Home Exercises Provided and Patient Education Provided     Education/Self-Care provided:   Patient educated on biomechanical justification for therapeutic exercise and importance of compliance with HEP in order to improve overall impairments and QOL    Patient was educated on all the above exercise prior/during/after for proper posture, positioning, and execution for safe performance with home exercise program.    Patient educated on the importance of improved core and lower extremity strength in order to improve alignment of the spine and lower extremities with static positions and dynamic movement.    Patient educated on the importance of strong core and lower extremity musculature in order to improve both static and dynamic balance, improve gait mechanics, reduce fall risk and improve household and community mobility.     Written Home Exercises Provided: Patient instructed to cont prior HEP. Exercises were reviewed and Lazarus was able to demonstrate them prior to the end of the session.  Lazarus demonstrated good  understanding of the education provided. See EMR under Patient Instructions for exercises provided during therapy sessions    ASSESSMENT     Patient had more difficulty with his knee than his hip this session; however,  he did not having any popping noted in his knee with step ups like last session. Strap was added to hip flexor stretch to increase intensity of stretch.    Lazarus Is progressing well towards his goals.   Pt prognosis is Good.     Pt will continue to benefit from skilled outpatient physical therapy to address the deficits listed in the problem list box on initial evaluation, provide pt/family education and to maximize pt's level of independence in the home and community environment.     Pt's spiritual, cultural and educational needs considered and pt agreeable to plan of care and goals.     Anticipated barriers to physical therapy: none    Goals:   Short Term Goals: 4 weeks  1. Patient will be compliant with HEP to promote the independent management of current diagnosis. Met  2. Patient will increase lumbar forward bending to reach lower shin level for function of dressing.  Met  3. Patient will improve hip flexor flexibility to normal during Son testing. In Progress, Not Met  4. Patient will report a decrease in complaints of left hip and leg pain to 4/10 during ascending steps. In Progress, Not Met        Long Term Goals:  8 weeks  1. Patient will improve bilateral hip strength to 5/5 to improve tolerance to normal house work activities for self care independence. In Progress, Not Met  2. Patient will increase lumbar forward bending to reach ankle level for function of dressing. In Progress, Not Met  3. Patient will report a decrease in complaints of left hip and leg pain to 1/10 during ascending steps. In Progress, Not Met  4. Patient will improve FOTO limitation status from 40% to 27% placing the patient in the 20-40% impaired, limited, or restricted category indicating increased functional mobility. In Progress, Not Met     PLAN     Continue with PT POC and progress exercises as tolerated.     Nadeem Valdivia, PTA

## 2022-05-16 ENCOUNTER — CLINICAL SUPPORT (OUTPATIENT)
Dept: REHABILITATION | Facility: HOSPITAL | Age: 67
End: 2022-05-16
Attending: FAMILY MEDICINE
Payer: MEDICARE

## 2022-05-16 DIAGNOSIS — M62.81 MUSCLE WEAKNESS OF LOWER EXTREMITY: ICD-10-CM

## 2022-05-16 DIAGNOSIS — R26.89 DECREASED FUNCTIONAL MOBILITY: ICD-10-CM

## 2022-05-16 DIAGNOSIS — M53.86 DECREASED ROM OF LUMBAR SPINE: ICD-10-CM

## 2022-05-16 DIAGNOSIS — G89.29 CHRONIC LEFT-SIDED LOW BACK PAIN WITHOUT SCIATICA: Primary | ICD-10-CM

## 2022-05-16 DIAGNOSIS — M54.50 CHRONIC LEFT-SIDED LOW BACK PAIN WITHOUT SCIATICA: Primary | ICD-10-CM

## 2022-05-16 PROCEDURE — 97110 THERAPEUTIC EXERCISES: CPT | Mod: PN,CQ

## 2022-05-18 ENCOUNTER — CLINICAL SUPPORT (OUTPATIENT)
Dept: REHABILITATION | Facility: HOSPITAL | Age: 67
End: 2022-05-18
Attending: FAMILY MEDICINE
Payer: MEDICARE

## 2022-05-18 DIAGNOSIS — G89.29 CHRONIC LEFT-SIDED LOW BACK PAIN WITHOUT SCIATICA: Primary | ICD-10-CM

## 2022-05-18 DIAGNOSIS — M53.86 DECREASED ROM OF LUMBAR SPINE: ICD-10-CM

## 2022-05-18 DIAGNOSIS — M62.81 MUSCLE WEAKNESS OF LOWER EXTREMITY: ICD-10-CM

## 2022-05-18 DIAGNOSIS — M54.50 CHRONIC LEFT-SIDED LOW BACK PAIN WITHOUT SCIATICA: Primary | ICD-10-CM

## 2022-05-18 DIAGNOSIS — R26.89 DECREASED FUNCTIONAL MOBILITY: ICD-10-CM

## 2022-05-18 PROCEDURE — 97110 THERAPEUTIC EXERCISES: CPT | Mod: PN

## 2022-05-18 NOTE — PROGRESS NOTES
[facility]   Physical Therapy Treatment Note     Name: Lazarus Zamudio  [facility] Number: [id number]    Therapy Diagnosis:   Encounter Diagnoses   Name Primary?    Chronic left-sided low back pain without sciatica Yes    Decreased functional mobility     Muscle weakness of lower extremity     Decreased ROM of lumbar spine      Physician: Casper Barrett MD    Visit Date: 5/18/2022    Physician Orders: PT Eval and Treat   Medical Diagnosis from Referral: M54.32 (ICD-10-CM) - Sciatica, left side  Evaluation Date: 3/22/2022  Authorization Period Expiration: 12/31/2022  Plan of Care Expiration: 7/02/2022  Progress Note Due: 6/02/2022  Visit # / Visits authorized: 13/ 20    FOTO: 3/ 3 (eval, 4/11/22, 5/2/22)   PTA Visit #: 1/5     Precautions: Standard and Diabetes    Time In: 8:30 am  Time Out: 9:15 am  Total Billable Time: 45 minutes    SUBJECTIVE     Pt reports: decreased pain in adductors this morning, having some soreness in left buttock region.      He was compliant with home exercise program.  Response to previous treatment: no adverse reactions  Functional change: improved tolerance to transferring leg into truck    Pain: 2/10   Location: medial aspect of left thigh knee     OBJECTIVE     Objective Measures updated at progress report unless specified.      TREATMENT     Lazarus received the treatments listed below:      Therapeutic exercises to develop strength, endurance, ROM, flexibility, posture and core stabilization for (40) minutes including:     Recumbent bike 5 min (Seat: 7, Level: 3) (NP today)  Treadmill x 8 minutes at 2.0 mph  Seated hamstring stretch 2 x 30 seconds B  Seated hip adduction with green swiss ball squeeze 3s x30  Sit to stand from chair 3 x 10 with 15# kb    Long sitting hip adductor stretch 10s x10 (NP today)  Supine figure 4 stretch 3 x 30 seconds L   Hip flexor/quad stretch with strap 3 x 30 seconds L  Pelvic tilt 30 x 3 second hold (NP  today)  Bridging 3 x 10  Supine clamshell BTB 3 x 10 (NP today)    Bilateral shuttle 3 bands 3 x10 (NP today)  Single leg shuttle 3 bands 3 x10 reps each (NP today)  Side stepping x2 laps in crosswalk YTB around ankles (NP today)  Pallof press 10# 3 x 10   Lat pull down 27# 3 x 10   Step up 8 inch step 3 x 10   Standing hip adduction GTB 3 x 10  Standing hip extension GTB 3 x 10  Standing hip abduction GTB 3 x 10  Standing hamstring curl (L) 5# 3 x 10  Standing march with TA 3 x 10     Possible next visit: progression of current therex     Manual therapy techniques applied for (5) minutes, including:    STM to left hip adductors, quads, and hamstrings  PROM to left hip    Long leg distractions    PATIENT EDUCATION AND HOME EXERCISES     Home Exercises Provided and Patient Education Provided     Education/Self-Care provided:   Patient educated on biomechanical justification for therapeutic exercise and importance of compliance with HEP in order to improve overall impairments and QOL    Patient was educated on all the above exercise prior/during/after for proper posture, positioning, and execution for safe performance with home exercise program.    Patient educated on the importance of improved core and lower extremity strength in order to improve alignment of the spine and lower extremities with static positions and dynamic movement.    Patient educated on the importance of strong core and lower extremity musculature in order to improve both static and dynamic balance, improve gait mechanics, reduce fall risk and improve household and community mobility.     Written Home Exercises Provided: Patient instructed to cont prior HEP. Exercises were reviewed and Lazarus was able to demonstrate them prior to the end of the session.  Lazarus demonstrated good  understanding of the education provided. See EMR under Patient Instructions for exercises provided during therapy sessions    ASSESSMENT     Lazarus continues to  have limited hip mobility with tightness in hip abductors and rotator musculature.  He is progressed with exercises with no increase in knee hip pain.  Patient will continue with manual therapy to decrease pain and improve mobility of left hip.      Lazarus Is progressing well towards his goals.   Pt prognosis is Good.     Pt will continue to benefit from skilled outpatient physical therapy to address the deficits listed in the problem list box on initial evaluation, provide pt/family education and to maximize pt's level of independence in the home and community environment.     Pt's spiritual, cultural and educational needs considered and pt agreeable to plan of care and goals.     Anticipated barriers to physical therapy: none    Goals:   Short Term Goals: 4 weeks  1. Patient will be compliant with HEP to promote the independent management of current diagnosis. Met  2. Patient will increase lumbar forward bending to reach lower shin level for function of dressing.  Met  3. Patient will improve hip flexor flexibility to normal during Son testing. In Progress, Not Met  4. Patient will report a decrease in complaints of left hip and leg pain to 4/10 during ascending steps. In Progress, Not Met        Long Term Goals:  8 weeks  1. Patient will improve bilateral hip strength to 5/5 to improve tolerance to normal house work activities for self care independence. In Progress, Not Met  2. Patient will increase lumbar forward bending to reach ankle level for function of dressing. In Progress, Not Met  3. Patient will report a decrease in complaints of left hip and leg pain to 1/10 during ascending steps. In Progress, Not Met  4. Patient will improve FOTO limitation status from 40% to 27% placing the patient in the 20-40% impaired, limited, or restricted category indicating increased functional mobility. In Progress, Not Met     PLAN     Continue with PT POC and progress exercises as tolerated.     Lazarus Segundo, PT

## 2022-05-20 NOTE — PROGRESS NOTES
[facility]   Physical Therapy Treatment Note     Name: Lazarus Zamudio  [facility] Number: [id number]    Therapy Diagnosis:   Encounter Diagnoses   Name Primary?    Chronic left-sided low back pain without sciatica Yes    Decreased functional mobility     Muscle weakness of lower extremity     Decreased ROM of lumbar spine      Physician: Casper Barrett MD    Visit Date: 5/23/2022    Physician Orders: PT Eval and Treat   Medical Diagnosis from Referral: M54.32 (ICD-10-CM) - Sciatica, left side  Evaluation Date: 3/22/2022  Authorization Period Expiration: 12/31/2022  Plan of Care Expiration: 7/02/2022  Progress Note Due: 6/02/2022  Visit # / Visits authorized: 14/ 20    FOTO: 3/ 3 (eval, 4/11/22, 5/2/22)   PTA Visit #: 1/5     Precautions: Standard and Diabetes    Time In: 825 am  Time Out: 915 am  Total Billable Time: 50 minutes    SUBJECTIVE     Pt reports: he's having a little pain this morning but not much.     He was compliant with home exercise program.  Response to previous treatment: no adverse reactions  Functional change: improved tolerance to transferring leg into truck    Pain: 2/10   Location: medial aspect of left thigh, knee    OBJECTIVE     Objective Measures updated at progress report unless specified.      TREATMENT     Lazarus received the treatments listed below:      Therapeutic exercises to develop strength, endurance, ROM, flexibility, posture and core stabilization for (50) minutes including:     Recumbent bike 5 min (Seat: 7, Level: 3) (NP today)  Treadmill 8 minutes at 2.0 mph  Seated hamstring stretch 2 x 30 seconds B  Seated hip adduction with green swiss ball squeeze 3s x30  Sit to stand from chair 3 x 10 with 15# kb    Long sitting hip adductor stretch 10s x10 (NP today)  Supine figure 4 stretch 3 x 30 seconds L   Hip flexor/quad stretch with strap 3 x 30 seconds L  Pelvic tilt 30 x 3 second hold (NP today)  Bridging 3 x 10  Supine clamshell BTB 3 x 10  (NP today)    Bilateral shuttle 3 bands 3 x10 (NP today)  Single leg shuttle 3 bands 3 x10 reps each (NP today)  Side stepping x2 laps in crosswalk YTB around ankles (NP today)  Pallof press 10# 3 x 10   Lat pull down 27# 3 x 10   Step up 8 inch step 3 x 10   Standing hip adduction GTB 3 x 10  Standing hip extension GTB 3 x 10  Standing hip abduction GTB 3 x 10  Standing hamstring curl (L) 5# 3 x 10  Standing march with TA 3 x 10     Possible next visit: progression of current therex     Manual therapy techniques applied for (00) minutes, including:    STM to left hip adductors, quads, and hamstrings  PROM to left hip    Long leg distractions    PATIENT EDUCATION AND HOME EXERCISES     Education/Self-Care provided:   Patient educated on biomechanical justification for therapeutic exercise and importance of compliance with HEP in order to improve overall impairments and QOL    Patient was educated on all the above exercise prior/during/after for proper posture, positioning, and execution for safe performance with home exercise program.    Patient educated on the importance of improved core and lower extremity strength in order to improve alignment of the spine and lower extremities with static positions and dynamic movement.    Patient educated on the importance of strong core and lower extremity musculature in order to improve both static and dynamic balance, improve gait mechanics, reduce fall risk and improve household and community mobility.     Written Home Exercises Provided: Patient instructed to cont prior HEP. Exercises were reviewed and Lazarus was able to demonstrate them prior to the end of the session. Lazarus demonstrated good  understanding of the education provided. See EMR under Patient Instructions for exercises provided during therapy sessions    ASSESSMENT     Patient tolerated all therex well this session with appropriate muscular fatigue noted. He still has limited mobility into his left hip  but was able to tolerate increased activity this weekend without increase in pain.    Lazarus Is progressing well towards his goals.   Pt prognosis is Good.     Pt will continue to benefit from skilled outpatient physical therapy to address the deficits listed in the problem list box on initial evaluation, provide pt/family education and to maximize pt's level of independence in the home and community environment.     Pt's spiritual, cultural and educational needs considered and pt agreeable to plan of care and goals.     Anticipated barriers to physical therapy: none    Goals:   Short Term Goals: 4 weeks  1. Patient will be compliant with HEP to promote the independent management of current diagnosis. Met  2. Patient will increase lumbar forward bending to reach lower shin level for function of dressing.  Met  3. Patient will improve hip flexor flexibility to normal during Son testing. In Progress, Not Met  4. Patient will report a decrease in complaints of left hip and leg pain to 4/10 during ascending steps. In Progress, Not Met        Long Term Goals:  8 weeks  1. Patient will improve bilateral hip strength to 5/5 to improve tolerance to normal house work activities for self care independence. In Progress, Not Met  2. Patient will increase lumbar forward bending to reach ankle level for function of dressing. In Progress, Not Met  3. Patient will report a decrease in complaints of left hip and leg pain to 1/10 during ascending steps. In Progress, Not Met  4. Patient will improve FOTO limitation status from 40% to 27% placing the patient in the 20-40% impaired, limited, or restricted category indicating increased functional mobility. In Progress, Not Met     PLAN     Continue with PT POC and progress exercises as tolerated.     Nadeem Valdivia, PTA

## 2022-05-23 ENCOUNTER — CLINICAL SUPPORT (OUTPATIENT)
Dept: REHABILITATION | Facility: HOSPITAL | Age: 67
End: 2022-05-23
Attending: FAMILY MEDICINE
Payer: MEDICARE

## 2022-05-23 DIAGNOSIS — M54.50 CHRONIC LEFT-SIDED LOW BACK PAIN WITHOUT SCIATICA: Primary | ICD-10-CM

## 2022-05-23 DIAGNOSIS — M53.86 DECREASED ROM OF LUMBAR SPINE: ICD-10-CM

## 2022-05-23 DIAGNOSIS — R26.89 DECREASED FUNCTIONAL MOBILITY: ICD-10-CM

## 2022-05-23 DIAGNOSIS — M62.81 MUSCLE WEAKNESS OF LOWER EXTREMITY: ICD-10-CM

## 2022-05-23 DIAGNOSIS — G89.29 CHRONIC LEFT-SIDED LOW BACK PAIN WITHOUT SCIATICA: Primary | ICD-10-CM

## 2022-05-23 PROCEDURE — 97110 THERAPEUTIC EXERCISES: CPT | Mod: PN,CQ

## 2022-05-25 ENCOUNTER — CLINICAL SUPPORT (OUTPATIENT)
Dept: REHABILITATION | Facility: HOSPITAL | Age: 67
End: 2022-05-25
Attending: FAMILY MEDICINE
Payer: MEDICARE

## 2022-05-25 DIAGNOSIS — M62.81 MUSCLE WEAKNESS OF LOWER EXTREMITY: ICD-10-CM

## 2022-05-25 DIAGNOSIS — G89.29 CHRONIC LEFT-SIDED LOW BACK PAIN WITHOUT SCIATICA: Primary | ICD-10-CM

## 2022-05-25 DIAGNOSIS — M54.50 CHRONIC LEFT-SIDED LOW BACK PAIN WITHOUT SCIATICA: Primary | ICD-10-CM

## 2022-05-25 DIAGNOSIS — R26.89 DECREASED FUNCTIONAL MOBILITY: ICD-10-CM

## 2022-05-25 DIAGNOSIS — M53.86 DECREASED ROM OF LUMBAR SPINE: ICD-10-CM

## 2022-05-25 PROCEDURE — 97140 MANUAL THERAPY 1/> REGIONS: CPT | Mod: PN

## 2022-05-25 PROCEDURE — 97110 THERAPEUTIC EXERCISES: CPT | Mod: PN

## 2022-05-25 NOTE — PROGRESS NOTES
[facility]   Physical Therapy Treatment Note     Name: Lazarus Zamudio  [facility] Number: [id number]    Therapy Diagnosis:   Encounter Diagnoses   Name Primary?    Chronic left-sided low back pain without sciatica Yes    Decreased functional mobility     Muscle weakness of lower extremity     Decreased ROM of lumbar spine      Physician: Casper Barrett MD    Visit Date: 5/25/2022    Physician Orders: PT Eval and Treat   Medical Diagnosis from Referral: M54.32 (ICD-10-CM) - Sciatica, left side  Evaluation Date: 3/22/2022  Authorization Period Expiration: 12/31/2022  Plan of Care Expiration: 7/02/2022  Progress Note Due: 6/02/2022  Visit # / Visits authorized: 15/ 20    FOTO: 3/ 3 (eval, 4/11/22, 5/2/22)   PTA Visit #: 1/5     Precautions: Standard and Diabetes    Time In: 8:30 am  Time Out: 9:15 am  Total Billable Time: 45 minutes    SUBJECTIVE     Pt reports: main complaint is pain in left groin region that he describes as throbbing.       He was compliant with home exercise program.  Response to previous treatment: no adverse reactions  Functional change: improved tolerance to transferring leg into truck    Pain: 1/10   Location: medial aspect of left thigh, knee    OBJECTIVE     Objective Measures updated at progress report unless specified.      TREATMENT     Lazarus received the treatments listed below:      Therapeutic exercises to develop strength, endurance, ROM, flexibility, posture and core stabilization for (35) minutes including:       Treadmill 8 minutes at 2.0 mph  Seated hamstring stretch 2 x 30 seconds B  Seated hip adduction with green swiss ball squeeze 3s x30   Box squat 3 x 10 with 15# kb    Long sitting hip adductor stretch 10s x10 (NP today)  Supine figure 4 stretch 3 x 30 seconds L   Hip flexor/quad stretch with strap 3 x 30 seconds L  Pelvic tilt 30 x 3 second hold (NP today)  Bridging 3 x 10  Supine clamshell BTB 3 x 10 (NP today)    Bilateral shuttle 3  bands 3 x10 (NP today)  Single leg shuttle 3 bands 3 x10 reps each (NP today)  Side stepping x2 laps in crosswalk YTB around ankles (NP today)  Pallof press 10# 3 x 10 (NP today)  Lat pull down 27# 3 x 10 (NP today)  Step up 8 inch step 3 x 10   Standing hip adduction BTB 3 x 10  Standing hip extension BTB 3 x 10  Standing hip abduction BTB 3 x 10  Standing hamstring curl (L) 5# 3 x 10  Standing march with TA 5# 3 x 10     Possible next visit: progression of current therex     Manual therapy techniques applied for (10) minutes, including:    STM to left hip adductors, quads, and hamstrings  PROM to left hip    Long leg distractions    PATIENT EDUCATION AND HOME EXERCISES     Education/Self-Care provided:   Patient educated on biomechanical justification for therapeutic exercise and importance of compliance with HEP in order to improve overall impairments and QOL    Patient was educated on all the above exercise prior/during/after for proper posture, positioning, and execution for safe performance with home exercise program.    Patient educated on the importance of improved core and lower extremity strength in order to improve alignment of the spine and lower extremities with static positions and dynamic movement.    Patient educated on the importance of strong core and lower extremity musculature in order to improve both static and dynamic balance, improve gait mechanics, reduce fall risk and improve household and community mobility.     Written Home Exercises Provided: Patient instructed to cont prior HEP. Exercises were reviewed and Lazarus was able to demonstrate them prior to the end of the session. Lazarus demonstrated good  understanding of the education provided. See EMR under Patient Instructions for exercises provided during therapy sessions    ASSESSMENT     Patient continues to have limited hip mobility with increased pain reported during end range adduction and continued tightness with hip extension,  abduction, and flexion.  He progresses resistance of standing hip exercises with no increase in pain reported.      Lazarus Is progressing well towards his goals.   Pt prognosis is Good.     Pt will continue to benefit from skilled outpatient physical therapy to address the deficits listed in the problem list box on initial evaluation, provide pt/family education and to maximize pt's level of independence in the home and community environment.     Pt's spiritual, cultural and educational needs considered and pt agreeable to plan of care and goals.     Anticipated barriers to physical therapy: none    Goals:   Short Term Goals: 4 weeks  1. Patient will be compliant with HEP to promote the independent management of current diagnosis. Met  2. Patient will increase lumbar forward bending to reach lower shin level for function of dressing.  Met  3. Patient will improve hip flexor flexibility to normal during Son testing. In Progress, Not Met  4. Patient will report a decrease in complaints of left hip and leg pain to 4/10 during ascending steps. In Progress, Not Met        Long Term Goals:  8 weeks  1. Patient will improve bilateral hip strength to 5/5 to improve tolerance to normal house work activities for self care independence. In Progress, Not Met  2. Patient will increase lumbar forward bending to reach ankle level for function of dressing. In Progress, Not Met  3. Patient will report a decrease in complaints of left hip and leg pain to 1/10 during ascending steps. In Progress, Not Met  4. Patient will improve FOTO limitation status from 40% to 27% placing the patient in the 20-40% impaired, limited, or restricted category indicating increased functional mobility. In Progress, Not Met     PLAN     Continue with PT POC and progress exercises as tolerated.     Lazarus Segundo, PT

## 2022-05-31 ENCOUNTER — PATIENT MESSAGE (OUTPATIENT)
Dept: FAMILY MEDICINE | Facility: CLINIC | Age: 67
End: 2022-05-31
Payer: COMMERCIAL

## 2022-06-01 ENCOUNTER — CLINICAL SUPPORT (OUTPATIENT)
Dept: REHABILITATION | Facility: HOSPITAL | Age: 67
End: 2022-06-01
Attending: FAMILY MEDICINE
Payer: MEDICARE

## 2022-06-01 DIAGNOSIS — M53.86 DECREASED ROM OF LUMBAR SPINE: ICD-10-CM

## 2022-06-01 DIAGNOSIS — M62.81 MUSCLE WEAKNESS OF LOWER EXTREMITY: ICD-10-CM

## 2022-06-01 DIAGNOSIS — G89.29 CHRONIC LEFT-SIDED LOW BACK PAIN WITHOUT SCIATICA: Primary | ICD-10-CM

## 2022-06-01 DIAGNOSIS — M54.50 CHRONIC LEFT-SIDED LOW BACK PAIN WITHOUT SCIATICA: Primary | ICD-10-CM

## 2022-06-01 DIAGNOSIS — R26.89 DECREASED FUNCTIONAL MOBILITY: ICD-10-CM

## 2022-06-01 PROCEDURE — 97140 MANUAL THERAPY 1/> REGIONS: CPT | Mod: PN

## 2022-06-01 PROCEDURE — 97110 THERAPEUTIC EXERCISES: CPT | Mod: PN

## 2022-06-01 NOTE — PROGRESS NOTES
[facility]   Physical Therapy Treatment Note     Name: Lazarus Zamudio  [facility] Number: [id number]    Therapy Diagnosis:   Encounter Diagnoses   Name Primary?    Chronic left-sided low back pain without sciatica Yes    Decreased functional mobility     Muscle weakness of lower extremity     Decreased ROM of lumbar spine      Physician: Casper Barrett MD    Visit Date: 6/1/2022    Physician Orders: PT Eval and Treat   Medical Diagnosis from Referral: M54.32 (ICD-10-CM) - Sciatica, left side  Evaluation Date: 3/22/2022  Authorization Period Expiration: 12/31/2022  Plan of Care Expiration: 7/02/2022  Progress Note Due: 6/02/2022  Visit # / Visits authorized: 16/ 20    FOTO: 3/ 3 (eval, 4/11/22, 5/2/22)   PTA Visit #: 1/5     Precautions: Standard and Diabetes    Time In: 9:15 am  Time Out: 10:00 am  Total Billable Time: 40 minutes    SUBJECTIVE     Pt reports: minimal pain on this date with the pain located in left knee.       He was compliant with home exercise program.  Response to previous treatment: no adverse reactions  Functional change: improved tolerance to transferring leg into truck    Pain: 1/10   Location: medial aspect of left thigh, knee    OBJECTIVE     Objective Measures updated at progress report unless specified.      TREATMENT     Lazarus received the treatments listed below:      Therapeutic exercises to develop strength, endurance, ROM, flexibility, posture and core stabilization for (25) minutes including:       Treadmill 8 minutes at 2.0 mph  Seated hamstring stretch 2 x 30 seconds B     Box squat 3 x 10 with 15# kb (NP today)    Long sitting hip adductor stretch 10s x10 (NP today)  Supine figure 4 stretch 3 x 30 seconds L   Hip flexor/quad stretch with strap 3 x 30 seconds L  Pelvic tilt 30 x 3 second hold (NP today)  Bridging 3 x 10  Supine clamshell BTB 3 x 10 (NP today)    Bilateral shuttle 3 bands 3 x10 (NP today)  Single leg shuttle 3 bands 3 x10 reps  each (NP today)  Side stepping x2 laps in crosswalk YTB around ankles (NP today)  Pallof press 10# 3 x 10 (NP today)  Lat pull down 27# 3 x 10 (NP today)  Step up 8 inch step 3 x 10   Standing hip adduction BTB 3 x 10  Standing hip extension BTB 3 x 10  Standing hip abduction BTB 3 x 10  Standing hamstring curl (L) 5# 3 x 10  Standing march with TA 5# 3 x 10     Possible next visit: progression of current therex     Manual therapy techniques applied for (15) minutes, including:    STM to left hip adductors, quads, and hamstrings  PROM to left hip    Long leg distractions    PATIENT EDUCATION AND HOME EXERCISES     Education/Self-Care provided:   Patient educated on biomechanical justification for therapeutic exercise and importance of compliance with HEP in order to improve overall impairments and QOL    Patient was educated on all the above exercise prior/during/after for proper posture, positioning, and execution for safe performance with home exercise program.    Patient educated on the importance of improved core and lower extremity strength in order to improve alignment of the spine and lower extremities with static positions and dynamic movement.    Patient educated on the importance of strong core and lower extremity musculature in order to improve both static and dynamic balance, improve gait mechanics, reduce fall risk and improve household and community mobility.     Written Home Exercises Provided: Patient instructed to cont prior HEP. Exercises were reviewed and Lazarus was able to demonstrate them prior to the end of the session. Lazarus demonstrated good  understanding of the education provided. See EMR under Patient Instructions for exercises provided during therapy sessions    ASSESSMENT     Patient is reporting decrease in back, hip, and groin pain, but continues to complain of medial knee pain.  He is tender to palpation along medial hamstring tendons and continues to have limited hip mobility.   Patient has responded well to treatment with decreased pain levels and improved tolerance to most daily activities.  He will continue with current plan of care and progress as tolerated.       Lazarus Is progressing well towards his goals.   Pt prognosis is Good.     Pt will continue to benefit from skilled outpatient physical therapy to address the deficits listed in the problem list box on initial evaluation, provide pt/family education and to maximize pt's level of independence in the home and community environment.     Pt's spiritual, cultural and educational needs considered and pt agreeable to plan of care and goals.     Anticipated barriers to physical therapy: none    Goals:   Short Term Goals: 4 weeks  1. Patient will be compliant with HEP to promote the independent management of current diagnosis. Met  2. Patient will increase lumbar forward bending to reach lower shin level for function of dressing.  Met  3. Patient will improve hip flexor flexibility to normal during Son testing. In Progress, Not Met  4. Patient will report a decrease in complaints of left hip and leg pain to 4/10 during ascending steps. In Progress, Not Met        Long Term Goals:  8 weeks  1. Patient will improve bilateral hip strength to 5/5 to improve tolerance to normal house work activities for self care independence. In Progress, Not Met  2. Patient will increase lumbar forward bending to reach ankle level for function of dressing. In Progress, Not Met  3. Patient will report a decrease in complaints of left hip and leg pain to 1/10 during ascending steps. In Progress, Not Met  4. Patient will improve FOTO limitation status from 40% to 27% placing the patient in the 20-40% impaired, limited, or restricted category indicating increased functional mobility. In Progress, Not Met     PLAN     Continue with PT POC and progress exercises as tolerated.     Lazarus Segundo, PT

## 2022-06-13 ENCOUNTER — CLINICAL SUPPORT (OUTPATIENT)
Dept: REHABILITATION | Facility: HOSPITAL | Age: 67
End: 2022-06-13
Attending: FAMILY MEDICINE
Payer: MEDICARE

## 2022-06-13 DIAGNOSIS — M54.50 CHRONIC LEFT-SIDED LOW BACK PAIN WITHOUT SCIATICA: Primary | ICD-10-CM

## 2022-06-13 DIAGNOSIS — M53.86 DECREASED ROM OF LUMBAR SPINE: ICD-10-CM

## 2022-06-13 DIAGNOSIS — R26.89 DECREASED FUNCTIONAL MOBILITY: ICD-10-CM

## 2022-06-13 DIAGNOSIS — M62.81 MUSCLE WEAKNESS OF LOWER EXTREMITY: ICD-10-CM

## 2022-06-13 DIAGNOSIS — G89.29 CHRONIC LEFT-SIDED LOW BACK PAIN WITHOUT SCIATICA: Primary | ICD-10-CM

## 2022-06-13 PROCEDURE — 97110 THERAPEUTIC EXERCISES: CPT | Mod: PN

## 2022-06-13 NOTE — PROGRESS NOTES
[facility]   Physical Therapy Treatment Note     Name: Lazarus Zamudio  [facility] Number: [id number]    Therapy Diagnosis:   Encounter Diagnoses   Name Primary?    Chronic left-sided low back pain without sciatica Yes    Decreased functional mobility     Muscle weakness of lower extremity     Decreased ROM of lumbar spine      Physician: Casper Barrett MD    Visit Date: 6/13/2022    Physician Orders: PT Eval and Treat   Medical Diagnosis from Referral: M54.32 (ICD-10-CM) - Sciatica, left side  Evaluation Date: 3/22/2022  Authorization Period Expiration: 12/31/2022  Plan of Care Expiration: 7/02/2022  Progress Note Due: 6/02/2022  Visit # / Visits authorized: 17/ 20    FOTO: 3/ 3 (eval, 4/11/22, 5/2/22)   PTA Visit #: 0/5     Precautions: Standard and Diabetes    Time In: 7:45 am  Time Out: 8:30 am  Total Billable Time: 40 minutes    SUBJECTIVE     Pt reports: his main complaint is pain on medial side of left knee with intermittent popping and locking.  He has no pain in low back, left hip, or adductor musculature this morning.       He was compliant with home exercise program.  Response to previous treatment: no adverse reactions  Functional change: improved tolerance to transferring leg into truck    Pain: 1/10   Location: medial aspect of left thigh, knee    OBJECTIVE     Objective Measures updated at progress report unless specified.      TREATMENT     Lazarus received the treatments listed below:      Therapeutic exercises to develop strength, endurance, ROM, flexibility, posture and core stabilization for (25) minutes including:     Treadmill 8 minutes at 2.0 mph  Seated hamstring stretch 2 x 30 seconds B  Seated hip adduction with green swiss ball squeeze 3s x30  Box squat 3 x 10 with 15# kb (NP today)    Supine figure 4 stretch 3 x 30 seconds L   Hip flexor/quad stretch with strap 3 x 30 seconds L  Pelvic tilt 30 x 3 second hold (NP today)  Bridging 3 x 10  Supine  clamshell BTB 3 x 10 (NP today)    Bilateral shuttle 3 bands 3 x10 (NP today)  Single leg shuttle 3 bands 3 x10 reps each (NP today)  Side stepping x2 laps in crosswalk YTB around ankles (NP today)  Pallof press 10# 3 x 10 (NP today)  Lat pull down 27# 3 x 10 (NP today)  Step up 8 inch step 3 x 10 (NP today)  Standing hip adduction BTB 3 x 10  Standing hip extension BTB 3 x 10  Standing hip abduction BTB 3 x 10  Standing hamstring curl (L) 7.5# 3 x 10  Standing march with TA 5# 3 x 10     Possible next visit: progression of current therex     Manual therapy techniques applied for (5) minutes, including:    STM to left hip adductors, quads, and hamstrings  PROM to left hip    Long leg distractions    PATIENT EDUCATION AND HOME EXERCISES     Education/Self-Care provided:   Patient educated on biomechanical justification for therapeutic exercise and importance of compliance with HEP in order to improve overall impairments and QOL    Patient was educated on all the above exercise prior/during/after for proper posture, positioning, and execution for safe performance with home exercise program.    Patient educated on the importance of improved core and lower extremity strength in order to improve alignment of the spine and lower extremities with static positions and dynamic movement.    Patient educated on the importance of strong core and lower extremity musculature in order to improve both static and dynamic balance, improve gait mechanics, reduce fall risk and improve household and community mobility.     Written Home Exercises Provided: Patient instructed to cont prior HEP. Exercises were reviewed and Lazarus was able to demonstrate them prior to the end of the session. Lazarus demonstrated good  understanding of the education provided. See EMR under Patient Instructions for exercises provided during therapy sessions    ASSESSMENT     Patient has decreased tenderness along left adductor group and reporting no pain  in low back, left hip, or left groin region.  He continues to have complaints of left knee pain and is tender along medial joint line.  Patient has no complaint of pain during today's stretches or manual therapy.  Anticipate discharging patient following next visit.     Lazarus Is progressing well towards his goals.   Pt prognosis is Good.     Pt will continue to benefit from skilled outpatient physical therapy to address the deficits listed in the problem list box on initial evaluation, provide pt/family education and to maximize pt's level of independence in the home and community environment.     Pt's spiritual, cultural and educational needs considered and pt agreeable to plan of care and goals.     Anticipated barriers to physical therapy: none    Goals:   Short Term Goals: 4 weeks  1. Patient will be compliant with HEP to promote the independent management of current diagnosis. Met  2. Patient will increase lumbar forward bending to reach lower shin level for function of dressing.  Met  3. Patient will improve hip flexor flexibility to normal during Son testing. In Progress, Not Met  4. Patient will report a decrease in complaints of left hip and leg pain to 4/10 during ascending steps. In Progress, Not Met        Long Term Goals:  8 weeks  1. Patient will improve bilateral hip strength to 5/5 to improve tolerance to normal house work activities for self care independence. In Progress, Not Met  2. Patient will increase lumbar forward bending to reach ankle level for function of dressing. In Progress, Not Met  3. Patient will report a decrease in complaints of left hip and leg pain to 1/10 during ascending steps. In Progress, Not Met  4. Patient will improve FOTO limitation status from 40% to 27% placing the patient in the 20-40% impaired, limited, or restricted category indicating increased functional mobility. In Progress, Not Met     PLAN     Continue with PT POC and progress exercises as tolerated.      Lazarus Segundo, PT

## 2022-06-21 NOTE — PROGRESS NOTES
[facility]   Physical Therapy Treatment Note     Name: Lazarus Zamudio  Clinic Number: [id number]    Therapy Diagnosis:   Encounter Diagnoses   Name Primary?    Chronic left-sided low back pain without sciatica Yes    Decreased functional mobility     Muscle weakness of lower extremity     Decreased ROM of lumbar spine      Physician: Casper Barrett MD    Visit Date: 6/22/2022    Physician Orders: PT Eval and Treat   Medical Diagnosis from Referral: M54.32 (ICD-10-CM) - Sciatica, left side  Evaluation Date: 3/22/2022  Authorization Period Expiration: 12/31/2022  Plan of Care Expiration: 7/02/2022  Progress Note Due: 6/02/2022  Visit # / Visits authorized: 18/ 20    FOTO: 3/ 3 (eval, 4/11/22, 5/2/22)   PTA Visit #: 1/5     Precautions: Standard and Diabetes    Time In: 745 am  Time Out: 815 am  Total Billable Time: 30 minutes    SUBJECTIVE     Pt reports: he is doing much better, the knee is the only thing that bothers him.     He was compliant with home exercise program.  Response to previous treatment: no adverse reactions  Functional change: improved tolerance to transferring leg into truck    Pain: 0/10   Location: medial aspect of left thigh, knee    OBJECTIVE     Objective Measures updated at progress report unless specified.      TREATMENT     Lazarus received the treatments listed below:      Therapeutic exercises to develop strength, endurance, ROM, flexibility, posture and core stabilization for (30) minutes including:     Treadmill 8 minutes at 2.0 mph    Box squat 3 x 10 with 15# kb (NP today)      Pelvic tilt 30 x 3 second hold (NP today)  Bridging 3 x 10  Supine clamshell BTB 3 x 10 (NP today)    Bilateral shuttle 3 bands 3 x10 (NP today)  Single leg shuttle 3 bands 3 x10 reps each (NP today)  Side stepping x2 laps in crosswalk YTB around ankles (NP today)  Pallof press 10# 3 x 10 (NP today)  Lat pull down 27# 3 x 10 (NP today)  Step up 8 inch step 3 x 10 (NP  today)  Standing hip adduction BTB 3 x 10  Standing hip extension BTB 3 x 10  Standing hip abduction BTB 3 x 10  Standing hamstring curl (L) 7.5# 3 x 10  Standing march with TA 5# 3 x 10     Possible next visit: progression of current therex     Manual therapy techniques applied for (00) minutes, including:    STM to left hip adductors, quads, and hamstrings  PROM to left hip    Long leg distractions    PATIENT EDUCATION AND HOME EXERCISES     Education/Self-Care provided:   Patient educated on biomechanical justification for therapeutic exercise and importance of compliance with HEP in order to improve overall impairments and QOL    Patient was educated on all the above exercise prior/during/after for proper posture, positioning, and execution for safe performance with home exercise program.    Patient educated on the importance of improved core and lower extremity strength in order to improve alignment of the spine and lower extremities with static positions and dynamic movement.    Patient educated on the importance of strong core and lower extremity musculature in order to improve both static and dynamic balance, improve gait mechanics, reduce fall risk and improve household and community mobility.     Written Home Exercises Provided: Patient instructed to cont prior HEP. Exercises were reviewed and Lazarus was able to demonstrate them prior to the end of the session. Lazarus demonstrated good  understanding of the education provided. See EMR under Patient Instructions for exercises provided during therapy sessions    ASSESSMENT     Patient with no difficulties with therex this session. See PT, Lazarus Segundo, note for discharge.    Lazarus Is progressing well towards his goals.   Pt prognosis is Good.     Pt will continue to benefit from skilled outpatient physical therapy to address the deficits listed in the problem list box on initial evaluation, provide pt/family education and to maximize pt's level of  independence in the home and community environment.     Pt's spiritual, cultural and educational needs considered and pt agreeable to plan of care and goals.     Anticipated barriers to physical therapy: none    Goals:   Short Term Goals: 4 weeks  1. Patient will be compliant with HEP to promote the independent management of current diagnosis. Met  2. Patient will increase lumbar forward bending to reach lower shin level for function of dressing.  Met  3. Patient will improve hip flexor flexibility to normal during Son testing. In Progress, Not Met  4. Patient will report a decrease in complaints of left hip and leg pain to 4/10 during ascending steps. In Progress, Not Met        Long Term Goals:  8 weeks  1. Patient will improve bilateral hip strength to 5/5 to improve tolerance to normal house work activities for self care independence. In Progress, Not Met  2. Patient will increase lumbar forward bending to reach ankle level for function of dressing. In Progress, Not Met  3. Patient will report a decrease in complaints of left hip and leg pain to 1/10 during ascending steps. In Progress, Not Met  4. Patient will improve FOTO limitation status from 40% to 27% placing the patient in the 20-40% impaired, limited, or restricted category indicating increased functional mobility. In Progress, Not Met     PLAN     Continue with PT POC and progress exercises as tolerated.     Nadeem Valdivia, PTA

## 2022-06-22 ENCOUNTER — CLINICAL SUPPORT (OUTPATIENT)
Dept: REHABILITATION | Facility: HOSPITAL | Age: 67
End: 2022-06-22
Attending: FAMILY MEDICINE
Payer: MEDICARE

## 2022-06-22 DIAGNOSIS — R26.89 DECREASED FUNCTIONAL MOBILITY: ICD-10-CM

## 2022-06-22 DIAGNOSIS — M62.81 MUSCLE WEAKNESS OF LOWER EXTREMITY: ICD-10-CM

## 2022-06-22 DIAGNOSIS — M54.50 CHRONIC LEFT-SIDED LOW BACK PAIN WITHOUT SCIATICA: Primary | ICD-10-CM

## 2022-06-22 DIAGNOSIS — M53.86 DECREASED ROM OF LUMBAR SPINE: ICD-10-CM

## 2022-06-22 DIAGNOSIS — G89.29 CHRONIC LEFT-SIDED LOW BACK PAIN WITHOUT SCIATICA: Primary | ICD-10-CM

## 2022-06-22 PROCEDURE — 97110 THERAPEUTIC EXERCISES: CPT | Mod: PN,CQ

## 2022-06-22 NOTE — PLAN OF CARE
[facility] OUTPATIENT THERAPY AND WELLNESS  Physical Therapy Discharge Note    Name: Lazarus Zamudio  [facility] Number: [id number]    Therapy Diagnosis:   Encounter Diagnoses   Name Primary?    Chronic left-sided low back pain without sciatica Yes    Decreased functional mobility     Muscle weakness of lower extremity     Decreased ROM of lumbar spine      Physician: Casper Barrett MD    Physician Orders: PT Eval and Treat   Medical Diagnosis from Referral: M54.32 (ICD-10-CM) - Sciatica, left side  Evaluation Date: 3/22/2022      Date of Last visit: 6/22/2022  Total Visits Received: 18    ASSESSMENT    Objective:   Observation: Patient presents to therapy on this date ambulating with normal gait      Lumbar Range of Motion:     Degrees Pain   Flexion Reach lower shin    Tightness in hamstring         Extension 10 degrees    no         Left Side Bending Reach mid thigh tightness         Right Side Bending Reach mid thigh tightness         Left rotation    45 deg no         Right Rotation    45 deg no               Lower Extremity Strength  Right LE   Left LE     Knee extension: 5/5 Knee extension: 4+/5 with pain   Knee flexion: 5/5 Knee flexion: 5/5   Hip flexion: 4+/5 Hip flexion: 4+/5   Hip extension:  4+/5 Hip extension: 4+/5   Hip abduction: 4+/5 Hip abduction: 4+/5   Ankle dorsiflexion: 5/5 Ankle dorsiflexion: 5/5   Ankle plantarflexion: 5/5 Ankle plantarflexion: 5/5            Special Tests:  -Repeated Flexion: negative  -Repeated Ext: negative  -Piriformis Test: positive on left  -GREGOR: positive bilaterally        DTR:    Right Left Comment   Patellar (L3-4) 2+ 2+     Achilles (S1) 2+ 2+           Neuro Dynamic Testing:               Sciatic nerve:                                       SLR:    R = negative                                      L = negative                             Slump: R = negative                                      L = negative                                        Femoral  Nerve:                          Femoral nerve test: positive on left        Joint Mobility: hypomobile PA testing throughout lumbar spine and sacrum     Palpation: tightness and tenderness noted in left hip flexors, adductors     Sensation: intact to light touch     Flexibility:               90/90 hamstring test: R = 20 degrees ; L = 20 degrees              Son test: R = tightness in quads ; L = tightness in quads and psoas        Limitation/Restriction for FOTO Lumbar Spine Survey     Therapist reviewed FOTO scores for Lazarus Zamudio on 6/22/2022.   FOTO documents entered into [employer] - see Media section.     Limitation Score: 6%           Assessment:     Upon re-assessment, Patient has signficant improvement in FOTO status, improved LROM, and improved LE strength and flexibility as compared to evaluation.  He is independent with HEP and is requesting discharge from PT at this time.  He will be discharged from PT to [facility] for maintenance with goals partially met.      Discharge reason: Patient has reached the maximum rehab potential for the present time    Discharge FOTO Score: 94% with 6% limitation    Goals:   Short Term Goals: 4 weeks  1. Patient will be compliant with HEP to promote the independent management of current diagnosis. Met  2. Patient will increase lumbar forward bending to reach lower shin level for function of dressing.  Met  3. Patient will improve hip flexor flexibility to normal during Son testing.  Met  4. Patient will report a decrease in complaints of left hip and leg pain to 4/10 during ascending steps.  Met        Long Term Goals:  8 weeks  1. Patient will improve bilateral hip strength to 5/5 to improve tolerance to normal house work activities for self care independence. Not Met  2. Patient will increase lumbar forward bending to reach ankle level for function of dressing. Not Met  3. Patient will report a decrease in complaints of left hip and leg pain to 1/10 during ascending  steps.  Met  4. Patient will improve FOTO limitation status from 40% to 27% placing the patient in the 20-40% impaired, limited, or restricted category indicating increased functional mobility. Met      PLAN   This patient is discharged from Physical Therapy      Lazarus Segundo, PT

## 2022-06-26 DIAGNOSIS — E11.59 HYPERTENSION ASSOCIATED WITH DIABETES: ICD-10-CM

## 2022-06-26 DIAGNOSIS — I15.2 HYPERTENSION ASSOCIATED WITH DIABETES: ICD-10-CM

## 2022-06-26 NOTE — TELEPHONE ENCOUNTER
Care Due:                  Date            Visit Type   Department     Provider  --------------------------------------------------------------------------------                                EP -                              PRIMARY      [facility] FAMILY  Last Visit: 03-      CARE ([facility])   MEDICINE       Casper Barrett                              EP -                              PRIMARY      [facility] FAMILY  Next Visit: 02-      CARE ([facility])   MEDICINE       Casper Barrett                                                            Last  Test          Frequency    Reason                     Performed    Due Date  --------------------------------------------------------------------------------    HBA1C.......  6 months...  semaglutide..............  02-   08-    Health [facility] Embedded Care Gaps. Reference number: [id number]. 6/26/2022   10:45:09 AM CLEMT

## 2022-06-27 RX ORDER — ENALAPRIL MALEATE 20 MG/1
TABLET ORAL
Qty: 90 TABLET | Refills: 2 | Status: SHIPPED | OUTPATIENT
Start: 2022-06-27 | End: 2022-12-24

## 2022-06-27 NOTE — TELEPHONE ENCOUNTER
Refill Decision Note   Lazarus Zamudio  is requesting a refill authorization.  Brief Assessment and Rationale for Refill:  Approve     Medication Therapy Plan:  FLOS;FOV;    Medication Reconciliation Completed: No   Comments:     No Care Gaps recommended.     Note composed:12:47 PM 06/27/2022

## 2022-08-12 ENCOUNTER — LAB VISIT (OUTPATIENT)
Dept: LAB | Facility: HOSPITAL | Age: 67
End: 2022-08-12
Attending: FAMILY MEDICINE
Payer: MEDICARE

## 2022-08-12 DIAGNOSIS — N40.1 ENLARGED PROSTATE WITH URINARY OBSTRUCTION: ICD-10-CM

## 2022-08-12 DIAGNOSIS — Z79.890 PERSONAL HISTORY OF ONGOING TREATMENT WITH HORMONAL THERAPY: ICD-10-CM

## 2022-08-12 DIAGNOSIS — Z79.899 ENCOUNTER FOR LONG-TERM (CURRENT) USE OF MEDICATIONS: ICD-10-CM

## 2022-08-12 DIAGNOSIS — N13.8 ENLARGED PROSTATE WITH URINARY OBSTRUCTION: ICD-10-CM

## 2022-08-12 DIAGNOSIS — E29.1 ANDROGEN DEFICIENCY: ICD-10-CM

## 2022-08-12 DIAGNOSIS — Z00.00 WELL ADULT EXAM: ICD-10-CM

## 2022-08-12 DIAGNOSIS — Z79.899 ENCOUNTER FOR LONG-TERM (CURRENT) USE OF OTHER MEDICATIONS: ICD-10-CM

## 2022-08-12 LAB
BASOPHILS # BLD AUTO: 0.03 K/UL (ref 0–0.2)
BASOPHILS NFR BLD: 0.5 % (ref 0–1.9)
COMPLEXED PSA SERPL-MCNC: 0.87 NG/ML (ref 0–4)
DIFFERENTIAL METHOD: ABNORMAL
EOSINOPHIL # BLD AUTO: 0.1 K/UL (ref 0–0.5)
EOSINOPHIL NFR BLD: 1 % (ref 0–8)
ERYTHROCYTE [DISTWIDTH] IN BLOOD BY AUTOMATED COUNT: 14.1 % (ref 11.5–14.5)
ESTIMATED AVG GLUCOSE: 143 MG/DL (ref 68–131)
ESTRADIOL SERPL-MCNC: 52 PG/ML (ref 11–44)
HBA1C MFR BLD: 6.6 % (ref 4–5.6)
HCT VFR BLD AUTO: 55.6 % (ref 40–54)
HGB BLD-MCNC: 18.5 G/DL (ref 14–18)
IMM GRANULOCYTES # BLD AUTO: 0.03 K/UL (ref 0–0.04)
IMM GRANULOCYTES NFR BLD AUTO: 0.5 % (ref 0–0.5)
LYMPHOCYTES # BLD AUTO: 1.8 K/UL (ref 1–4.8)
LYMPHOCYTES NFR BLD: 30.5 % (ref 18–48)
MCH RBC QN AUTO: 29.1 PG (ref 27–31)
MCHC RBC AUTO-ENTMCNC: 33.3 G/DL (ref 32–36)
MCV RBC AUTO: 88 FL (ref 82–98)
MONOCYTES # BLD AUTO: 0.6 K/UL (ref 0.3–1)
MONOCYTES NFR BLD: 9.6 % (ref 4–15)
NEUTROPHILS # BLD AUTO: 3.5 K/UL (ref 1.8–7.7)
NEUTROPHILS NFR BLD: 57.9 % (ref 38–73)
NRBC BLD-RTO: 0 /100 WBC
PLATELET # BLD AUTO: 101 K/UL (ref 150–450)
PMV BLD AUTO: 9.6 FL (ref 9.2–12.9)
RBC # BLD AUTO: 6.35 M/UL (ref 4.6–6.2)
TESTOST SERPL-MCNC: 700 NG/DL (ref 304–1227)
WBC # BLD AUTO: 6.04 K/UL (ref 3.9–12.7)

## 2022-08-12 PROCEDURE — 84153 ASSAY OF PSA TOTAL: CPT | Performed by: STUDENT IN AN ORGANIZED HEALTH CARE EDUCATION/TRAINING PROGRAM

## 2022-08-12 PROCEDURE — 84403 ASSAY OF TOTAL TESTOSTERONE: CPT | Performed by: STUDENT IN AN ORGANIZED HEALTH CARE EDUCATION/TRAINING PROGRAM

## 2022-08-12 PROCEDURE — 82670 ASSAY OF TOTAL ESTRADIOL: CPT | Performed by: STUDENT IN AN ORGANIZED HEALTH CARE EDUCATION/TRAINING PROGRAM

## 2022-08-12 PROCEDURE — 36415 COLL VENOUS BLD VENIPUNCTURE: CPT | Mod: PO | Performed by: FAMILY MEDICINE

## 2022-08-12 PROCEDURE — 83036 HEMOGLOBIN GLYCOSYLATED A1C: CPT | Performed by: FAMILY MEDICINE

## 2022-08-12 PROCEDURE — 85025 COMPLETE CBC W/AUTO DIFF WBC: CPT | Performed by: STUDENT IN AN ORGANIZED HEALTH CARE EDUCATION/TRAINING PROGRAM

## 2022-08-14 NOTE — PROGRESS NOTES
Make follow-up lab appointment per recommendation below.  Check to see if patient has seen the results through my chart.  If not then,  #CALL THE PATIENT# to discuss results/see if they have questions and document verification of contact. Make F/U appt if needed. [phone]    #My interpretation that was sent to them through [employer]:  Lazarus, I have reviewed your recent blood work.     Your hemoglobin A1c is improved from previous, keep up the great work!.  This test is gold standard screening test for diabetes.  It is a measures 3 months of your average blood sugar.    Follow-up with Urology concerning other labs.  =========================  Also please address any outstanding health maintenance that may be due: Shingles Vaccine(1 of 2) Never done  COVID-19 Vaccine(2 - Booster for [place] series) due on 06/03/2021  Eye Exam due on 06/23/2021  TETANUS VACCINE due on 10/18/2021  Foot Exam due on 02/12/2022

## 2022-08-15 ENCOUNTER — OFFICE VISIT (OUTPATIENT)
Dept: FAMILY MEDICINE | Facility: CLINIC | Age: 67
End: 2022-08-15
Payer: MEDICARE

## 2022-08-15 VITALS
HEIGHT: 70 IN | OXYGEN SATURATION: 97 % | TEMPERATURE: 98 F | HEART RATE: 64 BPM | BODY MASS INDEX: 40.04 KG/M2 | WEIGHT: 279.69 LBS | SYSTOLIC BLOOD PRESSURE: 138 MMHG | DIASTOLIC BLOOD PRESSURE: 76 MMHG

## 2022-08-15 DIAGNOSIS — B02.9 HERPES ZOSTER WITHOUT COMPLICATION: Primary | ICD-10-CM

## 2022-08-15 PROCEDURE — 99213 PR OFFICE/OUTPT VISIT, EST, LEVL III, 20-29 MIN: ICD-10-PCS | Mod: S$PBB,,, | Performed by: NURSE PRACTITIONER

## 2022-08-15 PROCEDURE — 99999 PR PBB SHADOW E&M-EST. PATIENT-LVL IV: CPT | Mod: PBBFAC,,, | Performed by: NURSE PRACTITIONER

## 2022-08-15 PROCEDURE — 99214 OFFICE O/P EST MOD 30 MIN: CPT | Mod: PBBFAC,PO | Performed by: NURSE PRACTITIONER

## 2022-08-15 PROCEDURE — 99999 PR PBB SHADOW E&M-EST. PATIENT-LVL IV: ICD-10-PCS | Mod: PBBFAC,,, | Performed by: NURSE PRACTITIONER

## 2022-08-15 PROCEDURE — 99213 OFFICE O/P EST LOW 20 MIN: CPT | Mod: S$PBB,,, | Performed by: NURSE PRACTITIONER

## 2022-08-15 RX ORDER — VALACYCLOVIR HYDROCHLORIDE 1 G/1
1000 TABLET, FILM COATED ORAL 2 TIMES DAILY
Qty: 20 TABLET | Refills: 0 | Status: SHIPPED | OUTPATIENT
Start: 2022-08-15 | End: 2022-10-19

## 2022-08-15 NOTE — PROGRESS NOTES
Subjective:       Patient ID: Lazarus Zamudio is a 67 y.o. male.    Chief Complaint: Rash    Rash  This is a new problem. Episode onset: 2 days  The problem has been gradually worsening since onset. The affected locations include the head, scalp and face. The rash is characterized by blistering and pain. He was exposed to nothing. Pertinent negatives include no cough, diarrhea, eye pain, fatigue, fever, shortness of breath, sore throat or vomiting. Past treatments include nothing.       Review of Systems   Constitutional: Negative for fatigue, fever and unexpected weight change.   HENT: Negative for ear pain and sore throat.    Eyes: Negative.  Negative for pain and visual disturbance.   Respiratory: Negative for cough and shortness of breath.    Cardiovascular: Negative for chest pain and palpitations.   Gastrointestinal: Negative for abdominal pain, diarrhea, nausea and vomiting.   Genitourinary: Negative for dysuria and frequency.   Musculoskeletal: Negative for arthralgias and myalgias.   Skin: Positive for rash. Negative for color change.   Neurological: Negative for dizziness and headaches.   Psychiatric/Behavioral: Negative for sleep disturbance. The patient is not nervous/anxious.        Vitals:    08/15/22 0727   BP: 138/76   Pulse: 64   Temp: 98 °F (36.7 °C)       Objective:     Current Outpatient Medications   Medication Sig Dispense Refill    aspirin (ECOTRIN) 81 MG EC tablet Take 81 mg by mouth once daily.      atorvastatin (LIPITOR) 40 MG tablet Take 1 tablet (40 mg total) by mouth once daily. 90 tablet 4    azelastine (ASTELIN) 137 mcg (0.1 %) nasal spray USE 1 SPRAY (137 MCG TOTAL) BY NASAL ROUTE 2 (TWO) TIMES DAILY. FOR 7 DAYS 30 mL 0    enalapril (VASOTEC) 20 MG tablet TAKE 1 TABLET BY MOUTH EVERY DAY 90 tablet 2    ibuprofen (ADVIL,MOTRIN) 800 MG tablet TAKE 1 TABLET BY MOUTH EVERY 8 HOURS AS NEEDED FOR PAIN 60 tablet 0    multivitamin with minerals tablet Take 1 tablet by mouth once  daily.      semaglutide (OZEMPIC) 1 mg/dose (4 mg/3 mL) Inject 1 mg into the skin every 7 days. 3 pen 4    sildenafiL (VIAGRA) 100 MG tablet Take 20 mg by mouth.      testosterone cypionate (DEPOTESTOTERONE CYPIONATE) 100 mg/mL injection Inject 200 mg into the muscle every 14 (fourteen) days.      valACYclovir (VALTREX) 1000 MG tablet Take 1 tablet (1,000 mg total) by mouth 2 (two) times daily. for 10 days 20 tablet 0     No current facility-administered medications for this visit.       Physical Exam  Vitals and nursing note reviewed.   Constitutional:       General: He is not in acute distress.     Appearance: He is well-developed.   HENT:      Head: Normocephalic and atraumatic.        Ears:      Comments: 1 vesicle to left ear canal  Eyes:      Pupils: Pupils are equal, round, and reactive to light.   Cardiovascular:      Rate and Rhythm: Normal rate and regular rhythm.   Pulmonary:      Effort: Pulmonary effort is normal.      Breath sounds: Normal breath sounds.   Musculoskeletal:         General: Normal range of motion.      Cervical back: Normal range of motion and neck supple.   Skin:     General: Skin is warm and dry.      Findings: Rash present. Rash is vesicular.   Neurological:      Mental Status: He is alert and oriented to person, place, and time.   Psychiatric:         Thought Content: Thought content normal.         Assessment:       1. Herpes zoster without complication        Plan:   Herpes zoster without complication    Other orders  -     valACYclovir (VALTREX) 1000 MG tablet; Take 1 tablet (1,000 mg total) by mouth 2 (two) times daily. for 10 days  Dispense: 20 tablet; Refill: 0        No follow-ups on file.    There are no Patient Instructions on file for this visit.

## 2022-10-18 ENCOUNTER — PATIENT MESSAGE (OUTPATIENT)
Dept: ADMINISTRATIVE | Facility: HOSPITAL | Age: 67
End: 2022-10-18
Payer: COMMERCIAL

## 2022-10-19 ENCOUNTER — OFFICE VISIT (OUTPATIENT)
Dept: PODIATRY | Facility: CLINIC | Age: 67
End: 2022-10-19
Payer: MEDICARE

## 2022-10-19 VITALS — WEIGHT: 279.75 LBS | HEIGHT: 70 IN | BODY MASS INDEX: 40.05 KG/M2

## 2022-10-19 DIAGNOSIS — E66.01 MORBID OBESITY: ICD-10-CM

## 2022-10-19 DIAGNOSIS — L60.1 ONYCHOLYSIS: ICD-10-CM

## 2022-10-19 DIAGNOSIS — E11.65 TYPE 2 DIABETES MELLITUS WITH HYPERGLYCEMIA, WITHOUT LONG-TERM CURRENT USE OF INSULIN: Primary | ICD-10-CM

## 2022-10-19 DIAGNOSIS — L60.3 ONYCHODYSTROPHY: ICD-10-CM

## 2022-10-19 PROCEDURE — 99213 OFFICE O/P EST LOW 20 MIN: CPT | Mod: 25,S$PBB,, | Performed by: PODIATRIST

## 2022-10-19 PROCEDURE — 99213 PR OFFICE/OUTPT VISIT, EST, LEVL III, 20-29 MIN: ICD-10-PCS | Mod: 25,S$PBB,, | Performed by: PODIATRIST

## 2022-10-19 PROCEDURE — 99213 OFFICE O/P EST LOW 20 MIN: CPT | Mod: PBBFAC | Performed by: PODIATRIST

## 2022-10-19 PROCEDURE — 99999 PR PBB SHADOW E&M-EST. PATIENT-LVL III: CPT | Mod: PBBFAC,,, | Performed by: PODIATRIST

## 2022-10-19 PROCEDURE — 99999 PR PBB SHADOW E&M-EST. PATIENT-LVL III: ICD-10-PCS | Mod: PBBFAC,,, | Performed by: PODIATRIST

## 2022-10-19 NOTE — PROGRESS NOTES
"  Subjective:       Patient ID: Lazarus Zamudio is a 67 y.o. male.    Chief Complaint: Nail Care (C/o DNC, rates pain 0/10, pt explains his left 5th digit toenail is lifted and may need to be taken off, diabetic, wearing socks and shoes, last seen PCP Dr. Barrett on 03/18/2022.)      HPI: Lazarus Zamudio presents to the office with complaints of pains to the left great  toe, due to dystrophic and thickened nail.  Relates that nail was lifted and partially pulled off when applying socks.  States 0/10 pain present except when applying socks on.  He is hopeful that this nail may be removed.  Does have history of diabetes which makes him concern of removing the nail himself.  Patient reports increase in pain secondary to rubbing against the toe box of the shoes and causing difficulty with normal ambulation.   Patient's Primary Care Provider is Casper Barrett MD.     Review of patient's allergies indicates:   Allergen Reactions    Metformin      Intolerant     Penicillins Rash       Past Medical History:   Diagnosis Date    Diabetes mellitus, type 2     Hypertension     Morbid obesity        Family History   Problem Relation Age of Onset    Heart disease Mother     Vision loss Mother     Diabetes Father     Heart disease Father     Hypertension Father     Diabetes Maternal Grandmother     Cancer Maternal Grandfather        Social History     Socioeconomic History    Marital status:    Occupational History     Employer:  [employer]   Tobacco Use    Smoking status: Never    Smokeless tobacco: Never   Substance and Sexual Activity    Alcohol use: No    Drug use: No    Sexual activity: Yes     Partners: Female   Social History Narrative    . 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       Past Surgical History:   Procedure Laterality Date    HERNIA REPAIR      KNEE ARTHROSCOPY W/ DEBRIDEMENT      VASECTOMY         Review of Systems      Objective:   Ht 5' 10" (1.778 m)   Wt 126.9 kg (279 lb 12.2 oz)   BMI 40.14 " kg/m²     Physical Exam  LOWER EXTREMITY PHYSICAL EXAMINATION    NEUROLOGY: Sensation to light touch is intact. Proprioception is intact.  Vibratory sensation intact    VASCULAR:  The right dorsalis pedis pulse 2/4 and the right posterior tibial pulse 1/4.  The left dorsalis pedis pulse 2/4 and posterior tibial pulse on the left is 1/4.  Capillary refill is intact.  Pedal hair growth decreased.     DERMATOLOGY:  Skin is supple, moist, intact.  There is no signs of callusing, ulcerations, other lesions identified to the dorsal or plantar aspect of the right or left foot.  The R1, 2, 5 and left L1,2, 5 are thickened, discolored dystrophic.  There is subungual debris.  Nail plates have area of dark discoloration.  The remaining nails 3-4 on the right foot and the left foot are elongated but of normal color, thickness, and texture.   There is no signs of ingrowing into the medial or lateral borders.  The left 5th toenail is elevated from the underlying nail bed.  It is only attached the proximal lateral aspect.  No signs of acute infection.  No evidence of nail bed laceration.      ORTHOPEDIC: Manual Muscle Testing is 5/5 in all planes on the left, without pains, with and without resistance. Gait pattern is slightly antalgic.    Assessment:     1. Type 2 diabetes mellitus with hyperglycemia, without long-term current use of insulin    2. Onycholysis    3. Onychodystrophy    4. Morbid obesity        Plan:     Type 2 diabetes mellitus with hyperglycemia, without long-term current use of insulin    Onycholysis    Onychodystrophy    Morbid obesity  Thorough discussion is had with the patient this afternoon, concerning the diagnosis, its etiology, and the treatment algorithm at present.  Greater than 50% of this visit spent on counseling and coordination of care. Greater than 15 minutes of a 20 minute appointment spent on education about the diabetic foot, neuropathy, and prevention of limb loss.  Shoe inspection. Diabetic  Foot Education. Patient reminded of the importance of good nutrition and blood sugar control to help prevent podiatric complications of diabetes. Patient instructed on proper foot hygeine. We discussed wearing proper and supportive shoe gear, daily foot inspections, never walking barefooted or sock footed, never putting sharp instruments to feet which can cause major complications associated with infection, ulcers, lacerations.    Dystrophic nail plates, as outlined above (R#1,2,5  ; L#1,2,5 ), are sharply debrided with double action nail nipper, and/or with the assistance of a mechanical rotary arnav, with removal of all offending nail and nail border(s), for reduction of pains. Nails are reduced in terms of length, width and girth with removal of subungual debris to facilitate pain free weight bearing and ambulation. The elongated nails as outlined in the objective portion of this note, were trimmed to appropriate length, with a double action nail nipper, for alleviation/reduction of pains as well. Follow up in approx. 3-4 months.    Left 5th toenail was cut at the most proximal lateral attachment.  When performing a nail trimming/debridement, the entire nail plate was removed at this point.  No anesthesia was needed.  Patient tolerated this well.  Continue to monitor this area.            Future Appointments   Date Time Provider Department Center   2/14/2023 10:20 AM Casper Barrett MD [facility] [place]

## 2022-11-19 ENCOUNTER — PATIENT MESSAGE (OUTPATIENT)
Dept: FAMILY MEDICINE | Facility: CLINIC | Age: 67
End: 2022-11-19
Payer: COMMERCIAL

## 2022-11-21 RX ORDER — ATORVASTATIN CALCIUM 40 MG/1
TABLET, FILM COATED ORAL
COMMUNITY
End: 2022-11-21 | Stop reason: SDUPTHER

## 2022-11-21 RX ORDER — ATORVASTATIN CALCIUM 40 MG/1
40 TABLET, FILM COATED ORAL DAILY
Qty: 90 TABLET | Refills: 4 | Status: SHIPPED | OUTPATIENT
Start: 2022-11-21 | End: 2024-02-09

## 2022-11-21 NOTE — TELEPHONE ENCOUNTER
Refill Routing Note   Medication(s) are not appropriate for processing by [facility] for the following reason(s):      - Medication not active on medication list    [facility] action(s):  Defer          Medication reconciliation completed: No     Appointments  past 12m or future 3m with PCP    Date Provider   Last Visit   3/18/2022 Casper Barrett MD   Next Visit   2/14/2023 Casper Barrett MD   ED visits in past 90 days: 0        Note composed:11:10 AM 11/21/2022

## 2022-11-21 NOTE — TELEPHONE ENCOUNTER
No new care gaps identified.  [facility] Embedded Care Gaps. Reference number: [id number]. 11/21/2022   8:44:32 AM CST

## 2022-11-23 LAB
LEFT EYE DM RETINOPATHY: NEGATIVE
RIGHT EYE DM RETINOPATHY: NEGATIVE

## 2023-01-01 DIAGNOSIS — E11.59 HYPERTENSION ASSOCIATED WITH DIABETES: Chronic | ICD-10-CM

## 2023-01-01 DIAGNOSIS — I15.2 HYPERTENSION ASSOCIATED WITH DIABETES: Chronic | ICD-10-CM

## 2023-01-01 DIAGNOSIS — E11.65 TYPE 2 DIABETES MELLITUS WITH HYPERGLYCEMIA, WITHOUT LONG-TERM CURRENT USE OF INSULIN: Chronic | ICD-10-CM

## 2023-01-01 NOTE — TELEPHONE ENCOUNTER
No new care gaps identified.  [facility] Embedded Care Gaps. Reference number: [id number]. 1/01/2023   3:29:01 PM CST

## 2023-01-03 RX ORDER — SEMAGLUTIDE 1.34 MG/ML
1 INJECTION, SOLUTION SUBCUTANEOUS
Qty: 3 PEN | Refills: 0 | Status: SHIPPED | OUTPATIENT
Start: 2023-01-03 | End: 2023-02-14

## 2023-01-03 NOTE — TELEPHONE ENCOUNTER
Refill Decision Note   Lazarus Zamudio  is requesting a refill authorization.  Brief Assessment and Rationale for Refill:  Approve     Medication Therapy Plan:       Medication Reconciliation Completed: No   Comments:     No Care Gaps recommended.     Note composed:1:34 PM 01/03/2023

## 2023-01-06 ENCOUNTER — PATIENT OUTREACH (OUTPATIENT)
Dept: ADMINISTRATIVE | Facility: HOSPITAL | Age: 68
End: 2023-01-06
Payer: COMMERCIAL

## 2023-01-06 DIAGNOSIS — E11.65 TYPE 2 DIABETES MELLITUS WITH HYPERGLYCEMIA, WITHOUT LONG-TERM CURRENT USE OF INSULIN: Primary | Chronic | ICD-10-CM

## 2023-01-06 NOTE — PROGRESS NOTES
Diabetic Eye Exam Report Jan 2023: Called Pt about overdue eye exam, No answer, LVM. Diabetic labs added to upcoming PCP visit. Historical Colonoscopy linked from Care Everywhere.

## 2023-01-06 NOTE — LETTER
January 9, 2023    Dr. Zane Corona - [facility]  [address]  [facility] [address]  Phone: [phone] January 9, 2023     Patient: Lazarus Zamudio    YOB: 1955   Date of Visit: 1/6/2023   To whom it may concern     We are seeing Lazarus Zamudio, YOB: 1955, at [facility]. Casper Barrett MD is their primary care physician. To help with our health maintenance records could you please send the following:     Last Diabetic Eye Exam Report that states Positive or Negative Retinopathy.    Please send fax to [phone] Attention Lanie JENNINGS LPN Care Coordination.    Thank-you in advance for your assistance. If you have any questions or concerns, please don't hesitate to contact me at [phone].     Lanie JENNINGS LPN  Care Coordination Department  [facility]  Phone number [phone]

## 2023-01-09 NOTE — PROGRESS NOTES
Pt called back informing eye exam was already done with Dr. Mazariegos. Fax form sent for Diabetic Eye Exam result.

## 2023-02-14 ENCOUNTER — OFFICE VISIT (OUTPATIENT)
Dept: FAMILY MEDICINE | Facility: CLINIC | Age: 68
End: 2023-02-14
Payer: MEDICARE

## 2023-02-14 ENCOUNTER — LAB VISIT (OUTPATIENT)
Dept: LAB | Facility: HOSPITAL | Age: 68
End: 2023-02-14
Attending: FAMILY MEDICINE
Payer: MEDICARE

## 2023-02-14 VITALS
BODY MASS INDEX: 40.04 KG/M2 | DIASTOLIC BLOOD PRESSURE: 80 MMHG | SYSTOLIC BLOOD PRESSURE: 136 MMHG | HEART RATE: 64 BPM | HEIGHT: 70 IN | OXYGEN SATURATION: 98 % | TEMPERATURE: 98 F | WEIGHT: 279.69 LBS

## 2023-02-14 DIAGNOSIS — E29.1 HYPOGONADISM MALE: ICD-10-CM

## 2023-02-14 DIAGNOSIS — Z79.899 ENCOUNTER FOR LONG-TERM (CURRENT) USE OF MEDICATIONS: ICD-10-CM

## 2023-02-14 DIAGNOSIS — Z00.00 WELL ADULT EXAM: ICD-10-CM

## 2023-02-14 DIAGNOSIS — Z00.00 WELL ADULT EXAM: Primary | ICD-10-CM

## 2023-02-14 DIAGNOSIS — E66.2 CLASS 3 OBESITY WITH ALVEOLAR HYPOVENTILATION, SERIOUS COMORBIDITY, AND BODY MASS INDEX (BMI) OF 40.0 TO 44.9 IN ADULT: ICD-10-CM

## 2023-02-14 DIAGNOSIS — Z13.6 ENCOUNTER FOR LIPID SCREENING FOR CARDIOVASCULAR DISEASE: ICD-10-CM

## 2023-02-14 DIAGNOSIS — I15.2 HYPERTENSION ASSOCIATED WITH DIABETES: ICD-10-CM

## 2023-02-14 DIAGNOSIS — E11.65 TYPE 2 DIABETES MELLITUS WITH HYPERGLYCEMIA, WITHOUT LONG-TERM CURRENT USE OF INSULIN: Chronic | ICD-10-CM

## 2023-02-14 DIAGNOSIS — D75.1 POLYCYTHEMIA: ICD-10-CM

## 2023-02-14 DIAGNOSIS — Z13.220 ENCOUNTER FOR LIPID SCREENING FOR CARDIOVASCULAR DISEASE: ICD-10-CM

## 2023-02-14 DIAGNOSIS — E11.59 HYPERTENSION ASSOCIATED WITH DIABETES: ICD-10-CM

## 2023-02-14 DIAGNOSIS — E11.65 TYPE 2 DIABETES MELLITUS WITH HYPERGLYCEMIA, WITHOUT LONG-TERM CURRENT USE OF INSULIN: ICD-10-CM

## 2023-02-14 PROBLEM — J34.89 NASAL DRAINAGE: Status: RESOLVED | Noted: 2021-02-12 | Resolved: 2023-02-14

## 2023-02-14 LAB
ALBUMIN SERPL BCP-MCNC: 4.3 G/DL (ref 3.5–5.2)
ALBUMIN/CREAT UR: NORMAL UG/MG (ref 0–30)
ALP SERPL-CCNC: 107 U/L (ref 55–135)
ALT SERPL W/O P-5'-P-CCNC: 43 U/L (ref 10–44)
ANION GAP SERPL CALC-SCNC: 15 MMOL/L (ref 8–16)
AST SERPL-CCNC: 30 U/L (ref 10–40)
BASOPHILS # BLD AUTO: 0.04 K/UL (ref 0–0.2)
BASOPHILS NFR BLD: 0.7 % (ref 0–1.9)
BILIRUB SERPL-MCNC: 1.1 MG/DL (ref 0.1–1)
BUN SERPL-MCNC: 14 MG/DL (ref 8–23)
CALCIUM SERPL-MCNC: 9.5 MG/DL (ref 8.7–10.5)
CHLORIDE SERPL-SCNC: 99 MMOL/L (ref 95–110)
CHOLEST SERPL-MCNC: 122 MG/DL (ref 120–199)
CHOLEST/HDLC SERPL: 2.7 {RATIO} (ref 2–5)
CO2 SERPL-SCNC: 23 MMOL/L (ref 23–29)
CREAT SERPL-MCNC: 0.9 MG/DL (ref 0.5–1.4)
CREAT UR-MCNC: 45 MG/DL (ref 23–375)
DIFFERENTIAL METHOD: ABNORMAL
EOSINOPHIL # BLD AUTO: 0 K/UL (ref 0–0.5)
EOSINOPHIL NFR BLD: 0.5 % (ref 0–8)
ERYTHROCYTE [DISTWIDTH] IN BLOOD BY AUTOMATED COUNT: 14.2 % (ref 11.5–14.5)
EST. GFR  (NO RACE VARIABLE): >60 ML/MIN/1.73 M^2
ESTIMATED AVG GLUCOSE: 148 MG/DL (ref 68–131)
GLUCOSE SERPL-MCNC: 119 MG/DL (ref 70–110)
HBA1C MFR BLD: 6.8 % (ref 4–5.6)
HCT VFR BLD AUTO: 52.7 % (ref 40–54)
HDLC SERPL-MCNC: 46 MG/DL (ref 40–75)
HDLC SERPL: 37.7 % (ref 20–50)
HGB BLD-MCNC: 17.3 G/DL (ref 14–18)
HGB BLD-MCNC: 17.3 G/DL (ref 14–18)
IMM GRANULOCYTES # BLD AUTO: 0.03 K/UL (ref 0–0.04)
IMM GRANULOCYTES NFR BLD AUTO: 0.5 % (ref 0–0.5)
LDLC SERPL CALC-MCNC: 66 MG/DL (ref 63–159)
LYMPHOCYTES # BLD AUTO: 1.8 K/UL (ref 1–4.8)
LYMPHOCYTES NFR BLD: 31.1 % (ref 18–48)
MCH RBC QN AUTO: 30 PG (ref 27–31)
MCHC RBC AUTO-ENTMCNC: 32.8 G/DL (ref 32–36)
MCV RBC AUTO: 91 FL (ref 82–98)
MICROALBUMIN UR DL<=1MG/L-MCNC: <5 UG/ML
MONOCYTES # BLD AUTO: 0.5 K/UL (ref 0.3–1)
MONOCYTES NFR BLD: 7.6 % (ref 4–15)
NEUTROPHILS # BLD AUTO: 3.5 K/UL (ref 1.8–7.7)
NEUTROPHILS NFR BLD: 59.6 % (ref 38–73)
NONHDLC SERPL-MCNC: 76 MG/DL
NRBC BLD-RTO: 0 /100 WBC
PLATELET # BLD AUTO: 108 K/UL (ref 150–450)
PMV BLD AUTO: 9.5 FL (ref 9.2–12.9)
POTASSIUM SERPL-SCNC: 4.6 MMOL/L (ref 3.5–5.1)
PROT SERPL-MCNC: 6.9 G/DL (ref 6–8.4)
RBC # BLD AUTO: 5.77 M/UL (ref 4.6–6.2)
SODIUM SERPL-SCNC: 137 MMOL/L (ref 136–145)
TRIGL SERPL-MCNC: 50 MG/DL (ref 30–150)
WBC # BLD AUTO: 5.91 K/UL (ref 3.9–12.7)

## 2023-02-14 PROCEDURE — 90677 PCV20 VACCINE IM: CPT | Mod: PBBFAC,PO

## 2023-02-14 PROCEDURE — 83036 HEMOGLOBIN GLYCOSYLATED A1C: CPT | Performed by: FAMILY MEDICINE

## 2023-02-14 PROCEDURE — 99999 PR PBB SHADOW E&M-EST. PATIENT-LVL IV: CPT | Mod: PBBFAC,,, | Performed by: FAMILY MEDICINE

## 2023-02-14 PROCEDURE — 99397 PR PREVENTIVE VISIT,EST,65 & OVER: ICD-10-PCS | Mod: S$PBB,GZ,, | Performed by: FAMILY MEDICINE

## 2023-02-14 PROCEDURE — 80053 COMPREHEN METABOLIC PANEL: CPT | Performed by: FAMILY MEDICINE

## 2023-02-14 PROCEDURE — 99999 PR PBB SHADOW E&M-EST. PATIENT-LVL IV: ICD-10-PCS | Mod: PBBFAC,,, | Performed by: FAMILY MEDICINE

## 2023-02-14 PROCEDURE — 84403 ASSAY OF TOTAL TESTOSTERONE: CPT | Performed by: FAMILY MEDICINE

## 2023-02-14 PROCEDURE — 80061 LIPID PANEL: CPT | Performed by: FAMILY MEDICINE

## 2023-02-14 PROCEDURE — 85025 COMPLETE CBC W/AUTO DIFF WBC: CPT | Performed by: FAMILY MEDICINE

## 2023-02-14 PROCEDURE — 99214 OFFICE O/P EST MOD 30 MIN: CPT | Mod: PBBFAC,PO,25 | Performed by: FAMILY MEDICINE

## 2023-02-14 PROCEDURE — 82570 ASSAY OF URINE CREATININE: CPT | Performed by: FAMILY MEDICINE

## 2023-02-14 PROCEDURE — 99397 PER PM REEVAL EST PAT 65+ YR: CPT | Mod: S$PBB,GZ,, | Performed by: FAMILY MEDICINE

## 2023-02-14 PROCEDURE — 36415 COLL VENOUS BLD VENIPUNCTURE: CPT | Mod: PO | Performed by: FAMILY MEDICINE

## 2023-02-14 RX ORDER — ENALAPRIL MALEATE 20 MG/1
20 TABLET ORAL DAILY
Qty: 90 TABLET | Refills: 4 | Status: SHIPPED | OUTPATIENT
Start: 2023-02-14 | End: 2024-03-19

## 2023-02-14 RX ORDER — SEMAGLUTIDE 2.68 MG/ML
2 INJECTION, SOLUTION SUBCUTANEOUS
Qty: 3 PEN | Refills: 4 | Status: SHIPPED | OUTPATIENT
Start: 2023-02-14 | End: 2023-06-23

## 2023-02-14 RX ORDER — TADALAFIL 20 MG/1
20 TABLET ORAL
COMMUNITY

## 2023-02-14 NOTE — PROGRESS NOTES
This note is specifically for wellness visit performed today.     WELLNESS EXAM      Patient ID: Lazarus Zamuido is a 67 y.o. male.  has a past medical history of Diabetes mellitus, type 2, Hypertension, and Morbid obesity.     Chief Complaint:  Encounter for wellness exam    Well Adult Physical: Patient here for a comprehensive physical exam.The patient reports chronic conditions.  Well controlled on current medication regimen.  The patient's last visit with me was on 3/18/2022.       February 2023:  Patient reports that he has been doing well.  Patient has been donating blood due to polycythemia from testosterone replacement.  Patient is wanting blood work performed by me and his urologist.  He is fasting today.  Patient reports his weight loss has plateaued on Ozempic 1 milligram and he is currently having difficulty getting the medication.  Otherwise doing well.  No other issues.    Reviewed Care team:Previous PCP: Dr. Lazarus Colindres   Specialists: all within [facility]: Aubrie Mullen B Forrest, MD, now [facility]   Urology: Dr. Melvin / [facility] , now [facility]  Eye Dr. Luis Manuel [facility]   [facility] Tellez at [facility]   Send labs to Dr. Melvin / [facility]     Diabetes Management Status    Statin: Taking  ACE/ARB: Taking    Screening or Prevention Patient's value Goal Complete/Controlled?   HgA1C Testing and Control   Lab Results   Component Value Date    HGBA1C 6.6 (H) 08/12/2022      Annually/Less than 8% Yes     Lipid profile : 02/14/2022 Annually No     LDL control Lab Results   Component Value Date    LDLCALC 68.0 02/14/2022    Annually/Less than 100 mg/dl  Yes     Nephropathy screening Lab Results   Component Value Date    LABMICR 13.0 02/14/2022     Lab Results   Component Value Date    PROTEINUA Negative 03/01/2022     No results found for: UTPCR   Annually Yes     Blood pressure BP Readings from Last 1 Encounters:   02/14/23 136/80    Less than 140/90 Yes     Dilated retinal exam :  11/23/2022 Annually Yes     Foot exam   : 10/19/2022 Annually Yes           February 2022:  Patient is doing well on Ozempic.  He reports he continues to have weight loss.  He did have a recent episode of COVID that was mild.  Patient recovered with only residual cough and some fatigue.  He is currently having some left-sided buttock pain that is radiating into the left leg.  It is worse at night.  Denies any injury or trauma.  He has tried some over-the-counter medication without relief.  He recently had steroids last month.  BMI Readings from Last 10 Encounters:   02/14/23 40.13 kg/m²   10/19/22 40.14 kg/m²   08/15/22 40.13 kg/m²   03/18/22 40.12 kg/m²   03/01/22 40.36 kg/m²   02/14/22 40.41 kg/m²   02/03/22 41.07 kg/m²   11/02/21 42.47 kg/m²   08/12/21 42.59 kg/m²   07/06/21 42.33 kg/m²       He reports previously on metformin with problems.  Patient having GI upset and is intolerant to the lower dose.  August 2021:  Reviewed.  Denies history of personal or family history of thyroid cancer, MEN syndrome, pancreatitis.  August 2020:  Reviewed  Diabetes Management Status     Hypertension:  CHRONIC. STABLE. BP Reviewed.  Compliant with BP medications. No SE reported.   (-) CP, SOB, palpitations, dizziness, lightheadedness, HA, arm numbness, tingling or weakness, syncope.  Creatinine   Date Value Ref Range Status   02/14/2022 0.8 0.5 - 1.4 mg/dL Final     Hyperlipidemia:  Compliant with Lipitor.  CHRONIC. STABLE. Lab analysis reviewed.   (-) CP, SOB, abdominal pain, N/V/D, constipation, jaundice, skin changes.  (-) Myalgias  Lab Results   Component Value Date    CHOL 120 02/14/2022    CHOL 174 08/18/2021    CHOL 160 08/13/2020     Lab Results   Component Value Date    HDL 44 02/14/2022    HDL 48 08/18/2021    HDL 44 08/13/2020     Lab Results   Component Value Date    LDLCALC 68.0 02/14/2022    LDLCALC 110 (H) 08/18/2021    LDLCALC 102 (H) 08/13/2020     Lab Results   Component Value Date    TRIG 40 02/14/2022     TRIG 71 08/18/2021    TRIG 58 08/13/2020     Lab Results   Component Value Date    CHOLHDL 36.7 02/14/2022    CHOLHDL 3.6 08/18/2021    CHOLHDL 3.6 08/13/2020     Lab Results   Component Value Date    TOTALCHOLEST 2.7 02/14/2022    TOTALCHOLEST 3.8 11/18/2014    TOTALCHOLEST 4.0 12/31/2013     Lab Results   Component Value Date    ALT 36 02/14/2022    AST 25 02/14/2022    ALKPHOS 98 02/14/2022    BILITOT 0.9 02/14/2022     ======================================================  The ASCVD Risk score (Jayme ACHARYA, et al., 2019) failed to calculate for the following reasons:    The valid total cholesterol range is 130 to 320 mg/dL    Do you take any herbs or supplements that were not prescribed by a doctor? no   Are you taking calcium supplements? no    History:  Dr. Melvin/ [facility] for hypotestosteronism.  Date last PSA: Reviewed.  Managed Dr. Melvin  Lab Results   Component Value Date    PSA 0.61 11/18/2014   The natural history of prostate cancer and ongoing controversy regarding screening and potential treatment outcomes of prostate cancer has been discussed with the patient. The meaning of a false positive PSA and a false negative PSA has been discussed. He indicates understanding of the limitations of this screening test and wishes  to proceed with screening PSA testing.  No history of STDs    Colorectal cancer screening:  Patient had colonoscopy in 2015 by Dr. Alfred  Tissue Pathology (01/19/2015 8:02 AM CST)  Tissue Pathology (01/19/2015 8:02 AM CST)   Component Value Ref Range Performed At Pathologist Signature   Test requested see belowComment: TISSUE PATHOLOGY   [facility]     Pathologist SEE BELOW  Comment:   Mary Cantor MD,  board certified in Anatomic Pathology,  [phone]  (electronic signature)     [facility]     Notes SEE BELOW  Comment:   The CPT codes provided are for informational  purposes only.  [employer] assumes no  responsibility or liability for the accuracy of  the CPT codes  provided.  The CPT codes listed are  not payor specific and should not be relied on  for billing purposes.  CPT coding is the sole  responsibility of the billing entity.     [facility]     A) Source SEE BELOWComment: Ascending colon   [facility]     A) DESCRIPTION SEE BELOW  Comment:   Ascending colon polyp, received in formalin,  verified as to patient name and consists of  multiple pieces of granular, tan-brown material  and fecal material that are 1.3 x 0.7 x 0.2 cm in  aggregate.  The specimen is entirely submitted in  one cassette.     [facility]     A) Diagnosis SEE BELOW  Comment:   Tubular adenoma.  No high grade dysplasia or malignancy identified.     [facility]     A) Micro Description SEE BELOW  Comment:   Microscopic examination supports the above  diagnosis.     [facility]     B) Source SEE BELOWComment: Descending colon   [facility]     B) Description SEE BELOW  Comment:   Descending colon, received in formalin, verified  as to patient name and consists of a tan-brown  polyp, that is .2 x .2 x .2 cm and green pieces  of fecal material.  The specimen is entirely  submitted in one cassette.     [facility]     B) Diagnosis SEE BELOW  Comment:   Cauterized colonic mucosa with no pathologic  alteration.  No dysplasia or malignancy identified.     [facility]     B) Micro Description SEE BELOW  Comment:   Microscopic examination supports the above  diagnosis.     [facility]     C) Source SEE BELOWComment: Recto-sigmoid polyp   [facility]     C) Description SEE BELOW  Comment:   Recto-sigmoid, received in formalin, verified as  to patient name and consists of two tan-brown  pieces of tissue, that are .4 x .2 x .2 cm in  aggregate. The specimen is entirely submitted in  one cassette.  CT     [facility]     C) Diagnosis SEE BELOW  Comment:   Cauterized colonic mucosa with no pathologic  alteration,  No dysplasia or malignancy seen.  Code 0  88305 x 3     [facility]     C) Micro Description SEE BELOW  Comment:    Microscopic examination supports the above  diagnosis.     [facility]           Health Maintenance Topics with due status: Not Due       Topic Last Completion Date    Colorectal Cancer Screening 01/19/2015    PROSTATE-SPECIFIC ANTIGEN 08/12/2022    Foot Exam 10/19/2022    Eye Exam 11/23/2022    Low Dose Statin 02/14/2023        ==============================================  History reviewed.     Health Maintenance Due   Topic Date Due    Shingles Vaccine (1 of 2) Never done    COVID-19 Vaccine (2 - Booster for Kulwant series) 06/03/2021    TETANUS VACCINE  10/18/2021    Lipid Panel  02/14/2023    Hemoglobin A1c  02/12/2023    Diabetes Urine Screening  02/14/2023    Patient refused other vaccines today.  He does agree to pneumonia vaccination discussed risk and benefits.    Past Medical History:  Past Medical History:   Diagnosis Date    Diabetes mellitus, type 2     Hypertension     Morbid obesity      Past Surgical History:   Procedure Laterality Date    HERNIA REPAIR      KNEE ARTHROSCOPY W/ DEBRIDEMENT      VASECTOMY       Review of patient's allergies indicates:   Allergen Reactions    Metformin      Intolerant     Penicillins Rash     Current Outpatient Medications on File Prior to Visit   Medication Sig Dispense Refill    aspirin (ECOTRIN) 81 MG EC tablet Take 81 mg by mouth once daily.      atorvastatin (LIPITOR) 40 MG tablet Take 1 tablet (40 mg total) by mouth once daily. 90 tablet 4    ibuprofen (ADVIL,MOTRIN) 800 MG tablet TAKE 1 TABLET BY MOUTH EVERY 8 HOURS AS NEEDED FOR PAIN 60 tablet 0    multivitamin with minerals tablet Take 1 tablet by mouth once daily.      tadalafiL (CIALIS) 20 MG Tab Take 20 mg by mouth as needed.      testosterone cypionate (DEPOTESTOTERONE CYPIONATE) 100 mg/mL injection Inject 200 mg into the muscle every 14 (fourteen) days.      [DISCONTINUED] enalapril (VASOTEC) 20 MG tablet TAKE 1 TABLET BY MOUTH EVERY DAY 90 tablet 0    [DISCONTINUED] semaglutide (OZEMPIC) 1 mg/dose (4  mg/3 mL) Inject 1 mg into the skin every 7 days. 3 pen 0    [DISCONTINUED] azelastine (ASTELIN) 137 mcg (0.1 %) nasal spray USE 1 SPRAY (137 MCG TOTAL) BY NASAL ROUTE 2 (TWO) TIMES DAILY. FOR 7 DAYS 30 mL 0    [DISCONTINUED] sildenafiL (VIAGRA) 100 MG tablet Take 20 mg by mouth.       No current facility-administered medications on file prior to visit.     Social History     Socioeconomic History    Marital status:    Occupational History     Employer:  \[employer]\""   Tobacco Use    Smoking status: Never    Smokeless tobacco: Never   Substance and Sexual Activity    Alcohol use: No    Drug use: No    Sexual activity: Yes     Partners: Female   Social History Narrative    . 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     Family History   Problem Relation Age of Onset    Heart disease Mother     Vision loss Mother     Diabetes Father     Heart disease Father     Hypertension Father     Diabetes Maternal Grandmother     Cancer Maternal Grandfather        Vitals:    02/14/23 1016   BP: 136/80   Pulse: 64   Temp: 98.4 °F (36.9 °C)      Body mass index is 40.13 kg/m².     Review of Systems   Constitutional: Negative for chills, fatigue, fever and unexpected weight change.   HENT: Negative for ear pain and sore throat.    Eyes: Negative for redness and visual disturbance.   Respiratory: Negative for cough and shortness of breath.    Cardiovascular: Negative for chest pain and palpitations.   Gastrointestinal: Positive for diarrhea resolved with stopping metformin and nausea. Negative for vomiting.   Endocrine: Negative for cold intolerance and heat intolerance.   Genitourinary: Negative for difficulty urinating and hematuria.   Musculoskeletal: Negative for arthralgias and myalgias.   Skin: Negative for rash and wound.   Allergic/Immunologic: Negative for environmental allergies and food allergies.   Neurological: Negative for weakness and headaches.   Hematological: Negative for adenopathy. Does not bruise/bleed easily.    Psychiatric/Behavioral: Negative for sleep disturbance. The patient is not nervous/anxious.           Objective:      Physical Exam  Vitals signs and nursing note reviewed.   Constitutional:       General: He is not in acute distress.     Appearance: He is well-developed.   HENT:      Head: Normocephalic and atraumatic.   Eyes:      Pupils: Pupils are equal, round, and reactive to light.   Neck:      Musculoskeletal: Normal range of motion and neck supple.   Cardiovascular:      Rate and Rhythm: Normal rate and regular rhythm.      Heart sounds: Normal heart sounds. No murmur.   Pulmonary:      Effort: Pulmonary effort is normal. No respiratory distress.      Breath sounds: Normal breath sounds. No wheezing.   Abdominal:      General: Bowel sounds are normal. There is no distension.      Palpations: Abdomen is soft.   Musculoskeletal: Normal range of motion.   Skin:     General: Skin is warm and dry.      Capillary Refill: Capillary refill takes less than 2 seconds.   Neurological:      Mental Status: He is alert and oriented to person, place, and time.      Cranial Nerves: No cranial nerve deficit.   Psychiatric:         Behavior: Behavior normal.          Assessment / Plan:      1.  Routine health exam-patient here for annual wellness exam.  Labs ordered.  Health maintenance was reviewed and ordered.  Hyperlipidemia with ASCVD:  LDL now less than 70.  Continue statin.  Previous plan:  Update fasting lipid panel to see if improved.  Continue statin medication.  LDL goal less than 70 with his comorbid conditions.Counseled on hyperlipidemia disease course, healthy diet and increased need for exercise.  Please be advised of the risk of cardiovascular disease, increase stroke and heart attack risk with uncontrolled/untreated hyperlipidemia.     Patient voiced understanding and understood the treatment plan. All questions were answered.     Left-sided sciatica:  Previous plan:  Avoid steroids at this time.  Start  Flexeril at bedtime.  Continue with anti-inflammatory over-the-counter.  Consider physical therapy if no improvement.  Patient may ultimately need Medrol Dosepak or imaging with x-ray.  He will message me if no improvement.   - risk of corticosteroids reviewed (elevated BP/glucose, insomnia, psychosis, bone loss, etc) and patient expressed understanding      BMI 40:  Continue Ozempic.  patient has lost approximately 30 pounds.  Continue lifestyle modification with diet and exercise.    Diabetes:  Increase Ozempic to 2 milligrams weekly.  Previous plan:  Stop metformin.  Continue OZEMPIC to 1mg.  Update labs today. Monitor hemoglobin A1c.  Discussed diabetic diet and exercise protocol.  Continue medications.  Monitor for side effects.  Discussed checking blood glucose.  Discussed symptoms to monitor for and to notify me immediately if persistent or worsening.  Follow up with Ophthalmology/Optometry and Podiatry.    Hypertension:  Well controlled.  Continue current medication regimen.Counseled on importance of hypertension disease course, I recommend ongoing Education for DASH-diet and exercise.  Counseled on medication regimen importance of treating high blood pressure.  Please be advised of risk of untreated blood pressure as discussed.  Please advised of ER precautions were given for symptoms of hypertensive urgency and emergency.  Hypertension Medications               enalapril (VASOTEC) 20 MG tablet Take 1 tablet (20 mg total) by mouth once daily.            Hypogonadism:  Chronic.  Stable.  Managed by Urology.  Discussed risk and benefits of TR T.     Complete history and physical was completed today.  Complete and thorough medication reconciliation was performed.  Discussed risks and benefits of medications.  Advised patient on orders and health maintenance.  We discussed old records and old labs if available.  Will request any records not available through epic.  Continue current medications listed on your  summary sheet.    All questions were answered. Patient had no further concerns. Advised of diagnoses and plan. Follow up as planned or return sooner if symptoms persist or worsen.     Orders Placed This Encounter   Procedures    (In Office Administered) Pneumococcal Conjugate Vaccine (20 Valent) (IM)    CBC Auto Differential     Standing Status:   Future     Standing Expiration Date:   4/14/2024    Testosterone     Standing Status:   Standing     Number of Occurrences:   99     Standing Expiration Date:   2/10/2038    CBC Auto Differential     Standing Status:   Standing     Number of Occurrences:   99     Standing Expiration Date:   2/10/2038       Medications Ordered This Encounter   Medications    enalapril (VASOTEC) 20 MG tablet     Sig: Take 1 tablet (20 mg total) by mouth once daily.     Dispense:  90 tablet     Refill:  4     .    semaglutide (OZEMPIC) 2 mg/dose (8 mg/3 mL) PnIj     Sig: Inject 2 mg into the skin every 7 days.     Dispense:  3 pen     Refill:  4                Casper Barrett MD

## 2023-02-14 NOTE — PATIENT INSTRUCTIONS
Follow up in about 1 year (around 2/14/2024), or if symptoms worsen or fail to improve, for Annual Wellness Exam.     Dear patient,   As a result of recent federal legislation (The Federal Cures Act), you may receive lab or pathology results from your visit in your [facility] account before your physician is able to contact you. Your physician or their representative will relay the results to you with their recommendations at their soonest availability.     If no improvement in symptoms or symptoms worsen, please be advised to call MD, follow-up at clinic and/or go to ER if becomes severe.    Casper Barrett M.D.        We Offer TELEHEALTH & Same Day Appointments!   Book your Telehealth appointment with me through my nurse or   Clinic appointments on avox!    [address] [place] [address]    Office: [phone]   FAX: [phone]    Check out my [employer] Page and Follow Me at: [email or web address]    Check out my website at [employer] by clicking on: [email or web address]    To Schedule appointments online, go to avox: [email or web address]     Ozempic® may cause serious side effects, including:    Possible thyroid tumors, including cancer. Tell your health care provider if you get a lump or swelling in your neck, hoarseness, trouble swallowing, or shortness of breath. These may be symptoms of thyroid cancer. In studies with rodents, Ozempic® and medicines that work like Ozempic® caused thyroid tumors, including thyroid cancer. It is not known if Ozempic® will cause thyroid tumors or a type of thyroid cancer called medullary thyroid carcinoma (MTC) in people.  Do not use Ozempic® if you or any of your family have ever had MTC, or if you have an endocrine system condition called Multiple Endocrine Neoplasia syndrome type 2 (MEN 2).  Do not use Ozempic® if:  you or any of your family have ever had MTC or if  you have MEN 2.  you are allergic to semaglutide or any of the ingredients in Ozempic®.

## 2023-02-15 LAB — TESTOST SERPL-MCNC: 1104 NG/DL (ref 304–1227)

## 2023-02-21 ENCOUNTER — TELEPHONE (OUTPATIENT)
Dept: FAMILY MEDICINE | Facility: CLINIC | Age: 68
End: 2023-02-21
Payer: COMMERCIAL

## 2023-02-21 ENCOUNTER — PATIENT MESSAGE (OUTPATIENT)
Dept: FAMILY MEDICINE | Facility: CLINIC | Age: 68
End: 2023-02-21
Payer: COMMERCIAL

## 2023-02-21 DIAGNOSIS — D69.6 THROMBOCYTOPENIA: Primary | ICD-10-CM

## 2023-02-21 NOTE — TELEPHONE ENCOUNTER
I received your message which was reviewed along with the the medication list and allergies that we have below.  Please review it for accuracy to make sure that we have the most recent records on your history.     Based on this, the following orders were placed AND/OR medicines were sent in.     Orders Placed This Encounter   Procedures    Ambulatory referral/consult to Hematology / Oncology     Standing Status:   Future     Standing Expiration Date:   3/21/2024     Referral Priority:   Routine     Referral Type:   Consultation     Referral Reason:   Specialty Services Required     Requested Specialty:   Hematology and Oncology     Number of Visits Requested:   1       Medications written and sent at this time include:       Your pharmacy(ies) of choice at this time on record include the list below and any medications would have been sent to the one at the top.    [id number] - [place], [place] - [address], [address] [facility] [address]  [address] [address]  Phone: [phone] Fax: [phone]      Thank you for choosing us as your healthcare provider!  Dr. Casper Barrett    ALLERGY LIST  Review of patient's allergies indicates:   Allergen Reactions    Metformin      Intolerant     Penicillins Rash       MEDICATION LIST  Current Outpatient Medications on File Prior to Visit   Medication Sig Dispense Refill    aspirin (ECOTRIN) 81 MG EC tablet Take 81 mg by mouth once daily.      atorvastatin (LIPITOR) 40 MG tablet Take 1 tablet (40 mg total) by mouth once daily. 90 tablet 4    enalapril (VASOTEC) 20 MG tablet Take 1 tablet (20 mg total) by mouth once daily. 90 tablet 4    ibuprofen (ADVIL,MOTRIN) 800 MG tablet TAKE 1 TABLET BY MOUTH EVERY 8 HOURS AS NEEDED FOR PAIN 60 tablet 0    multivitamin with minerals tablet Take 1 tablet by mouth once daily.      semaglutide (OZEMPIC) 2 mg/dose (8 mg/3 mL) PnIj Inject 2 mg into the skin every 7 days. 3 pen 4    tadalafiL (CIALIS) 20 MG Tab  Take 20 mg by mouth as needed.      testosterone cypionate (DEPOTESTOTERONE CYPIONATE) 100 mg/mL injection Inject 200 mg into the muscle every 14 (fourteen) days.       No current facility-administered medications on file prior to visit.       HEALTH MAINTENANCE THAT IS OVERDUE OR NEEDS TO BE UPDATED ON OUR CHART IS LISTED BELOW.  IF YOU HAVE HAD IT DONE ELSEWHERE, PLEASE SEND US DATES AND RECORDS IF YOU HAVE THEM TO MAKE YOUR CHART ACCURATE.  IF YOU HAVE NOT HAD THESE DONE AND ARE READY FOR US TO SCHEDULE THEM, PLEASE SEND US A MESSAGE.  Health Maintenance Due   Topic Date Due    Shingles Vaccine (1 of 2) Never done    COVID-19 Vaccine (2 - Booster for Kulwant series) 06/03/2021    TETANUS VACCINE  10/18/2021       DISCLAIMER: This note was compiled by using a speech recognition dictation system and therefore please be aware that typographical / speech recognition errors can and do occur.  Please contact me if you see any errors specifically.    Casper Barrett MD  We Offer Telehealth & Same Day Appointments!   Book your Telehealth appointment with me through my nurse or   Clinic appointments on [employer]!  [phone]     Check out my Facebook Page and Follow Me at: CLICK HERE    Check out my website at [employer] by clicking on: CLICK HERE    To Schedule appointments online, go to [employer]: CLICK HERE     Location: [email or web address]    [address] [place] [address]    FAX: [phone]

## 2023-02-21 NOTE — PROGRESS NOTES
Make follow-up lab appointment per recommendation below.  Check to see if patient has seen the results through my chart.  If not then,  #CALL THE PATIENT# to discuss results/see if they have questions and document verification of contact. Make F/U appt if needed. [phone]    #My interpretation that was sent to them through [employer]:  Lazarus, I have reviewed your recent blood work.     Testosterone level is within normal limits.  Your complete blood count is stable.  Consider hematology follow-up/consult for low platelets.  Your metabolic panel which shows your glucose, kidney function, electrolytes, and liver function is stable.   Your cholesterol is normal.    Your hemoglobin A1c is stable.  This test is gold standard screening test for diabetes.  It is a measures 3 months of your average blood sugar.  =========================  Also please address any outstanding health maintenance that may be due: Shingles Vaccine(1 of 2) Never done  COVID-19 Vaccine(2 - Booster for Kulwant series) due on 06/03/2021  TETANUS VACCINE due on 10/18/2021

## 2023-02-22 ENCOUNTER — TELEPHONE (OUTPATIENT)
Dept: HEMATOLOGY/ONCOLOGY | Facility: CLINIC | Age: 68
End: 2023-02-22
Payer: COMMERCIAL

## 2023-02-22 DIAGNOSIS — D69.6 THROMBOCYTOPENIA: Primary | ICD-10-CM

## 2023-02-22 NOTE — TELEPHONE ENCOUNTER
Spoke to patient in reference to needing additional/updated labs completed prior to Hematology appt. Lab orders placed, appt scheduled, notice via pt portal.

## 2023-03-01 ENCOUNTER — PES CALL (OUTPATIENT)
Dept: ADMINISTRATIVE | Facility: CLINIC | Age: 68
End: 2023-03-01
Payer: COMMERCIAL

## 2023-03-01 ENCOUNTER — PES CALL (OUTPATIENT)
Dept: ADMINISTRATIVE | Facility: OTHER | Age: 68
End: 2023-03-01
Payer: COMMERCIAL

## 2023-03-06 ENCOUNTER — LAB VISIT (OUTPATIENT)
Dept: LAB | Facility: HOSPITAL | Age: 68
End: 2023-03-06
Attending: NURSE PRACTITIONER
Payer: MEDICARE

## 2023-03-06 DIAGNOSIS — D69.6 THROMBOCYTOPENIA: ICD-10-CM

## 2023-03-06 LAB
BASOPHILS # BLD AUTO: 0.04 K/UL (ref 0–0.2)
BASOPHILS NFR BLD: 0.7 % (ref 0–1.9)
DIFFERENTIAL METHOD: ABNORMAL
EOSINOPHIL # BLD AUTO: 0.1 K/UL (ref 0–0.5)
EOSINOPHIL NFR BLD: 1 % (ref 0–8)
ERYTHROCYTE [DISTWIDTH] IN BLOOD BY AUTOMATED COUNT: 13.6 % (ref 11.5–14.5)
HCT VFR BLD AUTO: 49.2 % (ref 40–54)
HGB BLD-MCNC: 16 G/DL (ref 14–18)
IMM GRANULOCYTES # BLD AUTO: 0.03 K/UL (ref 0–0.04)
IMM GRANULOCYTES NFR BLD AUTO: 0.5 % (ref 0–0.5)
LYMPHOCYTES # BLD AUTO: 2.1 K/UL (ref 1–4.8)
LYMPHOCYTES NFR BLD: 34.6 % (ref 18–48)
MCH RBC QN AUTO: 30 PG (ref 27–31)
MCHC RBC AUTO-ENTMCNC: 32.5 G/DL (ref 32–36)
MCV RBC AUTO: 92 FL (ref 82–98)
MONOCYTES # BLD AUTO: 0.4 K/UL (ref 0.3–1)
MONOCYTES NFR BLD: 7.1 % (ref 4–15)
MPV, BLUE TOP: 9.7 FL (ref 9.2–12.9)
NEUTROPHILS # BLD AUTO: 3.3 K/UL (ref 1.8–7.7)
NEUTROPHILS NFR BLD: 56.1 % (ref 38–73)
NRBC BLD-RTO: 0 /100 WBC
PATH REV BLD -IMP: NORMAL
PLATELET # BLD AUTO: 103 K/UL (ref 150–450)
PLATELET, BLUE TOP: 83 K/UL (ref 150–450)
PMV BLD AUTO: 9.5 FL (ref 9.2–12.9)
RBC # BLD AUTO: 5.34 M/UL (ref 4.6–6.2)
WBC # BLD AUTO: 5.93 K/UL (ref 3.9–12.7)

## 2023-03-06 PROCEDURE — 85060 PATHOLOGIST REVIEW: ICD-10-PCS | Mod: ,,, | Performed by: PATHOLOGY

## 2023-03-06 PROCEDURE — 86803 HEPATITIS C AB TEST: CPT | Performed by: NURSE PRACTITIONER

## 2023-03-06 PROCEDURE — 87340 HEPATITIS B SURFACE AG IA: CPT | Performed by: NURSE PRACTITIONER

## 2023-03-06 PROCEDURE — 85049 AUTOMATED PLATELET COUNT: CPT | Mod: 59 | Performed by: NURSE PRACTITIONER

## 2023-03-06 PROCEDURE — 86038 ANTINUCLEAR ANTIBODIES: CPT | Performed by: NURSE PRACTITIONER

## 2023-03-06 PROCEDURE — 85060 BLOOD SMEAR INTERPRETATION: CPT | Mod: ,,, | Performed by: PATHOLOGY

## 2023-03-06 PROCEDURE — 87389 HIV-1 AG W/HIV-1&-2 AB AG IA: CPT | Performed by: NURSE PRACTITIONER

## 2023-03-06 PROCEDURE — 85025 COMPLETE CBC W/AUTO DIFF WBC: CPT | Performed by: NURSE PRACTITIONER

## 2023-03-07 LAB
ANA SER QL IF: NORMAL
HBV SURFACE AG SERPL QL IA: NORMAL
HCV AB SERPL QL IA: NORMAL
HIV 1+2 AB+HIV1 P24 AG SERPL QL IA: NORMAL
PATH REV BLD -IMP: NORMAL

## 2023-03-13 ENCOUNTER — OFFICE VISIT (OUTPATIENT)
Dept: FAMILY MEDICINE | Facility: CLINIC | Age: 68
End: 2023-03-13
Payer: MEDICARE

## 2023-03-13 VITALS
TEMPERATURE: 98 F | DIASTOLIC BLOOD PRESSURE: 67 MMHG | BODY MASS INDEX: 39.68 KG/M2 | OXYGEN SATURATION: 98 % | HEART RATE: 60 BPM | WEIGHT: 277.19 LBS | HEIGHT: 70 IN | SYSTOLIC BLOOD PRESSURE: 131 MMHG

## 2023-03-13 DIAGNOSIS — E29.1 HYPOGONADISM MALE: ICD-10-CM

## 2023-03-13 DIAGNOSIS — I15.2 HYPERTENSION ASSOCIATED WITH DIABETES: Chronic | ICD-10-CM

## 2023-03-13 DIAGNOSIS — G47.30 HYPERSOMNIA WITH SLEEP APNEA: ICD-10-CM

## 2023-03-13 DIAGNOSIS — E11.65 TYPE 2 DIABETES MELLITUS WITH HYPERGLYCEMIA, WITHOUT LONG-TERM CURRENT USE OF INSULIN: Chronic | ICD-10-CM

## 2023-03-13 DIAGNOSIS — Z00.00 ENCOUNTER FOR PREVENTIVE CARE: Primary | ICD-10-CM

## 2023-03-13 DIAGNOSIS — Z79.899 ENCOUNTER FOR LONG-TERM (CURRENT) USE OF MEDICATIONS: Chronic | ICD-10-CM

## 2023-03-13 DIAGNOSIS — G47.10 HYPERSOMNIA WITH SLEEP APNEA: ICD-10-CM

## 2023-03-13 DIAGNOSIS — E66.01 CLASS 2 SEVERE OBESITY WITH SERIOUS COMORBIDITY AND BODY MASS INDEX (BMI) OF 39.0 TO 39.9 IN ADULT, UNSPECIFIED OBESITY TYPE: ICD-10-CM

## 2023-03-13 DIAGNOSIS — D75.1 POLYCYTHEMIA: ICD-10-CM

## 2023-03-13 DIAGNOSIS — G47.33 OBSTRUCTIVE SLEEP APNEA SYNDROME: ICD-10-CM

## 2023-03-13 DIAGNOSIS — E11.59 HYPERTENSION ASSOCIATED WITH DIABETES: Chronic | ICD-10-CM

## 2023-03-13 DIAGNOSIS — I87.2 VENOUS STASIS DERMATITIS OF LEFT LOWER EXTREMITY: ICD-10-CM

## 2023-03-13 PROCEDURE — G0439 PR MEDICARE ANNUAL WELLNESS SUBSEQUENT VISIT: ICD-10-PCS | Mod: ,,, | Performed by: NURSE PRACTITIONER

## 2023-03-13 PROCEDURE — G0439 PPPS, SUBSEQ VISIT: HCPCS | Mod: ,,, | Performed by: NURSE PRACTITIONER

## 2023-03-13 PROCEDURE — 99999 PR PBB SHADOW E&M-EST. PATIENT-LVL IV: ICD-10-PCS | Mod: PBBFAC,,, | Performed by: NURSE PRACTITIONER

## 2023-03-13 PROCEDURE — 99214 OFFICE O/P EST MOD 30 MIN: CPT | Mod: PBBFAC,PO | Performed by: NURSE PRACTITIONER

## 2023-03-13 PROCEDURE — 99999 PR PBB SHADOW E&M-EST. PATIENT-LVL IV: CPT | Mod: PBBFAC,,, | Performed by: NURSE PRACTITIONER

## 2023-03-13 RX ORDER — TESTOSTERONE CYPIONATE 200 MG/ML
200 INJECTION, SOLUTION INTRAMUSCULAR
COMMUNITY
Start: 2023-01-05 | End: 2023-07-06 | Stop reason: ALTCHOICE

## 2023-03-13 NOTE — PROGRESS NOTES
"  Lazarus Zamudio presented for a follow-up Medicare AWV today. The following components were reviewed and updated:    Medical history  Family History  Social history  Allergies and Current Medications  Health Risk Assessment  Health Maintenance  Care Team    **See Completed Assessments for Annual Wellness visit with in the encounter summary    The following assessments were completed:  Depression Screening  Cognitive function Screening  Timed Get Up Test  Whisper Test  PHQ-2  Nutrition screen  ADL  PAQ  Osteoporosis risk  CAGE  Living situation  Vitals:    03/13/23 1412   BP: 131/67   Pulse: 60   Temp: 97.9 °F (36.6 °C)   SpO2: 98%   Weight: 125.7 kg (277 lb 3.2 oz)   Height: 5' 10" (1.778 m)     Body mass index is 39.77 kg/m².   ]        Diagnoses and health risks identified today and associated recommendations/orders:  1. Encounter for preventive care  Stable  Up to date    2. Type 2 diabetes mellitus with hyperglycemia, without long-term current use of insulin  Stable  Managed by PCP  Continue current medications, plan of care  F/U with PCP as advised    3. Hypertension associated with diabetes  Stable  Managed by PCP  Low sodium diet recommended  Continue current medications, plan of care  F/U with PCP as advised    4. Class 2 severe obesity with serious comorbidity and body mass index (BMI) of 39.0 to 39.9 in adult, unspecified obesity type  Improving  Healthy diet, weight loss recommended  Consider dietitian    5. Venous stasis dermatitis of left lower extremity  Stable  Managed by CV, PCP  Continue current medications, plan of care  F/U with PCP, specialist as advised    6. Polycythemia  Stable  Managed by hematology  Continue current medications, plan of care  F/U with specialist as advised    7. Hypogonadism male  Stable  Managed by urology  Continue current medications, plan of care  F/U with specialist as advised    8. Obstructive sleep apnea syndrome  Stable  Managed by pulmonology  Continue current " medications, plan of care  F/U with specialist as advised    9. Hypersomnia with sleep apnea  Stable  Managed by pulmonology  Continue current medications, plan of care  F/U with specialist as advised    10. Encounter for long-term (current) use of medications  Stable  Continue current plan of care    No current opioid use  I offered to discuss end of life issues, including information on how to make advance directives that the patient could use to name someone who would make medical decisions on their behalf if they became too ill to make themselves.    ___Patient declined - already done.    _x__Patient is interested, I provided paperwork and offered to discuss  Provided Lazarus with a 5-10 year written screening schedule and personal prevention plan. Recommendations were developed using the USPSTF age appropriate recommendations. Education, counseling, and referrals were provided as needed.  After Visit Summary printed and given to patient which includes a list of additional screenings\tests needed.    No follow-ups on file.      Salome Arana NP

## 2023-03-13 NOTE — PATIENT INSTRUCTIONS
Continue current plan of care  F/U with PCP, specialists as advised  RTC as needed  Report to ER immediately if symptoms worsen or persist

## 2023-03-15 ENCOUNTER — OFFICE VISIT (OUTPATIENT)
Dept: HEMATOLOGY/ONCOLOGY | Facility: CLINIC | Age: 68
End: 2023-03-15
Payer: MEDICARE

## 2023-03-15 ENCOUNTER — LAB VISIT (OUTPATIENT)
Dept: LAB | Facility: HOSPITAL | Age: 68
End: 2023-03-15
Attending: NURSE PRACTITIONER
Payer: MEDICARE

## 2023-03-15 VITALS
OXYGEN SATURATION: 97 % | TEMPERATURE: 99 F | HEIGHT: 70 IN | HEART RATE: 65 BPM | WEIGHT: 276 LBS | BODY MASS INDEX: 39.51 KG/M2 | DIASTOLIC BLOOD PRESSURE: 65 MMHG | SYSTOLIC BLOOD PRESSURE: 118 MMHG

## 2023-03-15 DIAGNOSIS — E83.19 INCREASED STORAGE IRON: ICD-10-CM

## 2023-03-15 DIAGNOSIS — D69.6 THROMBOCYTOPENIA: ICD-10-CM

## 2023-03-15 DIAGNOSIS — E83.19 INCREASED STORAGE IRON: Primary | ICD-10-CM

## 2023-03-15 LAB
FERRITIN SERPL-MCNC: 27 NG/ML (ref 20–300)
IRON SERPL-MCNC: 62 UG/DL (ref 45–160)
SATURATED IRON: 14 % (ref 20–50)
TOTAL IRON BINDING CAPACITY: 447 UG/DL (ref 250–450)
TRANSFERRIN SERPL-MCNC: 302 MG/DL (ref 200–375)

## 2023-03-15 PROCEDURE — 82728 ASSAY OF FERRITIN: CPT | Performed by: NURSE PRACTITIONER

## 2023-03-15 PROCEDURE — 99214 PR OFFICE/OUTPT VISIT, EST, LEVL IV, 30-39 MIN: ICD-10-PCS | Mod: S$PBB,ICN,, | Performed by: NURSE PRACTITIONER

## 2023-03-15 PROCEDURE — 99999 PR PBB SHADOW E&M-EST. PATIENT-LVL IV: ICD-10-PCS | Mod: PBBFAC,,, | Performed by: NURSE PRACTITIONER

## 2023-03-15 PROCEDURE — 36415 COLL VENOUS BLD VENIPUNCTURE: CPT | Mod: PO | Performed by: NURSE PRACTITIONER

## 2023-03-15 PROCEDURE — 99214 OFFICE O/P EST MOD 30 MIN: CPT | Mod: S$PBB,ICN,, | Performed by: NURSE PRACTITIONER

## 2023-03-15 PROCEDURE — 84466 ASSAY OF TRANSFERRIN: CPT | Performed by: NURSE PRACTITIONER

## 2023-03-15 PROCEDURE — 99214 OFFICE O/P EST MOD 30 MIN: CPT | Mod: PBBFAC,PN | Performed by: NURSE PRACTITIONER

## 2023-03-15 PROCEDURE — 99999 PR PBB SHADOW E&M-EST. PATIENT-LVL IV: CPT | Mod: PBBFAC,,, | Performed by: NURSE PRACTITIONER

## 2023-03-15 NOTE — PROGRESS NOTES
Subjective:      Patient ID: Lazarus Zamudio is a 67 y.o. male.    Chief Complaint: lab discussion    HPI:  Patient is a 67 year old male who presents today to the hematology clinic for further evaluation and recommendations of recent diagnosis thrombocytopenia.  He has been referred to the clinic by his PCP, Dr. Casper Barrett.  Hep B, Hep C, HÉCTOR, HIV negative.  Blue top 83 K.  Thrombocytopenia present on/off since 2012 with more consisted low platelet count since 5/2021 ranging from 101-148K.  Denies any abnormal bleeding    Is currently taking aspirin for a long time now per patient.  States at least since 2012    Has to donate blood d/t polycythemia d/t testosterone use; however states was unable to donate because his iron was too high. States that he is not taking iron.  Denies a large consumption of iron rich foods.     Patient is accompanied by his wife.      Other diagnosis include:     Very seldom drinks alcohol.  Denies cigarette smoking.    Was a  at [employer].      Family hx of cancer:  Maternal uncle: lymphoma  Maternal grandfather: prostate cancer    Denies f/c/ns or unintentional weight loss.  Denies any known lymphadenopathy.      2015 colonoscopy - polyps - due to repeat    States that he has early satiety and thinks due to Ozempic.       Social History     Socioeconomic History    Marital status:    Occupational History     Employer:  [employer]   Tobacco Use    Smoking status: Never    Smokeless tobacco: Never   Substance and Sexual Activity    Alcohol use: No    Drug use: No    Sexual activity: Yes     Partners: Female   Social History Narrative    . 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       Family History   Problem Relation Age of Onset    Heart disease Mother     Vision loss Mother     Diabetes Father     Heart disease Father     Hypertension Father     Diabetes Maternal Grandmother     Cancer Maternal Grandfather        Past Surgical History:   Procedure  Laterality Date    HERNIA REPAIR      KNEE ARTHROSCOPY W/ DEBRIDEMENT      VASECTOMY         Past Medical History:   Diagnosis Date    Diabetes mellitus, type 2     Hypertension     Morbid obesity        Review of Systems       Medication List with Changes/Refills   Current Medications    ASPIRIN (ECOTRIN) 81 MG EC TABLET    Take 81 mg by mouth once daily.    ATORVASTATIN (LIPITOR) 40 MG TABLET    Take 1 tablet (40 mg total) by mouth once daily.    ENALAPRIL (VASOTEC) 20 MG TABLET    Take 1 tablet (20 mg total) by mouth once daily.    IBUPROFEN (ADVIL,MOTRIN) 800 MG TABLET    TAKE 1 TABLET BY MOUTH EVERY 8 HOURS AS NEEDED FOR PAIN    MULTIVITAMIN WITH MINERALS TABLET    Take 1 tablet by mouth once daily.    SEMAGLUTIDE (OZEMPIC) 2 MG/DOSE (8 MG/3 ML) PNIJ    Inject 2 mg into the skin every 7 days.    TADALAFIL (CIALIS) 20 MG TAB    Take 20 mg by mouth as needed.    TESTOSTERONE CYPIONATE (DEPOTESTOTERONE CYPIONATE) 100 MG/ML INJECTION    Inject 200 mg into the muscle every 14 (fourteen) days.    TESTOSTERONE CYPIONATE (DEPOTESTOTERONE CYPIONATE) 200 MG/ML INJECTION    Inject 200 mg into the muscle every 14 (fourteen) days.        Objective:     Vitals:    03/15/23 1005   BP: 118/65   Pulse: 65   Temp: 98.9 °F (37.2 °C)       Physical Exam  Vitals reviewed.   Constitutional:       Appearance: Normal appearance. He is obese.   HENT:      Head: Normocephalic and atraumatic.      Right Ear: External ear normal.      Left Ear: External ear normal.   Cardiovascular:      Rate and Rhythm: Normal rate and regular rhythm.      Heart sounds: Normal heart sounds, S1 normal and S2 normal.   Pulmonary:      Effort: Pulmonary effort is normal.      Breath sounds: Normal breath sounds.   Abdominal:      General: There is no distension.      Comments: protuberant   Musculoskeletal:         General: Normal range of motion.      Cervical back: Normal range of motion.   Skin:     General: Skin is warm and dry.   Neurological:       General: No focal deficit present.      Mental Status: He is alert and oriented to person, place, and time.   Psychiatric:         Attention and Perception: Attention and perception normal.         Mood and Affect: Mood and affect normal.         Speech: Speech normal.         Behavior: Behavior normal. Behavior is cooperative.         Thought Content: Thought content normal.         Cognition and Memory: Cognition and memory normal.         Judgment: Judgment normal.       Assessment:     Problem List Items Addressed This Visit    None  Visit Diagnoses       Increased storage iron    -  Primary    Relevant Orders    Ferritin    Iron and TIBC    Thrombocytopenia        Relevant Orders    US Abdomen Complete            Lab Results   Component Value Date    WBC 5.93 03/06/2023    RBC 5.34 03/06/2023    HGB 16.0 03/06/2023    HCT 49.2 03/06/2023    MCV 92 03/06/2023    MCH 30.0 03/06/2023    MCHC 32.5 03/06/2023    RDW 13.6 03/06/2023     (L) 03/06/2023    MPV 9.5 03/06/2023    GRAN 3.3 03/06/2023    GRAN 56.1 03/06/2023    LYMPH 2.1 03/06/2023    LYMPH 34.6 03/06/2023    MONO 0.4 03/06/2023    MONO 7.1 03/06/2023    EOS 0.1 03/06/2023    BASO 0.04 03/06/2023    EOSINOPHIL 1.0 03/06/2023    BASOPHIL 0.7 03/06/2023      Lab Results   Component Value Date     02/14/2023    K 4.6 02/14/2023    CL 99 02/14/2023    CO2 23 02/14/2023    BUN 14 02/14/2023    CREATININE 0.9 02/14/2023    CALCIUM 9.5 02/14/2023    ANIONGAP 15 02/14/2023    ESTGFRAFRICA >60.0 02/14/2022    EGFRNONAA >60.0 02/14/2022     Lab Results   Component Value Date    ALT 43 02/14/2023    AST 30 02/14/2023    ALKPHOS 107 02/14/2023    BILITOT 1.1 (H) 02/14/2023       Plan:   Increased storage iron  -     Ferritin; Future; Expected date: 03/15/2023  -     Iron and TIBC; Future; Expected date: 03/15/2023    Thrombocytopenia  -     Ambulatory referral/consult to Hematology / Oncology  -     US Abdomen Complete; Future; Expected date:  03/15/2023      Med Onc Chart Routing      Follow up with physician    Follow up with CY . F/u in 2 weeks with a VV to discuss results   Infusion scheduling note n/a   Injection scheduling note n/a   Labs   Scheduling:  Preferred lab:  Lab interval:  Labs today - iron studies   Imaging   Abdominal US   Pharmacy appointment No pharmacy appointment needed      Other referrals  No additional referrals needed        Due to h/o high normal iron studies in the past, will recheck.  If elevated run HH testing.  Abdominal US to assess for splenic enlargement.  F/u in 2 weeks to discuss results.      Collaborating Provider:  Dr. Wily Salazar    Thank You,  Lyn To, FNP-C  Hematology Oncology

## 2023-03-21 ENCOUNTER — HOSPITAL ENCOUNTER (OUTPATIENT)
Dept: RADIOLOGY | Facility: HOSPITAL | Age: 68
Discharge: HOME | End: 2023-03-21
Attending: NURSE PRACTITIONER
Payer: MEDICARE

## 2023-03-21 DIAGNOSIS — D69.6 THROMBOCYTOPENIA: ICD-10-CM

## 2023-03-21 PROCEDURE — 76700 US EXAM ABDOM COMPLETE: CPT | Mod: 26,,, | Performed by: RADIOLOGY

## 2023-03-21 PROCEDURE — 76700 US EXAM ABDOM COMPLETE: CPT | Mod: TC,PO

## 2023-03-21 PROCEDURE — 76700 US ABDOMEN COMPLETE: ICD-10-PCS | Mod: 26,,, | Performed by: RADIOLOGY

## 2023-03-23 ENCOUNTER — PATIENT MESSAGE (OUTPATIENT)
Dept: HEMATOLOGY/ONCOLOGY | Facility: CLINIC | Age: 68
End: 2023-03-23
Payer: COMMERCIAL

## 2023-03-23 ENCOUNTER — TELEPHONE (OUTPATIENT)
Dept: HEMATOLOGY/ONCOLOGY | Facility: CLINIC | Age: 68
End: 2023-03-23
Payer: COMMERCIAL

## 2023-03-23 DIAGNOSIS — K76.0 FATTY LIVER: Primary | ICD-10-CM

## 2023-03-23 DIAGNOSIS — R16.0 HEPATOMEGALY: ICD-10-CM

## 2023-03-23 NOTE — TELEPHONE ENCOUNTER
----- Message from Basia To NP sent at 3/23/2023  9:00 AM CDT -----  Please schedule patient to see either Jayne Garcia or Tereza Peres MD in hepatology for evaluation of enlarged fatty liver.    Basia Quintanilla'

## 2023-03-23 NOTE — TELEPHONE ENCOUNTER
Nurse spoke with pt in regards to scheduling hepatology appt. Pt has been scheduled. Verbalized understanding

## 2023-03-23 NOTE — PROGRESS NOTES
Please schedule patient to see either Jayne Garcia or Tereza Peres MD in hepatology for evaluation of enlarged fatty liver.    Basia Quintanilla'

## 2023-03-30 ENCOUNTER — OFFICE VISIT (OUTPATIENT)
Dept: HEPATOLOGY | Facility: CLINIC | Age: 68
End: 2023-03-30
Payer: MEDICARE

## 2023-03-30 ENCOUNTER — OFFICE VISIT (OUTPATIENT)
Dept: HEMATOLOGY/ONCOLOGY | Facility: CLINIC | Age: 68
End: 2023-03-30
Payer: MEDICARE

## 2023-03-30 VITALS
BODY MASS INDEX: 39.99 KG/M2 | DIASTOLIC BLOOD PRESSURE: 72 MMHG | HEIGHT: 70 IN | WEIGHT: 279.31 LBS | HEART RATE: 62 BPM | SYSTOLIC BLOOD PRESSURE: 130 MMHG

## 2023-03-30 DIAGNOSIS — R16.0 HEPATOMEGALY: ICD-10-CM

## 2023-03-30 DIAGNOSIS — D69.6 THROMBOCYTOPENIA: ICD-10-CM

## 2023-03-30 DIAGNOSIS — K76.0 FATTY LIVER: Primary | ICD-10-CM

## 2023-03-30 DIAGNOSIS — R16.1 SPLENOMEGALY: ICD-10-CM

## 2023-03-30 PROCEDURE — 99214 OFFICE O/P EST MOD 30 MIN: CPT | Mod: S$PBB,,, | Performed by: NURSE PRACTITIONER

## 2023-03-30 PROCEDURE — 99214 OFFICE O/P EST MOD 30 MIN: CPT | Mod: 95,,, | Performed by: NURSE PRACTITIONER

## 2023-03-30 PROCEDURE — 99999 PR PBB SHADOW E&M-EST. PATIENT-LVL IV: ICD-10-PCS | Mod: PBBFAC,,, | Performed by: NURSE PRACTITIONER

## 2023-03-30 PROCEDURE — 99214 PR OFFICE/OUTPT VISIT, EST, LEVL IV, 30-39 MIN: ICD-10-PCS | Mod: 95,,, | Performed by: NURSE PRACTITIONER

## 2023-03-30 PROCEDURE — 99999 PR PBB SHADOW E&M-EST. PATIENT-LVL IV: CPT | Mod: PBBFAC,,, | Performed by: NURSE PRACTITIONER

## 2023-03-30 PROCEDURE — 99214 OFFICE O/P EST MOD 30 MIN: CPT | Mod: PBBFAC | Performed by: NURSE PRACTITIONER

## 2023-03-30 PROCEDURE — 99214 PR OFFICE/OUTPT VISIT, EST, LEVL IV, 30-39 MIN: ICD-10-PCS | Mod: S$PBB,,, | Performed by: NURSE PRACTITIONER

## 2023-03-30 NOTE — PROGRESS NOTES
Clinic Consult:  [facility] Consultation Note    Reason for Consult:  The primary encounter diagnosis was Fatty liver. Diagnoses of Hepatomegaly and Thrombocytopenia were also pertinent to this visit.    PCP: Casper Barrett   [id number] [facility] / [place] [id number]    HPI:  This is a 68 y.o. male here for evaluation of the above  Pt referred by hematology for further evaluation of noted NAFLD with hepatomegaly in a setting of thrombocytopenia.   Pt denies any previously known liver disease.   LFTs are WNL but on the higher end.   He has a PMH of HTN, HLD, DM and obesity  Denies any ETOH.   No upper GI bleeding.  No ascites or BLE.  No overt confusion.  Spleen borderline enlarged on US.       Review of Systems   Constitutional:  Negative for chills, fever, malaise/fatigue and weight loss.   Respiratory:  Negative for cough.    Cardiovascular:  Negative for chest pain.   Gastrointestinal:         Per HPI   Musculoskeletal:  Negative for myalgias.   Skin:  Negative for itching and rash.   Neurological:  Negative for headaches.   Psychiatric/Behavioral:  The patient is not nervous/anxious.      Medical History:   Past Medical History:   Diagnosis Date    Diabetes mellitus, type 2     Hypertension     Morbid obesity        Surgical History:  Past Surgical History:   Procedure Laterality Date    HERNIA REPAIR      KNEE ARTHROSCOPY W/ DEBRIDEMENT      VASECTOMY         Family History:   Family History   Problem Relation Age of Onset    Heart disease Mother     Vision loss Mother     Diabetes Father     Heart disease Father     Hypertension Father     Diabetes Maternal Grandmother     Cancer Maternal Grandfather        Social History:   Social History     Tobacco Use    Smoking status: Never    Smokeless tobacco: Never   Substance Use Topics    Alcohol use: No    Drug use: No       Allergies: Reviewed    Home Medications:   Current Outpatient Medications on File Prior to Visit   Medication Sig Dispense  "Refill    aspirin (ECOTRIN) 81 MG EC tablet Take 81 mg by mouth once daily.      atorvastatin (LIPITOR) 40 MG tablet Take 1 tablet (40 mg total) by mouth once daily. 90 tablet 4    enalapril (VASOTEC) 20 MG tablet Take 1 tablet (20 mg total) by mouth once daily. 90 tablet 4    multivitamin with minerals tablet Take 1 tablet by mouth once daily.      semaglutide (OZEMPIC) 2 mg/dose (8 mg/3 mL) PnIj Inject 2 mg into the skin every 7 days. 3 pen 4    testosterone cypionate (DEPOTESTOTERONE CYPIONATE) 100 mg/mL injection Inject 200 mg into the muscle every 14 (fourteen) days.      tadalafiL (CIALIS) 20 MG Tab Take 20 mg by mouth as needed.      testosterone cypionate (DEPOTESTOTERONE CYPIONATE) 200 mg/mL injection Inject 200 mg into the muscle every 14 (fourteen) days.      [DISCONTINUED] ibuprofen (ADVIL,MOTRIN) 800 MG tablet TAKE 1 TABLET BY MOUTH EVERY 8 HOURS AS NEEDED FOR PAIN 60 tablet 0     No current facility-administered medications on file prior to visit.       Physical Exam:  Vital Signs:  /72 (BP Location: Right arm, Patient Position: Sitting, BP Method: Large (Manual))   Pulse 62   Ht 5' 10" (1.778 m)   Wt 126.7 kg (279 lb 5.2 oz)   BMI 40.08 kg/m²   Body mass index is 40.08 kg/m².  Physical Exam  Vitals reviewed.   Constitutional:       Appearance: He is well-developed. He is obese. He is not ill-appearing.   HENT:      Head: Normocephalic.   Eyes:      General: No scleral icterus.  Cardiovascular:      Rate and Rhythm: Normal rate.   Pulmonary:      Effort: Pulmonary effort is normal.   Abdominal:      General: There is no distension.      Palpations: Abdomen is soft.   Musculoskeletal:         General: Normal range of motion.      Cervical back: Normal range of motion.   Skin:     General: Skin is dry.   Neurological:      Mental Status: He is alert and oriented to person, place, and time.       Labs: Pertinent labs reviewed.      Assessment:  1. Fatty liver    2. Hepatomegaly    3. " Thrombocytopenia         Recommendations:  - Educated patient on spectrum of fatty liver disease and potential for cirrhosis if LAWS present  - Advised weight loss (10%), strict glycemic control and lipid control (statins are ok if needed, prescribing doctor will need to monitor LFTs per routine)  - Mediterranean diet discussed  - will plan for Fibroscan for staging.  Pt aware that given his body habitus and BMI, the images may not be valid.        Follow up to be determined by results of above.          Thank you so much for allowing me to participate in the care of AMADEO Li

## 2023-03-30 NOTE — PROGRESS NOTES
The patient location is: home  The chief complaint leading to consultation is: lab discussion    Visit type: audiovisual    Face to Face time with patient: 10  30 minutes of total time spent on the encounter, which includes face to face time and non-face to face time preparing to see the patient (eg, review of tests), Obtaining and/or reviewing separately obtained history, Documenting clinical information in the electronic or other health record, Independently interpreting results (not separately reported) and communicating results to the patient/family/caregiver, or Care coordination (not separately reported).     Each patient to whom he or she provides medical services by telemedicine is:  (1) informed of the relationship between the physician and patient and the respective role of any other health care provider with respect to management of the patient; and (2) notified that he or she may decline to receive medical services by telemedicine and may withdraw from such care at any time.    Patient ID: Lazarus Zamudio is a 68 y.o. male.    Chief Complaint: lab discussion    HPI:  Patient is a 67 year old male who presents today to the hematology clinic for further evaluation and recommendations of recent diagnosis thrombocytopenia.  He has been referred to the clinic by his PCP, Dr. Casper Barrett.  Hep B, Hep C, HÉCTOR, HIV negative.  Blue top 83 K.  Thrombocytopenia present on/off since 2012 with more consisted low platelet count since 5/2021 ranging from 101-148K.  Denies any abnormal bleeding     Is currently taking aspirin for a long time now per patient.  States at least since 2012     Has to donate blood d/t polycythemia d/t testosterone use; however states was unable to donate because his iron was too high. States that he is not taking iron.  Denies a large consumption of iron rich foods.      Patient is accompanied by his wife.       Other diagnosis include:      Very seldom drinks alcohol.  Denies  cigarette smoking.     Was a  at [employer].       Family hx of cancer:  Maternal uncle: lymphoma  Maternal grandfather: prostate cancer     Denies f/c/ns or unintentional weight loss.  Denies any known lymphadenopathy.       2015 colonoscopy - polyps - due to repeat     States that he has early satiety and thinks due to Ozempic.      3/21/2023 Abdominal US Impression:     1. Hepatomegaly and fatty infiltration of the liver.  2. Splenomegaly.    Interval History:  Presents today to review results of workup thrombocytopenia.  Has no complaints today. Is accompanied by his wife. Has seen hepatology today and will be having fibroscan soon.         Social History     Socioeconomic History    Marital status:    Occupational History     Employer:  [employer]   Tobacco Use    Smoking status: Never    Smokeless tobacco: Never   Substance and Sexual Activity    Alcohol use: No    Drug use: No    Sexual activity: Yes     Partners: Female   Social History Narrative    . 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       Family History   Problem Relation Age of Onset    Heart disease Mother     Vision loss Mother     Diabetes Father     Heart disease Father     Hypertension Father     Diabetes Maternal Grandmother     Cancer Maternal Grandfather        Past Surgical History:   Procedure Laterality Date    HERNIA REPAIR      KNEE ARTHROSCOPY W/ DEBRIDEMENT      VASECTOMY         Past Medical History:   Diagnosis Date    Diabetes mellitus, type 2     Hypertension     Morbid obesity        Review of Systems   Constitutional:  Negative for appetite change and unexpected weight change.   HENT:  Negative for mouth sores.    Eyes:  Negative for visual disturbance.   Respiratory:  Negative for cough and shortness of breath.    Cardiovascular:  Negative for chest pain.   Gastrointestinal:  Negative for abdominal pain and diarrhea.   Genitourinary:  Negative for frequency.   Musculoskeletal:  Negative for back  pain.   Skin:  Negative for rash.   Neurological:  Negative for headaches.   Hematological:  Negative for adenopathy.   Psychiatric/Behavioral:  The patient is not nervous/anxious.         Medication List with Changes/Refills   Current Medications    ASPIRIN (ECOTRIN) 81 MG EC TABLET    Take 81 mg by mouth once daily.    ATORVASTATIN (LIPITOR) 40 MG TABLET    Take 1 tablet (40 mg total) by mouth once daily.    ENALAPRIL (VASOTEC) 20 MG TABLET    Take 1 tablet (20 mg total) by mouth once daily.    IBUPROFEN (ADVIL,MOTRIN) 800 MG TABLET    TAKE 1 TABLET BY MOUTH EVERY 8 HOURS AS NEEDED FOR PAIN    MULTIVITAMIN WITH MINERALS TABLET    Take 1 tablet by mouth once daily.    SEMAGLUTIDE (OZEMPIC) 2 MG/DOSE (8 MG/3 ML) PNIJ    Inject 2 mg into the skin every 7 days.    TADALAFIL (CIALIS) 20 MG TAB    Take 20 mg by mouth as needed.    TESTOSTERONE CYPIONATE (DEPOTESTOTERONE CYPIONATE) 100 MG/ML INJECTION    Inject 200 mg into the muscle every 14 (fourteen) days.    TESTOSTERONE CYPIONATE (DEPOTESTOTERONE CYPIONATE) 200 MG/ML INJECTION    Inject 200 mg into the muscle every 14 (fourteen) days.        Objective:   There were no vitals filed for this visit.    Physical Exam  Constitutional:       Appearance: Normal appearance.   Pulmonary:      Effort: Pulmonary effort is normal.   Neurological:      Mental Status: He is alert and oriented to person, place, and time.   Psychiatric:         Mood and Affect: Mood normal.         Behavior: Behavior normal.         Thought Content: Thought content normal.         Judgment: Judgment normal.       Assessment:     Problem List Items Addressed This Visit    None  Visit Diagnoses       Fatty liver    -  Primary    Relevant Orders    Comprehensive Metabolic Panel    Splenomegaly        Relevant Orders    Comprehensive Metabolic Panel    Thrombocytopenia        Relevant Orders    CBC Auto Differential            Lab Results   Component Value Date    WBC 5.93 03/06/2023    RBC 5.34  03/06/2023    HGB 16.0 03/06/2023    HCT 49.2 03/06/2023    MCV 92 03/06/2023    MCH 30.0 03/06/2023    MCHC 32.5 03/06/2023    RDW 13.6 03/06/2023     (L) 03/06/2023    MPV 9.5 03/06/2023    GRAN 3.3 03/06/2023    GRAN 56.1 03/06/2023    LYMPH 2.1 03/06/2023    LYMPH 34.6 03/06/2023    MONO 0.4 03/06/2023    MONO 7.1 03/06/2023    EOS 0.1 03/06/2023    BASO 0.04 03/06/2023    EOSINOPHIL 1.0 03/06/2023    BASOPHIL 0.7 03/06/2023      Lab Results   Component Value Date     02/14/2023    K 4.6 02/14/2023    CL 99 02/14/2023    CO2 23 02/14/2023    BUN 14 02/14/2023    CREATININE 0.9 02/14/2023    CALCIUM 9.5 02/14/2023    ANIONGAP 15 02/14/2023    ESTGFRAFRICA >60.0 02/14/2022    EGFRNONAA >60.0 02/14/2022     Lab Results   Component Value Date    ALT 43 02/14/2023    AST 30 02/14/2023    ALKPHOS 107 02/14/2023    BILITOT 1.1 (H) 02/14/2023       Plan:   Fatty liver  -     Comprehensive Metabolic Panel; Future; Expected date: 03/30/2023    Splenomegaly  -     Comprehensive Metabolic Panel; Future; Expected date: 03/30/2023    Thrombocytopenia  -     CBC Auto Differential; Future; Expected date: 03/30/2023      Med Onc Chart Routing      Follow up with physician    Follow up with CY 6 months. f/u in 6 months with labs prior in [place].  CBC and CMP VV after to discuss results   Infusion scheduling note n/a   Injection scheduling note n/a   Labs   Scheduling:  Preferred lab:  Lab interval:  See above   Imaging   N/a   Pharmacy appointment No pharmacy appointment needed      Other referrals  No additional referrals needed            Collaborating Provider:  Dr. Wily Salazar    Thank You,  Lyn To, FNP-C  Hematology Oncology

## 2023-04-02 DIAGNOSIS — M54.32 SCIATICA, LEFT SIDE: ICD-10-CM

## 2023-04-02 NOTE — TELEPHONE ENCOUNTER
No new care gaps identified.  [facility] Embedded Care Gaps. Reference number: [id number]. 4/02/2023   7:59:51 AM CLEMT

## 2023-04-03 RX ORDER — CYCLOBENZAPRINE HCL 10 MG
TABLET ORAL
Qty: 30 TABLET | Refills: 0 | Status: SHIPPED | OUTPATIENT
Start: 2023-04-03 | End: 2023-08-18

## 2023-04-05 ENCOUNTER — TELEPHONE (OUTPATIENT)
Dept: HEPATOLOGY | Facility: CLINIC | Age: 68
End: 2023-04-05
Payer: COMMERCIAL

## 2023-04-05 NOTE — TELEPHONE ENCOUNTER
Patient has to reschedule fibroscan due to a  tomorrow. Patient has been rescheduled for the fibroscan on 2023 and voices understanding.     ----- Message from Yenni Winchester sent at 2023  9:09 AM CDT -----  Contact: self  Pt is asking for an return call in reference to apt he has scheduled on tomorrow at 1 pm he states he is needing to reschedule for an later apt date, please call back at .[phone] or [phone] [facility]

## 2023-04-14 ENCOUNTER — PROCEDURE VISIT (OUTPATIENT)
Dept: HEPATOLOGY | Facility: CLINIC | Age: 68
End: 2023-04-14
Attending: NURSE PRACTITIONER
Payer: MEDICARE

## 2023-04-14 VITALS — WEIGHT: 273.56 LBS | BODY MASS INDEX: 39.16 KG/M2 | HEIGHT: 70 IN

## 2023-04-14 DIAGNOSIS — K76.0 FATTY LIVER: ICD-10-CM

## 2023-04-14 DIAGNOSIS — R16.0 HEPATOMEGALY: ICD-10-CM

## 2023-04-14 PROCEDURE — 76981 USE PARENCHYMA: CPT | Mod: 26,S$PBB,, | Performed by: NURSE PRACTITIONER

## 2023-04-14 PROCEDURE — 76981 USE PARENCHYMA: CPT | Mod: PBBFAC | Performed by: NURSE PRACTITIONER

## 2023-04-14 PROCEDURE — 76981 PR US, ELASTOGRAPHY, PARENCHYMA: ICD-10-PCS | Mod: 26,S$PBB,, | Performed by: NURSE PRACTITIONER

## 2023-04-14 NOTE — PROGRESS NOTES
Patient presented in clinic today for fibroscan examination of their liver. Patient verbalized that they have fasted 4 hours prior to their examination. Patient denies having any active implantable metal devices; such as a pacemaker, defibrillator, or pump.     FRANCISCO AlexanderN, RN   Registered Nurse Lead- Hepatology   [facility]

## 2023-04-17 ENCOUNTER — PATIENT MESSAGE (OUTPATIENT)
Dept: HEPATOLOGY | Facility: CLINIC | Age: 68
End: 2023-04-17
Payer: COMMERCIAL

## 2023-04-17 NOTE — PROCEDURES
Fibroscan Procedure     Name: Lazarus Zamudio  Date of Procedure : 2023   :: SHANE Constantino  Diagnosis: NAFLD    Probe: XL    Fibroscan readin.6 KPa    Fibrosis:F 0-1     CAP readin dB/m    Steatosis: :S3       Interpretation:   Severe steatosis without fibrosis

## 2023-04-17 NOTE — PROGRESS NOTES
Spoke with patient on [phone], in reference to results and further recommendations. Patient voices understanding.

## 2023-04-25 ENCOUNTER — HOSPITAL ENCOUNTER (OUTPATIENT)
Dept: RADIOLOGY | Facility: HOSPITAL | Age: 68
Discharge: HOME | End: 2023-04-25
Attending: FAMILY MEDICINE
Payer: MEDICARE

## 2023-04-25 ENCOUNTER — OFFICE VISIT (OUTPATIENT)
Dept: FAMILY MEDICINE | Facility: CLINIC | Age: 68
End: 2023-04-25
Payer: MEDICARE

## 2023-04-25 VITALS
BODY MASS INDEX: 38.65 KG/M2 | DIASTOLIC BLOOD PRESSURE: 72 MMHG | SYSTOLIC BLOOD PRESSURE: 122 MMHG | HEART RATE: 57 BPM | RESPIRATION RATE: 18 BRPM | WEIGHT: 270 LBS | TEMPERATURE: 98 F | HEIGHT: 70 IN

## 2023-04-25 DIAGNOSIS — G89.29 CHRONIC PAIN OF LEFT KNEE: ICD-10-CM

## 2023-04-25 DIAGNOSIS — I15.2 HYPERTENSION ASSOCIATED WITH DIABETES: ICD-10-CM

## 2023-04-25 DIAGNOSIS — M25.562 CHRONIC PAIN OF LEFT KNEE: ICD-10-CM

## 2023-04-25 DIAGNOSIS — Z79.899 ENCOUNTER FOR LONG-TERM (CURRENT) USE OF MEDICATIONS: ICD-10-CM

## 2023-04-25 DIAGNOSIS — E11.59 HYPERTENSION ASSOCIATED WITH DIABETES: ICD-10-CM

## 2023-04-25 DIAGNOSIS — M79.605 PAIN OF LEFT LOWER EXTREMITY: Primary | ICD-10-CM

## 2023-04-25 PROCEDURE — 73560 X-RAY EXAM OF KNEE 1 OR 2: CPT | Mod: 26,RT,, | Performed by: RADIOLOGY

## 2023-04-25 PROCEDURE — 73562 XR KNEE ORTHO LEFT: ICD-10-PCS | Mod: 26,LT,, | Performed by: RADIOLOGY

## 2023-04-25 PROCEDURE — 99999 PR PBB SHADOW E&M-EST. PATIENT-LVL V: CPT | Mod: PBBFAC,,, | Performed by: FAMILY MEDICINE

## 2023-04-25 PROCEDURE — 99215 OFFICE O/P EST HI 40 MIN: CPT | Mod: PBBFAC,PO | Performed by: FAMILY MEDICINE

## 2023-04-25 PROCEDURE — 73560 X-RAY EXAM OF KNEE 1 OR 2: CPT | Mod: 59,TC,PO,RT

## 2023-04-25 PROCEDURE — 73562 X-RAY EXAM OF KNEE 3: CPT | Mod: 26,LT,, | Performed by: RADIOLOGY

## 2023-04-25 PROCEDURE — 73560 XR KNEE ORTHO LEFT: ICD-10-PCS | Mod: 26,RT,, | Performed by: RADIOLOGY

## 2023-04-25 PROCEDURE — 99214 PR OFFICE/OUTPT VISIT, EST, LEVL IV, 30-39 MIN: ICD-10-PCS | Mod: S$PBB,,, | Performed by: FAMILY MEDICINE

## 2023-04-25 PROCEDURE — 99999 PR PBB SHADOW E&M-EST. PATIENT-LVL V: ICD-10-PCS | Mod: PBBFAC,,, | Performed by: FAMILY MEDICINE

## 2023-04-25 PROCEDURE — 99214 OFFICE O/P EST MOD 30 MIN: CPT | Mod: S$PBB,,, | Performed by: FAMILY MEDICINE

## 2023-04-25 RX ORDER — IBUPROFEN 800 MG/1
800 TABLET ORAL EVERY 8 HOURS PRN
Qty: 30 TABLET | Refills: 0 | Status: SHIPPED | OUTPATIENT
Start: 2023-04-25 | End: 2023-05-05

## 2023-04-25 NOTE — PROGRESS NOTES
PLAN:      Problem List Items Addressed This Visit       Hypertension associated with diabetes (Chronic)     Counseled on importance of hypertension disease course, I recommend ongoing Education for DASH-diet and exercise.  Counseled on medication regimen importance of treating high blood pressure.  Please be advised of risk of untreated blood pressure as discussed.  Please advised of ER precautions were given for symptoms of hypertensive urgency and emergency.           Encounter for long-term (current) use of medications (Chronic)     Complete history and physical was completed today.  Complete and thorough medication reconciliation was performed.  Discussed risks and benefits of medications.  Advised patient on orders and health maintenance.  We discussed old records and old labs if available.  Will request any records not available through epic.  Continue current medications listed on your summary sheet.             Chronic pain of left knee (Chronic)     Trial of anti-inflammatory.  Consider physical therapy if no improvement.  Patient may ultimately need orthopedic consult for worsening arthritis of the left knee.  GI precautions.           Relevant Medications    ibuprofen (ADVIL,MOTRIN) 800 MG tablet    Other Relevant Orders    X-ray Knee Ortho Left (Completed)    Pain of left lower extremity - Primary     Patient advised to follow-up with vascular specialist.  Consider ultrasound of the soft tissues since he is tender in that location.           Relevant Medications    ibuprofen (ADVIL,MOTRIN) 800 MG tablet     Future Appointments       Date Provider Specialty Appt Notes    6/30/2023  Lab     7/5/2023 Jayne Garcia, JOHANNYP Hepatology 3 month follow up           Medication Management for assessment above:   Medication List with Changes/Refills   New Medications    IBUPROFEN (ADVIL,MOTRIN) 800 MG TABLET    Take 1 tablet (800 mg total) by mouth every 8 (eight) hours as needed for Pain.   Current Medications     ASPIRIN (ECOTRIN) 81 MG EC TABLET    Take 81 mg by mouth once daily.    ATORVASTATIN (LIPITOR) 40 MG TABLET    Take 1 tablet (40 mg total) by mouth once daily.    CYCLOBENZAPRINE (FLEXERIL) 10 MG TABLET    TAKE 1 TABLET BY MOUTH EVERY DAY IN THE EVENING    ENALAPRIL (VASOTEC) 20 MG TABLET    Take 1 tablet (20 mg total) by mouth once daily.    MULTIVITAMIN WITH MINERALS TABLET    Take 1 tablet by mouth once daily.    SEMAGLUTIDE (OZEMPIC) 2 MG/DOSE (8 MG/3 ML) PNIJ    Inject 2 mg into the skin every 7 days.    TADALAFIL (CIALIS) 20 MG TAB    Take 20 mg by mouth as needed.    TESTOSTERONE CYPIONATE (DEPOTESTOTERONE CYPIONATE) 100 MG/ML INJECTION    Inject 200 mg into the muscle every 14 (fourteen) days.    TESTOSTERONE CYPIONATE (DEPOTESTOTERONE CYPIONATE) 200 MG/ML INJECTION    Inject 200 mg into the muscle every 14 (fourteen) days.       Casper Barrett M.D.  ==========================================================================  Subjective:   Patient ID: Lazarus Zamudio is a 68 y.o. male.  has a past medical history of Diabetes mellitus, type 2, Hypertension, and Morbid obesity.   Chief Complaint: Knee Pain (left)      Problem List Items Addressed This Visit       Hypertension associated with diabetes (Chronic)    Overview     CHRONIC.  April 2023:  Hypertension Medications               enalapril (VASOTEC) 20 MG tablet Take 1 tablet (20 mg total) by mouth once daily.     STABLE. BP Reviewed.  Compliant with BP medications. No SE reported.   (-) CP, SOB, palpitations, dizziness, lightheadedness, HA, arm numbness, tingling or weakness, syncope.  Creatinine   Date Value Ref Range Status   02/14/2023 0.9 0.5 - 1.4 mg/dL Final   No results found for this or any previous visit.           Current Assessment & Plan     Counseled on importance of hypertension disease course, I recommend ongoing Education for DASH-diet and exercise.  Counseled on medication regimen importance of treating high blood  pressure.  Please be advised of risk of untreated blood pressure as discussed.  Please advised of ER precautions were given for symptoms of hypertensive urgency and emergency.           Encounter for long-term (current) use of medications (Chronic)    Overview     April 2023: Reviewed labs.  CHRONIC. Stable. Compliant with medications for managed conditions. See medication list. No SE reported.   Routine lab analysis is being monitored. Refills were addressed.  Lab Results   Component Value Date    WBC 5.93 03/06/2023    HGB 16.0 03/06/2023    HCT 49.2 03/06/2023    MCV 92 03/06/2023     (L) 03/06/2023       Chemistry        Component Value Date/Time     02/14/2023 1117    K 4.6 02/14/2023 1117    CL 99 02/14/2023 1117    CO2 23 02/14/2023 1117    BUN 14 02/14/2023 1117    CREATININE 0.9 02/14/2023 1117     (H) 02/14/2023 1117        Component Value Date/Time    CALCIUM 9.5 02/14/2023 1117    ALKPHOS 107 02/14/2023 1117    AST 30 02/14/2023 1117    ALT 43 02/14/2023 1117    BILITOT 1.1 (H) 02/14/2023 1117    ESTGFRAFRICA >60.0 02/14/2022 1028    EGFRNONAA >60.0 02/14/2022 1028          Lab Results   Component Value Date    TSH 3.35 08/18/2021            Current Assessment & Plan     Complete history and physical was completed today.  Complete and thorough medication reconciliation was performed.  Discussed risks and benefits of medications.  Advised patient on orders and health maintenance.  We discussed old records and old labs if available.  Will request any records not available through epic.  Continue current medications listed on your summary sheet.             Chronic pain of left knee (Chronic)    Overview     Chronic.  Intermittent control.  Patient does not take any medication for this condition.  He did well with ibuprofen in the past.  X-ray Knee Ortho Left  Narrative: EXAM:  XR KNEE ORTHO LEFT    CLINICAL HISTORY:    Left knee joint pain    TECHNIQUE: 3 views of the left  knee.    COMPARISON: None.    FINDINGS:    Minor joint space narrowing.  Mild marginal spurring.  Minor patellar femoral joint spurring.    No obvious joint effusion.    No fracture.  Impression:  Mild tricompartment degenerative joint disease.    Finalized on: 2023 11:36 AM By:  Vincent Lopez MD  [facility]# [id number]      2023 11:38:54.342    [facility]             Current Assessment & Plan     Trial of anti-inflammatory.  Consider physical therapy if no improvement.  Patient may ultimately need orthopedic consult for worsening arthritis of the left knee.  GI precautions.           Pain of left lower extremity - Primary    Overview     Chronic.  Left groin pain.  Patient reports he is having similar pain in the same area that he had a procedure previously.    Ihsan Cuello MD - 2021 8:00 AM CST  Formatting of this note is different from the original.  CVT Vein Therapy - Radio-Frequency Ablation Op Note [facility]     Patient: Lazarus Zamudio  : 1955    BP:   BP Readings from Last 1 Encounters:   10/23/20 (!) 164/88     Pulse:   H&P changes:    Date: 3/11/2021  Surgeon: Rosalinda  Assistant: Thony  US Tech: Carla ALBA   Diagnosis: Venous Insufficiency with Symptomatic Varicose Veins     Procedure: Endovenous radiofrequency ablation of left greater saphenous vein  Additional procedures: Phlebectomy     Anesthesia:  Local infiltration- 1% lidocaine Amt used: 30 cc   Tumescent Bag- 400cc of Lactated Ringers/mixed   with 25cc 1% lidocaine Amt used: 175 cc   ______________________________________________________________________  Procedure: The patient was transferred to the procedure suite and the insufficient saphenous vein was mapped by ultrasound and diagrammed on the overlying skin. The depth and diameter of the vein(s) to be treated was documented. The varicose tributary veins and suitable access sites were identified and mapped as well. The patient was then positioned supine on the procedure table. The  "affected limb was prepped and draped in the usual sterile fashion. The RF catheter was placed on the sterile field, flushed and wiped down, prepared, and connected by a sterile cable.    The patient was placed in a reverse-Trendelenburg position and local anesthesia was instilled in the skin overlying the access site. A skin incision was made overlying the identified and mapped great saphenous vein entry site. The vein was accessed using ultrasound guidance and the Seldinger technique, a guide wire was introduced through the needle, which was then exchanged over the guide wire for a 7F sheath, which was secured in place. The guide wire was removed and the sheath was flushed.    Extra wires used: Glidewire    The RF catheter was placed into the vein through the sheath and preferentially imaging was used to place the catheter tip just inferior to the superficial epigastric vein to preserve normal physiological flow in that vein. Additionally, it was confirmed by ultrasound guidance that the catheter tip was also placed 3cm distal to the saphenofemoral junction. After the RF catheter position was verified by ultrasound, tumescent anesthesia was infiltrated, under ultrasound guidance, prescisely into the perivenous compartment along the entire length of vein from the entry site to the saphenofemoral junction until a "halo" of fluid was noted around the vein.    After RF catheter position was again confirmed with ultrasound imaging, and under direct external compression along the length of the heating element, RF energy was applied. The vein was segmentally ablated by heating a 7cm segment and then indexing the catheter forward by 6.5cm until the treatment length is completed. Device temperature was maintained at 120+5 degree C with an initial power level of 40W dropping to below 20W for each treatment.    Vein Treated- Left Greater Saphenous Total vein length treated 26.5 cm Total cycles of RF 8    Repeat ultrasound of " the saphenous vein was performed, confirming successful treatment. Compression of the common femoral vein shows no evidence of DVT. The catheter and sheath were withdrawn and hemostasis established with direct pressure. After assuring hemostasis, the skin incision over the saphenous vein was closed with a bandage and a compression wrap, and/or graduated compression stocking was applied from the level of the foot to the most proximal level of the thigh. The patient was given post-op instructions & a follow-up appointment.    Other Notes: Phlebectomy Medial thigh and calf x 10  Electronically signed by Ihsan Cuello MD at 03/11/2021 10:03 AM CST      Narrative & Impression  EXAMINATION:  US LOWER EXTREMITY VEINS LEFT     CLINICAL HISTORY:  Pain in left leg     TECHNIQUE:  Duplex and color flow Doppler evaluation and graded compression of the left lower extremity veins was performed.     COMPARISON:  None     FINDINGS:  Left thigh veins: The common femoral, femoral, popliteal, upper greater saphenous, and deep femoral veins are patent and free of thrombus. The veins are normally compressible and have normal phasic flow and augmentation response.     Left calf veins: The visualized calf veins are patent.     Contralateral CFV: The contralateral (right) common femoral vein is patent and free of thrombus.     Miscellaneous: None     Impression:     No evidence of deep venous thrombosis in the left lower extremity.        Electronically signed by: Johnson Saucedo MD  Date:                                            03/18/2022  Time:                                           15:40           Exam Ended: 03/18/22 15:33                    Current Assessment & Plan     Patient advised to follow-up with vascular specialist.  Consider ultrasound of the soft tissues since he is tender in that location.               Review of patient's allergies indicates:   Allergen Reactions    Metformin      Intolerant     Penicillins Rash  "    Current Outpatient Medications   Medication Instructions    aspirin (ECOTRIN) 81 mg, Oral, Daily,      atorvastatin (LIPITOR) 40 mg, Oral, Daily    cyclobenzaprine (FLEXERIL) 10 MG tablet TAKE 1 TABLET BY MOUTH EVERY DAY IN THE EVENING    enalapril (VASOTEC) 20 mg, Oral, Daily    ibuprofen (ADVIL,MOTRIN) 800 mg, Oral, Every 8 hours PRN    multivitamin with minerals tablet 1 tablet, Oral, Daily    OZEMPIC 2 mg, Subcutaneous, Every 7 days    tadalafiL (CIALIS) 20 mg, Oral, As needed (PRN)    testosterone cypionate (DEPOTESTOTERONE CYPIONATE) 200 mg, Every 14 days    testosterone cypionate (DEPOTESTOTERONE CYPIONATE) 200 mg, Intramuscular, Every 14 days      I have reviewed the PMH, social history, FamilyHx, surgical history, allergies and medications documented / confirmed by the patient at the time of this visit.  Review of Systems   Constitutional:  Negative for chills, fatigue, fever and unexpected weight change.   HENT:  Negative for ear pain and sore throat.    Eyes:  Negative for redness and visual disturbance.   Respiratory:  Negative for cough and shortness of breath.    Cardiovascular:  Negative for chest pain and palpitations.   Gastrointestinal:  Negative for nausea and vomiting.   Endocrine: Negative for cold intolerance and heat intolerance.   Genitourinary:  Negative for difficulty urinating and hematuria.   Musculoskeletal:  Positive for arthralgias, back pain and myalgias.   Skin:  Negative for rash and wound.   Allergic/Immunologic: Negative for environmental allergies and food allergies.   Neurological:  Negative for weakness and headaches.   Hematological:  Negative for adenopathy. Does not bruise/bleed easily.   Psychiatric/Behavioral:  Negative for sleep disturbance. The patient is not nervous/anxious.    Objective:   /72 (BP Location: Left arm, Patient Position: Sitting, BP Method: Large (Automatic))   Pulse (!) 57   Temp 97.7 °F (36.5 °C)   Resp 18   Ht 5' 10" (1.778 m)   Wt 122.5 " kg (270 lb)   BMI 38.74 kg/m²   Physical Exam  Vitals and nursing note reviewed.   Constitutional:       General: He is not in acute distress.     Appearance: He is well-developed. He is obese. He is not diaphoretic.   HENT:      Head: Normocephalic and atraumatic.      Right Ear: External ear normal.      Left Ear: External ear normal.      Nose: Nose normal. No rhinorrhea.   Eyes:      Extraocular Movements: Extraocular movements intact.      Pupils: Pupils are equal, round, and reactive to light.   Cardiovascular:      Rate and Rhythm: Normal rate.      Pulses: Normal pulses.   Pulmonary:      Effort: Pulmonary effort is normal. No respiratory distress.      Breath sounds: Normal breath sounds.   Abdominal:      General: Bowel sounds are normal.      Palpations: Abdomen is soft.   Musculoskeletal:         General: Tenderness and deformity present. Normal range of motion.      Cervical back: Normal range of motion and neck supple.      Thoracic back: No spasms, tenderness or bony tenderness.      Lumbar back: Spasms present. No tenderness or bony tenderness. Negative right straight leg raise test and negative left straight leg raise test.      Left hip: No tenderness, bony tenderness or crepitus. Normal strength.      Left knee: Bony tenderness present. No deformity, effusion, erythema, ecchymosis or lacerations. Tenderness present over the medial joint line.      Right lower leg: No edema.      Left lower leg: No edema.        Legs:       Comments: Area of tenderness to palpation   Skin:     General: Skin is warm and dry.      Capillary Refill: Capillary refill takes less than 2 seconds.      Findings: No rash.   Neurological:      General: No focal deficit present.      Mental Status: He is alert and oriented to person, place, and time.   Psychiatric:         Attention and Perception: He is attentive.         Mood and Affect: Mood normal. Mood is not anxious or depressed. Affect is not labile, blunt, angry or  inappropriate.         Speech: He is communicative. Speech is not rapid and pressured, delayed, slurred or tangential.         Behavior: Behavior normal. Behavior is not agitated, slowed, aggressive, withdrawn, hyperactive or combative.         Thought Content: Thought content normal. Thought content is not paranoid or delusional. Thought content does not include homicidal or suicidal ideation. Thought content does not include homicidal or suicidal plan.         Cognition and Memory: Memory is not impaired.         Judgment: Judgment normal. Judgment is not impulsive or inappropriate.         Assessment:     1. Pain of left lower extremity    2. Chronic pain of left knee    3. Encounter for long-term (current) use of medications    4. Hypertension associated with diabetes      MDM:   Moderate complexity.  Moderate risk.  Total time: 31 minutes.  This includes total time spent on the encounter, which includes face to face time and non-face to face time preparing to see the patient (eg, review of previous medical records, tests), Obtaining and/or reviewing separately obtained history, documenting clinical information in the electronic or other health record, independently interpreting results (not separately reported)/communicating results to the patient/family/caregiver, and/or care coordination (not separately reported).    I have Reviewed and summarized old records.  I have performed thorough medication reconciliation today and discussed risk and benefits of medications.  I have reviewed x-ray, labs and discussed with patient.  All questions were answered.  I am requesting old records and will review them once they are available.    I have signed for the following orders AND/OR meds.  Orders Placed This Encounter   Procedures    X-ray Knee Ortho Left     Standing Status:   Future     Number of Occurrences:   1     Standing Expiration Date:   4/25/2024     Order Specific Question:   May the Radiologist modify the  order per protocol to meet the clinical needs of the patient?     Answer:   Yes     Order Specific Question:   Release to patient     Answer:   Immediate     Medications Ordered This Encounter   Medications    ibuprofen (ADVIL,MOTRIN) 800 MG tablet     Sig: Take 1 tablet (800 mg total) by mouth every 8 (eight) hours as needed for Pain.     Dispense:  30 tablet     Refill:  0          Follow up in about 6 months (around 10/25/2023), or if symptoms worsen or fail to improve, for Annual Wellness Exam.    If no improvement in symptoms or symptoms worsen, advised to call/follow-up at clinic or go to ER. Patient voiced understanding and all questions/concerns were addressed.   DISCLAIMER: This note was compiled by using a speech recognition dictation system and therefore please be aware that typographical / speech recognition errors can and do occur.  Please contact me if you see any errors specifically.    Casper Barrett M.D.       Office: [phone]   [address] [place] [address]  FAX: [phone]

## 2023-04-25 NOTE — ASSESSMENT & PLAN NOTE
Trial of anti-inflammatory.  Consider physical therapy if no improvement.  Patient may ultimately need orthopedic consult for worsening arthritis of the left knee.  GI precautions.

## 2023-04-25 NOTE — ASSESSMENT & PLAN NOTE
Patient advised to follow-up with vascular specialist.  Consider ultrasound of the soft tissues since he is tender in that location.

## 2023-04-25 NOTE — PROGRESS NOTES
1st check to see if patient has seen the results.  If not then  CALL patient with results and Document verification.  Schedule follow-up if needed.  [phone]  X-ray of the left knee reviewed by radiology and myself.  There is mild arthritis throughout the knee joint.  I recommend that you proceed with taking anti-inflammatory as we discussed.  If no improvement consider physical therapy.  You may ultimately need orthopedic consult if still having pain.  Please follow-up with me sooner if needed.

## 2023-04-25 NOTE — PATIENT INSTRUCTIONS
Follow up in about 6 months (around 10/25/2023), or if symptoms worsen or fail to improve, for Annual Wellness Exam.     Dear patient,   As a result of recent federal legislation (The Federal Cures Act), you may receive lab or pathology results from your visit in your [facility] account before your physician is able to contact you. Your physician or their representative will relay the results to you with their recommendations at their soonest availability.     If no improvement in symptoms or symptoms worsen, please be advised to call MD, follow-up at clinic and/or go to ER if becomes severe.    Casper Barrett M.D.        We Offer TELEHEALTH & Same Day Appointments!   Book your Telehealth appointment with me through my nurse or   Clinic appointments on [facility]!    [address] [place] [address]    Office: [phone]   FAX: [phone]    Check out my Facebook Page and Follow Me at: [email or web address]    Check out my website at [facility] by clicking on: [email or web address]    To Schedule appointments online, go to [facility]: [email or web address]

## 2023-06-23 DIAGNOSIS — E11.65 TYPE 2 DIABETES MELLITUS WITH HYPERGLYCEMIA, WITHOUT LONG-TERM CURRENT USE OF INSULIN: ICD-10-CM

## 2023-06-23 RX ORDER — SEMAGLUTIDE 2.68 MG/ML
INJECTION, SOLUTION SUBCUTANEOUS
Qty: 3 EACH | Refills: 0 | Status: SHIPPED | OUTPATIENT
Start: 2023-06-23 | End: 2023-07-22

## 2023-06-23 NOTE — TELEPHONE ENCOUNTER
Care Due:                  Date            Visit Type   Department     Provider  --------------------------------------------------------------------------------                                MYCHART                              FOLLOWUP/OF  [facility]  Last Visit: 04-      FICE VISIT   MEDICINE       Casper Barrett  Next Visit: None Scheduled  None         None Found                                                            Last  Test          Frequency    Reason                     Performed    Due Date  --------------------------------------------------------------------------------    HBA1C.......  6 months...  semaglutide..............  02-   08-    [facility] Embedded Care Due Messages. Reference number: [id number].   6/23/2023 12:42:49 AM CDT

## 2023-06-23 NOTE — TELEPHONE ENCOUNTER
Provider Staff:  Action required for this patient     Please see care gap opportunities below in Care Due Message.    Thanks!  [facility]     Appointments      Date Provider   Last Visit   4/25/2023 Casper Barrett MD   Next Visit   Visit date not found Casper Barrett MD     Refill Decision Note   Lazarus Zamudio  is requesting a refill authorization.  Brief Assessment and Rationale for Refill:  Approve     Medication Therapy Plan:         Comments:     Note composed:12:30 PM 06/23/2023             Appointments     Last Visit   4/25/2023 Casper Barrett MD   Next Visit   Visit date not found Casper Barrett MD

## 2023-06-30 ENCOUNTER — LAB VISIT (OUTPATIENT)
Dept: LAB | Facility: HOSPITAL | Age: 68
End: 2023-06-30
Attending: FAMILY MEDICINE
Payer: MEDICARE

## 2023-06-30 DIAGNOSIS — Z00.00 WELL ADULT EXAM: ICD-10-CM

## 2023-06-30 DIAGNOSIS — Z79.899 ENCOUNTER FOR LONG-TERM (CURRENT) USE OF MEDICATIONS: ICD-10-CM

## 2023-06-30 DIAGNOSIS — D69.6 THROMBOCYTOPENIA: ICD-10-CM

## 2023-06-30 DIAGNOSIS — R16.0 HEPATOMEGALY: ICD-10-CM

## 2023-06-30 DIAGNOSIS — E29.1 HYPOGONADISM MALE: ICD-10-CM

## 2023-06-30 DIAGNOSIS — Z13.220 ENCOUNTER FOR LIPID SCREENING FOR CARDIOVASCULAR DISEASE: ICD-10-CM

## 2023-06-30 DIAGNOSIS — K76.0 FATTY LIVER: ICD-10-CM

## 2023-06-30 DIAGNOSIS — Z13.6 ENCOUNTER FOR LIPID SCREENING FOR CARDIOVASCULAR DISEASE: ICD-10-CM

## 2023-06-30 LAB
ALBUMIN SERPL BCP-MCNC: 4.1 G/DL (ref 3.5–5.2)
ALBUMIN SERPL BCP-MCNC: 4.1 G/DL (ref 3.5–5.2)
ALP SERPL-CCNC: 94 U/L (ref 55–135)
ALP SERPL-CCNC: 94 U/L (ref 55–135)
ALT SERPL W/O P-5'-P-CCNC: 45 U/L (ref 10–44)
ALT SERPL W/O P-5'-P-CCNC: 45 U/L (ref 10–44)
ANION GAP SERPL CALC-SCNC: 9 MMOL/L (ref 8–16)
AST SERPL-CCNC: 28 U/L (ref 10–40)
AST SERPL-CCNC: 28 U/L (ref 10–40)
BASOPHILS # BLD AUTO: 0.03 K/UL (ref 0–0.2)
BASOPHILS NFR BLD: 0.5 % (ref 0–1.9)
BILIRUB DIRECT SERPL-MCNC: 0.5 MG/DL (ref 0.1–0.3)
BILIRUB SERPL-MCNC: 1.1 MG/DL (ref 0.1–1)
BILIRUB SERPL-MCNC: 1.1 MG/DL (ref 0.1–1)
BUN SERPL-MCNC: 16 MG/DL (ref 8–23)
CALCIUM SERPL-MCNC: 9.2 MG/DL (ref 8.7–10.5)
CHLORIDE SERPL-SCNC: 99 MMOL/L (ref 95–110)
CHOLEST SERPL-MCNC: 115 MG/DL (ref 120–199)
CHOLEST/HDLC SERPL: 2.6 {RATIO} (ref 2–5)
CO2 SERPL-SCNC: 28 MMOL/L (ref 23–29)
CREAT SERPL-MCNC: 1.1 MG/DL (ref 0.5–1.4)
DIFFERENTIAL METHOD: ABNORMAL
EOSINOPHIL # BLD AUTO: 0.1 K/UL (ref 0–0.5)
EOSINOPHIL NFR BLD: 0.9 % (ref 0–8)
ERYTHROCYTE [DISTWIDTH] IN BLOOD BY AUTOMATED COUNT: 14.1 % (ref 11.5–14.5)
EST. GFR  (NO RACE VARIABLE): >60 ML/MIN/1.73 M^2
ESTIMATED AVG GLUCOSE: 137 MG/DL (ref 68–131)
GLUCOSE SERPL-MCNC: 131 MG/DL (ref 70–110)
HBA1C MFR BLD: 6.4 % (ref 4–5.6)
HCT VFR BLD AUTO: 46.9 % (ref 40–54)
HDLC SERPL-MCNC: 45 MG/DL (ref 40–75)
HDLC SERPL: 39.1 % (ref 20–50)
HGB BLD-MCNC: 15.4 G/DL (ref 14–18)
IMM GRANULOCYTES # BLD AUTO: 0.03 K/UL (ref 0–0.04)
IMM GRANULOCYTES NFR BLD AUTO: 0.5 % (ref 0–0.5)
INR PPP: 1.1 (ref 0.8–1.2)
LDLC SERPL CALC-MCNC: 62.6 MG/DL (ref 63–159)
LYMPHOCYTES # BLD AUTO: 2.2 K/UL (ref 1–4.8)
LYMPHOCYTES NFR BLD: 39 % (ref 18–48)
MCH RBC QN AUTO: 29.4 PG (ref 27–31)
MCHC RBC AUTO-ENTMCNC: 32.8 G/DL (ref 32–36)
MCV RBC AUTO: 90 FL (ref 82–98)
MONOCYTES # BLD AUTO: 0.4 K/UL (ref 0.3–1)
MONOCYTES NFR BLD: 7.7 % (ref 4–15)
NEUTROPHILS # BLD AUTO: 3 K/UL (ref 1.8–7.7)
NEUTROPHILS NFR BLD: 51.9 % (ref 38–73)
NONHDLC SERPL-MCNC: 70 MG/DL
NRBC BLD-RTO: 0 /100 WBC
PLATELET # BLD AUTO: 77 K/UL (ref 150–450)
PMV BLD AUTO: 8.2 FL (ref 9.2–12.9)
POTASSIUM SERPL-SCNC: 4.9 MMOL/L (ref 3.5–5.1)
PROT SERPL-MCNC: 6.7 G/DL (ref 6–8.4)
PROT SERPL-MCNC: 6.7 G/DL (ref 6–8.4)
PROTHROMBIN TIME: 11.6 SEC (ref 9–12.5)
RBC # BLD AUTO: 5.23 M/UL (ref 4.6–6.2)
SODIUM SERPL-SCNC: 136 MMOL/L (ref 136–145)
TESTOST SERPL-MCNC: 375 NG/DL (ref 304–1227)
TRIGL SERPL-MCNC: 37 MG/DL (ref 30–150)
WBC # BLD AUTO: 5.72 K/UL (ref 3.9–12.7)

## 2023-06-30 PROCEDURE — 80076 HEPATIC FUNCTION PANEL: CPT | Performed by: NURSE PRACTITIONER

## 2023-06-30 PROCEDURE — 80061 LIPID PANEL: CPT | Performed by: FAMILY MEDICINE

## 2023-06-30 PROCEDURE — 36415 COLL VENOUS BLD VENIPUNCTURE: CPT | Mod: PO | Performed by: NURSE PRACTITIONER

## 2023-06-30 PROCEDURE — 85025 COMPLETE CBC W/AUTO DIFF WBC: CPT | Mod: PO | Performed by: NURSE PRACTITIONER

## 2023-06-30 PROCEDURE — 80053 COMPREHEN METABOLIC PANEL: CPT | Performed by: FAMILY MEDICINE

## 2023-06-30 PROCEDURE — 83036 HEMOGLOBIN GLYCOSYLATED A1C: CPT | Performed by: FAMILY MEDICINE

## 2023-06-30 PROCEDURE — 85610 PROTHROMBIN TIME: CPT | Performed by: NURSE PRACTITIONER

## 2023-06-30 PROCEDURE — 84403 ASSAY OF TOTAL TESTOSTERONE: CPT | Performed by: FAMILY MEDICINE

## 2023-07-05 ENCOUNTER — OFFICE VISIT (OUTPATIENT)
Dept: HEPATOLOGY | Facility: CLINIC | Age: 68
End: 2023-07-05
Payer: MEDICARE

## 2023-07-05 VITALS — HEIGHT: 70 IN | BODY MASS INDEX: 38.51 KG/M2 | WEIGHT: 269 LBS

## 2023-07-05 DIAGNOSIS — D69.6 THROMBOCYTOPENIA: ICD-10-CM

## 2023-07-05 DIAGNOSIS — R16.0 HEPATOMEGALY: ICD-10-CM

## 2023-07-05 DIAGNOSIS — K76.0 FATTY LIVER: Primary | ICD-10-CM

## 2023-07-05 PROCEDURE — 99214 PR OFFICE/OUTPT VISIT, EST, LEVL IV, 30-39 MIN: ICD-10-PCS | Mod: 95,,, | Performed by: NURSE PRACTITIONER

## 2023-07-05 PROCEDURE — 99214 OFFICE O/P EST MOD 30 MIN: CPT | Mod: 95,,, | Performed by: NURSE PRACTITIONER

## 2023-07-05 RX ORDER — IBUPROFEN 800 MG/1
800 TABLET ORAL EVERY 8 HOURS PRN
COMMUNITY
Start: 2023-04-25

## 2023-07-05 NOTE — PROGRESS NOTES
Clinic Follow Up:  [facility] Follow Up Note    Reason for Follow Up:  The primary encounter diagnosis was Fatty liver. Diagnoses of Hepatomegaly and Thrombocytopenia were also pertinent to this visit.    PCP: Casper Barrett     The patient location is: [place]  The chief complaint leading to consultation is: above    Visit type: audiovisual    Face to Face time with patient: 20 minutes  28 minutes of total time spent on the encounter, which includes face to face time and non-face to face time preparing to see the patient (eg, review of tests), Obtaining and/or reviewing separately obtained history, Documenting clinical information in the electronic or other health record, Independently interpreting results (not separately reported) and communicating results to the patient/family/caregiver, or Care coordination (not separately reported).         Each patient to whom he or she provides medical services by telemedicine is:  (1) informed of the relationship between the physician and patient and the respective role of any other health care provider with respect to management of the patient; and (2) notified that he or she may decline to receive medical services by telemedicine and may withdraw from such care at any time.    Notes:       HPI:  This is a 68 y.o. male here for follow up of the above  Pt states that since his last visit, he has been feeling overall well without any new complaints.   He has been working on weight loss with improved nutrition and has lost about 10 pounds.     Recent labs show a continued worsening of thrombocytopenia.   LFTs are stable with a slight increase in total bili  Fibroscan was without concern for cirrhosis, however the IQR was on the higher side.       Review of Systems   Constitutional:  Negative for chills, fever, malaise/fatigue and weight loss.   Respiratory:  Negative for cough.    Cardiovascular:  Negative for chest pain.   Gastrointestinal:          Per HPI   Musculoskeletal:  Negative for myalgias.   Skin:  Negative for itching and rash.   Neurological:  Negative for headaches.   Psychiatric/Behavioral:  The patient is not nervous/anxious.      Medical History:  Past Medical History:   Diagnosis Date    Diabetes mellitus, type 2     Hypertension     Morbid obesity        Surgical History:   Past Surgical History:   Procedure Laterality Date    HERNIA REPAIR      KNEE ARTHROSCOPY W/ DEBRIDEMENT      VASECTOMY         Family History:   Family History   Problem Relation Age of Onset    Heart disease Mother     Vision loss Mother     Diabetes Father     Heart disease Father     Hypertension Father     Diabetes Maternal Grandmother     Cancer Maternal Grandfather        Social History:   Social History     Tobacco Use    Smoking status: Never    Smokeless tobacco: Never   Substance Use Topics    Alcohol use: No    Drug use: No       Allergies: Reviewed    Home Medications:  Current Outpatient Medications on File Prior to Visit   Medication Sig Dispense Refill    aspirin (ECOTRIN) 81 MG EC tablet Take 81 mg by mouth once daily.      atorvastatin (LIPITOR) 40 MG tablet Take 1 tablet (40 mg total) by mouth once daily. 90 tablet 4    cyclobenzaprine (FLEXERIL) 10 MG tablet TAKE 1 TABLET BY MOUTH EVERY DAY IN THE EVENING 30 tablet 0    enalapril (VASOTEC) 20 MG tablet Take 1 tablet (20 mg total) by mouth once daily. 90 tablet 4    ibuprofen (ADVIL,MOTRIN) 800 MG tablet Take 800 mg by mouth every 8 (eight) hours as needed.      multivitamin with minerals tablet Take 1 tablet by mouth once daily.      OZEMPIC 2 mg/dose (8 mg/3 mL) PnIj INJECT 2 MG SUBCUTANEOUSLY EVERY 7 DAYS 3 each 0    tadalafiL (CIALIS) 20 MG Tab Take 20 mg by mouth as needed.      testosterone cypionate (DEPOTESTOTERONE CYPIONATE) 100 mg/mL injection Inject 200 mg into the muscle every 14 (fourteen) days.      testosterone cypionate (DEPOTESTOTERONE CYPIONATE) 200 mg/mL injection Inject 200 mg into the  "muscle every 14 (fourteen) days.       No current facility-administered medications on file prior to visit.       Physical Exam:  Vital Signs:  Ht 5' 10" (1.778 m)   Wt 122 kg (269 lb)   BMI 38.60 kg/m²   Body mass index is 38.6 kg/m².  Physical Exam  Constitutional:       Appearance: He is well-developed.   HENT:      Head: Normocephalic.   Eyes:      General: No scleral icterus.  Pulmonary:      Effort: Pulmonary effort is normal.   Musculoskeletal:         General: Normal range of motion.      Cervical back: Normal range of motion.   Skin:     General: Skin is dry.   Neurological:      Mental Status: He is alert.       Labs: Pertinent labs reviewed.  FIB-4 Calculation: 3.74 at 7/5/2023  3:36 PM     FIB-4 below 1.30 is considered as low-risk for advanced fibrosis  FIB-4 over 2.67 is considered as high-risk for advanced fibrosis  FIB-4 values between 1.30 and 2.67 are considered as intermediate-risk of advanced fibrosis for ages 36-64.     For ages > 64 the cut-off for low-risk goes to < 2.  This is a screening tool and clinical judgement should be used in the interpretation of these results.      Assessment:  1. Fatty liver    2. Hepatomegaly    3. Thrombocytopenia        Recommendations:  Given the continued, worsening thrombocytopenia and the above noted FIB4, there is still concern for possible cirrhosis  - discussed with pt the option for continued monitoring vs liver biopsy for confirmation of fibrosis  - pt agrees to liver biopsy for further diagnostic evaluation.   - risks and benefits discussed.  Order placed   - continued weight loss with improved nutrition encouraged.       Return to Clinic:  Follow up to be determined by results of above.    "

## 2023-07-06 NOTE — PROGRESS NOTES
Make follow-up lab appointment per recommendation below.  Check to see if patient has seen the results through my chart.  If not then,  #CALL THE PATIENT# to discuss results/see if they have questions and document verification of contact. Make F/U appt if needed. [phone]    #My interpretation that was sent to them through [employer]:  Lazarus, I have reviewed your recent blood work.     Your metabolic panel which shows your glucose, kidney function, electrolytes, and liver function is stable.  ALT liver enzyme is slightly elevated.  Will continue to monitor.  If still elevated consider ultrasound of the liver.  Testosterone is at lower limits of normal.  Your cholesterol is stable.    Your hemoglobin A1c is improved.  This test is gold standard screening test for diabetes.  It is a measures 3 months of your average blood sugar.  =========================  Also please address any outstanding health maintenance that may be due: Shingles Vaccine(1 of 2) Never done  COVID-19 Vaccine(2 - Booster for Kulwant series) due on 06/03/2021  TETANUS VACCINE due on 10/18/2021

## 2023-07-17 ENCOUNTER — TELEPHONE (OUTPATIENT)
Dept: RADIOLOGY | Facility: HOSPITAL | Age: 68
End: 2023-07-17
Payer: COMMERCIAL

## 2023-07-17 DIAGNOSIS — D75.1 POLYCYTHEMIA: Primary | ICD-10-CM

## 2023-07-18 ENCOUNTER — HOSPITAL ENCOUNTER (OUTPATIENT)
Dept: RADIOLOGY | Facility: HOSPITAL | Age: 68
Discharge: HOME | End: 2023-07-18
Attending: NURSE PRACTITIONER
Payer: MEDICARE

## 2023-07-18 VITALS
RESPIRATION RATE: 16 BRPM | WEIGHT: 270 LBS | HEART RATE: 64 BPM | HEIGHT: 70 IN | SYSTOLIC BLOOD PRESSURE: 110 MMHG | DIASTOLIC BLOOD PRESSURE: 60 MMHG | BODY MASS INDEX: 38.65 KG/M2 | OXYGEN SATURATION: 96 %

## 2023-07-18 DIAGNOSIS — D69.6 THROMBOCYTOPENIA: ICD-10-CM

## 2023-07-18 DIAGNOSIS — K76.0 FATTY LIVER: ICD-10-CM

## 2023-07-18 DIAGNOSIS — R16.0 HEPATOMEGALY: ICD-10-CM

## 2023-07-18 PROCEDURE — 77012 CT SCAN FOR NEEDLE BIOPSY: CPT | Mod: TC

## 2023-07-18 PROCEDURE — 88307 TISSUE EXAM BY PATHOLOGIST: CPT | Mod: 26,,, | Performed by: PATHOLOGY

## 2023-07-18 PROCEDURE — 88313 SPECIAL STAINS GROUP 2: CPT | Performed by: PATHOLOGY

## 2023-07-18 PROCEDURE — 88313 PR  SPECIAL STAINS,GROUP II: ICD-10-PCS | Mod: 26,,, | Performed by: PATHOLOGY

## 2023-07-18 PROCEDURE — 77012 CT SCAN FOR NEEDLE BIOPSY: CPT | Mod: 26,,, | Performed by: RADIOLOGY

## 2023-07-18 PROCEDURE — 25000003 PHARM REV CODE 250: Performed by: RADIOLOGY

## 2023-07-18 PROCEDURE — 88307 PR  SURG PATH,LEVEL V: ICD-10-PCS | Mod: 26,,, | Performed by: PATHOLOGY

## 2023-07-18 PROCEDURE — 63600175 PHARM REV CODE 636 W HCPCS: Performed by: RADIOLOGY

## 2023-07-18 PROCEDURE — 47000 CT BIOPSY LIVER (XPD): ICD-10-PCS | Mod: ,,, | Performed by: RADIOLOGY

## 2023-07-18 PROCEDURE — 88307 TISSUE EXAM BY PATHOLOGIST: CPT | Performed by: PATHOLOGY

## 2023-07-18 PROCEDURE — 47000 NEEDLE BIOPSY OF LIVER PERQ: CPT | Mod: ,,, | Performed by: RADIOLOGY

## 2023-07-18 PROCEDURE — 88313 SPECIAL STAINS GROUP 2: CPT | Mod: 26,,, | Performed by: PATHOLOGY

## 2023-07-18 PROCEDURE — 77012 CT BIOPSY LIVER (XPD): ICD-10-PCS | Mod: 26,,, | Performed by: RADIOLOGY

## 2023-07-18 RX ORDER — FENTANYL CITRATE 50 UG/ML
INJECTION, SOLUTION INTRAMUSCULAR; INTRAVENOUS CODE/TRAUMA/SEDATION MEDICATION
Status: COMPLETED | OUTPATIENT
Start: 2023-07-18 | End: 2023-07-18

## 2023-07-18 RX ORDER — MIDAZOLAM HYDROCHLORIDE 1 MG/ML
INJECTION INTRAMUSCULAR; INTRAVENOUS CODE/TRAUMA/SEDATION MEDICATION
Status: COMPLETED | OUTPATIENT
Start: 2023-07-18 | End: 2023-07-18

## 2023-07-18 RX ADMIN — FENTANYL CITRATE 50 MCG: 50 INJECTION, SOLUTION INTRAMUSCULAR; INTRAVENOUS at 08:07

## 2023-07-18 RX ADMIN — Medication 1 APPLICATOR: at 09:07

## 2023-07-18 RX ADMIN — MIDAZOLAM HYDROCHLORIDE 1 MG: 1 INJECTION, SOLUTION INTRAMUSCULAR; INTRAVENOUS at 08:07

## 2023-07-18 NOTE — DISCHARGE SUMMARY
[facility])  Discharge Note  Short Stay    CT Biopsy Liver (xpd)      OUTCOME: Patient tolerated treatment/procedure well without complication and is now ready for discharge.    DISPOSITION: Home or Self Care    FINAL DIAGNOSIS:  <principal problem not specified>    FOLLOWUP: In clinic    DISCHARGE INSTRUCTIONS:  No discharge procedures on file.      Clinical Reference Documents Added to Patient Instructions         Document    LIVER BIOPSY DISCHARGE INSTRUCTIONS (ENGLISH)    PROCEDURAL SEDATION, ADULT ED (ENGLISH)            TIME SPENT ON DISCHARGE: 15 minutes    Pre Op Diagnosis: fatty liver     Post Op Diagnosis: same     Procedure:  liver biopsy     Procedure performed by: Amadou RUBY, Rochelle KANG     Written Informed Consent Obtained: Yes     Specimen Removed:  yes     Estimated Blood Loss:  minimal     Findings: Local anesthesia     Sedation:  yes     The patient tolerated the procedure well and there were no complications.      Disposition:  F/U in clinic or with ordering physician    Discharge instructions:  Light activity for 24 hours.  Remove band aid in 24 hours.  No baths (showers are appropriate).      Sterile technique was performed in the RUQ, lidocaine was used as a local anesthetic.  Multiple samples taken percutaneously from the liver core.  Pt tolerated the procedure well without immediate complications.  Please see radiologist report for details. F/u with PCP and/or ordering physician.

## 2023-07-18 NOTE — DISCHARGE INSTRUCTIONS
Please return to ER if any of these symptoms occur:  Fever over 101 degrees,  Any purulent drainage from site (pus, yellow or has foul odor), or any redness or swelling to site  Bleeding from the puncture site not controlled, If bleeding occurs at site hold pressure for 5 mins.  If bleeding continues go to ER  Pain not controlled with Aleve or Tylenol,     No driving for 24 hours after procedure due to sedation given during procedure.      Do not submerge in standing water for 2 days after biopsy but you may shower.     May change bandage if it becomes soiled and bandage may be removed in 2 days.     Rest for the next couple of days. Do not lift any thing heavier than a gallon of milk.  Increase activity as tolerated.     Resume home medications and diet     Biopsy results will be with Dr. Garcia in 5-7 days, please follow up with him for results and any other questions or concerns that you may have.

## 2023-07-18 NOTE — PLAN OF CARE
Pt stable for discharge. Band aid to right flank C/D/I with no bleeding/redness/swelling noted. VSS, NADN, and pt meets criteria for discharge. Discharge instructions given to and reviewed with pt, and pt verbalized understanding of all. Pt discharged to home, taken out via wheelchair and driven home by wife.    Yes

## 2023-07-18 NOTE — PLAN OF CARE
Pt ambulated to preop area. VVS. No complaints of headache, nausea, or pain. Will continue to monitor.

## 2023-07-18 NOTE — PLAN OF CARE
Received patient from procedure via stretcher. VVS. Breathing even and unlabored. No complaints of nausea, or headache. Complains of pain t right ribs 6/10. Procedure to right side bandage CDI. Family updated with patients status. Patient resting on stretcher. Will continue to monitor

## 2023-07-19 LAB
COMMENT: NORMAL
FINAL PATHOLOGIC DIAGNOSIS: NORMAL
GROSS: NORMAL
Lab: NORMAL
MICROSCOPIC EXAM: NORMAL

## 2023-07-22 DIAGNOSIS — E11.65 TYPE 2 DIABETES MELLITUS WITH HYPERGLYCEMIA, WITHOUT LONG-TERM CURRENT USE OF INSULIN: ICD-10-CM

## 2023-07-22 RX ORDER — SEMAGLUTIDE 2.68 MG/ML
2 INJECTION, SOLUTION SUBCUTANEOUS
Qty: 9 ML | Refills: 1 | Status: SHIPPED | OUTPATIENT
Start: 2023-07-22 | End: 2024-01-05 | Stop reason: SDUPTHER

## 2023-07-22 NOTE — TELEPHONE ENCOUNTER
Refill Decision Note   Lazarus Zamudio  is requesting a refill authorization.  Brief Assessment and Rationale for Refill:  Approve     Medication Therapy Plan:       Medication Reconciliation Completed: No    Comments:     No Care Gaps recommended.     Note composed:3:28 PM 07/22/2023

## 2023-07-26 ENCOUNTER — PATIENT MESSAGE (OUTPATIENT)
Dept: HEPATOLOGY | Facility: CLINIC | Age: 68
End: 2023-07-26
Payer: COMMERCIAL

## 2023-08-01 ENCOUNTER — PATIENT MESSAGE (OUTPATIENT)
Dept: HEPATOLOGY | Facility: CLINIC | Age: 68
End: 2023-08-01
Payer: COMMERCIAL

## 2023-08-01 ENCOUNTER — TELEPHONE (OUTPATIENT)
Dept: HEPATOLOGY | Facility: CLINIC | Age: 68
End: 2023-08-01
Payer: COMMERCIAL

## 2023-08-01 NOTE — TELEPHONE ENCOUNTER
Contacted patient via phone call instead of responding to his portal message in reference to his labs. I informed him that per Jayne his labs are stable and the labs that he was referencing was ordered by Dr. Hoffman and per is note all labs were stable at this time. Patient voiced understanding.

## 2023-08-18 DIAGNOSIS — M54.32 SCIATICA, LEFT SIDE: ICD-10-CM

## 2023-08-18 RX ORDER — CYCLOBENZAPRINE HCL 10 MG
TABLET ORAL
Qty: 30 TABLET | Refills: 0 | Status: SHIPPED | OUTPATIENT
Start: 2023-08-18 | End: 2024-01-05 | Stop reason: SDUPTHER

## 2023-08-18 NOTE — TELEPHONE ENCOUNTER
Refill Routing Note   Medication(s) are not appropriate for processing by [facility] for the following reason(s):      Medication outside of protocol    [facility] action(s):  Route Care Due:  None identified            Appointments  past 12m or future 3m with PCP    Date Provider   Last Visit   4/25/2023 Casper Barrett MD   Next Visit   Visit date not found Casper Barrett MD   ED visits in past 90 days: 0        Note composed:8:58 AM 08/18/2023

## 2023-12-13 ENCOUNTER — OFFICE VISIT (OUTPATIENT)
Dept: FAMILY MEDICINE | Facility: CLINIC | Age: 68
End: 2023-12-13
Payer: MEDICARE

## 2023-12-13 VITALS
DIASTOLIC BLOOD PRESSURE: 74 MMHG | OXYGEN SATURATION: 98 % | BODY MASS INDEX: 39.21 KG/M2 | SYSTOLIC BLOOD PRESSURE: 114 MMHG | HEIGHT: 70 IN | WEIGHT: 273.88 LBS | HEART RATE: 63 BPM

## 2023-12-13 DIAGNOSIS — E29.1 HYPOGONADISM MALE: ICD-10-CM

## 2023-12-13 DIAGNOSIS — M25.562 CHRONIC PAIN OF BOTH KNEES: Primary | ICD-10-CM

## 2023-12-13 DIAGNOSIS — M25.561 CHRONIC PAIN OF BOTH KNEES: Primary | ICD-10-CM

## 2023-12-13 DIAGNOSIS — E11.65 TYPE 2 DIABETES MELLITUS WITH HYPERGLYCEMIA, WITHOUT LONG-TERM CURRENT USE OF INSULIN: ICD-10-CM

## 2023-12-13 DIAGNOSIS — I15.2 HYPERTENSION ASSOCIATED WITH DIABETES: ICD-10-CM

## 2023-12-13 DIAGNOSIS — E11.59 HYPERTENSION ASSOCIATED WITH DIABETES: ICD-10-CM

## 2023-12-13 DIAGNOSIS — G89.29 CHRONIC PAIN OF BOTH KNEES: Primary | ICD-10-CM

## 2023-12-13 DIAGNOSIS — Z79.899 ENCOUNTER FOR LONG-TERM (CURRENT) USE OF MEDICATIONS: ICD-10-CM

## 2023-12-13 DIAGNOSIS — Z12.5 ENCOUNTER FOR PROSTATE CANCER SCREENING: ICD-10-CM

## 2023-12-13 PROCEDURE — 99214 OFFICE O/P EST MOD 30 MIN: CPT | Mod: S$PBB,,, | Performed by: FAMILY MEDICINE

## 2023-12-13 PROCEDURE — 99999 PR PBB SHADOW E&M-EST. PATIENT-LVL V: CPT | Mod: PBBFAC,,, | Performed by: FAMILY MEDICINE

## 2023-12-13 PROCEDURE — 99999 PR PBB SHADOW E&M-EST. PATIENT-LVL V: ICD-10-PCS | Mod: PBBFAC,,, | Performed by: FAMILY MEDICINE

## 2023-12-13 PROCEDURE — 99215 OFFICE O/P EST HI 40 MIN: CPT | Mod: PBBFAC,PO | Performed by: FAMILY MEDICINE

## 2023-12-13 PROCEDURE — 99214 PR OFFICE/OUTPT VISIT, EST, LEVL IV, 30-39 MIN: ICD-10-PCS | Mod: S$PBB,,, | Performed by: FAMILY MEDICINE

## 2023-12-13 RX ORDER — MELOXICAM 15 MG/1
15 TABLET ORAL DAILY
Qty: 90 TABLET | Refills: 4 | Status: SHIPPED | OUTPATIENT
Start: 2023-12-13 | End: 2024-01-05 | Stop reason: SDUPTHER

## 2023-12-13 RX ORDER — LATANOPROST 50 UG/ML
1 SOLUTION/ DROPS OPHTHALMIC NIGHTLY
COMMUNITY
Start: 2023-11-28

## 2023-12-13 NOTE — PATIENT INSTRUCTIONS
Brian Qiu,     If you are due for any health screening(s) below please notify me so we can arrange them to be ordered and scheduled. Most healthy patients at your age complete them, but you are free to accept or refuse.     If you can't do it, I'll definitely understand. If you can, I'd certainly appreciate it!    Tests to Keep You Healthy    Eye Exam: DUE  Colon Cancer Screening: Met on 1/19/2015  Last Blood Pressure <= 139/89 (12/13/2023): Yes  Last HbA1c < 8 (06/30/2023): Yes      Your diabetic retinal eye exam is due     Diabetes is the #1 cause of blindness in the [place] - early detection before signs or symptoms develop can prevent debilitating blindness.     Our records indicate that you may be overdue for your annual diabetic eye exam. Eye screening can help identify patients at risk for developing vision loss which is common in diabetes. This simple screening is an important step to keeping you healthy and preventing complications from diabetes.     This recommended diabetic eye exam should take place once per year and can prevent and treat diabetes complications in the eye before developing symptoms. This can be done with a special camera is used to take photographs of the back of your eye without having to dilate them, or you can see an eye doctor for a full dilated exam.     If you recently had your yearly diabetic eye exam performed outside of [facility], please let your Health care team know so that they can update your health record.                GLP 1 risk and benefits and common side effects of medication discussed with the patient at length.  All questions were answered.  Discussed with patient at length about potential pharmacologic options.  Patient desires consideration for Ozempic .  The risks, benefits and, side effects of this GLP 1 medication including nausea , constipation, diarrhea and pain injection site, etcetera.  Patient reports no personal or family history of pancreatitis,  MEN or medullary thyroid cancer.  There is potential for gallbladder issues while taking this medication if not already removed.  Patient should be advised to caution with taking this medication if having history of thyroid cancer or biliary disease/gallstones.  Patient should be aware of risk of diabetic retinopathy if blood sugars lowered too quickly.  Patient is aware that weight loss can and will most likely occur quickly.  Discussed GLP 1 mechanism of action.

## 2023-12-13 NOTE — ASSESSMENT & PLAN NOTE
Testosterone replacement therapy.  Follow-up with Urology for further management.  Checking testosterone and PSA.  The natural history of prostate cancer and ongoing controversy regarding screening and potential treatment outcomes of prostate cancer has been discussed with the patient. The meaning of a false positive PSA and a false negative PSA has been discussed. He indicates understanding of the limitations of this screening test and wishes to proceed with screening PSA testing.

## 2023-12-13 NOTE — ASSESSMENT & PLAN NOTE
Update labs.Counseled on importance of hypertension disease course, I recommend ongoing Education for DASH-diet and exercise.  Counseled on medication regimen importance of treating high blood pressure.  Please be advised of risk of untreated blood pressure as discussed.  Please advised of ER precautions were given for symptoms of hypertensive urgency and emergency.

## 2023-12-13 NOTE — ASSESSMENT & PLAN NOTE
Continue Ozempic at 2 milligrams weekly.  Update labs.    Previous plan:  Request records from [facility] .  Consider increasing Ozempic to 1 milligram weekly if needed.  Check labs.We will plan to monitor hemoglobin A1c at designated intervals 3 to 6 months.  I recommend ongoing Education for diabetic diet and exercise protocol.  We will continue to monitor for side effects.    Please be advised of symptoms to monitor for and to notify me immediately if persistent or worsening.  Follow up with Ophthalmology/Optometry and Podiatry at least annually.

## 2023-12-13 NOTE — ASSESSMENT & PLAN NOTE
Start meloxicam.  Follow-up in 1 to 2 weeks if no improvement.  Consider steroid injection of the knees bilaterally.  If patient is still does not have improvement after that will consider orthopedic consult or further imaging with MRI.  Consider physical therapy which is also an option.

## 2023-12-13 NOTE — PROGRESS NOTES
PLAN:      Problem List Items Addressed This Visit       Type 2 diabetes mellitus with hyperglycemia, without long-term current use of insulin (Chronic)     Continue Ozempic at 2 milligrams weekly.  Update labs.    Previous plan:  Request records from [facility] .  Consider increasing Ozempic to 1 milligram weekly if needed.  Check labs.We will plan to monitor hemoglobin A1c at designated intervals 3 to 6 months.  I recommend ongoing Education for diabetic diet and exercise protocol.  We will continue to monitor for side effects.    Please be advised of symptoms to monitor for and to notify me immediately if persistent or worsening.  Follow up with Ophthalmology/Optometry and Podiatry at least annually.             Relevant Orders    CBC Without Differential    Comprehensive Metabolic Panel    TSH    Hemoglobin A1C    Hypertension associated with diabetes (Chronic)     Update labs.Counseled on importance of hypertension disease course, I recommend ongoing Education for DASH-diet and exercise.  Counseled on medication regimen importance of treating high blood pressure.  Please be advised of risk of untreated blood pressure as discussed.  Please advised of ER precautions were given for symptoms of hypertensive urgency and emergency.           Relevant Orders    CBC Without Differential    Comprehensive Metabolic Panel    TSH    Hemoglobin A1C    Encounter for long-term (current) use of medications (Chronic)     Complete history and physical was completed today.  Complete and thorough medication reconciliation was performed.  Discussed risks and benefits of medications.  Advised patient on orders and health maintenance.  We discussed old records and old labs if available.  Will request any records not available through epic.  Continue current medications listed on your summary sheet.           Relevant Orders    CBC Without Differential    Comprehensive Metabolic Panel    TSH     Hemoglobin A1C    Hypogonadism male (Chronic)     Testosterone replacement therapy.  Follow-up with Urology for further management.  Checking testosterone and PSA.  The natural history of prostate cancer and ongoing controversy regarding screening and potential treatment outcomes of prostate cancer has been discussed with the patient. The meaning of a false positive PSA and a false negative PSA has been discussed. He indicates understanding of the limitations of this screening test and wishes to proceed with screening PSA testing.         Relevant Orders    TESTOSTERONE    CBC Without Differential    Comprehensive Metabolic Panel    TSH    Hemoglobin A1C    Chronic pain of both knees - Primary     Start meloxicam.  Follow-up in 1 to 2 weeks if no improvement.  Consider steroid injection of the knees bilaterally.  If patient is still does not have improvement after that will consider orthopedic consult or further imaging with MRI.  Consider physical therapy which is also an option.         Relevant Medications    meloxicam (MOBIC) 15 MG tablet    Other Relevant Orders    CBC Without Differential    Comprehensive Metabolic Panel    TSH    Hemoglobin A1C     Other Visit Diagnoses       Encounter for prostate cancer screening        Relevant Orders    PSA, Screening          Future Appointments       Date Provider Specialty Appt Notes    12/14/2023  Lab lab    2/5/2024 Jayne Garcia, JOHANNYP Hepatology fatty liver           Medication Management for assessment above:   Medication List with Changes/Refills   New Medications    MELOXICAM (MOBIC) 15 MG TABLET    Take 1 tablet (15 mg total) by mouth once daily.   Current Medications    ASPIRIN (ECOTRIN) 81 MG EC TABLET    Take 81 mg by mouth once daily.    ATORVASTATIN (LIPITOR) 40 MG TABLET    Take 1 tablet (40 mg total) by mouth once daily.    CYCLOBENZAPRINE (FLEXERIL) 10 MG TABLET    TAKE 1 TABLET BY MOUTH EVERY DAY IN THE EVENING    ENALAPRIL (VASOTEC) 20 MG TABLET    Take  1 tablet (20 mg total) by mouth once daily.    IBUPROFEN (ADVIL,MOTRIN) 800 MG TABLET    Take 800 mg by mouth every 8 (eight) hours as needed.    LATANOPROST 0.005 % OPHTHALMIC SOLUTION    Place 1 drop into both eyes every evening.    MULTIVITAMIN WITH MINERALS TABLET    Take 1 tablet by mouth once daily.    SEMAGLUTIDE (OZEMPIC) 2 MG/DOSE (8 MG/3 ML) PNIJ    Inject 2 mg into the skin every 7 days.    TADALAFIL (CIALIS) 20 MG TAB    Take 20 mg by mouth as needed.    TESTOSTERONE CYPIONATE (DEPOTESTOTERONE CYPIONATE) 100 MG/ML INJECTION    Inject 200 mg into the muscle every 14 (fourteen) days.       Casper Barrett M.D.  ==========================================================================  Subjective:   Patient ID: Lazarus Zamudio is a 68 y.o. male.  has a past medical history of Diabetes mellitus, type 2, Hypertension, and Morbid obesity.   Chief Complaint: Knee Pain (Both inner thighs)      Problem List Items Addressed This Visit       Type 2 diabetes mellitus with hyperglycemia, without long-term current use of insulin (Chronic)    Overview     December 2023:  Patient reports compliance with Ozempic when he can find it in stock.  Patient denies any history of thyroid cancer, pancreatitis, MEN syndrome.   February 2021:  Patient reports compliance with Ozempic 0.5 milligrams weekly.  Patient reports no side effects.  Symptoms are controlled.  Due for labs.  BMI Readings from Last 10 Encounters:   12/13/23 39.30 kg/m²   07/18/23 38.74 kg/m²   07/05/23 38.60 kg/m²   04/25/23 38.74 kg/m²   04/14/23 39.26 kg/m²   03/30/23 40.08 kg/m²   03/15/23 39.60 kg/m²   03/13/23 39.77 kg/m²   02/14/23 40.13 kg/m²   10/19/22 40.14 kg/m²   Diabetes Management Status    Statin: Taking  ACE/ARB: Taking    Screening or Prevention Patient's value Goal Complete/Controlled?   HgA1C Testing and Control   Lab Results   Component Value Date    HGBA1C 6.4 (H) 06/30/2023      Annually/Less than 8% Yes   Lipid profile : 06/30/2023  "Annually Yes   LDL control Lab Results   Component Value Date    LDLCALC 62.6 (L) 06/30/2023    Annually/Less than 100 mg/dl  Yes   Nephropathy screening Lab Results   Component Value Date    LABMICR <5.0 02/14/2023     Lab Results   Component Value Date    PROTEINUA Negative 03/01/2022     No results found for: "UTPCR"   Annually Yes   Blood pressure BP Readings from Last 1 Encounters:   12/13/23 114/74    Less than 140/90 Yes   Dilated retinal exam : 11/23/2022 Annually No   Foot exam   : 10/19/2022 Annually No            Current Assessment & Plan     Continue Ozempic at 2 milligrams weekly.  Update labs.    Previous plan:  Request records [facility] .  Consider increasing Ozempic to 1 milligram weekly if needed.  Check labs.We will plan to monitor hemoglobin A1c at designated intervals 3 to 6 months.  I recommend ongoing Education for diabetic diet and exercise protocol.  We will continue to monitor for side effects.    Please be advised of symptoms to monitor for and to notify me immediately if persistent or worsening.  Follow up with Ophthalmology/Optometry and Podiatry at least annually.             Hypertension associated with diabetes (Chronic)    Overview     CHRONIC.  December 2023: Blood pressure well controlled on current regimen.  Hypertension Medications               enalapril (VASOTEC) 20 MG tablet Take 1 tablet (20 mg total) by mouth once daily.          April 2023:  Reviewed medications.  STABLE. BP Reviewed.  Compliant with BP medications. No SE reported.   (-) CP, SOB, palpitations, dizziness, lightheadedness, HA, arm numbness, tingling or weakness, syncope.  Creatinine   Date Value Ref Range Status   06/30/2023 1.1 0.5 - 1.4 mg/dL Final   No results found for this or any previous visit.           Current Assessment & Plan     Update labs.Counseled on importance of hypertension disease course, I recommend ongoing Education for DASH-diet and exercise.  Counseled " on medication regimen importance of treating high blood pressure.  Please be advised of risk of untreated blood pressure as discussed.  Please advised of ER precautions were given for symptoms of hypertensive urgency and emergency.           Encounter for long-term (current) use of medications (Chronic)    Overview     December 2023: Reviewed labs.  April 2023: Reviewed labs.  CHRONIC. Stable. Compliant with medications for managed conditions. See medication list. No SE reported.   Routine lab analysis is being monitored. Refills were addressed.  Lab Results   Component Value Date    WBC 5.72 06/30/2023    HGB 15.4 06/30/2023    HCT 46.9 06/30/2023    MCV 90 06/30/2023    PLT 77 (L) 06/30/2023       Chemistry        Component Value Date/Time     06/30/2023 1021    K 4.9 06/30/2023 1021    CL 99 06/30/2023 1021    CO2 28 06/30/2023 1021    BUN 16 06/30/2023 1021    CREATININE 1.1 06/30/2023 1021     (H) 06/30/2023 1021        Component Value Date/Time    CALCIUM 9.2 06/30/2023 1021    ALKPHOS 94 06/30/2023 1021    ALKPHOS 94 06/30/2023 1021    AST 28 06/30/2023 1021    AST 28 06/30/2023 1021    ALT 45 (H) 06/30/2023 1021    ALT 45 (H) 06/30/2023 1021    BILITOT 1.1 (H) 06/30/2023 1021    BILITOT 1.1 (H) 06/30/2023 1021    ESTGFRAFRICA >60.0 02/14/2022 1028    EGFRNONAA >60.0 02/14/2022 1028        Lab Results   Component Value Date    TSH 3.35 08/18/2021            Current Assessment & Plan     Complete history and physical was completed today.  Complete and thorough medication reconciliation was performed.  Discussed risks and benefits of medications.  Advised patient on orders and health maintenance.  We discussed old records and old labs if available.  Will request any records not available through epic.  Continue current medications listed on your summary sheet.           Hypogonadism male (Chronic)    Overview     Chronic.  Stable.  Patient takes testosterone prescribed by Urology.  He is requesting  me to add on blood work to my labs for Urology to review.  He takes 200 milligrams every 14 days.         Current Assessment & Plan     Testosterone replacement therapy.  Follow-up with Urology for further management.  Checking testosterone and PSA.  The natural history of prostate cancer and ongoing controversy regarding screening and potential treatment outcomes of prostate cancer has been discussed with the patient. The meaning of a false positive PSA and a false negative PSA has been discussed. He indicates understanding of the limitations of this screening test and wishes to proceed with screening PSA testing.         Chronic pain of both knees - Primary    Overview     December 2023: Reviewed x-ray from previous.  Patient reports he did have a fall in December of last year.  Patient has been having knee pain since that time.   He has not having any improvement.  Patient does not take anything for the knee pain.  No new injuries or trauma.      Previous history:Chronic.  Intermittent control.  Patient does not take any medication for this condition.  He did well with ibuprofen in the past.  X-ray Knee Ortho Left  Narrative: EXAM:  XR KNEE ORTHO LEFT    CLINICAL HISTORY:    Left knee joint pain    TECHNIQUE: 3 views of the left knee.    COMPARISON: None.    FINDINGS:    Minor joint space narrowing.  Mild marginal spurring.  Minor patellar femoral joint spurring.    No obvious joint effusion.    No fracture.  Impression:  Mild tricompartment degenerative joint disease.    Finalized on: 4/25/2023 11:36 AM By:  Vincent Lopez MD  [facility]# [id number]      2023-04-25 11:38:[id number]    [facility]  Formatting of this note might be different from the original. Formatting of this note might be different from the original. Chronic.  Intermittent control.  Patient does not take any medication for this condition.  He did well with ibuprofen in the past.  X-ray Knee Ortho Left Narrative: EXAM:  XR KNEE ORTHO LEFT CLINICAL HISTORY:     Left knee joint pain TECHNIQUE: 3 views of the left knee. COMPARISON: None. FINDINGS:    Minor joint space narrowing.  Mild marginal spurring.  Minor patellar femoral joint spurring. No obvious joint effusion. No fracture. Impression:  Mild tricompartment degenerative joint disease. Finalized on: 4/25/2023 11:36 AM By:  Vincent Lopez MD [facility]# [id number]      2023-04-25 11:38:54.342    [facility] Last Assessment & Plan: Formatting of this note might be different from the original. Trial of anti-inflammatory.  Consider physical therapy if no improvement.  Patient may ultimately need orthopedic consult for worsening arthritis of the left knee.  GI precautions.         Current Assessment & Plan     Start meloxicam.  Follow-up in 1 to 2 weeks if no improvement.  Consider steroid injection of the knees bilaterally.  If patient is still does not have improvement after that will consider orthopedic consult or further imaging with MRI.  Consider physical therapy which is also an option.          Other Visit Diagnoses       Encounter for prostate cancer screening                 Review of patient's allergies indicates:   Allergen Reactions    Metformin      Intolerant     Penicillins Rash     Current Outpatient Medications   Medication Instructions    aspirin (ECOTRIN) 81 mg, Oral, Daily,      atorvastatin (LIPITOR) 40 mg, Oral, Daily    cyclobenzaprine (FLEXERIL) 10 MG tablet TAKE 1 TABLET BY MOUTH EVERY DAY IN THE EVENING    enalapril (VASOTEC) 20 mg, Oral, Daily    ibuprofen (ADVIL,MOTRIN) 800 mg, Oral, Every 8 hours PRN    latanoprost 0.005 % ophthalmic solution 1 drop, Both Eyes, Nightly    meloxicam (MOBIC) 15 mg, Oral, Daily    multivitamin with minerals tablet 1 tablet, Oral, Daily    OZEMPIC 2 mg, Subcutaneous, Every 7 days    tadalafiL (CIALIS) 20 mg, Oral, As needed (PRN)    testosterone cypionate (DEPOTESTOTERONE CYPIONATE) 200 mg, Every 14 days      I have reviewed the PMH, social history, FamilyHx, surgical history,  "allergies and medications documented / confirmed by the patient at the time of this visit.  Review of Systems   Constitutional:  Negative for chills, fatigue, fever and unexpected weight change.   HENT:  Negative for ear pain and sore throat.    Eyes:  Negative for redness and visual disturbance.   Respiratory:  Negative for cough and shortness of breath.    Cardiovascular:  Negative for chest pain and palpitations.   Gastrointestinal:  Negative for nausea and vomiting.   Endocrine: Negative for cold intolerance and heat intolerance.   Genitourinary:  Negative for difficulty urinating and hematuria.   Musculoskeletal:  Positive for arthralgias, back pain and myalgias.   Skin:  Negative for rash and wound.   Allergic/Immunologic: Negative for environmental allergies and food allergies.   Neurological:  Negative for weakness and headaches.   Hematological:  Negative for adenopathy. Does not bruise/bleed easily.   Psychiatric/Behavioral:  Negative for sleep disturbance. The patient is not nervous/anxious.      Objective:   /74   Pulse 63   Ht 5' 10" (1.778 m)   Wt 124.2 kg (273 lb 14.4 oz)   SpO2 98%   BMI 39.30 kg/m²   Physical Exam  Vitals and nursing note reviewed.   Constitutional:       General: He is not in acute distress.     Appearance: He is well-developed. He is obese. He is not diaphoretic.   HENT:      Head: Normocephalic and atraumatic.      Right Ear: External ear normal.      Left Ear: External ear normal.      Nose: Nose normal. No rhinorrhea.   Eyes:      Extraocular Movements: Extraocular movements intact.      Pupils: Pupils are equal, round, and reactive to light.   Cardiovascular:      Rate and Rhythm: Normal rate.      Pulses: Normal pulses.   Pulmonary:      Effort: Pulmonary effort is normal. No respiratory distress.      Breath sounds: Normal breath sounds.   Abdominal:      General: Bowel sounds are normal.      Palpations: Abdomen is soft.   Musculoskeletal:         General: " Tenderness and deformity present. Normal range of motion.      Cervical back: Normal range of motion and neck supple.      Thoracic back: No spasms, tenderness or bony tenderness.      Lumbar back: Spasms present. No tenderness or bony tenderness. Negative right straight leg raise test and negative left straight leg raise test.      Left hip: No tenderness, bony tenderness or crepitus. Normal strength.      Right knee: No effusion, erythema, ecchymosis or lacerations. Normal range of motion. Tenderness present over the medial joint line.      Left knee: Bony tenderness present. No deformity, effusion, erythema, ecchymosis or lacerations. Tenderness present over the medial joint line.      Right lower leg: No edema.      Left lower leg: No edema.        Legs:       Comments: Area of tenderness to palpation   Skin:     General: Skin is warm and dry.      Capillary Refill: Capillary refill takes less than 2 seconds.      Findings: No rash.   Neurological:      General: No focal deficit present.      Mental Status: He is alert and oriented to person, place, and time.   Psychiatric:         Attention and Perception: He is attentive.         Mood and Affect: Mood normal. Mood is not anxious or depressed. Affect is not labile, blunt, angry or inappropriate.         Speech: He is communicative. Speech is not rapid and pressured, delayed, slurred or tangential.         Behavior: Behavior normal. Behavior is not agitated, slowed, aggressive, withdrawn, hyperactive or combative.         Thought Content: Thought content normal. Thought content is not paranoid or delusional. Thought content does not include homicidal or suicidal ideation. Thought content does not include homicidal or suicidal plan.         Cognition and Memory: Memory is not impaired.         Judgment: Judgment normal. Judgment is not impulsive or inappropriate.           Assessment:     1. Chronic pain of both knees    2. Encounter for long-term (current) use  of medications    3. Hypertension associated with diabetes    4. Type 2 diabetes mellitus with hyperglycemia, without long-term current use of insulin    5. Hypogonadism male    6. Encounter for prostate cancer screening      MDM:   Moderate complexity.  Moderate risk.  Total time: 31 minutes.  This includes total time spent on the encounter, which includes face to face time and non-face to face time preparing to see the patient (eg, review of previous medical records, tests), Obtaining and/or reviewing separately obtained history, documenting clinical information in the electronic or other health record, independently interpreting results (not separately reported)/communicating results to the patient/family/caregiver, and/or care coordination (not separately reported).    I have Reviewed and summarized old records.  I have performed thorough medication reconciliation today and discussed risk and benefits of medications.  I have reviewed x-ray, labs and discussed with patient.  All questions were answered.  I am requesting old records and will review them once they are available.  Urology    I have signed for the following orders AND/OR meds.  Orders Placed This Encounter   Procedures    TESTOSTERONE     Standing Status:   Future     Standing Expiration Date:   2/10/2025    PSA, Screening     Standing Status:   Standing     Number of Occurrences:   99     Standing Expiration Date:   12/8/2043    CBC Without Differential     Standing Status:   Future     Standing Expiration Date:   2/10/2025    Comprehensive Metabolic Panel     Standing Status:   Future     Standing Expiration Date:   2/10/2025    TSH     Standing Status:   Future     Standing Expiration Date:   2/10/2025    Hemoglobin A1C     Standing Status:   Future     Standing Expiration Date:   2/10/2025     Medications Ordered This Encounter   Medications    meloxicam (MOBIC) 15 MG tablet     Sig: Take 1 tablet (15 mg total) by mouth once daily.     Dispense:   90 tablet     Refill:  4          Follow up in about 6 months (around 6/13/2024), or if symptoms worsen or fail to improve, for Med refills.    If no improvement in symptoms or symptoms worsen, advised to call/follow-up at clinic or go to ER. Patient voiced understanding and all questions/concerns were addressed.   DISCLAIMER: This note was compiled by using a speech recognition dictation system and therefore please be aware that typographical / speech recognition errors can and do occur.  Please contact me if you see any errors specifically.    Casper Barrett M.D.       Office: [phone] [id number] [place] [address]  FAX: [phone]

## 2023-12-14 ENCOUNTER — LAB VISIT (OUTPATIENT)
Dept: LAB | Facility: HOSPITAL | Age: 68
End: 2023-12-14
Attending: FAMILY MEDICINE
Payer: MEDICARE

## 2023-12-14 DIAGNOSIS — Z12.5 ENCOUNTER FOR PROSTATE CANCER SCREENING: ICD-10-CM

## 2023-12-14 DIAGNOSIS — E11.59 HYPERTENSION ASSOCIATED WITH DIABETES: ICD-10-CM

## 2023-12-14 DIAGNOSIS — I15.2 HYPERTENSION ASSOCIATED WITH DIABETES: ICD-10-CM

## 2023-12-14 DIAGNOSIS — E11.65 TYPE 2 DIABETES MELLITUS WITH HYPERGLYCEMIA, WITHOUT LONG-TERM CURRENT USE OF INSULIN: ICD-10-CM

## 2023-12-14 DIAGNOSIS — G89.29 CHRONIC PAIN OF BOTH KNEES: ICD-10-CM

## 2023-12-14 DIAGNOSIS — M25.562 CHRONIC PAIN OF BOTH KNEES: ICD-10-CM

## 2023-12-14 DIAGNOSIS — Z79.899 ENCOUNTER FOR LONG-TERM (CURRENT) USE OF MEDICATIONS: ICD-10-CM

## 2023-12-14 DIAGNOSIS — M25.561 CHRONIC PAIN OF BOTH KNEES: ICD-10-CM

## 2023-12-14 DIAGNOSIS — E29.1 HYPOGONADISM MALE: ICD-10-CM

## 2023-12-14 LAB
ALBUMIN SERPL BCP-MCNC: 3.8 G/DL (ref 3.5–5.2)
ALP SERPL-CCNC: 106 U/L (ref 55–135)
ALT SERPL W/O P-5'-P-CCNC: 31 U/L (ref 10–44)
ANION GAP SERPL CALC-SCNC: 9 MMOL/L (ref 8–16)
AST SERPL-CCNC: 24 U/L (ref 10–40)
BILIRUB SERPL-MCNC: 1.2 MG/DL (ref 0.1–1)
BUN SERPL-MCNC: 19 MG/DL (ref 8–23)
CALCIUM SERPL-MCNC: 9.7 MG/DL (ref 8.7–10.5)
CHLORIDE SERPL-SCNC: 101 MMOL/L (ref 95–110)
CO2 SERPL-SCNC: 27 MMOL/L (ref 23–29)
COMPLEXED PSA SERPL-MCNC: 0.91 NG/ML (ref 0–4)
CREAT SERPL-MCNC: 0.9 MG/DL (ref 0.5–1.4)
ERYTHROCYTE [DISTWIDTH] IN BLOOD BY AUTOMATED COUNT: 13.5 % (ref 11.5–14.5)
EST. GFR  (NO RACE VARIABLE): >60 ML/MIN/1.73 M^2
ESTIMATED AVG GLUCOSE: 166 MG/DL (ref 68–131)
GLUCOSE SERPL-MCNC: 194 MG/DL (ref 70–110)
HBA1C MFR BLD: 7.4 % (ref 4–5.6)
HCT VFR BLD AUTO: 42.2 % (ref 40–54)
HGB BLD-MCNC: 14.6 G/DL (ref 14–18)
MCH RBC QN AUTO: 29.7 PG (ref 27–31)
MCHC RBC AUTO-ENTMCNC: 34.6 G/DL (ref 32–36)
MCV RBC AUTO: 86 FL (ref 82–98)
PLATELET # BLD AUTO: 95 K/UL (ref 150–450)
PMV BLD AUTO: 8.9 FL (ref 9.2–12.9)
POTASSIUM SERPL-SCNC: 4.3 MMOL/L (ref 3.5–5.1)
PROT SERPL-MCNC: 6.6 G/DL (ref 6–8.4)
RBC # BLD AUTO: 4.91 M/UL (ref 4.6–6.2)
SODIUM SERPL-SCNC: 137 MMOL/L (ref 136–145)
TESTOST SERPL-MCNC: 208 NG/DL (ref 304–1227)
TSH SERPL DL<=0.005 MIU/L-ACNC: 1.88 UIU/ML (ref 0.4–4)
WBC # BLD AUTO: 5.33 K/UL (ref 3.9–12.7)

## 2023-12-14 PROCEDURE — 80053 COMPREHEN METABOLIC PANEL: CPT | Performed by: FAMILY MEDICINE

## 2023-12-14 PROCEDURE — 84443 ASSAY THYROID STIM HORMONE: CPT | Performed by: FAMILY MEDICINE

## 2023-12-14 PROCEDURE — 85027 COMPLETE CBC AUTOMATED: CPT | Performed by: FAMILY MEDICINE

## 2023-12-14 PROCEDURE — 84403 ASSAY OF TOTAL TESTOSTERONE: CPT | Performed by: FAMILY MEDICINE

## 2023-12-14 PROCEDURE — 83036 HEMOGLOBIN GLYCOSYLATED A1C: CPT | Performed by: FAMILY MEDICINE

## 2023-12-14 PROCEDURE — 84153 ASSAY OF PSA TOTAL: CPT | Performed by: FAMILY MEDICINE

## 2023-12-14 PROCEDURE — 36415 COLL VENOUS BLD VENIPUNCTURE: CPT | Mod: PO | Performed by: FAMILY MEDICINE

## 2023-12-15 NOTE — PROGRESS NOTES
Make follow-up lab appointment per recommendation below.  Check to see if patient has seen the results through my chart.  If not then,  #CALL THE PATIENT# to discuss results/see if they have questions and document verification of contact. Make F/U appt if needed. [phone]    #My interpretation that was sent to them through [employer]:  Lazarus, I have reviewed your recent blood work.     PSA screening for prostate cancer is within normal limits.  Repeat annually.  Testosterone level is low.  Follow-up with me or Urology to discuss in further detail.  Your complete blood count is stable.  Platelet count continues to be low.  Follow-up with hematologist.  Your metabolic panel which shows your glucose, kidney function, electrolytes, and liver function is stable.   Thyroid study is normal.   Your hemoglobin A1c is elevated from previous.  This test is gold standard screening test for diabetes.  It is a measures 3 months of your average blood sugar.  =========================  Also please address any outstanding health maintenance that may be due: RSV Vaccine (Age 60+ and Pregnant patients)(1 - 1-dose 60+ series) Never done  Foot Exam due on 10/19/2023  Eye Exam due on 11/23/2023

## 2024-01-05 ENCOUNTER — OFFICE VISIT (OUTPATIENT)
Dept: FAMILY MEDICINE | Facility: CLINIC | Age: 69
End: 2024-01-05
Payer: MEDICARE

## 2024-01-05 VITALS
HEART RATE: 64 BPM | DIASTOLIC BLOOD PRESSURE: 80 MMHG | HEIGHT: 70 IN | OXYGEN SATURATION: 98 % | SYSTOLIC BLOOD PRESSURE: 124 MMHG | BODY MASS INDEX: 39.75 KG/M2 | WEIGHT: 277.63 LBS

## 2024-01-05 DIAGNOSIS — G89.29 CHRONIC PAIN OF BOTH KNEES: Primary | ICD-10-CM

## 2024-01-05 DIAGNOSIS — I15.2 HYPERTENSION ASSOCIATED WITH DIABETES: ICD-10-CM

## 2024-01-05 DIAGNOSIS — E11.59 HYPERTENSION ASSOCIATED WITH DIABETES: ICD-10-CM

## 2024-01-05 DIAGNOSIS — E11.65 TYPE 2 DIABETES MELLITUS WITH HYPERGLYCEMIA, WITHOUT LONG-TERM CURRENT USE OF INSULIN: ICD-10-CM

## 2024-01-05 DIAGNOSIS — M25.562 CHRONIC PAIN OF BOTH KNEES: Primary | ICD-10-CM

## 2024-01-05 DIAGNOSIS — E66.01 CLASS 2 SEVERE OBESITY WITH SERIOUS COMORBIDITY AND BODY MASS INDEX (BMI) OF 39.0 TO 39.9 IN ADULT, UNSPECIFIED OBESITY TYPE: ICD-10-CM

## 2024-01-05 DIAGNOSIS — M25.561 CHRONIC PAIN OF BOTH KNEES: Primary | ICD-10-CM

## 2024-01-05 DIAGNOSIS — M54.32 SCIATICA, LEFT SIDE: ICD-10-CM

## 2024-01-05 DIAGNOSIS — D69.6 THROMBOCYTOPENIA: ICD-10-CM

## 2024-01-05 PROBLEM — E66.812 CLASS 2 SEVERE OBESITY WITH SERIOUS COMORBIDITY AND BODY MASS INDEX (BMI) OF 39.0 TO 39.9 IN ADULT: Chronic | Status: ACTIVE | Noted: 2024-01-05

## 2024-01-05 PROBLEM — E66.812 CLASS 2 SEVERE OBESITY WITH SERIOUS COMORBIDITY AND BODY MASS INDEX (BMI) OF 39.0 TO 39.9 IN ADULT: Status: ACTIVE | Noted: 2024-01-05

## 2024-01-05 PROCEDURE — 99999 PR PBB SHADOW E&M-EST. PATIENT-LVL IV: CPT | Mod: PBBFAC,,, | Performed by: FAMILY MEDICINE

## 2024-01-05 PROCEDURE — 99214 OFFICE O/P EST MOD 30 MIN: CPT | Mod: PBBFAC,PO | Performed by: FAMILY MEDICINE

## 2024-01-05 PROCEDURE — 99214 OFFICE O/P EST MOD 30 MIN: CPT | Mod: S$PBB,,, | Performed by: FAMILY MEDICINE

## 2024-01-05 RX ORDER — CYCLOBENZAPRINE HCL 10 MG
10 TABLET ORAL NIGHTLY
Qty: 30 TABLET | Refills: 0 | Status: SHIPPED | OUTPATIENT
Start: 2024-01-05 | End: 2024-02-12

## 2024-01-05 RX ORDER — SEMAGLUTIDE 2.68 MG/ML
2 INJECTION, SOLUTION SUBCUTANEOUS
Qty: 9 ML | Refills: 1 | Status: SHIPPED | OUTPATIENT
Start: 2024-01-05

## 2024-01-05 RX ORDER — DORZOLAMIDE HYDROCHLORIDE AND TIMOLOL MALEATE 20; 5 MG/ML; MG/ML
1 SOLUTION/ DROPS OPHTHALMIC 2 TIMES DAILY
COMMUNITY
Start: 2023-12-19

## 2024-01-05 RX ORDER — MELOXICAM 15 MG/1
15 TABLET ORAL DAILY
Qty: 90 TABLET | Refills: 4 | Status: SHIPPED | OUTPATIENT
Start: 2024-01-05

## 2024-01-05 NOTE — PATIENT INSTRUCTIONS
Brian Qiu,     If you are due for any health screening(s) below please notify me so we can arrange them to be ordered and scheduled. Most healthy patients at your age complete them, but you are free to accept or refuse.     If you can't do it, I'll definitely understand. If you can, I'd certainly appreciate it!    Tests to Keep You Healthy    Eye Exam: DUE  Colon Cancer Screening: Met on 1/19/2015  Last Blood Pressure <= 139/89 (1/5/2024): Yes  Last HbA1c < 8 (12/14/2023): Yes      Your diabetic retinal eye exam is due     Diabetes is the #1 cause of blindness in the [place] - early detection before signs or symptoms develop can prevent debilitating blindness.     Our records indicate that you may be overdue for your annual diabetic eye exam. Eye screening can help identify patients at risk for developing vision loss which is common in diabetes. This simple screening is an important step to keeping you healthy and preventing complications from diabetes.     This recommended diabetic eye exam should take place once per year and can prevent and treat diabetes complications in the eye before developing symptoms. This can be done with a special camera is used to take photographs of the back of your eye without having to dilate them, or you can see an eye doctor for a full dilated exam.     If you recently had your yearly diabetic eye exam performed outside of [facility], please let your Health care team know so that they can update your health record.                     Scheduled surgery with patient today. See ALETTERS TAB for surgery instructions gone over verbally on the phone and emailed via our fax machine to patient at [email or web address] and also sent a copy to her home address and patient portal.  Appointments below scheduled today:  Date of Surgery: 9/3/2021 at 9:00 am at the [facility]   Pre op Exam: N/A    H & P: Dr. Hanson 8/6/21   Consent signed: DOS   Postop visit: 9/10/2021 at 9:40 am     CHG/instructions were given to patient already on 8/6 by Dr. Hanson's nurse Brianna.

## 2024-01-05 NOTE — ASSESSMENT & PLAN NOTE
Continue anti-inflammatory.  GI precautions.  Previous plan:  Start meloxicam.  Follow-up in 1 to 2 weeks if no improvement.  Consider steroid injection of the knees bilaterally.  If patient is still does not have improvement after that will consider orthopedic consult or further imaging with MRI.  Consider physical therapy which is also an option.

## 2024-01-05 NOTE — ASSESSMENT & PLAN NOTE
"  History     Chief Complaint:  Fall       The history is provided by the patient.      Tre Vazquez is a 47 year old male with history of epilepsy, cerebral palsy, encephalomalacia, hypertension, and spastic diplegia among others who presents via EMS from his group home after a fall.  He states he has dizziness and lightheadedness at baseline, causing him to often feel off-balanced. He did strike his head on the corner of a bed frame today.  He also notes pain to the mid to low back.  He is unsure of other injuries.      Independent Historian:   None - Patient Only    Review of External Notes:   Multiple prior encounters for falls.  Discharge summary from May 22, 2023 reviewed where the patient was admitted for fall and placed on BiPAP.  He was in hypercapnic respiratory failure.      Medications:    BiPAP  Buspirone  Citalopram  Divalproex  Lamotrigine  Keppra  Levocarnitine  Pantoprazole  Prazosin  Seroquel  Zonisamide     Past Medical History:    Asthma  Cerebral palsy  Encephalomalacia    Toxic metabolic encephalopathy  Hyperammonemic encephalopathy  Depression  Epilepsy  Hypertension   Psychosis  PTSD  Spastic diplegia  Schizoaffective disorder  ANA on BiPAP  GERD  Hypoventilation syndrome  Sepsis   Respiratory failure    Past Surgical History:    Colonoscopy  EGD  Ankle surgery, bilateral   Removal of iris, left eye  Heel lengthening, bilateral    Physical Exam   Patient Vitals for the past 24 hrs:   BP Temp Temp src Pulse Resp SpO2 Height Weight   09/12/23 0130 (!) 128/93 -- -- 85 -- 93 % -- --   09/12/23 0115 (!) 128/95 -- -- 97 -- 96 % -- --   09/11/23 1941 -- 98  F (36.7  C) Temporal -- -- -- -- --   09/11/23 1937 (!) 148/84 -- -- 88 22 96 % 1.702 m (5' 7\") 124.7 kg (275 lb)        Physical Exam  General: Laying on the ED bed  HEENT: Normocephalic, superficial abrasion over the right posterior occiput  Cardiac: Radial pulses 2+, regular rate and rhythm  Pulm: Breathing comfortably, no accessory muscle " Follow-up with hepatology.  Consider hematology.  Monitoring platelet count.  Caution for bruising and bleeding.   usage, no conversational dyspnea, and lungs clear bilaterally  GI: Abdomen soft, nontender, no rigidity or guarding  MSK: C/T/L-spine nontender to palpation, no step-offs.  Well-healed lumbar surgical scar in the midline.  Anterior chest wall and posterior thorax nontender to palpation.  Pelvis stable.  Extremities x4 without bony deformity, no instability, tenderness to palpation, or painful range of motion.  Skin: Warm and dry  Neuro: Sensorimotor intact extremities x4  Psych: Pleasant mood and affect      Emergency Department Course     Imaging:  Cervical spine CT w/o contrast   Final Result   IMPRESSION:   HEAD CT:   No acute intracranial process.      CERVICAL SPINE CT:   1.  No CT evidence for acute fracture or post traumatic subluxation.   2.  Degenerative changes, as described; similar to prior..         Head CT w/o contrast   Final Result   IMPRESSION:   HEAD CT:   No acute intracranial process.      CERVICAL SPINE CT:   1.  No CT evidence for acute fracture or post traumatic subluxation.   2.  Degenerative changes, as described; similar to prior..            Report per radiology      Emergency Department Course & Assessments:    Assessments:  0120 I gathered history and examined the patient as noted above.     Independent Interpretation (X-rays, CTs, rhythm strip):  CT head without ICH    Social Determinants of Health affecting care:   Lives in a group home.    Disposition:  The patient was discharged to home.     Impression & Plan      Medical Decision Makin-year-old male presents with fall as above.  History of multiple prior falls and states he has chronic lightheadedness, no clear new symptoms today.  Vital signs are stable.  CT head and C-spine are thankfully without acute injuries.  I do not believe that further work-up is indicated in the ED.  Plan is for discharge home with PCP follow-up for further care.      Diagnosis:    ICD-10-CM    1. Fall, initial encounter  W19.XXXA              Scribe  Disclosure:  I, Ainsleybrian Gay, am serving as a scribe at 12:49 AM on 9/12/2023 to document services personally performed by Piotr Aguilera MD based on my observations and the provider's statements to me.     9/12/2023   Piotr Aguilera MD King, Colin, MD  09/12/23 0237

## 2024-01-05 NOTE — PROGRESS NOTES
PLAN:      Problem List Items Addressed This Visit       Type 2 diabetes mellitus with hyperglycemia, without long-term current use of insulin (Chronic)     Continue Ozempic at 2 milligrams weekly.  Update labs.    Previous plan:  Request records from [facility] .  Consider increasing Ozempic to 1 milligram weekly if needed.  Check labs.We will plan to monitor hemoglobin A1c at designated intervals 3 to 6 months.  I recommend ongoing Education for diabetic diet and exercise protocol.  We will continue to monitor for side effects.    Please be advised of symptoms to monitor for and to notify me immediately if persistent or worsening.  Follow up with Ophthalmology/Optometry and Podiatry at least annually.             Relevant Medications    semaglutide (OZEMPIC) 2 mg/dose (8 mg/3 mL) PnIj    Hypertension associated with diabetes (Chronic)     Update labs.Counseled on importance of hypertension disease course, I recommend ongoing Education for DASH-diet and exercise.  Counseled on medication regimen importance of treating high blood pressure.  Please be advised of risk of untreated blood pressure as discussed.  Please advised of ER precautions were given for symptoms of hypertensive urgency and emergency.           Relevant Medications    semaglutide (OZEMPIC) 2 mg/dose (8 mg/3 mL) PnIj    Chronic pain of both knees - Primary (Chronic)     Continue anti-inflammatory.  GI precautions.  Previous plan:  Start meloxicam.  Follow-up in 1 to 2 weeks if no improvement.  Consider steroid injection of the knees bilaterally.  If patient is still does not have improvement after that will consider orthopedic consult or further imaging with MRI.  Consider physical therapy which is also an option.         Relevant Medications    meloxicam (MOBIC) 15 MG tablet    Thrombocytopenia (Chronic)     Follow-up with hepatology.  Consider hematology.  Monitoring platelet count.  Caution for bruising and  bleeding.         Relevant Orders    CBC Auto Differential    Class 2 severe obesity with serious comorbidity and body mass index (BMI) of 39.0 to 39.9 in adult (Chronic)     Discussed lifestyle modification with diet and exercise.  Continue Ozempic.         Sciatica, left side     Refill Flexeril.   Avoid heavy lifting.  Consider physical therapy if no improvement.         Relevant Medications    cyclobenzaprine (FLEXERIL) 10 MG tablet     Future Appointments       Date Provider Specialty Appt Notes    2/5/2024 Jayne Garcia, FNP Hepatology fatty liver           Medication Management for assessment above:   Medication List with Changes/Refills   Current Medications    ASPIRIN (ECOTRIN) 81 MG EC TABLET    Take 81 mg by mouth once daily.    ATORVASTATIN (LIPITOR) 40 MG TABLET    Take 1 tablet (40 mg total) by mouth once daily.    DORZOLAMIDE-TIMOLOL 2-0.5% (COSOPT) 22.3-6.8 MG/ML OPHTHALMIC SOLUTION    Place 1 drop into the right eye 2 (two) times daily.    ENALAPRIL (VASOTEC) 20 MG TABLET    Take 1 tablet (20 mg total) by mouth once daily.    IBUPROFEN (ADVIL,MOTRIN) 800 MG TABLET    Take 800 mg by mouth every 8 (eight) hours as needed.    LATANOPROST 0.005 % OPHTHALMIC SOLUTION    Place 1 drop into both eyes every evening.    MULTIVITAMIN WITH MINERALS TABLET    Take 1 tablet by mouth once daily.    TADALAFIL (CIALIS) 20 MG TAB    Take 20 mg by mouth as needed.    TESTOSTERONE CYPIONATE (DEPOTESTOTERONE CYPIONATE) 100 MG/ML INJECTION    Inject 200 mg into the muscle every 14 (fourteen) days.   Changed and/or Refilled Medications    Modified Medication Previous Medication    CYCLOBENZAPRINE (FLEXERIL) 10 MG TABLET cyclobenzaprine (FLEXERIL) 10 MG tablet       Take 1 tablet (10 mg total) by mouth every evening.    TAKE 1 TABLET BY MOUTH EVERY DAY IN THE EVENING    MELOXICAM (MOBIC) 15 MG TABLET meloxicam (MOBIC) 15 MG tablet       Take 1 tablet (15 mg total) by mouth once daily.    Take 1 tablet (15 mg total) by  mouth once daily.    SEMAGLUTIDE (OZEMPIC) 2 MG/DOSE (8 MG/3 ML) PNIJ semaglutide (OZEMPIC) 2 mg/dose (8 mg/3 mL) PnIj       Inject 2 mg into the skin every 7 days.    Inject 2 mg into the skin every 7 days.       Casper Barrett M.D.  ==========================================================================  Subjective:   Patient ID: Lazarus Zamudio is a 68 y.o. male.  has a past medical history of Diabetes mellitus, type 2, Hypertension, and Morbid obesity.   Chief Complaint: Follow-up      Problem List Items Addressed This Visit       Type 2 diabetes mellitus with hyperglycemia, without long-term current use of insulin (Chronic)    Overview     January 2024:  Reports compliance.  No side effects reported.  Weight is stable.  Diabetes Medications               semaglutide (OZEMPIC) 2 mg/dose (8 mg/3 mL) PnIj Inject 2 mg into the skin every 7 days.        December 2023:  Patient reports compliance with Ozempic when he can find it in stock.  Patient denies any history of thyroid cancer, pancreatitis, MEN syndrome.   February 2021:  Patient reports compliance with Ozempic 0.5 milligrams weekly.  Patient reports no side effects.  Symptoms are controlled.  Due for labs.  BMI Readings from Last 10 Encounters:   01/05/24 39.83 kg/m²   12/13/23 39.30 kg/m²   07/18/23 38.74 kg/m²   07/05/23 38.60 kg/m²   04/25/23 38.74 kg/m²   04/14/23 39.26 kg/m²   03/30/23 40.08 kg/m²   03/15/23 39.60 kg/m²   03/13/23 39.77 kg/m²   02/14/23 40.13 kg/m²     Diabetes Medications               semaglutide (OZEMPIC) 2 mg/dose (8 mg/3 mL) PnIj Inject 2 mg into the skin every 7 days.                 Current Assessment & Plan     Continue Ozempic at 2 milligrams weekly.  Update labs.    Previous plan:  Request records from [facility] .  Consider increasing Ozempic to 1 milligram weekly if needed.  Check labs.We will plan to monitor hemoglobin A1c at designated intervals 3 to 6 months.  I recommend ongoing  Education for diabetic diet and exercise protocol.  We will continue to monitor for side effects.    Please be advised of symptoms to monitor for and to notify me immediately if persistent or worsening.  Follow up with Ophthalmology/Optometry and Podiatry at least annually.             Hypertension associated with diabetes (Chronic)    Overview     CHRONIC.  January 2024:  Hypertension Medications               enalapril (VASOTEC) 20 MG tablet Take 1 tablet (20 mg total) by mouth once daily.          December 2023: Blood pressure well controlled on current regimen.  Reviewed meds.  Patient on enalapril 20 milligram daily.        April 2023:  Reviewed medications.  STABLE. BP Reviewed.  Compliant with BP medications. No SE reported.   (-) CP, SOB, palpitations, dizziness, lightheadedness, HA, arm numbness, tingling or weakness, syncope.  Creatinine   Date Value Ref Range Status   12/14/2023 0.9 0.5 - 1.4 mg/dL Final   No results found for this or any previous visit.           Current Assessment & Plan     Update labs.Counseled on importance of hypertension disease course, I recommend ongoing Education for DASH-diet and exercise.  Counseled on medication regimen importance of treating high blood pressure.  Please be advised of risk of untreated blood pressure as discussed.  Please advised of ER precautions were given for symptoms of hypertensive urgency and emergency.           Chronic pain of both knees - Primary (Chronic)    Overview     January 2024: Chronic.  Patient is getting some improvement with meloxicam.  Started two weeks ago.  December 2023: Reviewed x-ray from previous.  Patient reports he did have a fall in December of last year.  Patient has been having knee pain since that time.   He has not having any improvement.  Patient does not take anything for the knee pain.  No new injuries or trauma.      Previous history:Chronic.  Intermittent control.  Patient does not take any medication for this condition.   He did well with ibuprofen in the past.  X-ray Knee Ortho Left  Narrative: EXAM:  XR KNEE ORTHO LEFT    CLINICAL HISTORY:    Left knee joint pain    TECHNIQUE: 3 views of the left knee.    COMPARISON: None.    FINDINGS:    Minor joint space narrowing.  Mild marginal spurring.  Minor patellar femoral joint spurring.    No obvious joint effusion.    No fracture.  Impression:  Mild tricompartment degenerative joint disease.    Finalized on: 4/25/2023 11:36 AM By:  Vincent Lopez MD  [facility]# [id number]      2023-04-25 11:38:54.342    [facility]  Formatting of this note might be different from the original. Formatting of this note might be different from the original. Chronic.  Intermittent control.  Patient does not take any medication for this condition.  He did well with ibuprofen in the past.  X-ray Knee Ortho Left Narrative: EXAM:  XR KNEE ORTHO LEFT CLINICAL HISTORY:    Left knee joint pain TECHNIQUE: 3 views of the left knee. COMPARISON: None. FINDINGS:    Minor joint space narrowing.  Mild marginal spurring.  Minor patellar femoral joint spurring. No obvious joint effusion. No fracture. Impression:  Mild tricompartment degenerative joint disease. Finalized on: 4/25/2023 11:36 AM By:  Vincent Lopez MD [facility]# [id number]      2023-04-25 11:38:54.342    [facility] Last Assessment & Plan: Formatting of this note might be different from the original. Trial of anti-inflammatory.  Consider physical therapy if no improvement.  Patient may ultimately need orthopedic consult for worsening arthritis of the left knee.  GI precautions.         Current Assessment & Plan     Continue anti-inflammatory.  GI precautions.  Previous plan:  Start meloxicam.  Follow-up in 1 to 2 weeks if no improvement.  Consider steroid injection of the knees bilaterally.  If patient is still does not have improvement after that will consider orthopedic consult or further imaging with MRI.  Consider physical therapy which is also an option.         Thrombocytopenia  "(Chronic)    Overview     Lab Results   Component Value Date    IRON 62 03/15/2023    TRANSFERRIN 302 03/15/2023    TIBC 447 03/15/2023    FESATURATED 14 (L) 03/15/2023      No results found for: "GQSPNTDH29"  No results found for: "FOLATE"  Lab Results   Component Value Date    WBC 5.33 12/14/2023    HGB 14.6 12/14/2023    HCT 42.2 12/14/2023    MCV 86 12/14/2023    PLT 95 (L) 12/14/2023              Current Assessment & Plan     Follow-up with hepatology.  Consider hematology.  Monitoring platelet count.  Caution for bruising and bleeding.         Class 2 severe obesity with serious comorbidity and body mass index (BMI) of 39.0 to 39.9 in adult (Chronic)    Overview     BMI Readings from Last 10 Encounters:   01/05/24 39.83 kg/m²   12/13/23 39.30 kg/m²   07/18/23 38.74 kg/m²   07/05/23 38.60 kg/m²   04/25/23 38.74 kg/m²   04/14/23 39.26 kg/m²   03/30/23 40.08 kg/m²   03/15/23 39.60 kg/m²   03/13/23 39.77 kg/m²   02/14/23 40.13 kg/m²            Current Assessment & Plan     Discussed lifestyle modification with diet and exercise.  Continue Ozempic.         Sciatica, left side    Overview     January 2024: Patient reports that he recently was weedeating and fell.  He exacerbated his back.  He is wanting to try muscle relaxers again.  He is having improvement with meloxicam.  Previous history:  Subacute.  Approximately four weeks.  Patient tried Flexeril with some improvement.  Still having significant left-sided pain radiating from his back/hip down into his knee area.  X-ray today is abnormal in reviewed with the patient.  Patient is also reporting some left inner groin pain related to a previous vein surgery.         Current Assessment & Plan     Refill Flexeril.   Avoid heavy lifting.  Consider physical therapy if no improvement.             Review of patient's allergies indicates:   Allergen Reactions    Metformin      Intolerant     Penicillins Rash     Current Outpatient Medications   Medication Instructions " "   aspirin (ECOTRIN) 81 mg, Oral, Daily,      atorvastatin (LIPITOR) 40 mg, Oral, Daily    cyclobenzaprine (FLEXERIL) 10 mg, Oral, Nightly    dorzolamide-timolol 2-0.5% (COSOPT) 22.3-6.8 mg/mL ophthalmic solution 1 drop, Right Eye, 2 times daily    enalapril (VASOTEC) 20 mg, Oral, Daily    ibuprofen (ADVIL,MOTRIN) 800 mg, Oral, Every 8 hours PRN    latanoprost 0.005 % ophthalmic solution 1 drop, Both Eyes, Nightly    meloxicam (MOBIC) 15 mg, Oral, Daily    multivitamin with minerals tablet 1 tablet, Oral, Daily    OZEMPIC 2 mg, Subcutaneous, Every 7 days    tadalafiL (CIALIS) 20 mg, Oral, As needed (PRN)    testosterone cypionate (DEPOTESTOTERONE CYPIONATE) 200 mg, Every 14 days      I have reviewed the PMH, social history, FamilyHx, surgical history, allergies and medications documented / confirmed by the patient at the time of this visit.  Review of Systems   Constitutional:  Negative for chills, fatigue, fever and unexpected weight change.   HENT:  Negative for ear pain and sore throat.    Eyes:  Negative for redness and visual disturbance.   Respiratory:  Negative for cough and shortness of breath.    Cardiovascular:  Negative for chest pain and palpitations.   Gastrointestinal:  Negative for nausea and vomiting.   Endocrine: Negative for cold intolerance and heat intolerance.   Genitourinary:  Negative for difficulty urinating and hematuria.   Musculoskeletal:  Positive for arthralgias, back pain and myalgias.   Skin:  Negative for rash and wound.   Allergic/Immunologic: Negative for environmental allergies and food allergies.   Neurological:  Negative for weakness and headaches.   Hematological:  Negative for adenopathy. Does not bruise/bleed easily.   Psychiatric/Behavioral:  Negative for sleep disturbance. The patient is not nervous/anxious.      Objective:   /80   Pulse 64   Ht 5' 10" (1.778 m)   Wt 125.9 kg (277 lb 9.6 oz)   SpO2 98%   BMI 39.83 kg/m²   Physical Exam  Vitals and nursing note " reviewed.   Constitutional:       General: He is not in acute distress.     Appearance: He is well-developed. He is obese. He is not diaphoretic.   HENT:      Head: Normocephalic and atraumatic.      Right Ear: External ear normal.      Left Ear: External ear normal.      Nose: Nose normal. No rhinorrhea.   Eyes:      Extraocular Movements: Extraocular movements intact.      Pupils: Pupils are equal, round, and reactive to light.   Cardiovascular:      Rate and Rhythm: Normal rate.      Pulses: Normal pulses.   Pulmonary:      Effort: Pulmonary effort is normal. No respiratory distress.      Breath sounds: Normal breath sounds.   Abdominal:      General: Bowel sounds are normal.      Palpations: Abdomen is soft.   Musculoskeletal:         General: Tenderness and deformity present. Normal range of motion.      Cervical back: Normal range of motion and neck supple.      Thoracic back: No spasms, tenderness or bony tenderness.      Lumbar back: Spasms present. No tenderness or bony tenderness. Negative right straight leg raise test and negative left straight leg raise test.      Left hip: No tenderness, bony tenderness or crepitus. Normal strength.      Right knee: No effusion, erythema, ecchymosis or lacerations. Normal range of motion. Tenderness present over the medial joint line.      Left knee: Bony tenderness present. No deformity, effusion, erythema, ecchymosis or lacerations. Tenderness present over the medial joint line.      Right lower leg: No edema.      Left lower leg: No edema.        Legs:       Comments: Area of tenderness to palpation   Skin:     General: Skin is warm and dry.      Capillary Refill: Capillary refill takes less than 2 seconds.      Findings: No rash.   Neurological:      General: No focal deficit present.      Mental Status: He is alert and oriented to person, place, and time.   Psychiatric:         Attention and Perception: He is attentive.         Mood and Affect: Mood normal. Mood is  not anxious or depressed. Affect is not labile, blunt, angry or inappropriate.         Speech: He is communicative. Speech is not rapid and pressured, delayed, slurred or tangential.         Behavior: Behavior normal. Behavior is not agitated, slowed, aggressive, withdrawn, hyperactive or combative.         Thought Content: Thought content normal. Thought content is not paranoid or delusional. Thought content does not include homicidal or suicidal ideation. Thought content does not include homicidal or suicidal plan.         Cognition and Memory: Memory is not impaired.         Judgment: Judgment normal. Judgment is not impulsive or inappropriate.           Assessment:     1. Chronic pain of both knees    2. Sciatica, left side    3. Type 2 diabetes mellitus with hyperglycemia, without long-term current use of insulin    4. Thrombocytopenia    5. Hypertension associated with diabetes    6. Class 2 severe obesity with serious comorbidity and body mass index (BMI) of 39.0 to 39.9 in adult, unspecified obesity type      MDM:   Moderate complexity.  Moderate risk.  Total time: 31 minutes.  This includes total time spent on the encounter, which includes face to face time and non-face to face time preparing to see the patient (eg, review of previous medical records, tests), Obtaining and/or reviewing separately obtained history, documenting clinical information in the electronic or other health record, independently interpreting results (not separately reported)/communicating results to the patient/family/caregiver, and/or care coordination (not separately reported).    I have Reviewed and summarized old records.  I have performed thorough medication reconciliation today and discussed risk and benefits of medications.  I have reviewed x-ray, labs and discussed with patient.  All questions were answered.  I am requesting old records and will review them once they are available.  Urology    I have signed for the following  orders AND/OR meds.  Orders Placed This Encounter   Procedures    CBC Auto Differential     Standing Status:   Future     Standing Expiration Date:   3/5/2025     Medications Ordered This Encounter   Medications    cyclobenzaprine (FLEXERIL) 10 MG tablet     Sig: Take 1 tablet (10 mg total) by mouth every evening.     Dispense:  30 tablet     Refill:  0    meloxicam (MOBIC) 15 MG tablet     Sig: Take 1 tablet (15 mg total) by mouth once daily.     Dispense:  90 tablet     Refill:  4    semaglutide (OZEMPIC) 2 mg/dose (8 mg/3 mL) PnIj     Sig: Inject 2 mg into the skin every 7 days.     Dispense:  9 mL     Refill:  1          Follow up in about 6 months (around 7/5/2024), or if symptoms worsen or fail to improve, for Med refills.    If no improvement in symptoms or symptoms worsen, advised to call/follow-up at clinic or go to ER. Patient voiced understanding and all questions/concerns were addressed.   DISCLAIMER: This note was compiled by using a speech recognition dictation system and therefore please be aware that typographical / speech recognition errors can and do occur.  Please contact me if you see any errors specifically.    Casper Barrett M.D.       Office: [phone] [address] [place] [address]  FAX: [phone]

## 2024-02-09 RX ORDER — ATORVASTATIN CALCIUM 40 MG/1
40 TABLET, FILM COATED ORAL
Qty: 90 TABLET | Refills: 1 | Status: SHIPPED | OUTPATIENT
Start: 2024-02-09

## 2024-02-09 NOTE — TELEPHONE ENCOUNTER
Refill Decision Note   Lazarus Zamudio  is requesting a refill authorization.  Brief Assessment and Rationale for Refill:  Approve     Medication Therapy Plan:         Comments:     Note composed:8:30 AM 02/09/2024             Appointments     Last Visit   1/5/2024 Casper Barrett MD   Next Visit   Visit date not found Casper Barrett MD

## 2024-02-09 NOTE — TELEPHONE ENCOUNTER
No care due was identified.  [facility] Embedded Care Due Messages. Reference number: [id number].   2/09/2024 2:09:39 AM CST

## 2024-02-11 DIAGNOSIS — M54.32 SCIATICA, LEFT SIDE: ICD-10-CM

## 2024-02-11 NOTE — TELEPHONE ENCOUNTER
No care due was identified.  [facility] Embedded Care Due Messages. Reference number: [id number].   2/11/2024 2:07:10 PM CST

## 2024-02-12 RX ORDER — CYCLOBENZAPRINE HCL 10 MG
10 TABLET ORAL NIGHTLY
Qty: 30 TABLET | Refills: 0 | Status: SHIPPED | OUTPATIENT
Start: 2024-02-12 | End: 2024-03-11

## 2024-02-12 NOTE — TELEPHONE ENCOUNTER
Refill Routing Note   Medication(s) are not appropriate for processing by [facility] for the following reason(s):        Outside of protocol    [facility] action(s):  Route               Appointments  past 12m or future 3m with PCP    Date Provider   Last Visit   1/5/2024 Casper Barrett MD   Next Visit   Visit date not found Casper Barrett MD   ED visits in past 90 days: 0        Note composed:8:20 AM 02/12/2024

## 2024-03-11 DIAGNOSIS — M54.32 SCIATICA, LEFT SIDE: ICD-10-CM

## 2024-03-11 RX ORDER — CYCLOBENZAPRINE HCL 10 MG
10 TABLET ORAL NIGHTLY
Qty: 30 TABLET | Refills: 0 | Status: SHIPPED | OUTPATIENT
Start: 2024-03-11 | End: 2024-04-22

## 2024-03-11 NOTE — TELEPHONE ENCOUNTER
Nephrology Progress Note:      Assessment & Plan    1.  ESRD: On a Tuesday Thursday Saturday outpatient hemodialysis schedule.  She is due for dialysis on 11/27/2021.  2.  Mental status changes: G's. CT/MRI of the brain had revealed no acute abnormalities.  EEG from July 2021 had revealed absence of seizure activity.  Mental status changes are felt to be more a consequence of acute psychosis rather than encephalopathy.  3.  Hypertension blood pressures are reasonable.  4.  Anemia: Hemoglobin and hematocrit are acceptable.    Plan:   1.  continue dialysis per outpatient schedule.  2.  Psychiatric consult awaited.        Subjective   Lying supine in bed.  Poorly communicative.  Lunch tray is at the bedside on eaten.  No overt distress.    PMHx, FHx, SHx, and review of systems reviewed and otherwise unchanged.      Patient Active Problem List   Diagnosis   • Essential hypertension   • CHF (congestive heart failure) (CMS/[facility])   • DERIK (obstructive sleep apnea)   • Diabetes mellitus type 2, uncontrolled, with complications (CMS/[facility])   • Dyslipidemia   • Insulin long-term use (CMS/[facility])   • Obesity, Class III, BMI 40-49.9 (morbid obesity) (CMS/[facility])   • Hospital discharge follow-up   • Chronic diastolic CHF (congestive heart failure) (CMS/[facility])   • Hypertensive heart disease without heart failure   • ESRD (end stage renal disease) (CMS/[facility])       Objective     I/O's    Intake/Output Summary (Last 24 hours) at 11/25/2021 1602  Last data filed at 11/24/2021 2000  Gross per 24 hour   Intake --   Output 1300 ml   Net -1300 ml       Last Recorded Vitals  Blood pressure 99/63, pulse (!) 109, temperature 98.2 °F (36.8 °C), temperature source Oral, resp. rate 16, height 5' 5\" (1.651 m), weight 117 kg (257 lb 15 oz), SpO2 96 %.  Body mass index is 42.92 kg/m².    Physical Exam     General: no distress , somnolent      Eye:  Pink conj, no icterus  HENT:  Normocephalic, atraumatic   , moist oral mucosa  Neck:  No neck masses, supple  No care due was identified.  [facility] Embedded Care Due Messages. Reference number: [id number].   3/11/2024 4:06:36 PM CDT     Heart: Regular rhythm, normal rate , no rubs, no murmur , No edema BLE   Respiratory: No wheezes , no crackles   GI:  abd soft, NABS, no guarding  : No suprapubic guarding, No little catheter   Musculoskeletal: No cyanosis, no clubbing , no joint swelling  , LUE AVF - good thrill/bruit   Integumentary: warm, no rash   Neuro: Twitching of the fingers of both hands noted.  No other seizure-like activity evidenced.   Psychiatric: no agitation, unable to assess given somnolent          Labs   Recent Labs     11/23/21 2307 11/25/21  0545   SODIUM 138 138   POTASSIUM 4.4 4.2   CO2 29 27   ANIONGAP 9* 12   GLUCOSE 152* 166*   BUN 23* 18   CREATININE 5.13* 5.11*   BCRAT 4* 4*   CALCIUM 9.6 9.1   BILIRUBIN 0.2 0.3   AST 14 17   GPT 13 8   ALKPT 181* 153*   GLOB 4.9* 4.1*   AGR 0.7* 0.7*        Recent Labs     11/23/21 2306 11/25/21  0545   WBC 10.0 9.2   RBC 4.07 3.70*   HGB 13.0 11.9*   HCT 42.7 39.2    201   .9* 105.9*   MCH 31.9 32.2   MCHC 30.4* 30.4*   NRBCRE 0 0         Imaging          Liudmila Arora MD

## 2024-03-11 NOTE — TELEPHONE ENCOUNTER
Refill Routing Note   Medication(s) are not appropriate for processing by [facility] for the following reason(s):        Outside of protocol    [facility] action(s):  Route               Appointments  past 12m or future 3m with PCP    Date Provider   Last Visit   1/5/2024 Casper Barrett MD   Next Visit   Visit date not found Casper Barrett MD   ED visits in past 90 days: 0        Note composed:4:36 PM 03/11/2024

## 2024-03-26 DIAGNOSIS — Z00.00 ENCOUNTER FOR MEDICARE ANNUAL WELLNESS EXAM: ICD-10-CM

## 2024-04-22 DIAGNOSIS — M54.32 SCIATICA, LEFT SIDE: ICD-10-CM

## 2024-04-22 RX ORDER — CYCLOBENZAPRINE HCL 10 MG
10 TABLET ORAL NIGHTLY
Qty: 30 TABLET | Refills: 0 | Status: SHIPPED | OUTPATIENT
Start: 2024-04-22 | End: 2024-05-06

## 2024-04-22 NOTE — TELEPHONE ENCOUNTER
No care due was identified.  [facility] Embedded Care Due Messages. Reference number: [id number].   4/22/2024 3:09:13 PM CDT

## 2024-04-22 NOTE — TELEPHONE ENCOUNTER
Refill Routing Note   Medication(s) are not appropriate for processing by [facility] for the following reason(s):        Outside of protocol    [facility] action(s):  Route               Appointments  past 12m or future 3m with PCP    Date Provider   Last Visit   1/5/2024 Casper Barrett MD   Next Visit   Visit date not found Casper Barrett MD   ED visits in past 90 days: 0        Note composed:5:32 PM 04/22/2024

## 2024-05-05 DIAGNOSIS — M54.32 SCIATICA, LEFT SIDE: ICD-10-CM

## 2024-05-05 NOTE — TELEPHONE ENCOUNTER
No care due was identified.  [facility] Embedded Care Due Messages. Reference number: [id number].   5/05/2024 3:46:13 PM CDT

## 2024-05-06 RX ORDER — CYCLOBENZAPRINE HCL 10 MG
10 TABLET ORAL NIGHTLY
Qty: 30 TABLET | Refills: 0 | Status: SHIPPED | OUTPATIENT
Start: 2024-05-06

## 2024-05-06 NOTE — TELEPHONE ENCOUNTER
Refill Routing Note   Medication(s) are not appropriate for processing by [facility] for the following reason(s):        Outside of protocol    [facility] action(s):  Route               Appointments  past 12m or future 3m with PCP    Date Provider   Last Visit   1/5/2024 Casper Barrett MD   Next Visit   Visit date not found Casper Barrett MD   ED visits in past 90 days: 0        Note composed:9:23 AM 05/06/2024

## 2024-05-18 ENCOUNTER — PATIENT MESSAGE (OUTPATIENT)
Dept: FAMILY MEDICINE | Facility: CLINIC | Age: 69
End: 2024-05-18
Payer: COMMERCIAL

## 2024-06-05 DIAGNOSIS — M25.562 LEFT KNEE PAIN, UNSPECIFIED CHRONICITY: Primary | ICD-10-CM

## 2024-06-06 ENCOUNTER — HOSPITAL ENCOUNTER (OUTPATIENT)
Dept: RADIOLOGY | Facility: HOSPITAL | Age: 69
Discharge: HOME | End: 2024-06-06
Attending: ORTHOPAEDIC SURGERY
Payer: MEDICARE

## 2024-06-06 ENCOUNTER — OFFICE VISIT (OUTPATIENT)
Dept: ORTHOPEDICS | Facility: CLINIC | Age: 69
End: 2024-06-06
Payer: MEDICARE

## 2024-06-06 VITALS — WEIGHT: 273 LBS | HEIGHT: 70 IN | BODY MASS INDEX: 39.08 KG/M2

## 2024-06-06 DIAGNOSIS — M17.11 PRIMARY OSTEOARTHRITIS OF RIGHT KNEE: Primary | ICD-10-CM

## 2024-06-06 DIAGNOSIS — M25.562 LEFT KNEE PAIN, UNSPECIFIED CHRONICITY: ICD-10-CM

## 2024-06-06 DIAGNOSIS — M17.12 PRIMARY OSTEOARTHRITIS OF LEFT KNEE: ICD-10-CM

## 2024-06-06 PROCEDURE — 99204 OFFICE O/P NEW MOD 45 MIN: CPT | Mod: S$PBB,25,, | Performed by: ORTHOPAEDIC SURGERY

## 2024-06-06 PROCEDURE — 73562 X-RAY EXAM OF KNEE 3: CPT | Mod: 26,LT,, | Performed by: RADIOLOGY

## 2024-06-06 PROCEDURE — 99999 PR PBB SHADOW E&M-EST. PATIENT-LVL III: CPT | Mod: PBBFAC,,, | Performed by: ORTHOPAEDIC SURGERY

## 2024-06-06 PROCEDURE — 99999PBSHW PR PBB SHADOW TECHNICAL ONLY FILED TO HB: Mod: PBBFAC,,,

## 2024-06-06 PROCEDURE — 20610 DRAIN/INJ JOINT/BURSA W/O US: CPT | Mod: 50,PBBFAC,PO | Performed by: ORTHOPAEDIC SURGERY

## 2024-06-06 PROCEDURE — 73560 X-RAY EXAM OF KNEE 1 OR 2: CPT | Mod: TC,PO,RT

## 2024-06-06 PROCEDURE — 99213 OFFICE O/P EST LOW 20 MIN: CPT | Mod: PBBFAC,25,PO | Performed by: ORTHOPAEDIC SURGERY

## 2024-06-06 PROCEDURE — 73560 X-RAY EXAM OF KNEE 1 OR 2: CPT | Mod: 26,59,RT, | Performed by: RADIOLOGY

## 2024-06-06 RX ADMIN — TRIAMCINOLONE ACETONIDE 40 MG: 40 INJECTION, SUSPENSION INTRA-ARTICULAR; INTRAMUSCULAR at 01:06

## 2024-06-18 ENCOUNTER — PATIENT MESSAGE (OUTPATIENT)
Dept: ADMINISTRATIVE | Facility: HOSPITAL | Age: 69
End: 2024-06-18
Payer: COMMERCIAL

## 2024-06-18 ENCOUNTER — PATIENT OUTREACH (OUTPATIENT)
Dept: ADMINISTRATIVE | Facility: HOSPITAL | Age: 69
End: 2024-06-18
Payer: COMMERCIAL

## 2024-06-18 DIAGNOSIS — E11.65 TYPE 2 DIABETES MELLITUS WITH HYPERGLYCEMIA, WITHOUT LONG-TERM CURRENT USE OF INSULIN: Primary | ICD-10-CM

## 2024-06-18 NOTE — PROGRESS NOTES
Replying to Campaign Questionnaire for Overdue HM: Labs      Scheduled patient Labs on 06/20. Sent confirmation via portal. Awaiting patient reply  Microalbumin urine order placed

## 2024-06-19 ENCOUNTER — PATIENT MESSAGE (OUTPATIENT)
Dept: FAMILY MEDICINE | Facility: CLINIC | Age: 69
End: 2024-06-19
Payer: MEDICARE

## 2024-06-20 ENCOUNTER — OFFICE VISIT (OUTPATIENT)
Dept: PODIATRY | Facility: CLINIC | Age: 69
End: 2024-06-20
Payer: MEDICARE

## 2024-06-20 ENCOUNTER — PATIENT MESSAGE (OUTPATIENT)
Dept: ORTHOPEDICS | Facility: CLINIC | Age: 69
End: 2024-06-20
Payer: MEDICARE

## 2024-06-20 VITALS — BODY MASS INDEX: 39.07 KG/M2 | HEIGHT: 70 IN | WEIGHT: 272.94 LBS

## 2024-06-20 DIAGNOSIS — E11.65 TYPE 2 DIABETES MELLITUS WITH HYPERGLYCEMIA, WITHOUT LONG-TERM CURRENT USE OF INSULIN: Primary | ICD-10-CM

## 2024-06-20 DIAGNOSIS — M17.11 PRIMARY OSTEOARTHRITIS OF RIGHT KNEE: Primary | ICD-10-CM

## 2024-06-20 DIAGNOSIS — E11.40 TYPE 2 DIABETES MELLITUS WITH DIABETIC NEUROPATHY, WITHOUT LONG-TERM CURRENT USE OF INSULIN: ICD-10-CM

## 2024-06-20 DIAGNOSIS — L60.3 ONYCHODYSTROPHY: ICD-10-CM

## 2024-06-20 DIAGNOSIS — I87.2 VENOUS INSUFFICIENCY OF BOTH LOWER EXTREMITIES: ICD-10-CM

## 2024-06-20 DIAGNOSIS — L60.2 ONYCHOGRYPHOSIS: ICD-10-CM

## 2024-06-20 DIAGNOSIS — M17.12 PRIMARY OSTEOARTHRITIS OF LEFT KNEE: ICD-10-CM

## 2024-06-20 PROCEDURE — 99213 OFFICE O/P EST LOW 20 MIN: CPT | Mod: PBBFAC | Performed by: PODIATRIST

## 2024-06-20 PROCEDURE — 99999 PR PBB SHADOW E&M-EST. PATIENT-LVL III: CPT | Mod: PBBFAC,,, | Performed by: PODIATRIST

## 2024-06-20 PROCEDURE — 11721 DEBRIDE NAIL 6 OR MORE: CPT | Mod: Q9,PBBFAC | Performed by: PODIATRIST

## 2024-06-20 RX ORDER — TRIAMCINOLONE ACETONIDE 40 MG/ML
40 INJECTION, SUSPENSION INTRA-ARTICULAR; INTRAMUSCULAR
Status: DISCONTINUED | OUTPATIENT
Start: 2024-06-06 | End: 2024-06-20 | Stop reason: HOSPADM

## 2024-06-20 RX ORDER — MUPIROCIN 20 MG/G
OINTMENT TOPICAL 2 TIMES DAILY
Qty: 30 G | Refills: 1 | Status: SHIPPED | OUTPATIENT
Start: 2024-06-20

## 2024-06-20 NOTE — PROCEDURES
Large Joint Aspiration/Injection: bilateral knee    Date/Time: 6/6/2024 1:30 PM    Performed by: Casper Nixon MD  Authorized by: Casper Nixon MD    Consent Done?:  Yes (Verbal)  Indications:  Pain  Timeout: prior to procedure the correct patient, procedure, and site was verified    Prep: patient was prepped and draped in usual sterile fashion    Local anesthetic:  Lidocaine 1% without epinephrine  Anesthetic total (ml):  5      Details:  Needle Size:  21 G  Approach:  Anterolateral  Location:  Knee  Laterality:  Bilateral  Site:  Bilateral knee  Medications (Right):  40 mg triamcinolone acetonide 40 mg/mL  Medications (Left):  40 mg triamcinolone acetonide 40 mg/mL  Patient tolerance:  Patient tolerated the procedure well with no immediate complications

## 2024-06-20 NOTE — PROGRESS NOTES
Subjective:       Patient ID: Lazarus Zamudio is a 69 y.o. male.    Chief Complaint: Toe Pain and Diabetic Foot Exam (Diabetic foot exam, great toe on right foot might need to be removed, no pain, pt is wearing tennis shoes and socks )      HPI: Lazarus Zamudio presents to the office today, under referral by , Casper Barrett MD, for his annual diabetic foot assessment and risk evaluation.  Patient is a DMII. This patient last saw his/her internal/family medicine physician on 01/05/2024.  Concerned about appearance of the right hallux nail.    Hemoglobin A1C   Date Value Ref Range Status   12/14/2023 7.4 (H) 4.0 - 5.6 % Final     Comment:     ADA Screening Guidelines:  5.7-6.4%  Consistent with prediabetes  >or=6.5%  Consistent with diabetes    High levels of fetal hemoglobin interfere with the HbA1C  assay. Heterozygous hemoglobin variants (HbS, HgC, etc)do  not significantly interfere with this assay.   However, presence of multiple variants may affect accuracy.     06/30/2023 6.4 (H) 4.0 - 5.6 % Final     Comment:     ADA Screening Guidelines:  5.7-6.4%  Consistent with prediabetes  >or=6.5%  Consistent with diabetes    High levels of fetal hemoglobin interfere with the HbA1C  assay. Heterozygous hemoglobin variants (HbS, HgC, etc)do  not significantly interfere with this assay.   However, presence of multiple variants may affect accuracy.     02/14/2023 6.8 (H) 4.0 - 5.6 % Final     Comment:     ADA Screening Guidelines:  5.7-6.4%  Consistent with prediabetes  >or=6.5%  Consistent with diabetes    High levels of fetal hemoglobin interfere with the HbA1C  assay. Heterozygous hemoglobin variants (HbS, HgC, etc)do  not significantly interfere with this assay.   However, presence of multiple variants may affect accuracy.     .    Review of patient's allergies indicates:   Allergen Reactions    Metformin      Intolerant     Penicillins Rash       Past Medical History:   Diagnosis Date    Diabetes mellitus, type  "2     Hypertension     Morbid obesity        Family History   Problem Relation Name Age of Onset    Heart disease Mother Maribeth     Vision loss Mother Maribeth     Diabetes Father Ramon     Heart disease Father Ramon     Hypertension Father Ramon     Diabetes Maternal Grandmother Kathy     Cancer Maternal Grandfather Garrison        Social History     Socioeconomic History    Marital status:    Occupational History     Employer:  [employer]   Tobacco Use    Smoking status: Never    Smokeless tobacco: Never   Substance and Sexual Activity    Alcohol use: No    Drug use: No    Sexual activity: Yes     Partners: Female   Social History Narrative    . 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     Social Determinants of Health     Financial Resource Strain: Low Risk  (8/11/2020)    Overall Financial Resource Strain (CARDIA)     Difficulty of Paying Living Expenses: Not very hard   Food Insecurity: No Food Insecurity (8/11/2020)    Hunger Vital Sign     Worried About Running Out of Food in the Last Year: Never true     Ran Out of Food in the Last Year: Never true   Transportation Needs: No Transportation Needs (8/11/2020)    PRAPARE - Transportation     Lack of Transportation (Medical): No     Lack of Transportation (Non-Medical): No   Physical Activity: Sufficiently Active (8/11/2020)    Exercise Vital Sign     Days of Exercise per Week: 5 days     Minutes of Exercise per Session: 30 min   Stress: No Stress Concern Present (8/11/2020)    [employer] - Occupational Stress Questionnaire     Feeling of Stress : Only a little       Past Surgical History:   Procedure Laterality Date    HERNIA REPAIR      KNEE ARTHROSCOPY W/ DEBRIDEMENT      VASECTOMY         Review of Systems        Objective:   Ht 5' 10" (1.778 m)   Wt 123.8 kg (272 lb 14.9 oz)   BMI 39.16 kg/m²     Physical Exam  LOWER EXTREMITY PHYSICAL EXAMINATION    ORTHOPEDIC:  No pain on palpation of the foot or ankle.   Range of motion " within normal limits of the ankle joint, rearfoot, and forefoot.  Manual muscle strength testing is 5/5 with dorsiflexion, plantar flexion, abduction and abduction of the lower extremity.  No pain with or without resistance.  The patient is a full to ambulate without pain or discomfort.  The patient's gait is non antalgic.  The patient does not utilize any assistive device for ambulation.    VASCULAR:  The right dorsalis pedis pulse 2/4 and the right posterior tibial pulse 2/4.  The left dorsalis pedis pulse 2/4 and posterior tibial pulse on the left is 2/4.  Capillary refill is intact.  Pedal hair growth intact.  Telangiectasias and varicosities noted bilaterally    NEUROLOGY:  Protective sensation is not intact to the right left foot.  Vibratory sensation is diminished.  Proprioception is intact.  Sharp/dull is reduced.    DERMATOLOGY:  Skin is supple, moist, intact.  Small wound present to the distal lateral aspect of the right 3rd toe.  No signs of underlying infection or abscess..  The R1, 2, 5 and left L1,2, 5 are thickened, discolored dystrophic.  There is subungual debris.  Nail plates have area of dark discoloration.  The remaining nails 3-4 on the right foot and the left foot are elongated but of normal color, thickness, and texture.   There is no signs of ingrowing into the medial or lateral borders.  There is no evidence of wounds or skin breakdown.  No edema or erythema.  No obvious lacerations or fissuring.  Interdigital spaces are clean, dry, intact.  No rashes or scars appreciated.  Assessment:     1. Type 2 diabetes mellitus with hyperglycemia, without long-term current use of insulin    2. Type 2 diabetes mellitus with diabetic neuropathy, without long-term current use of insulin    3. Venous insufficiency of both lower extremities    4. Onychodystrophy    5. Onychogryphosis        Plan:     Type 2 diabetes mellitus with hyperglycemia, without long-term current use of insulin    Type 2 diabetes  mellitus with diabetic neuropathy, without long-term current use of insulin    Venous insufficiency of both lower extremities    Onychodystrophy    Onychogryphosis    Other orders  -     mupirocin (BACTROBAN) 2 % ointment; Apply topically 2 (two) times daily.  Dispense: 30 g; Refill: 1      Thorough discussion is had with the patient today, concerning the diagnosis, its etiology, and the treatment algorithm at present.    I counseled the patient on his/her Diabetic Mellitus regarding today's clinical examination and objection findings. We did also discuss recent medication changes, pertinent labs and imaging evaluations and other medical consultation notes and progress notes. Greater than 50% of this visit was spent on counseling and coordination of care. Greater than 20 minutes of this appt. was spent on education about the diabetic foot, in relation to PVD and/or neuropathy, and the prevention of limb loss.     Shoe gear is inspected and wear and proper fit/type. Patient is reminded of the importance of good nutrition and blood sugar control to help prevent podiatric complications of diabetes. Patient instructed on proper foot hygeine. We discussed wearing proper shoe gear, daily foot inspections, never walking without protective shoe gear, never putting sharp instruments to feet.  Patient  will continue to monitor the areas daily, inspect feet, wear protective shoe gear when ambulatory, moisturizer to maintain skin integrity.     Patient's DMI/DMII is managed by Internal/Family Medicine Physician and/or Endocrinology Advanced Practice Provider.      Dystrophic nail plates, as outlined above (R#1,2,5  ; L#1,2,5 ), are sharply debrided with double action nail nipper, and/or with the assistance of a mechanical rotary arnav, with removal of all offending nail and nail border(s), for reduction of pains. Nails are reduced in terms of length, width and girth with removal of subungual debris to facilitate pain free weight  bearing and ambulation. The elongated nails as outlined in the objective portion of this note, were trimmed to appropriate length, with a double action nail nipper, for alleviation/reduction of pains as well. Follow up in approx. 3-4 months.    Patient does have development of a open wound to the lateral aspect of the right 3rd toe secondary to rubbing from nail.  Appears in no show no signs of infection.  Recommend to utilize antibiotic cream to the area and allowing that to heal.

## 2024-06-24 ENCOUNTER — PATIENT OUTREACH (OUTPATIENT)
Dept: ADMINISTRATIVE | Facility: HOSPITAL | Age: 69
End: 2024-06-24
Payer: MEDICARE

## 2024-06-26 ENCOUNTER — CLINICAL SUPPORT (OUTPATIENT)
Dept: REHABILITATION | Facility: HOSPITAL | Age: 69
End: 2024-06-26
Payer: MEDICARE

## 2024-06-26 DIAGNOSIS — M17.11 PRIMARY OSTEOARTHRITIS OF RIGHT KNEE: ICD-10-CM

## 2024-06-26 DIAGNOSIS — Z74.09 IMPAIRED FUNCTIONAL MOBILITY AND ACTIVITY TOLERANCE: Primary | ICD-10-CM

## 2024-06-26 DIAGNOSIS — M17.12 PRIMARY OSTEOARTHRITIS OF LEFT KNEE: ICD-10-CM

## 2024-06-26 PROCEDURE — 97110 THERAPEUTIC EXERCISES: CPT | Mod: PN | Performed by: PHYSICAL THERAPIST

## 2024-06-26 PROCEDURE — 97161 PT EVAL LOW COMPLEX 20 MIN: CPT | Mod: PN | Performed by: PHYSICAL THERAPIST

## 2024-06-26 NOTE — PLAN OF CARE
[facility]  Physical Therapy Initial Evaluation    Name: Lazarus Zamudio  Clinic Number: [id number]  Date: 6/26/2024     Therapy Diagnosis:   Encounter Diagnoses   Name Primary?    Primary osteoarthritis of right knee     Primary osteoarthritis of left knee     Impaired functional mobility and activity tolerance Yes     Physician: Angela Melendez FNP    Physician Orders: PT Eval and Treat   Medical Diagnosis from Referral: Primary osteoarthritis of R/L knees   Evaluation Date: 6/26/2024  Authorization Period Expiration: 06/21/25  Plan of Care Expiration: 08/07/24  Progress Note Due: 07/26/24  Visit # / Visits authorized: 1/ 1   FOTO: 1/ 3     Time In: 10:05 AM   Time Out: 10:49 AM   Total Appointment Time (timed & untimed codes): 44 minutes    Precautions: Standard      Subjective   Date of onset: Chronic in nature   History of current condition - Lazarus reports: A history of chronic bilateral knee pain. However, he states that his knees aren't the problem, but that he is having some pain on the inside of his right thigh. He did have a steroid injection in each knee, which helped to decrease the pain. His thigh pain is intermittent and worsened with making a quick turn on his leg. He denies any numbness or tingling in the thigh.        Past Medical History:   Diagnosis Date    Diabetes mellitus, type 2     Hypertension     Morbid obesity      Lazarus Zamudio  has a past surgical history that includes Hernia repair; Knee arthroscopy w/ debridement; and Vasectomy.    Lazarus has a current medication list which includes the following prescription(s): aspirin, atorvastatin, cyclobenzaprine, dorzolamide-timolol 2-0.5%, enalapril, ibuprofen, latanoprost, meloxicam, multivitamin with minerals, mupirocin, ozempic, tadalafil, and testosterone cypionate.    Review of patient's allergies indicates:   Allergen Reactions    Metformin      Intolerant     Penicillins Rash        Imaging: x-rays    Prior  Therapy: For back pain   Social History:  lives with their spouse  Occupation: retired; helps with grand kids   Prior Level of Function: Independent   Current Level of Function: Difficulty with activities that require prolonged standing    Pain:  Current 2/10, worst 9/10, best 0/10   Location: left medial thigh    Description: Tight  Aggravating Factors: Standing and twisting wrong on his knee   Easing Factors:  sitting down     Pts goals: To get rid of his left thigh pain     Objective   Mental status: alert, oriented x3  Posture/ Alignment: Poor    GAIT DEVIATIONS: Lazarus ureña with  normal gait speed and mechanics  .    ROM:   AROM Right Left Comment   Knee Extension: 10 degrees 10 degrees    Knee Flexion: 125 degrees 125 degrees          PROM Right Left    Knee Extension:      Knee Flexion:      *pain  Strength:      Right Left Comment   Hip Flexion: 5/5 5/5    Hip ABD: 5/5 5/5    Hip ADD: 5/5 5/5    Hip Extension: 3+/5 3+/5    Knee Ext: 4/5 4/5    Knee Flex: 4/5 4/5      Functional Tests (* indicates w/ pain)   Gait: normal heel-toe   Squat: able to squat with dysfunctional pattern, without pain   SLS on R: 5 seconds  SLS on L: 5 seconds    Special Tests:   Hip scour: (+) for reproduction of left medial thigh pain     Palpation:  TTP left distal adductor mass    Joint Play:  Limited hip IR mobility     Pt/family was provided educational information, including: role of PT, goals for PT, scheduling - pt verbalized understanding. Discussed insurance plan with pt.       Limitation/Restriction for FOTO Knee Survey    Therapist reviewed FOTO scores for Lazarus Zamudio on 6/26/2024.   FOTO documents entered into [employer] - see Media section.    Limitation Score: 41%         TREATMENT   Treatment Time In: 10:41 am  Treatment Time Out: 10:49 am   Total Treatment time separate from Evaluation: 8 minutes    Lazarus received therapeutic exercises to develop strength for 8 minutes including:  HEP setup and instruction; handout  issued     Home Exercises and Patient Education Provided    Education provided:   - pathophysiology of condition   - HEP   - POC     Written Home Exercises Provided: yes.  Exercises were reviewed and Lazarus was able to demonstrate them prior to the end of the session.  Lazarus demonstrated good  understanding of the education provided.     See EMR under Patient Instructions for exercises provided 6/26/2024.    Assessment    Pt presents with a medical diagnosis of primary osteoarthritis of the knees. Physical exam is consistent with the referring diagnosis. His medial thigh pain is likely referred from either the hip or lumbar spine. Primary impairments include limited AROM, PROM, joint mobility, strength, balance, and pain which limits functional mobility. This pt is a good candidate for skilled PT tx and stands to benefit from a combination of manual therapy, therapeutic exercise, neuromuscular re-education, therapeutic activities, dry needling and modalities Prn. The pt has been educated on their dx/POC and consents to further PT tx.      Pt prognosis is Good.   Pt will benefit from skilled outpatient Physical Therapy to address the deficits stated above and in the chart below, provide pt/family education, and to maximize pt's level of independence.     Plan of care discussed with patient: Yes  Pt's spiritual, cultural and educational needs considered and patient is agreeable to the plan of care and goals as stated below:     Anticipated Barriers for therapy: None at this time      Medical Necessity is demonstrated by the following  History  Co-morbidities and personal factors that may impact the plan of care [] LOW: no personal factors / co-morbidities  [x] MODERATE: 1-2 personal factors / co-morbidities  [] HIGH: 3+ personal factors / co-morbidities    Moderate / High Support Documentation:      Examination  Body Structures and Functions, activity limitations and participation restrictions that may impact the  plan of care [x] LOW: addressing 1-2 elements  [] MODERATE: 3+ elements  [] HIGH: 4+ elements (please support below)    Moderate / High Support Documentation:      Clinical Presentation [] LOW: stable  [x] MODERATE: Evolving  [] HIGH: Unstable     Decision Making/ Complexity Score: low         Goals:  Short Term Goals: 3 weeks   1) Pt will be I with established HEP   2) Pt will improve strength by 1/2 grade to improve ease of ADL activities   3) Frequency of left thigh pain will decrease by 25%     Long Term Goals: 6 weeks   1) Pt will improve knee extension ROM by 5 degrees   2) Strength will be 4/5 or greater to improve ease of ADL activities   3) FOTO score will improve by 20% to demonstrate functional improvement       Plan     Plan of care Certification: 6/26/2024 to 08/07/24.    Outpatient Physical Therapy 2 times weekly for 6 weeks to include the following interventions: Manual Therapy, Neuromuscular Re-ed, Patient Education, Therapeutic Activities, and Therapeutic Exercise.     Javier Mcduffie, PT      I CERTIFY THE NEED FOR THESE SERVICES FURNISHED UNDER THIS PLAN OF TREATMENT AND WHILE UNDER MY CARE

## 2024-06-27 ENCOUNTER — LAB VISIT (OUTPATIENT)
Dept: LAB | Facility: HOSPITAL | Age: 69
End: 2024-06-27
Attending: FAMILY MEDICINE
Payer: MEDICARE

## 2024-06-27 DIAGNOSIS — Z13.220 ENCOUNTER FOR LIPID SCREENING FOR CARDIOVASCULAR DISEASE: ICD-10-CM

## 2024-06-27 DIAGNOSIS — D69.6 THROMBOCYTOPENIA: ICD-10-CM

## 2024-06-27 DIAGNOSIS — Z00.00 WELL ADULT EXAM: ICD-10-CM

## 2024-06-27 DIAGNOSIS — Z13.6 ENCOUNTER FOR LIPID SCREENING FOR CARDIOVASCULAR DISEASE: ICD-10-CM

## 2024-06-27 DIAGNOSIS — Z79.899 ENCOUNTER FOR LONG-TERM (CURRENT) USE OF MEDICATIONS: ICD-10-CM

## 2024-06-27 LAB
ALBUMIN SERPL BCP-MCNC: 4.1 G/DL (ref 3.5–5.2)
ALP SERPL-CCNC: 128 U/L (ref 55–135)
ALT SERPL W/O P-5'-P-CCNC: 30 U/L (ref 10–44)
ANION GAP SERPL CALC-SCNC: 9 MMOL/L (ref 8–16)
AST SERPL-CCNC: 22 U/L (ref 10–40)
BASOPHILS # BLD AUTO: 0.02 K/UL (ref 0–0.2)
BASOPHILS NFR BLD: 0.4 % (ref 0–1.9)
BILIRUB SERPL-MCNC: 0.9 MG/DL (ref 0.1–1)
BUN SERPL-MCNC: 16 MG/DL (ref 8–23)
CALCIUM SERPL-MCNC: 9.4 MG/DL (ref 8.7–10.5)
CHLORIDE SERPL-SCNC: 97 MMOL/L (ref 95–110)
CHOLEST SERPL-MCNC: 121 MG/DL (ref 120–199)
CHOLEST/HDLC SERPL: 2.9 {RATIO} (ref 2–5)
CO2 SERPL-SCNC: 26 MMOL/L (ref 23–29)
CREAT SERPL-MCNC: 1.1 MG/DL (ref 0.5–1.4)
DIFFERENTIAL METHOD BLD: ABNORMAL
EOSINOPHIL # BLD AUTO: 0 K/UL (ref 0–0.5)
EOSINOPHIL NFR BLD: 0.8 % (ref 0–8)
ERYTHROCYTE [DISTWIDTH] IN BLOOD BY AUTOMATED COUNT: 12.6 % (ref 11.5–14.5)
EST. GFR  (NO RACE VARIABLE): >60 ML/MIN/1.73 M^2
ESTIMATED AVG GLUCOSE: 246 MG/DL (ref 68–131)
GLUCOSE SERPL-MCNC: 215 MG/DL (ref 70–110)
HBA1C MFR BLD: 10.2 % (ref 4–5.6)
HCT VFR BLD AUTO: 50.7 % (ref 40–54)
HDLC SERPL-MCNC: 42 MG/DL (ref 40–75)
HDLC SERPL: 34.7 % (ref 20–50)
HGB BLD-MCNC: 17.2 G/DL (ref 14–18)
IMM GRANULOCYTES # BLD AUTO: 0.01 K/UL (ref 0–0.04)
IMM GRANULOCYTES NFR BLD AUTO: 0.2 % (ref 0–0.5)
LDLC SERPL CALC-MCNC: 69.4 MG/DL (ref 63–159)
LYMPHOCYTES # BLD AUTO: 1.9 K/UL (ref 1–4.8)
LYMPHOCYTES NFR BLD: 37 % (ref 18–48)
MCH RBC QN AUTO: 29.9 PG (ref 27–31)
MCHC RBC AUTO-ENTMCNC: 33.9 G/DL (ref 32–36)
MCV RBC AUTO: 88 FL (ref 82–98)
MONOCYTES # BLD AUTO: 0.3 K/UL (ref 0.3–1)
MONOCYTES NFR BLD: 6.3 % (ref 4–15)
NEUTROPHILS # BLD AUTO: 2.8 K/UL (ref 1.8–7.7)
NEUTROPHILS NFR BLD: 55.3 % (ref 38–73)
NONHDLC SERPL-MCNC: 79 MG/DL
NRBC BLD-RTO: 0 /100 WBC
PLATELET # BLD AUTO: 80 K/UL (ref 150–450)
PMV BLD AUTO: 9.4 FL (ref 9.2–12.9)
POTASSIUM SERPL-SCNC: 4.3 MMOL/L (ref 3.5–5.1)
PROT SERPL-MCNC: 7 G/DL (ref 6–8.4)
RBC # BLD AUTO: 5.76 M/UL (ref 4.6–6.2)
SODIUM SERPL-SCNC: 132 MMOL/L (ref 136–145)
TRIGL SERPL-MCNC: 48 MG/DL (ref 30–150)
WBC # BLD AUTO: 5.05 K/UL (ref 3.9–12.7)

## 2024-06-27 PROCEDURE — 80053 COMPREHEN METABOLIC PANEL: CPT | Performed by: FAMILY MEDICINE

## 2024-06-27 PROCEDURE — 36415 COLL VENOUS BLD VENIPUNCTURE: CPT | Mod: PO | Performed by: FAMILY MEDICINE

## 2024-06-27 PROCEDURE — 83036 HEMOGLOBIN GLYCOSYLATED A1C: CPT | Performed by: FAMILY MEDICINE

## 2024-06-27 PROCEDURE — 85025 COMPLETE CBC W/AUTO DIFF WBC: CPT | Performed by: FAMILY MEDICINE

## 2024-06-27 PROCEDURE — 80061 LIPID PANEL: CPT | Performed by: FAMILY MEDICINE

## 2024-06-28 ENCOUNTER — PATIENT MESSAGE (OUTPATIENT)
Dept: FAMILY MEDICINE | Facility: CLINIC | Age: 69
End: 2024-06-28
Payer: MEDICARE

## 2024-06-28 NOTE — PROGRESS NOTES
Make follow-up lab appointment per recommendation below.  Check to see if patient has seen the results through my chart.  If not then,  #CALL THE PATIENT# to discuss results/see if they have questions and document verification of contact. Make F/U appt if needed. [phone]    #My interpretation that was sent to them through [facility]:  Lazarus, I have reviewed your recent blood work.     Your metabolic panel which shows your glucose, kidney function, electrolytes, and liver function is mostly within normal limits except for low sodium and elevated glucose.  Will monitor sodium level with repeat labs in four weeks.  Check CMP.  Your cholesterol is stable.    Your hemoglobin A1c is significantly elevated from previous.  Follow-up to discuss diabetes management.  This test is gold standard screening test for diabetes.  It is a measures 3 months of your average blood sugar.    =========================  Also please address any outstanding health maintenance that may be due: RSV Vaccine (Age 60+ and Pregnant patients)(1 - 1-dose 60+ series) Never done

## 2024-07-02 ENCOUNTER — CLINICAL SUPPORT (OUTPATIENT)
Dept: REHABILITATION | Facility: HOSPITAL | Age: 69
End: 2024-07-02
Payer: MEDICARE

## 2024-07-02 DIAGNOSIS — Z74.09 IMPAIRED FUNCTIONAL MOBILITY AND ACTIVITY TOLERANCE: Primary | ICD-10-CM

## 2024-07-02 PROCEDURE — 97112 NEUROMUSCULAR REEDUCATION: CPT | Mod: PN | Performed by: PHYSICAL THERAPIST

## 2024-07-02 PROCEDURE — 97110 THERAPEUTIC EXERCISES: CPT | Mod: PN | Performed by: PHYSICAL THERAPIST

## 2024-07-02 PROCEDURE — 97530 THERAPEUTIC ACTIVITIES: CPT | Mod: PN | Performed by: PHYSICAL THERAPIST

## 2024-07-02 NOTE — PROGRESS NOTES
"[facility]   Physical Therapy Treatment Note      Name: Lazarus Zamudio  Clinic Number: [id number]    Therapy Diagnosis:   Encounter Diagnosis   Name Primary?    Impaired functional mobility and activity tolerance Yes     Physician: Angela Melendez FNP    Visit Date: 7/2/2024    Physician Orders: PT Eval and Treat   Medical Diagnosis from Referral: Primary osteoarthritis of R/L knees   Evaluation Date: 6/26/2024  Authorization Period Expiration: 06/21/25  Plan of Care Expiration: 08/07/24  Progress Note Due: 07/26/24  Visit # / Visits authorized: 1/20  FOTO: 1/ 3      Time In: 8:00 AM   Time Out: 8:58 AM   Total Appointment Time (timed & untimed codes): 58 minutes     Precautions: Standard      Subjective     Patient reports: That he is still having some hip pain, but none right now .  He was compliant with home exercise program.  Response to previous treatment: Initial evaluation   Functional change: ongoing    Pain: 0/10  Location: left hip      Objective      Objective Measures updated at progress report unless specified.     Treatment     Lazarus received the treatments listed below:      therapeutic exercises to develop ROM for 25 minutes including:  Recumbent bike 10 min   Ball flexion stretch 3 min   Supine piriformis stretch 3 min 10 sec holds   Supine hip IR 2 min   Supine hip marches 3 x 10 B red theraband   LAQ 3 x 10 B 7.5#     manual therapy techniques: Joint mobilizations were applied to the:  for  minutes, including:      neuromuscular re-education activities to improve: Kinesthetic and Sense for 20 minutes. The following activities were included:  Bridges 3 x 10   Sidelying clamshells 3 x 10 B red theraband   Standing hip extension 3 x 10 B red theraband   Standing hip abduction 3 x 10 B red theraband     therapeutic activities to improve functional performance for 13  minutes, including:  Step ups 3 x 10 B   Lateral step ups 3 x 10 L   Box squats 3 x 10 20"        Patient " Education and Home Exercises       Education provided:   - pathophysiology of condition  - HEP review     Written Home Exercises Provided: Patient instructed to cont prior HEP. Exercises were reviewed and Lazarus was able to demonstrate them prior to the end of the session.  Lazarus demonstrated good  understanding of the education provided. See Electronic Medical Record under Patient Instructions for exercises provided during therapy sessions    Assessment     Able to perform all exercises without an exacerbation of left medial thigh pain. Mild knee discomfort with squats. He will benefit from continued care with progression of functional strengthening    Lazarus Is progressing well towards his goals.   Patient prognosis is Good.     Patient will continue to benefit from skilled outpatient physical therapy to address the deficits listed in the problem list box on initial evaluation, provide pt/family education and to maximize pt's level of independence in the home and community environment.     Patient's spiritual, cultural and educational needs considered and pt agreeable to plan of care and goals.     Anticipated barriers to physical therapy: None at this time    Goals:   Short Term Goals: 3 weeks   1) Pt will be I with established HEP   2) Pt will improve strength by 1/2 grade to improve ease of ADL activities   3) Frequency of left thigh pain will decrease by 25%      Long Term Goals: 6 weeks   1) Pt will improve knee extension ROM by 5 degrees   2) Strength will be 4/5 or greater to improve ease of ADL activities   3) FOTO score will improve by 20% to demonstrate functional improvement          Plan     Continue progression of functional strengthening    Javier Mcduffie, PT

## 2024-07-08 NOTE — TELEPHONE ENCOUNTER
No care due was identified.  [facility] Embedded Care Due Messages. Reference number: [id number].   7/08/2024 2:38:28 PM CDT

## 2024-07-09 ENCOUNTER — CLINICAL SUPPORT (OUTPATIENT)
Dept: REHABILITATION | Facility: HOSPITAL | Age: 69
End: 2024-07-09
Payer: MEDICARE

## 2024-07-09 DIAGNOSIS — Z74.09 IMPAIRED FUNCTIONAL MOBILITY AND ACTIVITY TOLERANCE: Primary | ICD-10-CM

## 2024-07-09 PROCEDURE — 97110 THERAPEUTIC EXERCISES: CPT | Mod: PN | Performed by: PHYSICAL THERAPIST

## 2024-07-09 PROCEDURE — 97112 NEUROMUSCULAR REEDUCATION: CPT | Mod: PN | Performed by: PHYSICAL THERAPIST

## 2024-07-09 PROCEDURE — 97530 THERAPEUTIC ACTIVITIES: CPT | Mod: PN | Performed by: PHYSICAL THERAPIST

## 2024-07-09 RX ORDER — ATORVASTATIN CALCIUM 40 MG/1
40 TABLET, FILM COATED ORAL
Qty: 90 TABLET | Refills: 1 | Status: SHIPPED | OUTPATIENT
Start: 2024-07-09

## 2024-07-09 NOTE — PROGRESS NOTES
[facility]   Physical Therapy Treatment Note      Name: Lazarus Zamudio  Clinic Number: [id number]    Therapy Diagnosis:   Encounter Diagnosis   Name Primary?    Impaired functional mobility and activity tolerance Yes     Physician: Angela Melendez FNP    Visit Date: 7/9/2024    Physician Orders: PT Eval and Treat   Medical Diagnosis from Referral: Primary osteoarthritis of R/L knees   Evaluation Date: 6/26/2024  Authorization Period Expiration: 06/21/25  Plan of Care Expiration: 08/07/24  Progress Note Due: 07/26/24  Visit # / Visits authorized: 2/20  FOTO: 1/ 3      Time In: 8:00 AM   Time Out: 8:55 AM   Total Appointment Time (timed & untimed codes): 55 minutes     Precautions: Standard      Subjective     Patient reports: That he had a lot of hip pain over the weekend after walking 3 1/2 miles   He was compliant with home exercise program.  Response to previous treatment: No adverse effects    Functional change: ongoing    Pain: 0/10  Location: left hip      Objective      Objective Measures updated at progress report unless specified.     Treatment     Lazarus received the treatments listed below:      therapeutic exercises to develop ROM for 25 minutes including:  Recumbent bike 10 min   Ball flexion stretch 3 min   Supine piriformis stretch 3 min 10 sec holds   Supine hip IR 2 min   Supine hip marches 3 x 10 B red theraband   LAQ 3 x 10 B 7.5#     manual therapy techniques: Joint mobilizations were applied to the:  for  minutes, including:      neuromuscular re-education activities to improve: Kinesthetic and Sense for 15 minutes. The following activities were included:  Bridges 3 x 10   Sidelying clamshells 3 x 10 B red theraband   Standing hip extension 3 x 10 B red theraband   Standing hip abduction 3 x 10 B red theraband     therapeutic activities to improve functional performance for 15  minutes, including:  Step ups 3 x 10 B   Lateral step ups 3 x 10 L   Box squats 3 x 10  "18"  Shuttle B 2 1/2 bands 3 x 10   Shuttle R/L 2 1/2 bands 3 x 10         Patient Education and Home Exercises       Education provided:   - pathophysiology of condition  - HEP review     Written Home Exercises Provided: Patient instructed to cont prior HEP. Exercises were reviewed and Lazarus was able to demonstrate them prior to the end of the session.  Lazarus demonstrated good  understanding of the education provided. See Electronic Medical Record under Patient Instructions for exercises provided during therapy sessions    Assessment     Lazarus continues to have an intermittent "pulling" sensation in left medial thigh. Symptoms aren't exacerbated by any of the prescribed exercises. Symptoms likely originating from either hip or lumbar spine.     Lazarus Is progressing well towards his goals.   Patient prognosis is Good.     Patient will continue to benefit from skilled outpatient physical therapy to address the deficits listed in the problem list box on initial evaluation, provide pt/family education and to maximize pt's level of independence in the home and community environment.     Patient's spiritual, cultural and educational needs considered and pt agreeable to plan of care and goals.     Anticipated barriers to physical therapy: None at this time    Goals:   Short Term Goals: 3 weeks   1) Pt will be I with established HEP   2) Pt will improve strength by 1/2 grade to improve ease of ADL activities   3) Frequency of left thigh pain will decrease by 25%      Long Term Goals: 6 weeks   1) Pt will improve knee extension ROM by 5 degrees   2) Strength will be 4/5 or greater to improve ease of ADL activities   3) FOTO score will improve by 20% to demonstrate functional improvement          Plan     Continue progression of functional strengthening    Javier Mcduffie, PT       "

## 2024-07-09 NOTE — TELEPHONE ENCOUNTER
Refill Decision Note   Lazarus Zamudio  is requesting a refill authorization.  Brief Assessment and Rationale for Refill:  Approve     Medication Therapy Plan:         Comments:     Note composed:10:47 AM 07/09/2024

## 2024-07-12 ENCOUNTER — OFFICE VISIT (OUTPATIENT)
Dept: FAMILY MEDICINE | Facility: CLINIC | Age: 69
End: 2024-07-12
Payer: MEDICARE

## 2024-07-12 VITALS
HEIGHT: 70 IN | SYSTOLIC BLOOD PRESSURE: 130 MMHG | HEART RATE: 64 BPM | OXYGEN SATURATION: 96 % | WEIGHT: 273.63 LBS | DIASTOLIC BLOOD PRESSURE: 88 MMHG | BODY MASS INDEX: 39.17 KG/M2

## 2024-07-12 DIAGNOSIS — E11.65 TYPE 2 DIABETES MELLITUS WITH HYPERGLYCEMIA, WITHOUT LONG-TERM CURRENT USE OF INSULIN: Primary | ICD-10-CM

## 2024-07-12 DIAGNOSIS — M25.561 CHRONIC PAIN OF BOTH KNEES: ICD-10-CM

## 2024-07-12 DIAGNOSIS — I15.2 HYPERTENSION ASSOCIATED WITH DIABETES: ICD-10-CM

## 2024-07-12 DIAGNOSIS — Z79.899 ENCOUNTER FOR LONG-TERM (CURRENT) USE OF MEDICATIONS: ICD-10-CM

## 2024-07-12 DIAGNOSIS — D69.6 THROMBOCYTOPENIA: ICD-10-CM

## 2024-07-12 DIAGNOSIS — G89.29 CHRONIC PAIN OF BOTH KNEES: ICD-10-CM

## 2024-07-12 DIAGNOSIS — E11.59 HYPERTENSION ASSOCIATED WITH DIABETES: ICD-10-CM

## 2024-07-12 DIAGNOSIS — M25.562 CHRONIC PAIN OF BOTH KNEES: ICD-10-CM

## 2024-07-12 PROCEDURE — 99214 OFFICE O/P EST MOD 30 MIN: CPT | Mod: S$PBB,,, | Performed by: FAMILY MEDICINE

## 2024-07-12 PROCEDURE — 99999 PR PBB SHADOW E&M-EST. PATIENT-LVL V: CPT | Mod: PBBFAC,,, | Performed by: FAMILY MEDICINE

## 2024-07-12 PROCEDURE — G2211 COMPLEX E/M VISIT ADD ON: HCPCS | Mod: S$PBB,,, | Performed by: FAMILY MEDICINE

## 2024-07-12 PROCEDURE — 99215 OFFICE O/P EST HI 40 MIN: CPT | Mod: PBBFAC,PO | Performed by: FAMILY MEDICINE

## 2024-07-12 RX ORDER — TIRZEPATIDE 10 MG/.5ML
10 INJECTION, SOLUTION SUBCUTANEOUS
Qty: 4 PEN | Refills: 1 | Status: SHIPPED | OUTPATIENT
Start: 2024-07-12

## 2024-07-12 RX ORDER — METFORMIN HYDROCHLORIDE 500 MG/1
500 TABLET, EXTENDED RELEASE ORAL
Qty: 90 TABLET | Refills: 3 | Status: SHIPPED | OUTPATIENT
Start: 2024-07-12 | End: 2025-07-12

## 2024-07-12 NOTE — PATIENT INSTRUCTIONS
Follow up in about 3 months (around 10/12/2024), or if symptoms worsen or fail to improve.     Dear patient,   As a result of recent federal legislation (The Federal Cures Act), you may receive lab or pathology results from your visit in your [facility] account before your physician is able to contact you. Your physician or their representative will relay the results to you with their recommendations at their soonest availability.     If no improvement in symptoms or symptoms worsen, please be advised to call MD, follow-up at clinic and/or go to ER if becomes severe.    Casper Barrett M.D.        We Offer TELEHEALTH & Same Day Appointments!   Book your Telehealth appointment with me through my nurse or   Clinic appointments on [employer]!    [address] [place] [address]    Office: [phone]   FAX: [phone]    Check out my Facebook Page and Follow Me at: [email or web address]    Check out my website at [employer] by clicking on: [email or web address]    To Schedule appointments [email or web address] go to [email or web address]: [email or web address]

## 2024-07-13 NOTE — PROGRESS NOTES
PLAN:    Assessment & Plan  1. Uncontrolled diabetes.  His A1c has increased to 7.4, indicating uncontrolled diabetes. A low dose of metformin will be reintroduced. He is advised to reduce carbohydrate intake and engage in regular exercise, such as walking or biking, for 10 to 15 minutes daily. The possibility of switching to Mounjaro was discussed, pending insurance approval. Lab tests will be rechecked in 3 months. Ozempic will be discontinued when Mounjaro is prescribed.    2. Hypertension.  His blood pressure is well-managed with enalapril.    Problem List Items Addressed This Visit       Type 2 diabetes mellitus with hyperglycemia, without long-term current use of insulin - Primary (Chronic)     Changing Ozempic to MOUNJARO.  Plan to titrate up to 15 milligrams as tolerated.  Restart metformin until blood sugar becomes better controlled.  Target A1c near seven.  We will plan to monitor hemoglobin A1c at designated intervals 3 to 6 months.  I recommend ongoing Education for diabetic diet and exercise protocol.  We will continue to monitor for side effects.    Please be advised of symptoms to monitor for and to notify me immediately if persistent or worsening.  Follow up with Ophthalmology/Optometry and Podiatry at least annually.  GLP 1 risk and benefits and common side effects of medication discussed with the patient at length.  All questions were answered.  Discussed with patient at length about potential pharmacologic options.  Patient desires consideration for MOUNJARO .  The risks, benefits and, side effects of this GLP 1 medication including nausea , constipation, diarrhea and pain injection site, etcetera.  Patient reports no personal or family history of pancreatitis, MEN or medullary thyroid cancer.  There is potential for gallbladder issues while taking this medication if not already removed.  Patient should be advised to caution with taking this medication if having history of thyroid cancer or  biliary disease/gallstones.  Patient should be aware of risk of diabetic retinopathy if blood sugars lowered too quickly.  Patient is aware that weight loss can and will most likely occur quickly.  Discussed GLP 1 mechanism of action.         Relevant Medications    metFORMIN (GLUCOPHAGE-XR) 500 MG ER 24hr tablet    tirzepatide (MOUNJARO) 10 mg/0.5 mL PnIj    Other Relevant Orders    Comprehensive Metabolic Panel    TSH    Hemoglobin A1C    Lipid Panel    Hypertension associated with diabetes (Chronic)     Counseled on importance of hypertension disease course, I recommend ongoing Education for DASH-diet and exercise.  Counseled on medication regimen importance of treating high blood pressure.  Please be advised of risk of untreated blood pressure as discussed.  Please advised of ER precautions were given for symptoms of hypertensive urgency and emergency.           Relevant Medications    metFORMIN (GLUCOPHAGE-XR) 500 MG ER 24hr tablet    Other Relevant Orders    Comprehensive Metabolic Panel    TSH    Hemoglobin A1C    Lipid Panel    Encounter for long-term (current) use of medications (Chronic)     Complete history and physical was completed today.  Complete and thorough medication reconciliation was performed.  Discussed risks and benefits of medications.  Advised patient on orders and health maintenance.  We discussed old records and old labs if available.  Will request any records not available through epic.  Continue current medications listed on your summary sheet.           Relevant Orders    Comprehensive Metabolic Panel    TSH    Hemoglobin A1C    Lipid Panel    Chronic pain of both knees (Chronic)     Avoid further steroid injections due to uncontrolled diabetes.  Follow-up with orthopedics.  Weight loss is recommended.  Switching to MOUNJARO for better weight loss profile.         Relevant Orders    Comprehensive Metabolic Panel    TSH    Hemoglobin A1C    Lipid Panel    Thrombocytopenia (Chronic)      Follow-up with Hematology.  Possibly related to Ozempic?  Switching Ozempic to MOUNJARO.  Thrombocytopenia precaution.         Relevant Orders    Ambulatory referral/consult to Hematology / Oncology    Comprehensive Metabolic Panel    TSH    Hemoglobin A1C    Lipid Panel    CBC Auto Differential     Future Appointments       Next 10 Appointments       Date Provider Specialty Appt Notes    7/16/2024 Javier Mcduffie, PT Outpatient Rehab 2x a week  No copay    7/18/2024 Javier Mcduffie, PT Outpatient Rehab 2x a week  No copay    7/23/2024 Javier Mcduffie, PT Outpatient Rehab 2x a week  No copay    7/25/2024 Javier Mcduffie, PT Outpatient Rehab 2x a week  No copay    7/26/2024  Lab     7/30/2024 Javier Mcduffie PT Outpatient Rehab 2x a week  No copay    7/30/2024 Casper Nixon MD Orthopedics B KNEE GEL PENDING    8/1/2024 Javier Mcduffie, PT Outpatient Rehab 2x a week  No copay    8/28/2024 Basia To NP Hematology and Oncology thrombocytopenia    9/18/2024 Ramon Parisi MD Hepatology fatty liver              Displaying the next 10 appointments. This patient has additional appointments scheduled.           Medication Management for assessment above:   Medication List with Changes/Refills   New Medications    METFORMIN (GLUCOPHAGE-XR) 500 MG ER 24HR TABLET    Take 1 tablet (500 mg total) by mouth daily with breakfast.    TIRZEPATIDE (MOUNJARO) 10 MG/0.5 ML PNIJ    Inject 10 mg into the skin every 7 days.   Current Medications    ASPIRIN (ECOTRIN) 81 MG EC TABLET    Take 81 mg by mouth once daily.    ATORVASTATIN (LIPITOR) 40 MG TABLET    TAKE 1 TABLET BY MOUTH EVERY DAY    CYCLOBENZAPRINE (FLEXERIL) 10 MG TABLET    TAKE 1 TABLET BY MOUTH EVERY DAY IN THE EVENING    DORZOLAMIDE-TIMOLOL 2-0.5% (COSOPT) 22.3-6.8 MG/ML OPHTHALMIC SOLUTION    Place 1 drop into the right eye 2 (two) times daily.    ENALAPRIL (VASOTEC) 20 MG TABLET    TAKE 1 TABLET BY MOUTH EVERY DAY    IBUPROFEN (ADVIL,MOTRIN) 800 MG TABLET    Take 800 mg  by mouth every 8 (eight) hours as needed.    LATANOPROST 0.005 % OPHTHALMIC SOLUTION    Place 1 drop into both eyes every evening.    MULTIVITAMIN WITH MINERALS TABLET    Take 1 tablet by mouth once daily.    MUPIROCIN (BACTROBAN) 2 % OINTMENT    Apply topically 2 (two) times daily.    SEMAGLUTIDE (OZEMPIC) 2 MG/DOSE (8 MG/3 ML) PNIJ    Inject 2 mg into the skin every 7 days.    TADALAFIL (CIALIS) 20 MG TAB    Take 20 mg by mouth as needed.    TESTOSTERONE CYPIONATE (DEPOTESTOTERONE CYPIONATE) 100 MG/ML INJECTION    Inject 200 mg into the muscle every 14 (fourteen) days.   Discontinued Medications    MELOXICAM (MOBIC) 15 MG TABLET    Take 1 tablet (15 mg total) by mouth once daily.       Casper Barrett M.D.  ==========================================================================  Subjective:   Patient ID: Lazarus Zamudio is a 69 y.o. male.  has a past medical history of Diabetes mellitus, type 2, Hypertension, and Morbid obesity.   Chief Complaint: Follow-up      History of Present Illness  The patient is a 69-year-old male here for lab results. Vital signs are normal. He has elevated A1c indicating uncontrolled diabetes. He is accompanied by his wife.    He reports feeling well overall. However, his A1c levels have increased following two steroid injections, one in each knee, administered by Dr. Nixon on 06/06/2024. His knee condition has improved. He is currently on Ozempic 2 mg. Metformin was discontinued due to stomach discomfort. His diet remains unchanged. He is scheduled for another knee injection at the end of this month however it is not with steroid. He continues to take testosterone injections every two weeks. His wife has observed that his stomach appears larger and his shirt appears tighter. His platelet count has decreased since starting Ozempic. Several years ago, he was diagnosed with fatty liver. He denies abnormal bleeding.    Problem List Items Addressed This Visit       Type 2 diabetes  "mellitus with hyperglycemia, without long-term current use of insulin - Primary (Chronic)    Overview     July 2024:  Patient reports increased stress over the last few months due to the passing of his daughter.  They report noncompliance with diet over these months.  Patient also recently had bilateral knee injection with steroid.  January 2024:  Reports compliance.  No side effects reported.  Weight is stable.  Diabetes Medications               semaglutide (OZEMPIC) 2 mg/dose (8 mg/3 mL) PnIj Inject 2 mg into the skin every 7 days.          December 2023:  Patient reports compliance with Ozempic when he can find it in stock.  Patient denies any history of thyroid cancer, pancreatitis, MEN syndrome.   February 2021:  Patient reports compliance with Ozempic 0.5 milligrams weekly.  Patient reports no side effects.  Symptoms are controlled.  Due for labs.  BMI Readings from Last 10 Encounters:   07/12/24 39.26 kg/m²   06/20/24 39.16 kg/m²   06/06/24 39.17 kg/m²   01/05/24 39.83 kg/m²   12/13/23 39.30 kg/m²   07/18/23 38.74 kg/m²   07/05/23 38.60 kg/m²   04/25/23 38.74 kg/m²   04/14/23 39.26 kg/m²   03/30/23 40.08 kg/m²   Diabetes Management Status    Statin: Taking  ACE/ARB: Taking    Screening or Prevention Patient's value Goal Complete/Controlled?   HgA1C Testing and Control   Lab Results   Component Value Date    HGBA1C 10.2 (H) 06/27/2024      Annually/Less than 8% No   Lipid profile : 06/27/2024 Annually Yes   LDL control Lab Results   Component Value Date    LDLCALC 69.4 06/27/2024    Annually/Less than 100 mg/dl  Yes   Nephropathy screening Lab Results   Component Value Date    LABMICR <5.0 06/27/2024     Lab Results   Component Value Date    PROTEINUA Negative 03/01/2022     No results found for: "UTPCR"   Annually Yes   Blood pressure BP Readings from Last 1 Encounters:   07/12/24 130/88    Less than 140/90 Yes   Dilated retinal exam : 02/21/2024 Annually Yes   Foot exam   : 06/20/2024 Annually Yes           "    Current Assessment & Plan     Changing Ozempic to MOUNJARO.  Plan to titrate up to 15 milligrams as tolerated.  Restart metformin until blood sugar becomes better controlled.  Target A1c near seven.  We will plan to monitor hemoglobin A1c at designated intervals 3 to 6 months.  I recommend ongoing Education for diabetic diet and exercise protocol.  We will continue to monitor for side effects.    Please be advised of symptoms to monitor for and to notify me immediately if persistent or worsening.  Follow up with Ophthalmology/Optometry and Podiatry at least annually.  GLP 1 risk and benefits and common side effects of medication discussed with the patient at length.  All questions were answered.  Discussed with patient at length about potential pharmacologic options.  Patient desires consideration for MOUNJARO .  The risks, benefits and, side effects of this GLP 1 medication including nausea , constipation, diarrhea and pain injection site, etcetera.  Patient reports no personal or family history of pancreatitis, MEN or medullary thyroid cancer.  There is potential for gallbladder issues while taking this medication if not already removed.  Patient should be advised to caution with taking this medication if having history of thyroid cancer or biliary disease/gallstones.  Patient should be aware of risk of diabetic retinopathy if blood sugars lowered too quickly.  Patient is aware that weight loss can and will most likely occur quickly.  Discussed GLP 1 mechanism of action.         Hypertension associated with diabetes (Chronic)    Overview     July 2024:  Blood pressure is borderline.  Remains compliant with enalapril.  Hypertension Medications               enalapril (VASOTEC) 20 MG tablet TAKE 1 TABLET BY MOUTH EVERY DAY            CHRONIC.  January 2024:  Reviewed medications.  December 2023: Blood pressure well controlled on current regimen.  Reviewed meds.  Patient on enalapril 20 milligram daily.  April  2023:  Reviewed medications.  STABLE. BP Reviewed.  Compliant with BP medications. No SE reported.   (-) CP, SOB, palpitations, dizziness, lightheadedness, HA, arm numbness, tingling or weakness, syncope.  Creatinine   Date Value Ref Range Status   06/27/2024 1.1 0.5 - 1.4 mg/dL Final     No results found for this or any previous visit.           Current Assessment & Plan     Counseled on importance of hypertension disease course, I recommend ongoing Education for DASH-diet and exercise.  Counseled on medication regimen importance of treating high blood pressure.  Please be advised of risk of untreated blood pressure as discussed.  Please advised of ER precautions were given for symptoms of hypertensive urgency and emergency.           Encounter for long-term (current) use of medications (Chronic)    Overview     July 2024:   Reviewed labs.  December 2023: Reviewed labs.  April 2023: Reviewed labs.  CHRONIC. Stable. Compliant with medications for managed conditions. See medication list. No SE reported.   Routine lab analysis is being monitored. Refills were addressed.  Lab Results   Component Value Date    WBC 5.05 06/27/2024    HGB 17.2 06/27/2024    HCT 50.7 06/27/2024    MCV 88 06/27/2024    PLT 80 (L) 06/27/2024         Chemistry        Component Value Date/Time     (L) 06/27/2024 1010    K 4.3 06/27/2024 1010    CL 97 06/27/2024 1010    CO2 26 06/27/2024 1010    BUN 16 06/27/2024 1010    CREATININE 1.1 06/27/2024 1010     (H) 06/27/2024 1010        Component Value Date/Time    CALCIUM 9.4 06/27/2024 1010    ALKPHOS 128 06/27/2024 1010    AST 22 06/27/2024 1010    ALT 30 06/27/2024 1010    BILITOT 0.9 06/27/2024 1010    ESTGFRAFRICA >60.0 02/14/2022 1028    EGFRNONAA >60.0 02/14/2022 1028          Lab Results   Component Value Date    TSH 1.877 12/14/2023              Current Assessment & Plan     Complete history and physical was completed today.  Complete and thorough medication reconciliation was  performed.  Discussed risks and benefits of medications.  Advised patient on orders and health maintenance.  We discussed old records and old labs if available.  Will request any records not available through epic.  Continue current medications listed on your summary sheet.           Chronic pain of both knees (Chronic)    Overview     July 2024:   Patient reports that he recently had bilateral steroid knee injections.  Patient is following closely with Orthopedics.  He will have another injection soon but not with steroid.  January 2024: Chronic.  Patient is getting some improvement with meloxicam.  Started two weeks ago.  December 2023: Reviewed x-ray from previous.  Patient reports he did have a fall in December of last year.  Patient has been having knee pain since that time.   He has not having any improvement.  Patient does not take anything for the knee pain.  No new injuries or trauma.      Previous history:Chronic.  Intermittent control.  Patient does not take any medication for this condition.  He did well with ibuprofen in the past.  X-ray Knee Ortho Left  Narrative: EXAM:  XR KNEE ORTHO LEFT    CLINICAL HISTORY:    Left knee joint pain    TECHNIQUE: 3 views of the left knee.    COMPARISON: None.    FINDINGS:    Minor joint space narrowing.  Mild marginal spurring.  Minor patellar femoral joint spurring.    No obvious joint effusion.    No fracture.  Impression:  Mild tricompartment degenerative joint disease.    Finalized on: 4/25/2023 11:36 AM By:  Vincent Lopez MD  [facility]# [id number]      2023-04-25 11:[id number]:[id number]    [facility]  Formatting of this note might be different from the original. Formatting of this note might be different from the original. Chronic.  Intermittent control.  Patient does not take any medication for this condition.  He did well with ibuprofen in the past.  X-ray Knee Ortho Left Narrative: EXAM:  XR KNEE ORTHO LEFT CLINICAL HISTORY:    Left knee joint pain TECHNIQUE: 3 views of the left  "knee. COMPARISON: None. FINDINGS:    Minor joint space narrowing.  Mild marginal spurring.  Minor patellar femoral joint spurring. No obvious joint effusion. No fracture. Impression:  Mild tricompartment degenerative joint disease. Finalized on: 4/25/2023 11:36 AM By:  Vincent Lopez MD [facility]# [id number]      2023-04-25 11:38:[id number]    [facility] Last Assessment & Plan: Formatting of this note might be different from the original. Trial of anti-inflammatory.  Consider physical therapy if no improvement.  Patient may ultimately need orthopedic consult for worsening arthritis of the left knee.  GI precautions.         Current Assessment & Plan     Avoid further steroid injections due to uncontrolled diabetes.  Follow-up with orthopedics.  Weight loss is recommended.  Switching to MOUNJARO for better weight loss profile.         Thrombocytopenia (Chronic)    Overview     Chronic.  Worsening.  Platelet count less than 100.  Denies any bleeding but does have easy bruising.  Patient is due for follow-up with Hematology.    Lab Results   Component Value Date    IRON 62 03/15/2023    TRANSFERRIN 302 03/15/2023    TIBC 447 03/15/2023    FESATURATED 14 (L) 03/15/2023      No results found for: "[id number]"  No results found for: "FOLATE"  Lab Results   Component Value Date    WBC 5.05 06/27/2024    HGB 17.2 06/27/2024    HCT 50.7 06/27/2024    MCV 88 06/27/2024    PLT 80 (L) 06/27/2024                Current Assessment & Plan     Follow-up with Hematology.  Possibly related to Ozempic?  Switching Ozempic to MOUNJARO.  Thrombocytopenia precaution.             Review of patient's allergies indicates:   Allergen Reactions    Metformin      Intolerant     Penicillins Rash     Current Outpatient Medications   Medication Instructions    aspirin (ECOTRIN) 81 mg, Oral, Daily,      atorvastatin (LIPITOR) 40 mg, Oral    cyclobenzaprine (FLEXERIL) 10 mg, Oral, Nightly    dorzolamide-timolol 2-0.5% (COSOPT) 22.3-6.8 mg/mL ophthalmic solution 1 " "drop, Right Eye, 2 times daily    enalapril (VASOTEC) 20 mg, Oral    ibuprofen (ADVIL,MOTRIN) 800 mg, Oral, Every 8 hours PRN    latanoprost 0.005 % ophthalmic solution 1 drop, Both Eyes, Nightly    metFORMIN (GLUCOPHAGE-XR) 500 mg, Oral, With breakfast    MOUNJARO 10 mg, Subcutaneous, Every 7 days    multivitamin with minerals tablet 1 tablet, Oral, Daily    mupirocin (BACTROBAN) 2 % ointment Topical (Top), 2 times daily    OZEMPIC 2 mg, Subcutaneous, Every 7 days    tadalafiL (CIALIS) 20 mg, As needed (PRN)    testosterone cypionate (DEPOTESTOTERONE CYPIONATE) 200 mg, Every 14 days      I have reviewed the PMH, social history, FamilyHx, surgical history, allergies and medications documented / confirmed by the patient at the time of this visit.  Review of Systems   Constitutional:  Positive for fatigue. Negative for chills, fever and unexpected weight change.   HENT:  Negative for ear pain and sore throat.    Eyes:  Negative for redness and visual disturbance.   Respiratory:  Negative for cough and shortness of breath.    Cardiovascular:  Negative for chest pain and palpitations.   Gastrointestinal:  Negative for nausea and vomiting.   Endocrine: Negative for cold intolerance and heat intolerance.   Genitourinary:  Negative for difficulty urinating and hematuria.   Musculoskeletal:  Positive for arthralgias. Negative for myalgias.   Skin:  Negative for rash and wound.   Allergic/Immunologic: Negative for environmental allergies and food allergies.   Neurological:  Negative for weakness and headaches.   Hematological:  Negative for adenopathy. Does not bruise/bleed easily.   Psychiatric/Behavioral:  Negative for sleep disturbance. The patient is not nervous/anxious.      Objective:   /88   Pulse 64   Ht 5' 10" (1.778 m)   Wt 124.1 kg (273 lb 9.6 oz)   SpO2 96%   BMI 39.26 kg/m²   Physical Exam  Vitals and nursing note reviewed.   Constitutional:       General: He is not in acute distress.     Appearance: " He is well-developed. He is obese. He is not diaphoretic.   HENT:      Head: Normocephalic and atraumatic.      Right Ear: External ear normal.      Left Ear: External ear normal.      Nose: Nose normal. No rhinorrhea.   Eyes:      Extraocular Movements: Extraocular movements intact.      Pupils: Pupils are equal, round, and reactive to light.   Cardiovascular:      Rate and Rhythm: Normal rate.      Pulses: Normal pulses.   Pulmonary:      Effort: Pulmonary effort is normal. No respiratory distress.      Breath sounds: Normal breath sounds.   Abdominal:      General: Bowel sounds are normal.      Palpations: Abdomen is soft.   Musculoskeletal:         General: Tenderness and deformity present. Normal range of motion.      Cervical back: Normal range of motion and neck supple.      Thoracic back: No spasms, tenderness or bony tenderness.      Lumbar back: Spasms present. No tenderness or bony tenderness. Negative right straight leg raise test and negative left straight leg raise test.      Left hip: No tenderness, bony tenderness or crepitus. Normal strength.      Right knee: No effusion, erythema, ecchymosis or lacerations. Normal range of motion. Tenderness present over the medial joint line.      Left knee: Bony tenderness present. No deformity, effusion, erythema, ecchymosis or lacerations. Tenderness present over the medial joint line.      Right lower leg: No edema.      Left lower leg: No edema.        Legs:       Comments: Area of tenderness to palpation   Skin:     General: Skin is warm and dry.      Capillary Refill: Capillary refill takes less than 2 seconds.      Findings: No rash.   Neurological:      General: No focal deficit present.      Mental Status: He is alert and oriented to person, place, and time.   Psychiatric:         Attention and Perception: He is attentive.         Mood and Affect: Mood normal. Mood is not anxious or depressed. Affect is not labile, blunt, angry or inappropriate.          Speech: He is communicative. Speech is not rapid and pressured, delayed, slurred or tangential.         Behavior: Behavior normal. Behavior is not agitated, slowed, aggressive, withdrawn, hyperactive or combative.         Thought Content: Thought content normal. Thought content is not paranoid or delusional. Thought content does not include homicidal or suicidal ideation. Thought content does not include homicidal or suicidal plan.         Cognition and Memory: Memory is not impaired.         Judgment: Judgment normal. Judgment is not impulsive or inappropriate.       Physical Exam  Clear lungs.  Normal heart sounds.    Vital Signs  Patient's weight is 273 pounds.    Results  Laboratory Studies  A1c is 7.4. Average blood sugar is 246.    Assessment:     1. Type 2 diabetes mellitus with hyperglycemia, without long-term current use of insulin    2. Hypertension associated with diabetes    3. Chronic pain of both knees    4. Encounter for long-term (current) use of medications    5. Thrombocytopenia      MDM:   Moderate medical complexity.  Moderate risk.  Total time: 21 minutes.  This includes total time spent on the encounter, which includes face to face time and non-face to face time preparing to see the patient (eg, review of previous medical records, tests), Obtaining and/or reviewing separately obtained history, documenting clinical information in the electronic or other health record, independently interpreting results (not separately reported)/communicating results to the patient/family/caregiver, and/or care coordination (not separately reported).    I have Reviewed and summarized old records.  I have performed thorough medication reconciliation today and discussed risk and benefits of medications.  I have reviewed labs and discussed with patient.  All questions were answered.  I am requesting old records and will review them once they are available.  Visit today included increased complexity associated with  the care of the episodic problem see above assessment addressed and managing the longitudinal care of the patient due to the serious and/or complex managed problem(s) see above.  I have signed for the following orders AND/OR meds.  Orders Placed This Encounter   Procedures    Comprehensive Metabolic Panel     Standing Status:   Future     Standing Expiration Date:   9/10/2025    TSH     Standing Status:   Future     Standing Expiration Date:   9/10/2025    Hemoglobin A1C     Standing Status:   Future     Standing Expiration Date:   9/10/2025    Lipid Panel     Standing Status:   Future     Standing Expiration Date:   9/10/2025    CBC Auto Differential     Standing Status:   Future     Standing Expiration Date:   10/10/2025    Ambulatory referral/consult to Hematology / Oncology     Standing Status:   Future     Standing Expiration Date:   8/12/2025     Referral Priority:   Routine     Referral Type:   Consultation     Referral Reason:   Specialty Services Required     Requested Specialty:   Hematology and Oncology     Number of Visits Requested:   1     Medications Ordered This Encounter   Medications    metFORMIN (GLUCOPHAGE-XR) 500 MG ER 24hr tablet     Sig: Take 1 tablet (500 mg total) by mouth daily with breakfast.     Dispense:  90 tablet     Refill:  3    tirzepatide (MOUNJARO) 10 mg/0.5 mL PnIj     Sig: Inject 10 mg into the skin every 7 days.     Dispense:  4 Pen     Refill:  1        Follow up in about 3 months (around 10/12/2024), or if symptoms worsen or fail to improve, for Med refills, LAB RESULTS.  Future Appointments       Next 10 Appointments       Date Provider Specialty Appt Notes    7/16/2024 Javier Mcduffie PT Outpatient Rehab 2x a week  No copay    7/18/2024 Javier Mcduffie PT Outpatient Rehab 2x a week  No copay    7/23/2024 Javier Mcduffie PT Outpatient Rehab 2x a week  No copay    7/25/2024 Javier Mcduffie PT Outpatient Rehab 2x a week  No copay    7/26/2024  Lab     7/30/2024 Javier Mcduffie PT  Outpatient Rehab 2x a week  No copay    7/30/2024 Casper Nixon MD Orthopedics B KNEE GEL PENDING    8/1/2024 Javier Mcduffie, PT Outpatient Rehab 2x a week  No copay    8/28/2024 Basia To, NP Hematology and Oncology thrombocytopenia    9/18/2024 Ramon Parisi MD Hepatology fatty liver              Displaying the next 10 appointments. This patient has additional appointments scheduled.          If no improvement in symptoms or symptoms worsen, advised to call/follow-up at clinic or go to ER. Patient voiced understanding and all questions/concerns were addressed.   DISCLAIMER: This note was compiled by using a speech recognition dictation system and therefore please be aware that typographical / speech recognition errors can and do occur.  Please contact me if you see any errors specifically.  Consent was obtained for CARMEN recording system prior to the visit.    Casper Barrett M.D.       Office: [phone]   [address] [place] [address]  FAX: [phone]

## 2024-07-13 NOTE — ASSESSMENT & PLAN NOTE
Changing Ozempic to MOUNJARO.  Plan to titrate up to 15 milligrams as tolerated.  Restart metformin until blood sugar becomes better controlled.  Target A1c near seven.  We will plan to monitor hemoglobin A1c at designated intervals 3 to 6 months.  I recommend ongoing Education for diabetic diet and exercise protocol.  We will continue to monitor for side effects.    Please be advised of symptoms to monitor for and to notify me immediately if persistent or worsening.  Follow up with Ophthalmology/Optometry and Podiatry at least annually.  GLP 1 risk and benefits and common side effects of medication discussed with the patient at length.  All questions were answered.  Discussed with patient at length about potential pharmacologic options.  Patient desires consideration for MOUNJARO .  The risks, benefits and, side effects of this GLP 1 medication including nausea , constipation, diarrhea and pain injection site, etcetera.  Patient reports no personal or family history of pancreatitis, MEN or medullary thyroid cancer.  There is potential for gallbladder issues while taking this medication if not already removed.  Patient should be advised to caution with taking this medication if having history of thyroid cancer or biliary disease/gallstones.  Patient should be aware of risk of diabetic retinopathy if blood sugars lowered too quickly.  Patient is aware that weight loss can and will most likely occur quickly.  Discussed GLP 1 mechanism of action.

## 2024-07-13 NOTE — ASSESSMENT & PLAN NOTE
Follow-up with Hematology.  Possibly related to Ozempic?  Switching Ozempic to MOUNJARO.  Thrombocytopenia precaution.

## 2024-07-13 NOTE — ASSESSMENT & PLAN NOTE
Avoid further steroid injections due to uncontrolled diabetes.  Follow-up with orthopedics.  Weight loss is recommended.  Switching to MOUNJARO for better weight loss profile.

## 2024-07-16 ENCOUNTER — CLINICAL SUPPORT (OUTPATIENT)
Dept: REHABILITATION | Facility: HOSPITAL | Age: 69
End: 2024-07-16
Payer: MEDICARE

## 2024-07-16 DIAGNOSIS — Z74.09 IMPAIRED FUNCTIONAL MOBILITY AND ACTIVITY TOLERANCE: Primary | ICD-10-CM

## 2024-07-16 PROCEDURE — 97112 NEUROMUSCULAR REEDUCATION: CPT | Mod: PN | Performed by: PHYSICAL THERAPIST

## 2024-07-16 PROCEDURE — 97110 THERAPEUTIC EXERCISES: CPT | Mod: PN | Performed by: PHYSICAL THERAPIST

## 2024-07-16 PROCEDURE — 97530 THERAPEUTIC ACTIVITIES: CPT | Mod: PN | Performed by: PHYSICAL THERAPIST

## 2024-07-16 NOTE — PROGRESS NOTES
"[facility]   Physical Therapy Treatment Note      Name: Lazarus Zamudio  Clinic Number: [id number]    Therapy Diagnosis:   Encounter Diagnosis   Name Primary?    Impaired functional mobility and activity tolerance Yes     Physician: Angela Melendez FNP    Visit Date: 7/16/2024    Physician Orders: PT Eval and Treat   Medical Diagnosis from Referral: Primary osteoarthritis of R/L knees   Evaluation Date: 6/26/2024  Authorization Period Expiration: 06/21/25  Plan of Care Expiration: 08/07/24  Progress Note Due: 07/26/24  Visit # / Visits authorized: 3/20  FOTO: 1/ 3      Time In: 8:00 AM   Time Out: 8:54 AM   Total Appointment Time (timed & untimed codes): 54 minutes     Precautions: Standard      Subjective     Patient reports: That he is now only having pain when getting in his truck  He was compliant with home exercise program.  Response to previous treatment: No adverse effects    Functional change: ongoing    Pain: 0/10  Location: left hip      Objective      Objective Measures updated at progress report unless specified.     Treatment     Lazarus received the treatments listed below:      therapeutic exercises to develop ROM for 24 minutes including:  Recumbent bike 10 min   Ball flexion stretch 3 min   Supine piriformis stretch 3 min 10 sec holds   Supine hip IR 2 min   Supine hip marches 3 x 10 B red theraband   LAQ 3 x 10 B 7.5#     manual therapy techniques: Joint mobilizations were applied to the:  for  minutes, including:      neuromuscular re-education activities to improve: Kinesthetic and Sense for 15 minutes. The following activities were included:  Bridges 3 x 10   Sidelying clamshells 3 x 10 B red theraband   Standing hip extension 3 x 10 B red theraband   Standing hip abduction 3 x 10 B red theraband     therapeutic activities to improve functional performance for 15  minutes, including:  Step ups 3 x 10 B   Lateral step ups 3 x 10 L   Box squats 3 x 10 18"  Shuttle B 3 1/2 " bands 3 x 10   Shuttle R/L 3 1/2 bands 3 x 10         Patient Education and Home Exercises       Education provided:   - pathophysiology of condition  - HEP review     Written Home Exercises Provided: Patient instructed to cont prior HEP. Exercises were reviewed and Lazarus was able to demonstrate them prior to the end of the session.  Lazarus demonstrated good  understanding of the education provided. See Electronic Medical Record under Patient Instructions for exercises provided during therapy sessions    Assessment     Lazarus with reduced medial thigh pain since initiating physical therapy. Able to increase resistance on leg press activities today    Lazarus Is progressing well towards his goals.   Patient prognosis is Good.     Patient will continue to benefit from skilled outpatient physical therapy to address the deficits listed in the problem list box on initial evaluation, provide pt/family education and to maximize pt's level of independence in the home and community environment.     Patient's spiritual, cultural and educational needs considered and pt agreeable to plan of care and goals.     Anticipated barriers to physical therapy: None at this time    Goals:   Short Term Goals: 3 weeks   1) Pt will be I with established HEP   2) Pt will improve strength by 1/2 grade to improve ease of ADL activities   3) Frequency of left thigh pain will decrease by 25%      Long Term Goals: 6 weeks   1) Pt will improve knee extension ROM by 5 degrees   2) Strength will be 4/5 or greater to improve ease of ADL activities   3) FOTO score will improve by 20% to demonstrate functional improvement          Plan     Continue progression of functional strengthening    Javier Mcduffie, PT

## 2024-07-18 ENCOUNTER — CLINICAL SUPPORT (OUTPATIENT)
Dept: REHABILITATION | Facility: HOSPITAL | Age: 69
End: 2024-07-18
Payer: MEDICARE

## 2024-07-18 DIAGNOSIS — Z74.09 IMPAIRED FUNCTIONAL MOBILITY AND ACTIVITY TOLERANCE: Primary | ICD-10-CM

## 2024-07-18 PROCEDURE — 97112 NEUROMUSCULAR REEDUCATION: CPT | Mod: PN | Performed by: PHYSICAL THERAPIST

## 2024-07-18 PROCEDURE — 97110 THERAPEUTIC EXERCISES: CPT | Mod: PN | Performed by: PHYSICAL THERAPIST

## 2024-07-18 PROCEDURE — 97530 THERAPEUTIC ACTIVITIES: CPT | Mod: PN | Performed by: PHYSICAL THERAPIST

## 2024-07-18 NOTE — PROGRESS NOTES
[facility]   Physical Therapy Treatment Note      Name: Lazarus Zamudio  Clinic Number: [id number]    Therapy Diagnosis:   Encounter Diagnosis   Name Primary?    Impaired functional mobility and activity tolerance Yes     Physician: Angela Melendez FNP    Visit Date: 7/18/2024    Physician Orders: PT Eval and Treat   Medical Diagnosis from Referral: Primary osteoarthritis of R/L knees   Evaluation Date: 6/26/2024  Authorization Period Expiration: 06/21/25  Plan of Care Expiration: 08/07/24  Progress Note Due: 07/26/24  Visit # / Visits authorized: 4/20  FOTO: 1/ 3      Time In: 8:30 AM   Time Out: 9:27 AM   Total Appointment Time (timed & untimed codes): 54 minutes     Precautions: Standard      Subjective     Patient reports: That his thigh is getting better. Still a little pain, but improving   He was compliant with home exercise program.  Response to previous treatment: No adverse effects    Functional change: ongoing    Pain: 1/10  Location: left hip      Objective      Objective Measures updated at progress report unless specified.     Treatment     Lazarus received the treatments listed below:      therapeutic exercises to develop ROM for 30 minutes including:  Recumbent bike 10 min   Ball flexion stretch 3 min   Supine piriformis stretch 3 min 10 sec holds   Supine hip IR 2 min   Supine hip marches 3 x 10 B red theraband   Standing hip extension 3 x 10 B red theraband   Standing hip abduction 3 x 10 B red theraband   LAQ 3 x 10 B 10#     manual therapy techniques: Joint mobilizations were applied to the:  for  minutes, including:      neuromuscular re-education activities to improve: Kinesthetic and Sense for 12 minutes. The following activities were included:  Bridges 3 x 10   Sidelying clamshells 3 x 10 B red theraband   Lateral band walk 3 laps red theraband     therapeutic activities to improve functional performance for 15  minutes, including:  Step ups 3 x 10 B   Lateral step  "ups 3 x 10 L   Box squats 3 x 10 18" 10# KB   Shuttle B 4 bands 3 x 10   Shuttle R/L 4 bands 3 x 10         Patient Education and Home Exercises       Education provided:   - pathophysiology of condition  - HEP review     Written Home Exercises Provided: Patient instructed to cont prior HEP. Exercises were reviewed and Lazarus was able to demonstrate them prior to the end of the session.  Lazarus demonstrated good  understanding of the education provided. See Electronic Medical Record under Patient Instructions for exercises provided during therapy sessions    Assessment     Lazarus was able to increase resistance and add new exercises without an increase in symptoms. Pain levels decreasing, which is allowing for improved walking tolerance     Lazarus Is progressing well towards his goals.   Patient prognosis is Good.     Patient will continue to benefit from skilled outpatient physical therapy to address the deficits listed in the problem list box on initial evaluation, provide pt/family education and to maximize pt's level of independence in the home and community environment.     Patient's spiritual, cultural and educational needs considered and pt agreeable to plan of care and goals.     Anticipated barriers to physical therapy: None at this time    Goals:   Short Term Goals: 3 weeks   1) Pt will be I with established HEP   2) Pt will improve strength by 1/2 grade to improve ease of ADL activities   3) Frequency of left thigh pain will decrease by 25%      Long Term Goals: 6 weeks   1) Pt will improve knee extension ROM by 5 degrees   2) Strength will be 4/5 or greater to improve ease of ADL activities   3) FOTO score will improve by 20% to demonstrate functional improvement          Plan     Continue progression of functional strengthening, FOTO next visit     Javier Mcduffie, PT           "

## 2024-07-23 ENCOUNTER — OFFICE VISIT (OUTPATIENT)
Dept: FAMILY MEDICINE | Facility: CLINIC | Age: 69
End: 2024-07-23
Payer: MEDICARE

## 2024-07-23 ENCOUNTER — PATIENT MESSAGE (OUTPATIENT)
Dept: FAMILY MEDICINE | Facility: CLINIC | Age: 69
End: 2024-07-23

## 2024-07-23 ENCOUNTER — TELEPHONE (OUTPATIENT)
Dept: LAB | Facility: HOSPITAL | Age: 69
End: 2024-07-23
Payer: MEDICARE

## 2024-07-23 VITALS
HEIGHT: 70 IN | OXYGEN SATURATION: 98 % | HEART RATE: 65 BPM | DIASTOLIC BLOOD PRESSURE: 69 MMHG | SYSTOLIC BLOOD PRESSURE: 127 MMHG | TEMPERATURE: 97 F | BODY MASS INDEX: 39.41 KG/M2 | WEIGHT: 275.31 LBS

## 2024-07-23 DIAGNOSIS — E11.65 TYPE 2 DIABETES MELLITUS WITH HYPERGLYCEMIA, WITHOUT LONG-TERM CURRENT USE OF INSULIN: ICD-10-CM

## 2024-07-23 DIAGNOSIS — L02.91 ABSCESS: Primary | ICD-10-CM

## 2024-07-23 PROCEDURE — 99999 PR PBB SHADOW E&M-EST. PATIENT-LVL V: CPT | Mod: PBBFAC,,, | Performed by: NURSE PRACTITIONER

## 2024-07-23 PROCEDURE — 99213 OFFICE O/P EST LOW 20 MIN: CPT | Mod: S$PBB,,, | Performed by: NURSE PRACTITIONER

## 2024-07-23 PROCEDURE — 99215 OFFICE O/P EST HI 40 MIN: CPT | Mod: PBBFAC,PO | Performed by: NURSE PRACTITIONER

## 2024-07-23 RX ORDER — DOXYCYCLINE HYCLATE 100 MG
100 TABLET ORAL 2 TIMES DAILY
Qty: 20 TABLET | Refills: 0 | Status: SHIPPED | OUTPATIENT
Start: 2024-07-23 | End: 2024-08-02

## 2024-07-23 RX ORDER — TRAMADOL HYDROCHLORIDE 50 MG/1
50 TABLET ORAL EVERY 8 HOURS PRN
Qty: 15 TABLET | Refills: 0 | Status: SHIPPED | OUTPATIENT
Start: 2024-07-23 | End: 2024-07-28

## 2024-07-23 NOTE — PROGRESS NOTES
Subjective     Patient ID: Lazarus Zamudio is a 69 y.o. male.    Chief Complaint: ingrown hair    Abscess  Chronicity:  NewProgression Since Onset: gradually worsening  Location:  Shoulder/arm (right posterior shoulder/upper back)  Associated Symptoms: no fever, no chills, no sweats  Characteristics: painful, redness, swelling and blistering    Characteristics: not draining, no itching, no dryness, no scaling, no peeling and no bruising    Treatments Tried:  Topical antibiotics  Relieved by:  Nothing  Worsened by:  Nothing    Past Medical History:   Diagnosis Date    Diabetes mellitus, type 2     Hypertension     Morbid obesity      Social History     Socioeconomic History    Marital status:    Occupational History     Employer:  [employer]   Tobacco Use    Smoking status: Never    Smokeless tobacco: Never   Substance and Sexual Activity    Alcohol use: No    Drug use: No    Sexual activity: Yes     Partners: Female   Social History Narrative    . [facility] at [employer] [facility] the [facility].     Social Determinants of Health     Financial Resource Strain: Low Risk  (8/11/2020)    Overall Financial Resource Strain (CARDIA)     Difficulty of Paying Living Expenses: Not very hard   Food Insecurity: No Food Insecurity (8/11/2020)    Hunger Vital Sign     Worried About Running Out of Food in the Last Year: Never true     Ran Out of Food in the Last Year: Never true   Transportation Needs: No Transportation Needs (8/11/2020)    PRAPARE - Transportation     Lack of Transportation (Medical): No     Lack of Transportation (Non-Medical): No   Physical Activity: Sufficiently Active (8/11/2020)    Exercise Vital Sign     Days of Exercise per Week: 5 days     Minutes of Exercise per Session: 30 min   Stress: No Stress Concern Present (8/11/2020)    [employer] [place] [employer] - Occupational Stress Questionnaire     Feeling of Stress : Only a little     Past Surgical History:   Procedure Laterality Date    HERNIA REPAIR       KNEE ARTHROSCOPY W/ DEBRIDEMENT      VASECTOMY         Review of Systems   Constitutional: Negative.  Negative for activity change, chills, fever and unexpected weight change.   HENT: Negative.  Negative for hearing loss, rhinorrhea and trouble swallowing.    Eyes: Negative.  Negative for discharge and visual disturbance.   Respiratory: Negative.  Negative for chest tightness and wheezing.    Cardiovascular: Negative.  Negative for chest pain and palpitations.   Gastrointestinal: Negative.  Negative for blood in stool, constipation, diarrhea and vomiting.   Endocrine: Negative.  Negative for polydipsia and polyuria.   Genitourinary: Negative.  Negative for difficulty urinating, hematuria and urgency.   Musculoskeletal: Negative.  Negative for arthralgias, joint swelling and neck pain.   Integumentary:         abscess   Allergic/Immunologic: Negative.    Neurological: Negative.  Negative for weakness and headaches.   Psychiatric/Behavioral: Negative.  Negative for confusion and dysphoric mood.           Objective     Physical Exam  Vitals and nursing note reviewed.   Constitutional:       Appearance: Normal appearance. He is obese.   HENT:      Head: Normocephalic.      Right Ear: Tympanic membrane, ear canal and external ear normal.      Left Ear: Tympanic membrane, ear canal and external ear normal.      Nose: Nose normal.      Mouth/Throat:      Mouth: Mucous membranes are moist.      Pharynx: Oropharynx is clear.   Eyes:      Conjunctiva/sclera: Conjunctivae normal.      Pupils: Pupils are equal, round, and reactive to light.   Cardiovascular:      Rate and Rhythm: Normal rate and regular rhythm.      Pulses: Normal pulses.      Heart sounds: Normal heart sounds.   Pulmonary:      Effort: Pulmonary effort is normal.      Breath sounds: Normal breath sounds.   Abdominal:      General: Bowel sounds are normal.      Palpations: Abdomen is soft.   Musculoskeletal:         General: Normal range of motion.       Cervical back: Normal range of motion and neck supple.   Skin:     General: Skin is warm and dry.      Capillary Refill: Capillary refill takes 2 to 3 seconds.          Neurological:      Mental Status: He is alert and oriented to person, place, and time.   Psychiatric:         Mood and Affect: Mood normal.         Behavior: Behavior normal.         Thought Content: Thought content normal.         Judgment: Judgment normal.            Assessment and Plan     1. Abscess  -     Ambulatory referral/consult to General Surgery; Future; Expected date: 07/30/2024  -     doxycycline (VIBRA-TABS) 100 MG tablet; Take 1 tablet (100 mg total) by mouth 2 (two) times daily. for 10 days  Dispense: 20 tablet; Refill: 0  Bactroban 3x/d x 10d  Warm compresses to affected area 2-3x/d  Hibiclens with showers/baths as directed  Avoid prolonged sun exposure while taking doxycycline  Ultram request sent to PCP to approve  Report to ER immediately if symptoms worsen or persist               No follow-ups on file.

## 2024-07-23 NOTE — PATIENT INSTRUCTIONS
Bactroban 3x/d x 10d  Warm compresses to affected area 2-3x/d  Hibiclens with showers/baths as directed  Avoid prolonged sun exposure while taking doxycycline  Ultram request sent to PCP to approve  Report to ER immediately if symptoms worsen or persist    Brian Qiu,     If you are due for any health screening(s) below please notify me so we can arrange them to be ordered and scheduled. Most healthy patients at your age complete them, but you are free to accept or refuse.     If you can't do it, I'll definitely understand. If you can, I'd certainly appreciate it!    Tests to Keep You Healthy    Eye Exam: Met on 2/21/2024  Colon Cancer Screening: Met on 1/19/2015  Last Blood Pressure <= 139/89 (7/12/2024): Yes  Last HbA1c < 8 (06/27/2024): NO      Lets manage your A1c levels     Your last hemoglobin A1c was higher than the goal of less than 8. Hemoglobin A1c or HbA1c is a blood test that measures your average blood sugar levels over the past 3 months. It is the main test to help you and your health care team manage your diabetes.     Higher A1c levels are linked to diabetes complications, such as loss of vision, kidney disease, and nerve damage. Keeping your A1c at least less than 8 is important to stay healthy and we are here to help. Talk with your provider on how you can further improve your A1c.     We recommend that you set up blood work to get a repeat hemoglobin A1c in 3 months to monitor your diabetes. Let your health care team know if you have questions.

## 2024-07-25 ENCOUNTER — OFFICE VISIT (OUTPATIENT)
Dept: SURGERY | Facility: CLINIC | Age: 69
End: 2024-07-25
Payer: MEDICARE

## 2024-07-25 VITALS
SYSTOLIC BLOOD PRESSURE: 148 MMHG | BODY MASS INDEX: 39.17 KG/M2 | DIASTOLIC BLOOD PRESSURE: 72 MMHG | HEART RATE: 67 BPM | WEIGHT: 273 LBS

## 2024-07-25 DIAGNOSIS — L02.91 ABSCESS: ICD-10-CM

## 2024-07-25 DIAGNOSIS — M17.12 PRIMARY OSTEOARTHRITIS OF LEFT KNEE: ICD-10-CM

## 2024-07-25 DIAGNOSIS — M17.11 PRIMARY OSTEOARTHRITIS OF RIGHT KNEE: Primary | ICD-10-CM

## 2024-07-25 DIAGNOSIS — L98.9 SKIN LESION OF BACK: Primary | ICD-10-CM

## 2024-07-25 PROCEDURE — 99999 PR PBB SHADOW E&M-EST. PATIENT-LVL III: CPT | Mod: PBBFAC,,,

## 2024-07-25 PROCEDURE — 21930 EXC BACK LES SC < 3 CM: CPT | Mod: PBBFAC

## 2024-07-25 PROCEDURE — 99213 OFFICE O/P EST LOW 20 MIN: CPT | Mod: PBBFAC

## 2024-07-25 NOTE — PROCEDURES
Exc, Cyst    Date/Time: 7/25/2024 10:30 AM    Performed by: Mary Arizmendi PA-C  Authorized by: Mary Arizmendi PA-C    Consent Done?:  Yes (Written)  Timeout: prior to procedure the correct patient, procedure, and site was verified    Local anesthesia used?: Yes    Anesthesia:  Local infiltration  Local anesthetic:  Lidocaine 2% with epinephrine  Anesthetic total (ml):  5  Assistants?: No     I was present for the entire procedure.  Indications:  Cyst (Appears to be infected sebaceous cyst)  Body area:  Back / flank  Laterality:  Right (right posterior shoulder)  Position:  Prone  Anesthesia:  Local infiltration  Local anesthetic:  Lidocaine 2% with epinephrine  Excision type:  Tumor  Excision depth:  Subcutaneous  Excision size (cm):  2  Scalpel size:  15  Incision type:  Elliptical  Specimens?: Yes     Specimens submitted to pathology. (Right posterior shoulder lesion)   Hemostasis was obtained.  Estimated blood loss (cc):  5  Wound closure: Left open due to infection, pressure dressing applied.  Sterile dressings:  Gauze (Gauze secured with tape)   Patient tolerated the procedure well with no immediate complications.   Patient was discharged and will follow up for wound check and pathology results.  Comments:      Pressure dressing applied, to be remove din 48 hours. Advised to call with any questions or concerns.    Mary Arizmendi PA-C  [facility]

## 2024-07-25 NOTE — PROGRESS NOTES
History & Physical    Subjective     History of Present Illness:  Patient is a 69 y.o. male who presents for evaluation of a painful lump on his right posterior shoulder. It has been present for years, although much smaller and not painful. His wife states she could gently press the area and get an ingrown hair out of it. He rubbed the area on a door frame recently due to it itching, and it then enlarged and became red. He presented to primary care on Monday who placed him on oral and topical antibiotics. The redness and swelling have improved since then. He denies any fevers and chills. He denies any other similar lesions. He takes ASA 81 mg daily but no other blood thinners.     Chief Complaint   Patient presents with    Cyst     Infected cyst of back       Review of patient's allergies indicates:   Allergen Reactions    Metformin      Intolerant     Penicillins Rash       Current Outpatient Medications   Medication Sig Dispense Refill    aspirin (ECOTRIN) 81 MG EC tablet Take 81 mg by mouth once daily.      atorvastatin (LIPITOR) 40 MG tablet TAKE 1 TABLET BY MOUTH EVERY DAY 90 tablet 1    cyclobenzaprine (FLEXERIL) 10 MG tablet TAKE 1 TABLET BY MOUTH EVERY DAY IN THE EVENING 30 tablet 0    dorzolamide-timolol 2-0.5% (COSOPT) 22.3-6.8 mg/mL ophthalmic solution Place 1 drop into the right eye 2 (two) times daily.      doxycycline (VIBRA-TABS) 100 MG tablet Take 1 tablet (100 mg total) by mouth 2 (two) times daily. for 10 days 20 tablet 0    enalapril (VASOTEC) 20 MG tablet TAKE 1 TABLET BY MOUTH EVERY DAY 90 tablet 2    ibuprofen (ADVIL,MOTRIN) 800 MG tablet Take 800 mg by mouth every 8 (eight) hours as needed.      latanoprost 0.005 % ophthalmic solution Place 1 drop into both eyes every evening.      metFORMIN (GLUCOPHAGE-XR) 500 MG ER 24hr tablet Take 1 tablet (500 mg total) by mouth daily with breakfast. 90 tablet 3    multivitamin with minerals tablet Take 1 tablet by mouth once daily.      mupirocin  (BACTROBAN) 2 % ointment Apply topically 2 (two) times daily. 30 g 1    tadalafiL (CIALIS) 20 MG Tab Take 20 mg by mouth as needed.      testosterone cypionate (DEPOTESTOTERONE CYPIONATE) 100 mg/mL injection Inject 200 mg into the muscle every 14 (fourteen) days.      tirzepatide (MOUNJARO) 10 mg/0.5 mL PnIj Inject 10 mg into the skin every 7 days. 4 Pen 1    traMADoL (ULTRAM) 50 mg tablet Take 1 tablet (50 mg total) by mouth every 8 (eight) hours as needed for Pain. 15 tablet 0    semaglutide (OZEMPIC) 2 mg/dose (8 mg/3 mL) PnIj Inject 2 mg into the skin every 7 days. 9 mL 1     No current facility-administered medications for this visit.       Past Medical History:   Diagnosis Date    Diabetes mellitus, type 2     Hypertension     Morbid obesity      Past Surgical History:   Procedure Laterality Date    HERNIA REPAIR      KNEE ARTHROSCOPY W/ DEBRIDEMENT      VASECTOMY       Family History   Problem Relation Name Age of Onset    Heart disease Mother Maribeth     Vision loss Mother Maribeth     Diabetes Father Ramon     Heart disease Father Ramon     Hypertension Father Ramon     Diabetes Maternal Grandmother Kathy     Cancer [facility]aternal [facility]      Social History     Tobacco Use    Smoking status: Never    Smokeless tobacco: Never   Substance Use Topics    Alcohol use: No    Drug use: No        Review of Systems:  Review of Systems   Constitutional:  Negative for chills, fever and unexpected weight change.   HENT:  Negative for congestion.    Eyes:  Negative for visual disturbance.   Respiratory:  Negative for shortness of breath.    Cardiovascular:  Negative for chest pain.   Gastrointestinal:  Negative for abdominal distention, abdominal pain, constipation, nausea, rectal pain and vomiting.   Genitourinary:  Negative for dysuria.   Musculoskeletal:  Negative for arthralgias.   Skin:  Positive for color change and wound. Negative for rash.        Lump posterior right shoulder   Neurological:  Negative  for light-headedness.   Hematological:  Negative for adenopathy.          Objective     Vital Signs (Most Recent)  Pulse: 67 (07/25/24 1005)  BP: (!) 148/72 (07/25/24 1005)     123.8 kg (273 lb)     Physical Exam:  Physical Exam  Vitals reviewed.   Constitutional:       General: He is not in acute distress.     Appearance: He is well-developed. He is not toxic-appearing.   HENT:      Head: Normocephalic and atraumatic.      Right Ear: External ear normal.      Left Ear: External ear normal.      Nose: Nose normal.      Mouth/Throat:      Mouth: Mucous membranes are moist.   Eyes:      Extraocular Movements: Extraocular movements intact.      Conjunctiva/sclera: Conjunctivae normal.   Cardiovascular:      Rate and Rhythm: Normal rate.   Pulmonary:      Effort: Pulmonary effort is normal. No respiratory distress.   Musculoskeletal:      Cervical back: Normal range of motion and neck supple.   Skin:     General: Skin is warm and dry.          Neurological:      Mental Status: He is alert and oriented to person, place, and time.   Psychiatric:         Behavior: Behavior normal.          Assessment and Plan   70 y/o male with infected sebaceous cyst of back who underwent I&D in clinic today.    - Discussed that this is consistent with benign skin lesion, sebaceous cyst. Sent specimen for pathology to confirm.  - Daily local wound care to area. Removed current pressure dressing in 48 hours and replace daily with topical dressing. Avoid creams on the area.  - Complete course of oral antibiotics.  - Call with any s/s of returning/worsening infection.  - RTC 2 weeks for wound check.      Mary Arizmendi PA-C  [facility]          I spent a total of 30 minutes on the day of the visit.This includes face to face time and non-face to face time preparing to see the patient (eg, review of tests), obtaining and/or reviewing separately obtained history, documenting clinical information in the electronic or other health  record, independently interpreting results and communicating results to the patient/family/caregiver, or care coordinator.

## 2024-07-29 ENCOUNTER — TELEPHONE (OUTPATIENT)
Dept: SURGERY | Facility: CLINIC | Age: 69
End: 2024-07-29
Payer: MEDICARE

## 2024-07-29 ENCOUNTER — LAB VISIT (OUTPATIENT)
Dept: LAB | Facility: HOSPITAL | Age: 69
End: 2024-07-29
Attending: FAMILY MEDICINE
Payer: MEDICARE

## 2024-07-29 DIAGNOSIS — Z79.899 ENCOUNTER FOR LONG-TERM (CURRENT) USE OF MEDICATIONS: ICD-10-CM

## 2024-07-29 DIAGNOSIS — Z00.00 WELL ADULT EXAM: ICD-10-CM

## 2024-07-29 LAB
ALBUMIN SERPL BCP-MCNC: 3.9 G/DL (ref 3.5–5.2)
ALP SERPL-CCNC: 123 U/L (ref 55–135)
ALT SERPL W/O P-5'-P-CCNC: 26 U/L (ref 10–44)
ANION GAP SERPL CALC-SCNC: 7 MMOL/L (ref 8–16)
AST SERPL-CCNC: 22 U/L (ref 10–40)
BILIRUB SERPL-MCNC: 0.8 MG/DL (ref 0.1–1)
BUN SERPL-MCNC: 15 MG/DL (ref 8–23)
CALCIUM SERPL-MCNC: 9.5 MG/DL (ref 8.7–10.5)
CHLORIDE SERPL-SCNC: 100 MMOL/L (ref 95–110)
CO2 SERPL-SCNC: 27 MMOL/L (ref 23–29)
CREAT SERPL-MCNC: 0.9 MG/DL (ref 0.5–1.4)
EST. GFR  (NO RACE VARIABLE): >60 ML/MIN/1.73 M^2
GLUCOSE SERPL-MCNC: 173 MG/DL (ref 70–110)
POTASSIUM SERPL-SCNC: 4.4 MMOL/L (ref 3.5–5.1)
PROT SERPL-MCNC: 6.6 G/DL (ref 6–8.4)
SODIUM SERPL-SCNC: 134 MMOL/L (ref 136–145)

## 2024-07-29 PROCEDURE — 80053 COMPREHEN METABOLIC PANEL: CPT | Performed by: FAMILY MEDICINE

## 2024-07-29 PROCEDURE — 36415 COLL VENOUS BLD VENIPUNCTURE: CPT | Mod: PO | Performed by: FAMILY MEDICINE

## 2024-07-29 NOTE — TELEPHONE ENCOUNTER
Called patient and informed of pathology results. He is recovering well. He has a follow-up appointment scheduled.      Mary Arizmendi PA-C  [facility]

## 2024-07-30 ENCOUNTER — CLINICAL SUPPORT (OUTPATIENT)
Dept: REHABILITATION | Facility: HOSPITAL | Age: 69
End: 2024-07-30
Payer: MEDICARE

## 2024-07-30 ENCOUNTER — OFFICE VISIT (OUTPATIENT)
Dept: ORTHOPEDICS | Facility: CLINIC | Age: 69
End: 2024-07-30
Payer: MEDICARE

## 2024-07-30 VITALS — WEIGHT: 272.94 LBS | BODY MASS INDEX: 39.07 KG/M2 | HEIGHT: 70 IN

## 2024-07-30 DIAGNOSIS — Z74.09 IMPAIRED FUNCTIONAL MOBILITY AND ACTIVITY TOLERANCE: Primary | ICD-10-CM

## 2024-07-30 DIAGNOSIS — M17.11 PRIMARY OSTEOARTHRITIS OF RIGHT KNEE: Primary | ICD-10-CM

## 2024-07-30 DIAGNOSIS — M17.12 PRIMARY OSTEOARTHRITIS OF LEFT KNEE: ICD-10-CM

## 2024-07-30 PROCEDURE — 99999 PR PBB SHADOW E&M-EST. PATIENT-LVL III: CPT | Mod: PBBFAC,,, | Performed by: ORTHOPAEDIC SURGERY

## 2024-07-30 PROCEDURE — 99999PBSHW PR PBB SHADOW TECHNICAL ONLY FILED TO HB: Mod: JZ,PBBFAC,,

## 2024-07-30 PROCEDURE — 97110 THERAPEUTIC EXERCISES: CPT | Mod: PN | Performed by: PHYSICAL THERAPIST

## 2024-07-30 PROCEDURE — 97112 NEUROMUSCULAR REEDUCATION: CPT | Mod: PN | Performed by: PHYSICAL THERAPIST

## 2024-07-30 PROCEDURE — 99499 UNLISTED E&M SERVICE: CPT | Mod: S$PBB,,, | Performed by: ORTHOPAEDIC SURGERY

## 2024-07-30 PROCEDURE — 20610 DRAIN/INJ JOINT/BURSA W/O US: CPT | Mod: 50,PBBFAC,PO | Performed by: ORTHOPAEDIC SURGERY

## 2024-07-30 PROCEDURE — 99213 OFFICE O/P EST LOW 20 MIN: CPT | Mod: PBBFAC,PO,25 | Performed by: ORTHOPAEDIC SURGERY

## 2024-07-30 PROCEDURE — 97530 THERAPEUTIC ACTIVITIES: CPT | Mod: PN | Performed by: PHYSICAL THERAPIST

## 2024-07-30 RX ADMIN — Medication 88 MG: at 02:07

## 2024-07-30 NOTE — PROGRESS NOTES
"[facility]   Physical Therapy Treatment Note      Name: Lazarus Zamudio  Clinic Number: [id number]    Therapy Diagnosis:   Encounter Diagnosis   Name Primary?    Impaired functional mobility and activity tolerance Yes     Physician: Angela Melendez FNP    Visit Date: 7/30/2024    Physician Orders: PT Eval and Treat   Medical Diagnosis from Referral: Primary osteoarthritis of R/L knees   Evaluation Date: 6/26/2024  Authorization Period Expiration: 06/21/25  Plan of Care Expiration: 08/07/24  Progress Note Due: 07/26/24  Visit # / Visits authorized: 5/20  FOTO: 2/ 3      Time In: 8:00 AM   Time Out: 8:53 AM   Total Appointment Time (timed & untimed codes): 53 minutes     Precautions: Standard      Subjective     Patient reports: That his thigh and knee are doing well  He was compliant with home exercise program.  Response to previous treatment: No adverse effects    Functional change: ongoing    Pain: 0/10  Location: left hip      Objective      FOTO: 39% limited; was 41% limited     Treatment     Lazarus received the treatments listed below:      therapeutic exercises to develop ROM for 31 minutes including:  Nustep 10 min   Ball flexion stretch 3 min   Supine piriformis stretch 3 min 10 sec holds   Supine hip IR 2 min   Supine hip marches 3 x 10 B green theraband   Standing hip extension 3 x 10 B green theraband   Standing hip abduction 3 x 10 B green theraband   LAQ 3 x 10 B 10#     manual therapy techniques: Joint mobilizations were applied to the:  for  minutes, including:      neuromuscular re-education activities to improve: Kinesthetic and Sense for 12 minutes. The following activities were included:  Bridges 3 x 10   Sidelying clamshells 3 x 10 B green theraband   Lateral band walk 3 laps green theraband     therapeutic activities to improve functional performance for 10  minutes, including:  Step ups 3 x 10 B   Lateral step ups 3 x 10 L   Box squats 3 x 10 18" 10# KB   Shuttle B 4 " bands 3 x 10 - NP   Shuttle R/L 4 bands 3 x 10 - NP         Patient Education and Home Exercises       Education provided:   - pathophysiology of condition  - HEP review     Written Home Exercises Provided: Patient instructed to cont prior HEP. Exercises were reviewed and Lazarus was able to demonstrate them prior to the end of the session.  Lazarus demonstrated good  understanding of the education provided. See Electronic Medical Record under Patient Instructions for exercises provided during therapy sessions    Assessment     Lazarus was unable to complete shuttle today secondary to the pressure on a wound on his shoulder. Knees and hip improving    Lazarus Is progressing well towards his goals.   Patient prognosis is Good.     Patient will continue to benefit from skilled outpatient physical therapy to address the deficits listed in the problem list box on initial evaluation, provide pt/family education and to maximize pt's level of independence in the home and community environment.     Patient's spiritual, cultural and educational needs considered and pt agreeable to plan of care and goals.     Anticipated barriers to physical therapy: None at this time    Goals:   Short Term Goals: 3 weeks   1) Pt will be I with established HEP   2) Pt will improve strength by 1/2 grade to improve ease of ADL activities   3) Frequency of left thigh pain will decrease by 25%      Long Term Goals: 6 weeks   1) Pt will improve knee extension ROM by 5 degrees   2) Strength will be 4/5 or greater to improve ease of ADL activities   3) FOTO score will improve by 20% to demonstrate functional improvement          Plan     Continue progression of functional strengthening, FOTO next visit     Javier Mcduffie, PT

## 2024-08-02 ENCOUNTER — PATIENT MESSAGE (OUTPATIENT)
Dept: SURGERY | Facility: CLINIC | Age: 69
End: 2024-08-02
Payer: MEDICARE

## 2024-08-04 NOTE — PROGRESS NOTES
Make follow-up lab appointment per recommendation below.  Check to see if patient has seen the results through my chart.  If not then,  #CALL THE PATIENT# to discuss results/see if they have questions and document verification of contact. Make F/U appt if needed. [phone]    #My interpretation that was sent to them through [employer]:  Lazarus, I have reviewed your recent blood work.     Your metabolic panel which shows your glucose, kidney function, electrolytes, and liver function is improved from previous.  Sodium level is near normal.  Glucose has improved.  =========================  Also please address any outstanding health maintenance that may be due: RSV Vaccine (Age 60+ and Pregnant patients)(1 - 1-dose 60+ series) Never done

## 2024-08-05 ENCOUNTER — CLINICAL SUPPORT (OUTPATIENT)
Dept: REHABILITATION | Facility: HOSPITAL | Age: 69
End: 2024-08-05
Payer: MEDICARE

## 2024-08-05 DIAGNOSIS — Z74.09 IMPAIRED FUNCTIONAL MOBILITY AND ACTIVITY TOLERANCE: Primary | ICD-10-CM

## 2024-08-05 PROCEDURE — 97110 THERAPEUTIC EXERCISES: CPT | Mod: PN | Performed by: PHYSICAL THERAPIST

## 2024-08-05 PROCEDURE — 97112 NEUROMUSCULAR REEDUCATION: CPT | Mod: PN | Performed by: PHYSICAL THERAPIST

## 2024-08-08 ENCOUNTER — CLINICAL SUPPORT (OUTPATIENT)
Dept: REHABILITATION | Facility: HOSPITAL | Age: 69
End: 2024-08-08
Payer: MEDICARE

## 2024-08-08 DIAGNOSIS — Z74.09 IMPAIRED FUNCTIONAL MOBILITY AND ACTIVITY TOLERANCE: Primary | ICD-10-CM

## 2024-08-08 PROCEDURE — 97530 THERAPEUTIC ACTIVITIES: CPT | Mod: PN | Performed by: PHYSICAL THERAPIST

## 2024-08-08 PROCEDURE — 97110 THERAPEUTIC EXERCISES: CPT | Mod: PN | Performed by: PHYSICAL THERAPIST

## 2024-08-08 PROCEDURE — 97112 NEUROMUSCULAR REEDUCATION: CPT | Mod: PN | Performed by: PHYSICAL THERAPIST

## 2024-08-12 ENCOUNTER — OFFICE VISIT (OUTPATIENT)
Dept: SURGERY | Facility: CLINIC | Age: 69
End: 2024-08-12
Payer: MEDICARE

## 2024-08-12 VITALS
BODY MASS INDEX: 38.4 KG/M2 | DIASTOLIC BLOOD PRESSURE: 71 MMHG | HEART RATE: 63 BPM | WEIGHT: 267.63 LBS | SYSTOLIC BLOOD PRESSURE: 127 MMHG

## 2024-08-12 DIAGNOSIS — L98.9 SKIN LESION OF BACK: Primary | ICD-10-CM

## 2024-08-12 PROCEDURE — 99213 OFFICE O/P EST LOW 20 MIN: CPT | Mod: PBBFAC

## 2024-08-12 PROCEDURE — 99999 PR PBB SHADOW E&M-EST. PATIENT-LVL III: CPT | Mod: PBBFAC,,,

## 2024-08-12 NOTE — PROGRESS NOTES
History & Physical    Subjective     History of Present Illness:  Patient is a 69 y.o. male who presents for evaluation of a painful lump on his right posterior shoulder. It has been present for years, although much smaller and not painful. His wife states she could gently press the area and get an ingrown hair out of it. He rubbed the area on a door frame recently due to it itching, and it then enlarged and became red. He presented to primary care on Monday who placed him on oral and topical antibiotics. The redness and swelling have improved since then. He denies any fevers and chills. He denies any other similar lesions. He takes ASA 81 mg daily but no other blood thinners.     Interval history:   Since the last clinic visit, the patient has perform local wound care and is doing well.  Denies any pain or drainage.  Denies fevers and chills.    Chief Complaint   Patient presents with    Wound Check     Status post cyst excision on back       Review of patient's allergies indicates:   Allergen Reactions    Metformin      Intolerant     Penicillins Rash       Current Outpatient Medications   Medication Sig Dispense Refill    aspirin (ECOTRIN) 81 MG EC tablet Take 81 mg by mouth once daily.      atorvastatin (LIPITOR) 40 MG tablet TAKE 1 TABLET BY MOUTH EVERY DAY 90 tablet 1    cyclobenzaprine (FLEXERIL) 10 MG tablet TAKE 1 TABLET BY MOUTH EVERY DAY IN THE EVENING 30 tablet 0    dorzolamide-timolol 2-0.5% (COSOPT) 22.3-6.8 mg/mL ophthalmic solution Place 1 drop into the right eye 2 (two) times daily.      enalapril (VASOTEC) 20 MG tablet TAKE 1 TABLET BY MOUTH EVERY DAY 90 tablet 2    ibuprofen (ADVIL,MOTRIN) 800 MG tablet Take 800 mg by mouth every 8 (eight) hours as needed.      latanoprost 0.005 % ophthalmic solution Place 1 drop into both eyes every evening.      metFORMIN (GLUCOPHAGE-XR) 500 MG ER 24hr tablet Take 1 tablet (500 mg total) by mouth daily with breakfast. 90 tablet 3    multivitamin with minerals  tablet Take 1 tablet by mouth once daily.      mupirocin (BACTROBAN) 2 % ointment Apply topically 2 (two) times daily. 30 g 1    tadalafiL (CIALIS) 20 MG Tab Take 20 mg by mouth as needed.      testosterone cypionate (DEPOTESTOTERONE CYPIONATE) 100 mg/mL injection Inject 200 mg into the muscle every 14 (fourteen) days.      tirzepatide (MOUNJARO) 10 mg/0.5 mL PnIj Inject 10 mg into the skin every 7 days. 4 Pen 1     No current facility-administered medications for this visit.       Past Medical History:   Diagnosis Date    Diabetes mellitus, type 2     Hypertension     Morbid obesity      Past Surgical History:   Procedure Laterality Date    HERNIA REPAIR      KNEE ARTHROSCOPY W/ DEBRIDEMENT      VASECTOMY       Family History   Problem Relation Name Age of Onset    Heart disease Mother Maribeth     Vision loss Mother Maribeth     Diabetes Father Ramon     Heart disease Father Ramon     Hypertension Father Ramon     Diabetes Maternal Grandmother Kathy     Cancer Maternal Grandfather [facility]raman      Social History     Tobacco Use    Smoking status: Never    Smokeless tobacco: Never   Substance Use Topics    Alcohol use: No    Drug use: No        Review of Systems:  Review of Systems   Constitutional:  Negative for chills, fever and unexpected weight change.   HENT:  Negative for congestion.    Eyes:  Negative for visual disturbance.   Respiratory:  Negative for shortness of breath.    Cardiovascular:  Negative for chest pain.   Gastrointestinal:  Negative for abdominal distention, abdominal pain, constipation, nausea, rectal pain and vomiting.   Genitourinary:  Negative for dysuria.   Musculoskeletal:  Negative for arthralgias.   Skin:  Negative for color change, rash and wound.   Neurological:  Negative for light-headedness.   Hematological:  Negative for adenopathy.          Objective     Vital Signs (Most Recent)  Pulse: 63 (08/12/24 1010)  BP: 127/71 (08/12/24 1010)     121.4 kg (267 lb 10.2 oz)     Physical  Exam:  Physical Exam  Vitals reviewed.   Constitutional:       General: He is not in acute distress.     Appearance: He is well-developed. He is not toxic-appearing.   HENT:      Head: Normocephalic and atraumatic.      Right Ear: External ear normal.      Left Ear: External ear normal.      Nose: Nose normal.      Mouth/Throat:      Mouth: Mucous membranes are moist.   Eyes:      Extraocular Movements: Extraocular movements intact.      Conjunctiva/sclera: Conjunctivae normal.   Cardiovascular:      Rate and Rhythm: Normal rate.   Pulmonary:      Effort: Pulmonary effort is normal. No respiratory distress.   Musculoskeletal:      Cervical back: Normal range of motion and neck supple.   Skin:     General: Skin is warm and dry.          Neurological:      Mental Status: He is alert and oriented to person, place, and time.   Psychiatric:         Behavior: Behavior normal.          Assessment and Plan   70 y/o male with infected sebaceous cyst of back who underwent I&D in clinic and has recovered well.      - no further interventions or restrictions  -return to clinic as needed or if any issues arise    Mary Arizmendi PA-C  [facility]          I spent a total of 5 minutes on the day of the visit.This includes face to face time and non-face to face time preparing to see the patient (eg, review of tests), obtaining and/or reviewing separately obtained history, documenting clinical information in the electronic or other health record, independently interpreting results and communicating results to the patient/family/caregiver, or care coordinator.

## 2024-08-28 ENCOUNTER — OFFICE VISIT (OUTPATIENT)
Dept: HEMATOLOGY/ONCOLOGY | Facility: CLINIC | Age: 69
End: 2024-08-28
Payer: MEDICARE

## 2024-08-28 ENCOUNTER — LAB VISIT (OUTPATIENT)
Dept: LAB | Facility: HOSPITAL | Age: 69
End: 2024-08-28
Attending: NURSE PRACTITIONER
Payer: MEDICARE

## 2024-08-28 VITALS
HEIGHT: 70 IN | WEIGHT: 263.5 LBS | SYSTOLIC BLOOD PRESSURE: 142 MMHG | DIASTOLIC BLOOD PRESSURE: 68 MMHG | BODY MASS INDEX: 37.72 KG/M2 | HEART RATE: 58 BPM | OXYGEN SATURATION: 97 %

## 2024-08-28 DIAGNOSIS — R16.1 SPLENOMEGALY: ICD-10-CM

## 2024-08-28 DIAGNOSIS — Z86.39 HISTORY OF IRON DEFICIENCY: ICD-10-CM

## 2024-08-28 DIAGNOSIS — D75.1 POLYCYTHEMIA: ICD-10-CM

## 2024-08-28 DIAGNOSIS — D69.6 THROMBOCYTOPENIA: ICD-10-CM

## 2024-08-28 DIAGNOSIS — E29.1 HYPOGONADISM MALE: Chronic | ICD-10-CM

## 2024-08-28 DIAGNOSIS — K76.0 NAFLD (NONALCOHOLIC FATTY LIVER DISEASE): Primary | ICD-10-CM

## 2024-08-28 LAB
BASOPHILS # BLD AUTO: 0.04 K/UL (ref 0–0.2)
BASOPHILS NFR BLD: 0.7 % (ref 0–1.9)
DIFFERENTIAL METHOD BLD: ABNORMAL
EOSINOPHIL # BLD AUTO: 0 K/UL (ref 0–0.5)
EOSINOPHIL NFR BLD: 0.7 % (ref 0–8)
ERYTHROCYTE [DISTWIDTH] IN BLOOD BY AUTOMATED COUNT: 13.8 % (ref 11.5–14.5)
HCT VFR BLD AUTO: 48.5 % (ref 40–54)
HGB BLD-MCNC: 16.1 G/DL (ref 14–18)
IMM GRANULOCYTES # BLD AUTO: 0.03 K/UL (ref 0–0.04)
IMM GRANULOCYTES NFR BLD AUTO: 0.5 % (ref 0–0.5)
LYMPHOCYTES # BLD AUTO: 1.7 K/UL (ref 1–4.8)
LYMPHOCYTES NFR BLD: 27.8 % (ref 18–48)
MCH RBC QN AUTO: 28.3 PG (ref 27–31)
MCHC RBC AUTO-ENTMCNC: 33.2 G/DL (ref 32–36)
MCV RBC AUTO: 85 FL (ref 82–98)
MONOCYTES # BLD AUTO: 0.5 K/UL (ref 0.3–1)
MONOCYTES NFR BLD: 8.1 % (ref 4–15)
NEUTROPHILS # BLD AUTO: 3.8 K/UL (ref 1.8–7.7)
NEUTROPHILS NFR BLD: 62.7 % (ref 38–73)
NRBC BLD-RTO: 0 /100 WBC
PLATELET # BLD AUTO: 88 K/UL (ref 150–450)
PLATELET BLD QL SMEAR: ABNORMAL
PMV BLD AUTO: 8.5 FL (ref 9.2–12.9)
RBC # BLD AUTO: 5.69 M/UL (ref 4.6–6.2)
WBC # BLD AUTO: 6.04 K/UL (ref 3.9–12.7)

## 2024-08-28 PROCEDURE — 99215 OFFICE O/P EST HI 40 MIN: CPT | Mod: S$PBB,,, | Performed by: NURSE PRACTITIONER

## 2024-08-28 PROCEDURE — 99215 OFFICE O/P EST HI 40 MIN: CPT | Mod: PBBFAC,PO | Performed by: NURSE PRACTITIONER

## 2024-08-28 PROCEDURE — G2211 COMPLEX E/M VISIT ADD ON: HCPCS | Mod: S$PBB,,, | Performed by: NURSE PRACTITIONER

## 2024-08-28 PROCEDURE — 36415 COLL VENOUS BLD VENIPUNCTURE: CPT | Mod: PO | Performed by: NURSE PRACTITIONER

## 2024-08-28 PROCEDURE — 99999 PR PBB SHADOW E&M-EST. PATIENT-LVL V: CPT | Mod: PBBFAC,,, | Performed by: NURSE PRACTITIONER

## 2024-08-28 RX ORDER — TADALAFIL 5 MG/1
5 TABLET ORAL DAILY
COMMUNITY
Start: 2024-08-15

## 2024-08-28 NOTE — PROGRESS NOTES
Subjective:      Patient ID: Lazarus Zamudio is a 69 y.o. male.    Chief Complaint: fatigue    HPI:   69 year old male who presents today to the hematology clinic for further evaluation and recommendations of recent diagnosis thrombocytopenia.  He has been referred to the clinic by his PCP, Dr. Casper Barrett.  Hep B, Hep C, HÉCTOR, HIV negative.  Blue top 83 K.  Thrombocytopenia present on/off since 2012 with more consisted low platelet count since 5/2021 ranging from 101-148K.  Denies any abnormal bleeding     Is currently taking aspirin for a long time now per patient.  States at least since 2012     Has to donate blood d/t polycythemia d/t testosterone use; however states was unable to donate because his iron was too high. States that he is not taking iron.  Denies a large consumption of iron rich foods.      Patient is accompanied by his wife.       Other diagnosis include:      Very seldom drinks alcohol.  Denies cigarette smoking.     Was a  at [employer] [facility] [employer].       Family hx of cancer:  Maternal uncle: lymphoma  Maternal grandfather: prostate cancer     Denies f/c/ns or unintentional weight loss.  Denies any known lymphadenopathy.       2015 colonoscopy - polyps - due to repeat     States that he has early satiety and thinks due to Ozempic.       3/21/2023 Abdominal US Impression:     1. Hepatomegaly and fatty infiltration of the liver.  2. Splenomegaly.     Interval History:  3/30/2023  Presents today to review results of workup thrombocytopenia.  Has no complaints today. Is accompanied by his wife. Has seen hepatology today and will be having fibroscan soon.     Interval History:  8/28/2024  had liver biopsy done 7/18/2023 1. Liver, biopsy:   Mild macrovesicular steatosis without active steatohepatitis.   Mild mixed portal inflammation.   Denies any known abnormal bleeding.  Scheduled for phlebotomies every 8 weeks due to polycythemia from testosterone therapy.  States that he was  told that his iron is too high. No hereditary hemochromatosis labs seen in system. Last time he had a phlebotomy was June 2023.  Is scheduled to see new hepatologist in September.  Denies f/c/ns or unintentional weight loss.  Denies any known abnormal lymphadenopathy.   I have reviewed all of the patient's relevant lab work available in the medical record and have utilized this in my evaluation and management recommendations today.      Social History     Socioeconomic History    Marital status:    Occupational History     Employer:  [employer]   Tobacco Use    Smoking status: Never    Smokeless tobacco: Never   Substance and Sexual Activity    Alcohol use: No    Drug use: No    Sexual activity: Yes     Partners: Female   Social History Narrative    . 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     Social Determinants of Health     Financial Resource Strain: Low Risk  (7/24/2024)    Overall Financial Resource Strain (CARDIA)     Difficulty of Paying Living Expenses: Not hard at all   Food Insecurity: No Food Insecurity (7/24/2024)    Hunger Vital Sign     Worried About Running Out of Food in the Last Year: Never true     Ran Out of Food in the Last Year: Never true   Transportation Needs: No Transportation Needs (8/11/2020)    PRAPARE - Transportation     Lack of Transportation (Medical): No     Lack of Transportation (Non-Medical): No   Physical Activity: Inactive (7/24/2024)    Exercise Vital Sign     Days of Exercise per Week: 0 days     Minutes of Exercise per Session: 0 min   Stress: No Stress Concern Present (7/24/2024)    [employer] - Occupational Stress Questionnaire     Feeling of Stress : Not at all   Housing Stability: Unknown (7/24/2024)    Housing Stability Vital Sign     Unable to Pay for Housing in the Last Year: No       Family History   Problem Relation Name Age of Onset    Heart disease Mother Maribeth     Vision loss Mother Maribeth     Diabetes Father Ramon     Heart  disease Father Ramon     Hypertension Father Ramon     Diabetes Maternal Grandmother Kathy     Cancer Maternal Grandfather Garrison        Past Surgical History:   Procedure Laterality Date    HERNIA REPAIR      KNEE ARTHROSCOPY W/ DEBRIDEMENT      VASECTOMY         Past Medical History:   Diagnosis Date    Diabetes mellitus, type 2     Hypertension     Morbid obesity        Review of Systems   Constitutional:  Positive for fatigue. Negative for appetite change and unexpected weight change.   HENT:  Negative for mouth sores and nosebleeds.    Eyes:  Negative for visual disturbance.   Respiratory:  Negative for cough and shortness of breath.    Cardiovascular:  Negative for chest pain.   Gastrointestinal:  Negative for abdominal pain, anal bleeding, blood in stool and diarrhea.   Endocrine: Negative.    Genitourinary:  Negative for frequency and hematuria.   Musculoskeletal:  Positive for arthralgias. Negative for back pain.   Skin:  Negative for rash.   Allergic/Immunologic: Negative.    Neurological:  Negative for headaches.   Hematological:  Negative for adenopathy. Bruises/bleeds easily.   Psychiatric/Behavioral:  The patient is not nervous/anxious.           Medication List with Changes/Refills   Current Medications    ASPIRIN (ECOTRIN) 81 MG EC TABLET    Take 81 mg by mouth once daily.    ATORVASTATIN (LIPITOR) 40 MG TABLET    TAKE 1 TABLET BY MOUTH EVERY DAY    CYCLOBENZAPRINE (FLEXERIL) 10 MG TABLET    TAKE 1 TABLET BY MOUTH EVERY DAY IN THE EVENING    DORZOLAMIDE-TIMOLOL 2-0.5% (COSOPT) 22.3-6.8 MG/ML OPHTHALMIC SOLUTION    Place 1 drop into the right eye 2 (two) times daily.    ENALAPRIL (VASOTEC) 20 MG TABLET    TAKE 1 TABLET BY MOUTH EVERY DAY    GLUCOSAMINE/CHONDR LEOS A SOD (OSTEO BI-FLEX ORAL)    Take 1 tablet by mouth Daily.    LATANOPROST 0.005 % OPHTHALMIC SOLUTION    Place 1 drop into both eyes every evening.    METFORMIN (GLUCOPHAGE-XR) 500 MG ER 24HR TABLET    Take 1 tablet (500 mg total) by mouth  daily with breakfast.    MULTIVITAMIN WITH MINERALS TABLET    Take 1 tablet by mouth once daily.    MUPIROCIN (BACTROBAN) 2 % OINTMENT    Apply topically 2 (two) times daily.    TADALAFIL (CIALIS) 5 MG TABLET    Take 5 mg by mouth once daily.    TESTOSTERONE CYPIONATE (DEPOTESTOTERONE CYPIONATE) 100 MG/ML INJECTION    Inject 200 mg into the muscle every 14 (fourteen) days.    TIRZEPATIDE (MOUNJARO) 10 MG/0.5 ML PNIJ    Inject 10 mg into the skin every 7 days.   Discontinued Medications    DOXYCYCLINE (VIBRA-TABS) 100 MG TABLET    Take 1 tablet (100 mg total) by mouth 2 (two) times daily. for 10 days    IBUPROFEN (ADVIL,MOTRIN) 800 MG TABLET    Take 800 mg by mouth every 8 (eight) hours as needed.    TADALAFIL (CIALIS) 20 MG TAB    Take 20 mg by mouth as needed.        Objective:     Vitals:    08/28/24 1252   BP: (!) 142/68   Pulse: (!) 58       Physical Exam  Vitals reviewed.   Constitutional:       Appearance: Normal appearance.   HENT:      Head: Normocephalic and atraumatic.      Right Ear: External ear normal.      Left Ear: External ear normal.   Cardiovascular:      Rate and Rhythm: Regular rhythm. Bradycardia present.      Heart sounds: Normal heart sounds, S1 normal and S2 normal.   Pulmonary:      Effort: Pulmonary effort is normal.      Breath sounds: Normal breath sounds.   Abdominal:      General: There is no distension.   Musculoskeletal:         General: Normal range of motion.      Cervical back: Normal range of motion.   Lymphadenopathy:      Head:      Right side of head: No submental, submandibular, tonsillar, preauricular, posterior auricular or occipital adenopathy.      Left side of head: No submental, submandibular, tonsillar, preauricular, posterior auricular or occipital adenopathy.      Cervical: No cervical adenopathy.      Upper Body:      Right upper body: No supraclavicular adenopathy.      Left upper body: No supraclavicular adenopathy.   Skin:     General: Skin is warm and dry.    Neurological:      General: No focal deficit present.      Mental Status: He is alert and oriented to person, place, and time.   Psychiatric:         Attention and Perception: Attention and perception normal.         Mood and Affect: Mood and affect normal.         Speech: Speech normal.         Behavior: Behavior normal. Behavior is cooperative.         Thought Content: Thought content normal.         Cognition and Memory: Cognition and memory normal.         Judgment: Judgment normal.         Assessment:     Problem List Items Addressed This Visit          Hematology    Thrombocytopenia (Chronic)    Relevant Orders    Folate    Vitamin B12    US Abdomen Complete    CBC Auto Differential    Folate    Vitamin B12    CBC Auto Differential    Comprehensive Metabolic Panel       Endocrine    Hypogonadism male (Chronic)    Relevant Orders    CBC Auto Differential    CBC Auto Differential    Comprehensive Metabolic Panel     Other Visit Diagnoses       NAFLD (nonalcoholic fatty liver disease)    -  Primary    Relevant Orders    US Abdomen Complete    CBC Auto Differential    Comprehensive Metabolic Panel    CBC Auto Differential    Comprehensive Metabolic Panel    History of iron deficiency        Relevant Orders    Iron and TIBC    Ferritin    CBC Auto Differential    Comprehensive Metabolic Panel    Ferritin    Iron and TIBC    CBC Auto Differential    Splenomegaly        Relevant Orders    US Abdomen Complete    CBC Auto Differential    Comprehensive Metabolic Panel    CBC Auto Differential    Comprehensive Metabolic Panel            Lab Results   Component Value Date    WBC 6.04 08/28/2024    RBC 5.69 08/28/2024    HGB 16.1 08/28/2024    HCT 48.5 08/28/2024    MCV 85 08/28/2024    MCH 28.3 08/28/2024    MCHC 33.2 08/28/2024    RDW 13.8 08/28/2024    PLT 88 (L) 08/28/2024    MPV 8.5 (L) 08/28/2024    GRAN 3.8 08/28/2024    GRAN 62.7 08/28/2024    LYMPH 1.7 08/28/2024    LYMPH 27.8 08/28/2024    MONO 0.5 08/28/2024     MONO 8.1 08/28/2024    EOS 0.0 08/28/2024    BASO 0.04 08/28/2024    EOSINOPHIL 0.7 08/28/2024    BASOPHIL 0.7 08/28/2024      Lab Results   Component Value Date     (L) 07/29/2024    K 4.4 07/29/2024     07/29/2024    CO2 27 07/29/2024    BUN 15 07/29/2024    CREATININE 0.9 07/29/2024    CALCIUM 9.5 07/29/2024    ANIONGAP 7 (L) 07/29/2024    ESTGFRAFRICA >60.0 02/14/2022    EGFRNONAA >60.0 02/14/2022     Lab Results   Component Value Date    ALT 26 07/29/2024    AST 22 07/29/2024    ALKPHOS 123 07/29/2024    BILITOT 0.8 07/29/2024     Lab Results   Component Value Date    IRON 62 03/15/2023    TRANSFERRIN 302 03/15/2023    TIBC 447 03/15/2023    FESATURATED 14 (L) 03/15/2023    FERRITIN 27 03/15/2023        Plan:   NAFLD (nonalcoholic fatty liver disease)  -     US Abdomen Complete; Future; Expected date: 08/28/2024  -     CBC Auto Differential; Future; Expected date: 08/28/2024  -     Comprehensive Metabolic Panel; Future; Expected date: 08/28/2024  -     CBC Auto Differential; Future; Expected date: 08/28/2024  -     Comprehensive Metabolic Panel; Future; Expected date: 08/28/2024    Thrombocytopenia  -     Ambulatory referral/consult to Hematology / Oncology  -     Folate; Future; Expected date: 08/28/2024  -     Vitamin B12; Future; Expected date: 08/28/2024  -     US Abdomen Complete; Future; Expected date: 08/28/2024  -     CBC Auto Differential; Future; Expected date: 08/28/2024  -     Folate; Future; Expected date: 08/28/2024  -     Vitamin B12; Future; Expected date: 08/28/2024  -     CBC Auto Differential; Future; Expected date: 08/28/2024  -     Comprehensive Metabolic Panel; Future; Expected date: 08/28/2024    History of iron deficiency  -     Iron and TIBC; Future; Expected date: 08/28/2024  -     Ferritin; Future; Expected date: 08/28/2024  -     CBC Auto Differential; Future; Expected date: 08/28/2024  -     Comprehensive Metabolic Panel; Future; Expected date: 08/28/2024  -      Ferritin; Future; Expected date: 08/28/2024  -     Iron and TIBC; Future; Expected date: 08/28/2024  -     CBC Auto Differential; Future; Expected date: 08/28/2024    Hypogonadism male  -     CBC Auto Differential; Future; Expected date: 08/28/2024  -     CBC Auto Differential; Future; Expected date: 08/28/2024  -     Comprehensive Metabolic Panel; Future; Expected date: 08/28/2024    Splenomegaly  -     US Abdomen Complete; Future; Expected date: 08/28/2024  -     CBC Auto Differential; Future; Expected date: 08/28/2024  -     Comprehensive Metabolic Panel; Future; Expected date: 08/28/2024  -     CBC Auto Differential; Future; Expected date: 08/28/2024  -     Comprehensive Metabolic Panel; Future; Expected date: 08/28/2024      Med Onc Chart Routing      Follow up with physician    Follow up with CY 2 months. in person visit in [place]   Infusion scheduling note   n/a   Injection scheduling note n/a   Labs   Scheduling:  Preferred lab: [facility]  Lab interval:  b12, folate, iron studies   Imaging   Abdominal US   Pharmacy appointment No pharmacy appointment needed      Other referrals       No additional referrals needed  n/a          Thrombocytopenia due to splenomegaly.  Due to worsening thrombocytopenia will re evaluate with abdominal US - to see hepatology soon.  Discussed that metformin can cause B12 def in 1/3 patients - so will check b12.  Also has a h/o iron deficiency  and noted normocytosis- may have a combo of b12/folate/iron deficiency with is causing normocytosis.  No anemia due to chronic testosterone use.  Will repeleat as needed once labs results    Total time spent on encounter: 60 minutes    Collaborating Provider:  Dr. Wily Salazar    Thank You,  SHANE Dueñas-CHIKA  Benign Hematology

## 2024-09-03 ENCOUNTER — HOSPITAL ENCOUNTER (OUTPATIENT)
Dept: RADIOLOGY | Facility: HOSPITAL | Age: 69
Discharge: HOME | End: 2024-09-03
Attending: NURSE PRACTITIONER
Payer: MEDICARE

## 2024-09-03 DIAGNOSIS — R16.1 SPLENOMEGALY: ICD-10-CM

## 2024-09-03 DIAGNOSIS — D69.6 THROMBOCYTOPENIA: ICD-10-CM

## 2024-09-03 DIAGNOSIS — K76.0 NAFLD (NONALCOHOLIC FATTY LIVER DISEASE): ICD-10-CM

## 2024-09-03 PROCEDURE — 76700 US EXAM ABDOM COMPLETE: CPT | Mod: 26,,, | Performed by: RADIOLOGY

## 2024-09-03 PROCEDURE — 76700 US EXAM ABDOM COMPLETE: CPT | Mod: TC,PO

## 2024-09-08 DIAGNOSIS — E11.65 TYPE 2 DIABETES MELLITUS WITH HYPERGLYCEMIA, WITHOUT LONG-TERM CURRENT USE OF INSULIN: ICD-10-CM

## 2024-09-08 NOTE — TELEPHONE ENCOUNTER
No care due was identified.  [facility] Embedded Care Due Messages. Reference number: [id number].   9/08/2024 9:51:01 AM CDT

## 2024-09-09 RX ORDER — TIRZEPATIDE 10 MG/.5ML
10 INJECTION, SOLUTION SUBCUTANEOUS
Qty: 2 PEN | Refills: 1 | Status: SHIPPED | OUTPATIENT
Start: 2024-09-09

## 2024-09-09 NOTE — TELEPHONE ENCOUNTER
Refill Routing Note   Medication(s) are not appropriate for processing by [facility] for the following reason(s):      New or recently adjusted medication    [facility] action(s):  Defer Care Due:  None identified            Appointments  past 12m or future 3m with PCP    Date Provider   Last Visit   7/12/2024 Casper Barrett MD   Next Visit   Visit date not found Casper Barrett MD   ED visits in past 90 days: 0        Note composed:9:30 PM 09/08/2024

## 2024-09-11 ENCOUNTER — PATIENT MESSAGE (OUTPATIENT)
Dept: FAMILY MEDICINE | Facility: CLINIC | Age: 69
End: 2024-09-11
Payer: MEDICARE

## 2024-09-19 ENCOUNTER — OFFICE VISIT (OUTPATIENT)
Dept: HEPATOLOGY | Facility: CLINIC | Age: 69
End: 2024-09-19
Payer: MEDICARE

## 2024-09-19 VITALS
HEIGHT: 70 IN | BODY MASS INDEX: 38.22 KG/M2 | WEIGHT: 267 LBS | HEART RATE: 60 BPM | DIASTOLIC BLOOD PRESSURE: 76 MMHG | SYSTOLIC BLOOD PRESSURE: 130 MMHG

## 2024-09-19 DIAGNOSIS — E66.9 OBESITY (BMI 30-39.9): ICD-10-CM

## 2024-09-19 DIAGNOSIS — K74.00 LIVER FIBROSIS: Primary | ICD-10-CM

## 2024-09-19 PROCEDURE — 99999 PR PBB SHADOW E&M-EST. PATIENT-LVL IV: CPT | Mod: PBBFAC,,, | Performed by: NURSE PRACTITIONER

## 2024-09-19 PROCEDURE — 99214 OFFICE O/P EST MOD 30 MIN: CPT | Mod: PBBFAC | Performed by: NURSE PRACTITIONER

## 2024-09-19 NOTE — PROGRESS NOTES
Hepatology Note    PATIENT: Lazarus Zamudio  MRN: [id number]  DATE: 2024    Urgency of review: non-urgent  Referring provider: No ref. provider found    Diagnosis:   1. Liver fibrosis    2. Obesity (BMI 30-39.9)        Chief complaint:   Chief Complaint   Patient presents with    Fatty Liver       Subjective:    Initial History: Lazarus Zamudio is a 69 y.o. male who was referred to Hepatology Clinic for consultation of fatty liver.  The patient reports medical history of HTN, HLD, DM, liver fibrosis, colon polyps and obesity     3/30/2023  Pt referred by hematology for further evaluation of noted NAFLD with hepatomegaly in a setting of thrombocytopenia.   Pt denies any previously known liver disease.   LFTs are WNL but on the higher end.   Denies any ETOH.    Fibroscan readin.6 KPa  Fibrosis:F 0-1   CAP readin dB/m  Steatosis: :S3    2023  Pt states that since his last visit, he has been feeling overall well without any new complaints.   He has been working on weight loss with improved nutrition and has lost about 10 pounds.     Recent labs show a continued worsening of thrombocytopenia.   LFTs are stable with a slight increase in total bili  Fibroscan was without concern for cirrhosis, however the IQR was on the higher side.    FIB-4 Calculation: 3.74  Given the continued, worsening thrombocytopenia and the above noted FIB4, there is still concern for possible cirrhosis  - discussed with pt the option for continued monitoring vs liver biopsy for confirmation of fibrosis  - pt agrees to liver biopsy for further diagnostic evaluation.     2023  Overall, the biopsy shows mild macrovesicular steatosis without active steatohepatitis. The NAFLD activity  score (RAHAT) = 3 (1+2+0) with stage 1 fibrosis.  The histologic differential diagnosis for the portal inflammation includes drug induced liver injury (DILI) and  infection.    2024  A1c 10.2  2024  FIB-4 Calculation: 3.43  ,  cr 0.9, , bilirubin 0.8, AST 22, ALT 26,     8/28/2024  Platelet 88  US abd showed   1. Hepatomegaly and hepatic steatosis.  2. Splenomegaly.    09/19/2024   Patient does not have any new complains from liver standpoint. Liver enzymes are stable on 7/29/2024.    The patient reported that his last alcoholic drink was around 12/2024 and he usually only drinks alcoholic drink once per year. He reported that he does not use IV drug, does not use any OTC supplement or herb.    He just saw a hematologist for low platelet, had labs done and is waiting for his hematologist's treatment plan.    He just started Mounjaro and his doctor is monitoring his A1C q 3 months.    He denies recent hematemesis, coffee ground emesis, melena, hematochezia, jaundice, confusion, LE edema, abdominal distension.     Prior Relevant History:    He  denies hepatotoxic medication.    Review of systems:  A review of 12+ systems was conducted with pertinent positive and negative findings documented in HPI with all other systems reviewed and negative.      PFSH:  Past medical, family, and social history reviewed as documented in chart with pertinent positive medical, family, and social history detailed in HPI.    Past Medical History:   Past Medical History:   Diagnosis Date    Diabetes mellitus, type 2     Hypertension     Morbid obesity        Past Surgical HIstory:   Past Surgical History:   Procedure Laterality Date    HERNIA REPAIR      KNEE ARTHROSCOPY W/ DEBRIDEMENT      VASECTOMY         Family History:   Family History   Problem Relation Name Age of Onset    Heart disease Mother Maribeth     Vision loss Mother Maribeth     Diabetes Father Ramon     Heart disease Father Ramon     Hypertension Father Ramon     Diabetes Maternal Grandmother Kathy     Prostate cancer Maternal Grandfather Garrison      He has no known family history of liver disease.     Social History:  reports that he has never smoked. He has never used smokeless  tobacco. He reports current alcohol use. He reports that he does not use drugs.    He has no significant history of alcohol consumption.    He denies history of IV drug use/Tatto  He  denies high-risk sexual contacts, no raw seafood, no sick contacts.      Allergies:  Review of patient's allergies indicates:   Allergen Reactions    Penicillins Rash       Medications:  Current Outpatient Medications   Medication Sig Dispense Refill    aspirin (ECOTRIN) 81 MG EC tablet Take 81 mg by mouth once daily.      atorvastatin (LIPITOR) 40 MG tablet TAKE 1 TABLET BY MOUTH EVERY DAY 90 tablet 1    cyclobenzaprine (FLEXERIL) 10 MG tablet TAKE 1 TABLET BY MOUTH EVERY DAY IN THE EVENING 30 tablet 0    dorzolamide-timolol 2-0.5% (COSOPT) 22.3-6.8 mg/mL ophthalmic solution Place 1 drop into the right eye 2 (two) times daily.      enalapril (VASOTEC) 20 MG tablet TAKE 1 TABLET BY MOUTH EVERY DAY 90 tablet 2    glucosamine/chondr castillo A sod (OSTEO BI-FLEX ORAL) Take 1 tablet by mouth Daily.      latanoprost 0.005 % ophthalmic solution Place 1 drop into both eyes every evening.      metFORMIN (GLUCOPHAGE-XR) 500 MG ER 24hr tablet Take 1 tablet (500 mg total) by mouth daily with breakfast. 90 tablet 3    multivitamin with minerals tablet Take 1 tablet by mouth once daily.      mupirocin (BACTROBAN) 2 % ointment Apply topically 2 (two) times daily. 30 g 1    tadalafiL (CIALIS) 5 MG tablet Take 5 mg by mouth once daily.      testosterone cypionate (DEPOTESTOTERONE CYPIONATE) 100 mg/mL injection Inject 200 mg into the muscle every 14 (fourteen) days.      tirzepatide (MOUNJARO) 10 mg/0.5 mL PnIj INJECT 10 MG INTO THE SKIN EVERY 7 DAYS 2 Pen 1     No current facility-administered medications for this visit.       Review of Systems   Constitutional:  Negative for chills, fatigue, fever and unexpected weight change.   HENT:  Negative for facial swelling and nosebleeds.    Eyes:  Negative for pain and redness.   Respiratory:  Negative for cough,  "chest tightness and shortness of breath.    Cardiovascular:  Negative for chest pain and leg swelling.   Gastrointestinal:  Negative for abdominal distention, abdominal pain, blood in stool, constipation, diarrhea, nausea and vomiting.   Genitourinary:  Negative for hematuria, penile swelling and scrotal swelling.   Musculoskeletal:  Negative for gait problem and joint swelling.   Skin:  Negative for color change and rash.   Allergic/Immunologic: Negative for food allergies.   Neurological:  Negative for tremors, seizures, syncope and speech difficulty.   Hematological:  Does not bruise/bleed easily.   Psychiatric/Behavioral:  Negative for behavioral problems and confusion.        Objective:      Vitals:   Vitals:    09/19/24 1331   BP: 130/76   BP Location: Right arm   Patient Position: Sitting   BP Method: Medium (Automatic)   Pulse: 60   Weight: 121.1 kg (266 lb 15.6 oz)   Height: 5' 10" (1.778 m)       Physical Exam  HENT:      Head: Atraumatic.      Nose: No congestion.   Eyes:      General: No scleral icterus.  Cardiovascular:      Rate and Rhythm: Normal rate and regular rhythm.   Pulmonary:      Effort: No respiratory distress.      Breath sounds: Normal breath sounds. No wheezing.   Abdominal:      General: Abdomen is flat. Bowel sounds are normal. There is no distension.      Palpations: Abdomen is soft. There is no mass.      Tenderness: There is no guarding.      Hernia: No hernia is present.   Musculoskeletal:         General: No swelling.      Cervical back: Normal range of motion.      Right lower leg: No edema.      Left lower leg: No edema.   Skin:     Coloration: Skin is not jaundiced.      Findings: No bruising.   Neurological:      General: No focal deficit present.      Mental Status: He is alert and oriented to person, place, and time. Mental status is at baseline.      Comments: No asterixis.   Psychiatric:         Mood and Affect: Mood normal.         Behavior: Behavior normal. " "        Laboratory Data:  No visits with results within 1 Week(s) from this visit.   Latest known visit with results is:   Lab Visit on 08/28/2024   Component Date Value Ref Range Status    Ferritin 08/28/2024 81  20.0 - 300.0 ng/mL Final    Iron 08/28/2024 69  45 - 160 ug/dL Final    Transferrin 08/28/2024 265  200 - 375 mg/dL Final    TIBC 08/28/2024 392  250 - 450 ug/dL Final    Saturated Iron 08/28/2024 18 (L)  20 - 50 % Final    Folate 08/28/2024 14.3  4.0 - 24.0 ng/mL Final    Vitamin B-12 08/28/2024 656  210 - 950 pg/mL Final       Lab Results   Component Value Date    INR 1.1 06/30/2023       No results found for: "SMOOTHMUSCAB", "MITOAB"  Lab Results   Component Value Date    IRON 69 08/28/2024    TIBC 392 08/28/2024    FERRITIN 81 08/28/2024     Lab Results   Component Value Date    HEPCAB Non-reactive 03/06/2023     Lab Results   Component Value Date    TSH 1.877 12/14/2023     No results found for: "HÉCTOR"    No results found for: "ABORH"        Lab Results   Component Value Date    LABA1C 6.3 (H) 07/20/2018    HGBA1C 10.2 (H) 06/27/2024     Lab Results   Component Value Date    CHOL 121 06/27/2024    CHOL 115 (L) 06/30/2023    CHOL 122 02/14/2023     Lab Results   Component Value Date    HDL 42 06/27/2024    HDL 45 06/30/2023    HDL 46 02/14/2023     Lab Results   Component Value Date    LDLCALC 69.4 06/27/2024    LDLCALC 62.6 (L) 06/30/2023    LDLCALC 66.0 02/14/2023     Lab Results   Component Value Date    TRIG 48 06/27/2024    TRIG 37 06/30/2023    TRIG 50 02/14/2023     Lab Results   Component Value Date    CHOLHDL 34.7 06/27/2024    CHOLHDL 39.1 06/30/2023    CHOLHDL 37.7 02/14/2023         I personally reviewed imaging studies and outside records..      Assessment:       1. Liver fibrosis    2. Obesity (BMI 30-39.9)          Plan:     Problem List Items Addressed This Visit    None  Visit Diagnoses       Liver fibrosis    -  Primary    Relevant Orders    COMPREHENSIVE METABOLIC PANEL    Obesity " (BMI 30-39.9)        Relevant Orders    COMPREHENSIVE METABOLIC PANEL            Lazarus was seen today for fatty liver.    Diagnoses and all orders for this visit:    Liver fibrosis  -     COMPREHENSIVE METABOLIC PANEL; Future    Obesity (BMI 30-39.9)  -     COMPREHENSIVE METABOLIC PANEL; Future        Recommendations    Recommend alcohol abstinence.  CMP, INR, CBC in 6 months before next visit.  Will plan fibrscan next year.    I recommended a more pragmatic approach to her medical problems which would include the following:  - life-style modifications: weight loss, daily exercise (30 mins of HIIT or cardio), low carb/low fat diet         Educated patient on spectrum of fatty liver disease and potential for cirrhosis if MASH present        Explored dietary habits and discussed dietary recommendations- Low calorie, low carbohydrate, high protein mediterranean diet with goal of loosing >3-5% to improve steatosis and >7-10% to improve histological changes if present  - control of diabetes or insulin resistance   - control of high cholesterol, if needed with statin drugs or other cholesterol-lowering agents  - coffee consumption (low caloric): 2-3 cups per day may reduce liver fat  -I will defer the management of the patient's dyslipidemia and diabetes to patient's primary care physician and/or endocrinologist       Return to clinic in 6 months.    I have sent communication to the referring physician and/or primary care provider.      Time Statement  A total 30 minutes spent includes time preparing to see patient, reviewing  diagnostic studies and records, direct face-to-face visit, completing orders, medications , reconciliation, prescription management, and care coordination    We discussed in depth the nature of the patient's disease, the management plan in details. I have provided the patient with an opportunity to ask questions and have all questions answered to his satisfaction.     Discussed with patient that  it is likely that she will see results before Myself or my nurse are able to view them and report results due to the Cures Act passed 4/1/21. Results will be sent immediately to the patient who are enrolled in the patient portal. If results come through after business hours or on weekend, we will not see them until the next business day that we are in the office. If resulted during the business day, we will likely not be able to review them until after completing all patient visits in office that day.     Noa Rojas, JOHANNYP  [facility]

## 2024-10-06 DIAGNOSIS — E11.59 HYPERTENSION ASSOCIATED WITH DIABETES: ICD-10-CM

## 2024-10-06 DIAGNOSIS — I15.2 HYPERTENSION ASSOCIATED WITH DIABETES: ICD-10-CM

## 2024-10-07 ENCOUNTER — PATIENT OUTREACH (OUTPATIENT)
Dept: ADMINISTRATIVE | Facility: HOSPITAL | Age: 69
End: 2024-10-07
Payer: MEDICARE

## 2024-10-07 RX ORDER — ENALAPRIL MALEATE 20 MG/1
20 TABLET ORAL
Qty: 90 TABLET | Refills: 3 | Status: SHIPPED | OUTPATIENT
Start: 2024-10-07

## 2024-10-07 NOTE — PROGRESS NOTES
Lab visit report: Patient has upcoming lab appointment on 10/11/24 for recheck of diabetic labs.

## 2024-10-07 NOTE — TELEPHONE ENCOUNTER
Care Due:                  Date            Visit Type   Department     Provider  --------------------------------------------------------------------------------                                EP -                              PRIMARY      [facility]  Last Visit: 07-      CARE ([facility])   MEDICINE       Casper Barrett  Next Visit: None Scheduled  None         None Found                                                            Last  Test          Frequency    Reason                     Performed    Due Date  --------------------------------------------------------------------------------    HBA1C.......  6 months...  metFORMIN................  06- 12-    [facility] Embedded Care Due Messages. Reference number: [id number].   10/06/2024 7:56:27 PM CDT

## 2024-10-07 NOTE — TELEPHONE ENCOUNTER
Refill Decision Note   Lazarus Zamudio  is requesting a refill authorization.  Brief Assessment and Rationale for Refill:  Approve     Medication Therapy Plan:  FLOS      Comments:     Note composed:2:09 AM 10/07/2024

## 2024-10-11 ENCOUNTER — LAB VISIT (OUTPATIENT)
Dept: LAB | Facility: HOSPITAL | Age: 69
End: 2024-10-11
Attending: FAMILY MEDICINE
Payer: MEDICARE

## 2024-10-11 DIAGNOSIS — M25.562 CHRONIC PAIN OF BOTH KNEES: ICD-10-CM

## 2024-10-11 DIAGNOSIS — E11.59 HYPERTENSION ASSOCIATED WITH DIABETES: ICD-10-CM

## 2024-10-11 DIAGNOSIS — D69.6 THROMBOCYTOPENIA: ICD-10-CM

## 2024-10-11 DIAGNOSIS — M25.561 CHRONIC PAIN OF BOTH KNEES: ICD-10-CM

## 2024-10-11 DIAGNOSIS — G89.29 CHRONIC PAIN OF BOTH KNEES: ICD-10-CM

## 2024-10-11 DIAGNOSIS — E11.65 TYPE 2 DIABETES MELLITUS WITH HYPERGLYCEMIA, WITHOUT LONG-TERM CURRENT USE OF INSULIN: ICD-10-CM

## 2024-10-11 DIAGNOSIS — Z79.899 ENCOUNTER FOR LONG-TERM (CURRENT) USE OF MEDICATIONS: ICD-10-CM

## 2024-10-11 DIAGNOSIS — I15.2 HYPERTENSION ASSOCIATED WITH DIABETES: ICD-10-CM

## 2024-10-11 LAB
ALBUMIN SERPL BCP-MCNC: 4 G/DL (ref 3.5–5.2)
ALP SERPL-CCNC: 101 U/L (ref 55–135)
ALT SERPL W/O P-5'-P-CCNC: 20 U/L (ref 10–44)
ANION GAP SERPL CALC-SCNC: 11 MMOL/L (ref 8–16)
AST SERPL-CCNC: 20 U/L (ref 10–40)
BASOPHILS # BLD AUTO: 0.03 K/UL (ref 0–0.2)
BASOPHILS NFR BLD: 0.6 % (ref 0–1.9)
BILIRUB SERPL-MCNC: 0.7 MG/DL (ref 0.1–1)
BUN SERPL-MCNC: 17 MG/DL (ref 8–23)
CALCIUM SERPL-MCNC: 9.9 MG/DL (ref 8.7–10.5)
CHLORIDE SERPL-SCNC: 103 MMOL/L (ref 95–110)
CHOLEST SERPL-MCNC: 106 MG/DL (ref 120–199)
CHOLEST/HDLC SERPL: 2.7 {RATIO} (ref 2–5)
CO2 SERPL-SCNC: 24 MMOL/L (ref 23–29)
CREAT SERPL-MCNC: 0.9 MG/DL (ref 0.5–1.4)
DIFFERENTIAL METHOD BLD: ABNORMAL
EOSINOPHIL # BLD AUTO: 0 K/UL (ref 0–0.5)
EOSINOPHIL NFR BLD: 0.8 % (ref 0–8)
ERYTHROCYTE [DISTWIDTH] IN BLOOD BY AUTOMATED COUNT: 14 % (ref 11.5–14.5)
EST. GFR  (NO RACE VARIABLE): >60 ML/MIN/1.73 M^2
ESTIMATED AVG GLUCOSE: 134 MG/DL (ref 68–131)
GLUCOSE SERPL-MCNC: 140 MG/DL (ref 70–110)
HBA1C MFR BLD: 6.3 % (ref 4–5.6)
HCT VFR BLD AUTO: 49.4 % (ref 40–54)
HDLC SERPL-MCNC: 39 MG/DL (ref 40–75)
HDLC SERPL: 36.8 % (ref 20–50)
HGB BLD-MCNC: 16.2 G/DL (ref 14–18)
IMM GRANULOCYTES # BLD AUTO: 0.01 K/UL (ref 0–0.04)
IMM GRANULOCYTES NFR BLD AUTO: 0.2 % (ref 0–0.5)
LDLC SERPL CALC-MCNC: 59.2 MG/DL (ref 63–159)
LYMPHOCYTES # BLD AUTO: 1.7 K/UL (ref 1–4.8)
LYMPHOCYTES NFR BLD: 33.5 % (ref 18–48)
MCH RBC QN AUTO: 30.2 PG (ref 27–31)
MCHC RBC AUTO-ENTMCNC: 32.8 G/DL (ref 32–36)
MCV RBC AUTO: 92 FL (ref 82–98)
MONOCYTES # BLD AUTO: 0.4 K/UL (ref 0.3–1)
MONOCYTES NFR BLD: 7.6 % (ref 4–15)
NEUTROPHILS # BLD AUTO: 2.9 K/UL (ref 1.8–7.7)
NEUTROPHILS NFR BLD: 57.3 % (ref 38–73)
NONHDLC SERPL-MCNC: 67 MG/DL
NRBC BLD-RTO: 0 /100 WBC
PLATELET # BLD AUTO: 90 K/UL (ref 150–450)
PMV BLD AUTO: 9.2 FL (ref 9.2–12.9)
POTASSIUM SERPL-SCNC: 5 MMOL/L (ref 3.5–5.1)
PROT SERPL-MCNC: 6.8 G/DL (ref 6–8.4)
RBC # BLD AUTO: 5.36 M/UL (ref 4.6–6.2)
SODIUM SERPL-SCNC: 138 MMOL/L (ref 136–145)
TRIGL SERPL-MCNC: 39 MG/DL (ref 30–150)
TSH SERPL DL<=0.005 MIU/L-ACNC: 2.59 UIU/ML (ref 0.4–4)
WBC # BLD AUTO: 5.1 K/UL (ref 3.9–12.7)

## 2024-10-11 PROCEDURE — 83036 HEMOGLOBIN GLYCOSYLATED A1C: CPT | Performed by: FAMILY MEDICINE

## 2024-10-11 PROCEDURE — 80061 LIPID PANEL: CPT | Performed by: FAMILY MEDICINE

## 2024-10-11 PROCEDURE — 84443 ASSAY THYROID STIM HORMONE: CPT | Performed by: FAMILY MEDICINE

## 2024-10-11 PROCEDURE — 80053 COMPREHEN METABOLIC PANEL: CPT | Performed by: FAMILY MEDICINE

## 2024-10-11 PROCEDURE — 85025 COMPLETE CBC W/AUTO DIFF WBC: CPT | Performed by: FAMILY MEDICINE

## 2024-10-13 NOTE — PROGRESS NOTES
Make follow-up lab appointment per recommendation below.  Check to see if patient has seen the results through my chart.  If not then,  #CALL THE PATIENT# to discuss results/see if they have questions and document verification of contact. Make F/U appt if needed. [phone]    #My interpretation that was sent to them through [employer]:  Lazarus, I have reviewed your recent blood work.     Your complete blood count is stable.  Platelet count is still less than 100.  Follow-up with specialist.  Your metabolic panel which shows your glucose, kidney function, electrolytes, and liver function is stable.   Thyroid study is normal.   Your cholesterol is improved.    Your hemoglobin A1c is improved significantly.  Keep up the great work!  This test is gold standard screening test for diabetes.  It is a measures 3 months of your average blood sugar.  =========================  Also please address any outstanding health maintenance that may be due: RSV Vaccine (Age 60+ and Pregnant patients)(1 - Risk 60-74 years 1-dose series) Never done

## 2024-10-17 ENCOUNTER — OFFICE VISIT (OUTPATIENT)
Dept: PODIATRY | Facility: CLINIC | Age: 69
End: 2024-10-17
Payer: MEDICARE

## 2024-10-17 DIAGNOSIS — E11.40 TYPE 2 DIABETES MELLITUS WITH DIABETIC NEUROPATHY, WITHOUT LONG-TERM CURRENT USE OF INSULIN: ICD-10-CM

## 2024-10-17 DIAGNOSIS — I87.2 VENOUS INSUFFICIENCY OF BOTH LOWER EXTREMITIES: ICD-10-CM

## 2024-10-17 DIAGNOSIS — E11.65 TYPE 2 DIABETES MELLITUS WITH HYPERGLYCEMIA, WITHOUT LONG-TERM CURRENT USE OF INSULIN: Primary | ICD-10-CM

## 2024-10-17 DIAGNOSIS — L60.3 ONYCHODYSTROPHY: ICD-10-CM

## 2024-10-17 PROCEDURE — 99999 PR PBB SHADOW E&M-EST. PATIENT-LVL III: CPT | Mod: PBBFAC,,, | Performed by: PODIATRIST

## 2024-10-17 PROCEDURE — 11721 DEBRIDE NAIL 6 OR MORE: CPT | Mod: Q9,PBBFAC | Performed by: PODIATRIST

## 2024-10-17 PROCEDURE — 99213 OFFICE O/P EST LOW 20 MIN: CPT | Mod: PBBFAC | Performed by: PODIATRIST

## 2024-10-17 NOTE — PROGRESS NOTES
Subjective:       Patient ID: Lazarus Zamudio is a 69 y.o. male.    Chief Complaint: Diabetic Foot Exam (DFE: Pt denies pain at present, pt is diabetic, was last seen on 7.12.24 by Casper Barrett /)    HPI: Lazarus Zamudio presents to the office chief complain of thickened, elongated, dystrophic toenails of the right foot and left foot.  Continues to follow with, Casper Barrett MD, for his diabetic management with last appointment on 07/12/2024.  Relates 0/10 pain to the right and left foot.  Denies any recent trauma or injury.  Blood sugars have continued to improve with A1c.  Last reading 6.3.    Hemoglobin A1C   Date Value Ref Range Status   10/11/2024 6.3 (H) 4.0 - 5.6 % Final     Comment:     ADA Screening Guidelines:  5.7-6.4%  Consistent with prediabetes  >or=6.5%  Consistent with diabetes    High levels of fetal hemoglobin interfere with the HbA1C  assay. Heterozygous hemoglobin variants (HbS, HgC, etc)do  not significantly interfere with this assay.   However, presence of multiple variants may affect accuracy.     06/27/2024 10.2 (H) 4.0 - 5.6 % Final     Comment:     ADA Screening Guidelines:  5.7-6.4%  Consistent with prediabetes  >or=6.5%  Consistent with diabetes    High levels of fetal hemoglobin interfere with the HbA1C  assay. Heterozygous hemoglobin variants (HbS, HgC, etc)do  not significantly interfere with this assay.   However, presence of multiple variants may affect accuracy.     12/14/2023 7.4 (H) 4.0 - 5.6 % Final     Comment:     ADA Screening Guidelines:  5.7-6.4%  Consistent with prediabetes  >or=6.5%  Consistent with diabetes    High levels of fetal hemoglobin interfere with the HbA1C  assay. Heterozygous hemoglobin variants (HbS, HgC, etc)do  not significantly interfere with this assay.   However, presence of multiple variants may affect accuracy.       Review of patient's allergies indicates:   Allergen Reactions    Penicillins Rash     Past Medical History:   Diagnosis Date     Diabetes mellitus, type 2     Hypertension     Morbid obesity      Family History   Problem Relation Name Age of Onset    Heart disease Mother Maribeth     Vision loss Mother Maribeth     Diabetes Father Ramon     Heart disease Father Ramon     Hypertension Father Ramon     Diabetes Maternal Grandmother Kathy     Prostate cancer Maternal Grandfather Vincraman        Social History     Socioeconomic History    Marital status:    Occupational History     Employer:  [employer]   Tobacco Use    Smoking status: Never    Smokeless tobacco: Never   Substance and Sexual Activity    Alcohol use: Yes     Comment: Reported 1 per year.    Drug use: No    Sexual activity: Yes     Partners: Female   Social History Narrative    . [facility].     Social Drivers of Health     Financial Resource Strain: Low Risk  (7/24/2024)    Overall Financial Resource Strain (CARDIA)     Difficulty of Paying Living Expenses: Not hard at all   Food Insecurity: No Food Insecurity (7/24/2024)    Hunger Vital Sign     Worried About Running Out of Food in the Last Year: Never true     Ran Out of Food in the Last Year: Never true   Transportation Needs: No Transportation Needs (8/11/2020)    PRAPARE - Transportation     Lack of Transportation (Medical): No     Lack of Transportation (Non-Medical): No   Physical Activity: Inactive (7/24/2024)    Exercise Vital Sign     Days of Exercise per Week: 0 days     Minutes of Exercise per Session: 0 min   Stress: No Stress Concern Present (7/24/2024)    [employer] [place] [employer] - Occupational Stress Questionnaire     Feeling of Stress : Not at all   Housing Stability: Unknown (7/24/2024)    Housing Stability Vital Sign     Unable to Pay for Housing in the Last Year: No       Past Surgical History:   Procedure Laterality Date    HERNIA REPAIR      KNEE ARTHROSCOPY W/ DEBRIDEMENT      VASECTOMY       Review of Systems    Objective:   There were no vitals taken for this  visit.    Physical Exam  LOWER EXTREMITY PHYSICAL EXAMINATION    ORTHOPEDIC:  No pain on palpation of the foot or ankle.   Range of motion within normal limits of the ankle joint, rearfoot, and forefoot.  Manual muscle strength testing is 5/5 with dorsiflexion, plantar flexion, abduction and abduction of the lower extremity.  No pain with or without resistance.  The patient is a full to ambulate without pain or discomfort.  The patient's gait is non antalgic.  The patient does not utilize any assistive device for ambulation.    VASCULAR:  The right dorsalis pedis pulse 2/4 and the right posterior tibial pulse 2/4.  The left dorsalis pedis pulse 2/4 and posterior tibial pulse on the left is 2/4.  Capillary refill is intact.  Pedal hair growth intact.  Telangiectasias and varicosities noted bilaterally    NEUROLOGY:  Protective sensation is not intact to the right left foot.  Vibratory sensation is diminished.  Proprioception is intact.  Sharp/dull is reduced.    DERMATOLOGY:  Skin is supple, moist, intact. No signs of underlying infection or abscess..  The R1, 2, 5 and left L1,2, 5 are thickened, discolored dystrophic.  There is subungual debris.  Nail plates have area of dark discoloration.  The remaining nails 3-4 on the right foot and the left foot are elongated but of normal color, thickness, and texture.   There is no signs of ingrowing into the medial or lateral borders.  There is no evidence of wounds or skin breakdown.  No edema or erythema.  No obvious lacerations or fissuring.  Interdigital spaces are clean, dry, intact.  No rashes or scars appreciated.    Assessment:     1. Type 2 diabetes mellitus with hyperglycemia, without long-term current use of insulin    2. Type 2 diabetes mellitus with diabetic neuropathy, without long-term current use of insulin    3. Venous insufficiency of both lower extremities    4. Onychodystrophy        Plan:     Type 2 diabetes mellitus with hyperglycemia, without long-term  current use of insulin    Type 2 diabetes mellitus with diabetic neuropathy, without long-term current use of insulin    Venous insufficiency of both lower extremities    Onychodystrophy      Thorough discussion is had with the patient concerning the diagnosis, its etiology, and the treatment algorithm at present. Shoe inspection. Foot Education. Patient reminded of the importance of good nutrition and blood sugar control to help prevent podiatric complications of diabetes. Patient instructed on proper foot hygeine. We discussed wearing proper and supportive shoe gear, daily foot inspections, never walking barefooted or sock footed, never putting sharp instruments to feet which can cause major complications associated with infection, ulcers, lacerations.      Dystrophic nail plates, as outlined above (R#1,2,5  ; L#1,2,5 ), are sharply debrided with double action nail nipper, and/or with the assistance of a mechanical rotary arnav, with removal of all offending nail and nail border(s), for reduction of pains. Nails are reduced in terms of length, width and girth with removal of subungual debris to facilitate pain free weight bearing and ambulation. The elongated nails as outlined in the objective portion of this note, were trimmed to appropriate length, with a double action nail nipper, for alleviation/reduction of pains as well. Follow up in approx. 3-4 months.

## 2024-10-28 ENCOUNTER — LAB VISIT (OUTPATIENT)
Dept: LAB | Facility: HOSPITAL | Age: 69
End: 2024-10-28
Attending: NURSE PRACTITIONER
Payer: MEDICARE

## 2024-10-28 DIAGNOSIS — D69.6 THROMBOCYTOPENIA: ICD-10-CM

## 2024-10-28 DIAGNOSIS — R16.1 SPLENOMEGALY: ICD-10-CM

## 2024-10-28 DIAGNOSIS — E29.1 HYPOGONADISM MALE: Chronic | ICD-10-CM

## 2024-10-28 DIAGNOSIS — Z86.39 HISTORY OF IRON DEFICIENCY: ICD-10-CM

## 2024-10-28 DIAGNOSIS — K76.0 NAFLD (NONALCOHOLIC FATTY LIVER DISEASE): ICD-10-CM

## 2024-10-28 LAB
ALBUMIN SERPL BCP-MCNC: 4.1 G/DL (ref 3.5–5.2)
ALP SERPL-CCNC: 100 U/L (ref 40–150)
ALT SERPL W/O P-5'-P-CCNC: 22 U/L (ref 10–44)
ANION GAP SERPL CALC-SCNC: 9 MMOL/L (ref 8–16)
AST SERPL-CCNC: 22 U/L (ref 10–40)
BASOPHILS # BLD AUTO: 0.04 K/UL (ref 0–0.2)
BASOPHILS NFR BLD: 0.7 % (ref 0–1.9)
BILIRUB SERPL-MCNC: 0.7 MG/DL (ref 0.1–1)
BUN SERPL-MCNC: 16 MG/DL (ref 8–23)
CALCIUM SERPL-MCNC: 9.4 MG/DL (ref 8.7–10.5)
CHLORIDE SERPL-SCNC: 102 MMOL/L (ref 95–110)
CO2 SERPL-SCNC: 28 MMOL/L (ref 23–29)
CREAT SERPL-MCNC: 0.8 MG/DL (ref 0.5–1.4)
DIFFERENTIAL METHOD BLD: ABNORMAL
EOSINOPHIL # BLD AUTO: 0 K/UL (ref 0–0.5)
EOSINOPHIL NFR BLD: 0.5 % (ref 0–8)
ERYTHROCYTE [DISTWIDTH] IN BLOOD BY AUTOMATED COUNT: 13.2 % (ref 11.5–14.5)
EST. GFR  (NO RACE VARIABLE): >60 ML/MIN/1.73 M^2
FERRITIN SERPL-MCNC: 26 NG/ML (ref 20–300)
FOLATE SERPL-MCNC: 9.2 NG/ML (ref 4–24)
GLUCOSE SERPL-MCNC: 124 MG/DL (ref 70–110)
HCT VFR BLD AUTO: 50.6 % (ref 40–54)
HGB BLD-MCNC: 16.2 G/DL (ref 14–18)
IMM GRANULOCYTES # BLD AUTO: 0.01 K/UL (ref 0–0.04)
IMM GRANULOCYTES NFR BLD AUTO: 0.2 % (ref 0–0.5)
IRON SERPL-MCNC: 42 UG/DL (ref 45–160)
LYMPHOCYTES # BLD AUTO: 1.7 K/UL (ref 1–4.8)
LYMPHOCYTES NFR BLD: 29.7 % (ref 18–48)
MCH RBC QN AUTO: 28.7 PG (ref 27–31)
MCHC RBC AUTO-ENTMCNC: 32 G/DL (ref 32–36)
MCV RBC AUTO: 90 FL (ref 82–98)
MONOCYTES # BLD AUTO: 0.4 K/UL (ref 0.3–1)
MONOCYTES NFR BLD: 7.3 % (ref 4–15)
NEUTROPHILS # BLD AUTO: 3.5 K/UL (ref 1.8–7.7)
NEUTROPHILS NFR BLD: 61.6 % (ref 38–73)
NRBC BLD-RTO: 0 /100 WBC
PLATELET # BLD AUTO: 76 K/UL (ref 150–450)
PMV BLD AUTO: 8.7 FL (ref 9.2–12.9)
POTASSIUM SERPL-SCNC: 5 MMOL/L (ref 3.5–5.1)
PROT SERPL-MCNC: 6.8 G/DL (ref 6–8.4)
RBC # BLD AUTO: 5.64 M/UL (ref 4.6–6.2)
SATURATED IRON: 10 % (ref 20–50)
SODIUM SERPL-SCNC: 139 MMOL/L (ref 136–145)
TOTAL IRON BINDING CAPACITY: 401 UG/DL (ref 250–450)
TRANSFERRIN SERPL-MCNC: 271 MG/DL (ref 200–375)
VIT B12 SERPL-MCNC: 502 PG/ML (ref 210–950)
WBC # BLD AUTO: 5.63 K/UL (ref 3.9–12.7)

## 2024-10-28 PROCEDURE — 80053 COMPREHEN METABOLIC PANEL: CPT | Performed by: NURSE PRACTITIONER

## 2024-10-28 PROCEDURE — 82746 ASSAY OF FOLIC ACID SERUM: CPT | Performed by: NURSE PRACTITIONER

## 2024-10-28 PROCEDURE — 85025 COMPLETE CBC W/AUTO DIFF WBC: CPT | Mod: PO | Performed by: NURSE PRACTITIONER

## 2024-10-28 PROCEDURE — 82728 ASSAY OF FERRITIN: CPT | Performed by: NURSE PRACTITIONER

## 2024-10-28 PROCEDURE — 83540 ASSAY OF IRON: CPT | Performed by: NURSE PRACTITIONER

## 2024-10-28 PROCEDURE — 82607 VITAMIN B-12: CPT | Performed by: NURSE PRACTITIONER

## 2024-10-28 PROCEDURE — 36415 COLL VENOUS BLD VENIPUNCTURE: CPT | Mod: PO | Performed by: NURSE PRACTITIONER

## 2024-10-30 ENCOUNTER — OFFICE VISIT (OUTPATIENT)
Dept: HEMATOLOGY/ONCOLOGY | Facility: CLINIC | Age: 69
End: 2024-10-30
Payer: MEDICARE

## 2024-10-30 DIAGNOSIS — D50.9 IRON DEFICIENCY ANEMIA, UNSPECIFIED IRON DEFICIENCY ANEMIA TYPE: Primary | ICD-10-CM

## 2024-10-30 DIAGNOSIS — Z86.39 HISTORY OF IRON DEFICIENCY: ICD-10-CM

## 2024-10-30 DIAGNOSIS — D69.6 THROMBOCYTOPENIA: ICD-10-CM

## 2024-10-30 DIAGNOSIS — R16.1 SPLENOMEGALY: ICD-10-CM

## 2024-10-30 DIAGNOSIS — K76.0 NAFLD (NONALCOHOLIC FATTY LIVER DISEASE): ICD-10-CM

## 2024-10-30 DIAGNOSIS — R16.0 HEPATOMEGALY: ICD-10-CM

## 2024-10-30 PROCEDURE — 99213 OFFICE O/P EST LOW 20 MIN: CPT | Mod: PBBFAC,PO | Performed by: NURSE PRACTITIONER

## 2024-10-30 PROCEDURE — 99999 PR PBB SHADOW E&M-EST. PATIENT-LVL III: CPT | Mod: PBBFAC,,, | Performed by: NURSE PRACTITIONER

## 2024-10-30 RX ORDER — DIPHENHYDRAMINE HYDROCHLORIDE 50 MG/ML
50 INJECTION INTRAMUSCULAR; INTRAVENOUS ONCE AS NEEDED
OUTPATIENT
Start: 2024-10-30

## 2024-10-30 RX ORDER — EPINEPHRINE 0.3 MG/.3ML
0.3 INJECTION SUBCUTANEOUS ONCE AS NEEDED
OUTPATIENT
Start: 2024-10-30

## 2024-10-30 RX ORDER — SODIUM CHLORIDE 0.9 % (FLUSH) 0.9 %
10 SYRINGE (ML) INJECTION
OUTPATIENT
Start: 2024-10-30

## 2024-10-31 ENCOUNTER — OFFICE VISIT (OUTPATIENT)
Dept: ORTHOPEDICS | Facility: CLINIC | Age: 69
End: 2024-10-31
Payer: MEDICARE

## 2024-10-31 ENCOUNTER — PATIENT MESSAGE (OUTPATIENT)
Dept: INFUSION THERAPY | Facility: HOSPITAL | Age: 69
End: 2024-10-31
Payer: MEDICARE

## 2024-10-31 VITALS — WEIGHT: 267 LBS | HEIGHT: 70 IN | BODY MASS INDEX: 38.22 KG/M2

## 2024-10-31 DIAGNOSIS — M17.12 PRIMARY OSTEOARTHRITIS OF LEFT KNEE: ICD-10-CM

## 2024-10-31 DIAGNOSIS — M17.11 PRIMARY OSTEOARTHRITIS OF RIGHT KNEE: Primary | ICD-10-CM

## 2024-10-31 PROCEDURE — 99499 UNLISTED E&M SERVICE: CPT | Mod: S$PBB,,, | Performed by: ORTHOPAEDIC SURGERY

## 2024-10-31 PROCEDURE — 99999 PR PBB SHADOW E&M-EST. PATIENT-LVL III: CPT | Mod: PBBFAC,,, | Performed by: ORTHOPAEDIC SURGERY

## 2024-10-31 PROCEDURE — 20610 DRAIN/INJ JOINT/BURSA W/O US: CPT | Mod: 50,PBBFAC,PO | Performed by: ORTHOPAEDIC SURGERY

## 2024-10-31 PROCEDURE — 99999PBSHW PR PBB SHADOW TECHNICAL ONLY FILED TO HB: Mod: PBBFAC,,,

## 2024-10-31 PROCEDURE — 99213 OFFICE O/P EST LOW 20 MIN: CPT | Mod: PBBFAC,PO | Performed by: ORTHOPAEDIC SURGERY

## 2024-10-31 RX ADMIN — TRIAMCINOLONE ACETONIDE 40 MG: 40 INJECTION, SUSPENSION INTRA-ARTICULAR; INTRAMUSCULAR at 01:10

## 2024-11-01 ENCOUNTER — TELEPHONE (OUTPATIENT)
Dept: INFUSION THERAPY | Facility: HOSPITAL | Age: 69
End: 2024-11-01
Payer: MEDICARE

## 2024-11-01 DIAGNOSIS — E11.65 TYPE 2 DIABETES MELLITUS WITH HYPERGLYCEMIA, WITHOUT LONG-TERM CURRENT USE OF INSULIN: ICD-10-CM

## 2024-11-01 RX ORDER — TIRZEPATIDE 10 MG/.5ML
10 INJECTION, SOLUTION SUBCUTANEOUS
Qty: 2 PEN | Refills: 1 | Status: SHIPPED | OUTPATIENT
Start: 2024-11-01

## 2024-11-01 RX ORDER — TIRZEPATIDE 10 MG/.5ML
10 INJECTION, SOLUTION SUBCUTANEOUS
Refills: 1 | OUTPATIENT
Start: 2024-11-01

## 2024-11-04 ENCOUNTER — HOSPITAL ENCOUNTER (OUTPATIENT)
Dept: PREADMISSION TESTING | Facility: HOSPITAL | Age: 69
Discharge: HOME | End: 2024-11-04
Attending: COLON & RECTAL SURGERY
Payer: MEDICARE

## 2024-11-04 DIAGNOSIS — D50.9 IRON DEFICIENCY ANEMIA, UNSPECIFIED IRON DEFICIENCY ANEMIA TYPE: ICD-10-CM

## 2024-11-04 DIAGNOSIS — D69.6 THROMBOCYTOPENIA: ICD-10-CM

## 2024-11-04 DIAGNOSIS — Z86.39 HISTORY OF IRON DEFICIENCY: Primary | ICD-10-CM

## 2024-11-04 RX ORDER — SODIUM, POTASSIUM,MAG SULFATES 17.5-3.13G
1 SOLUTION, RECONSTITUTED, ORAL ORAL DAILY
Qty: 1 KIT | Refills: 0 | Status: SHIPPED | OUTPATIENT
Start: 2024-11-04 | End: 2024-11-06

## 2024-11-06 ENCOUNTER — INFUSION (OUTPATIENT)
Dept: INFUSION THERAPY | Facility: HOSPITAL | Age: 69
End: 2024-11-06
Payer: MEDICARE

## 2024-11-06 VITALS
WEIGHT: 259.06 LBS | SYSTOLIC BLOOD PRESSURE: 120 MMHG | DIASTOLIC BLOOD PRESSURE: 65 MMHG | OXYGEN SATURATION: 96 % | BODY MASS INDEX: 37.09 KG/M2 | HEIGHT: 70 IN | HEART RATE: 56 BPM | TEMPERATURE: 99 F | RESPIRATION RATE: 16 BRPM

## 2024-11-06 DIAGNOSIS — D50.9 IRON DEFICIENCY ANEMIA, UNSPECIFIED IRON DEFICIENCY ANEMIA TYPE: Primary | ICD-10-CM

## 2024-11-06 PROCEDURE — 25000003 PHARM REV CODE 250: Mod: PN

## 2024-11-06 PROCEDURE — 63600175 PHARM REV CODE 636 W HCPCS: Mod: JZ,JG,PN

## 2024-11-06 PROCEDURE — 96374 THER/PROPH/DIAG INJ IV PUSH: CPT | Mod: PN

## 2024-11-06 RX ORDER — SODIUM CHLORIDE 0.9 % (FLUSH) 0.9 %
10 SYRINGE (ML) INJECTION
OUTPATIENT
Start: 2024-11-06

## 2024-11-06 RX ORDER — DIPHENHYDRAMINE HYDROCHLORIDE 50 MG/ML
50 INJECTION INTRAMUSCULAR; INTRAVENOUS ONCE AS NEEDED
OUTPATIENT
Start: 2024-11-06

## 2024-11-06 RX ORDER — SODIUM CHLORIDE 0.9 % (FLUSH) 0.9 %
10 SYRINGE (ML) INJECTION
Status: DISCONTINUED | OUTPATIENT
Start: 2024-11-06 | End: 2024-11-06 | Stop reason: HOSPADM

## 2024-11-06 RX ORDER — EPINEPHRINE 0.3 MG/.3ML
0.3 INJECTION SUBCUTANEOUS ONCE AS NEEDED
OUTPATIENT
Start: 2024-11-06

## 2024-11-06 RX ADMIN — FERUMOXYTOL 510 MG: 510 INJECTION INTRAVENOUS at 02:11

## 2024-11-06 NOTE — PLAN OF CARE
Pt tolerated Feraheme infusion well.  Pt stayed for 30 minutes post infusion.  No s/s of infusion reaction noted.  Instructed to call MD with any problems

## 2024-11-07 RX ORDER — TRIAMCINOLONE ACETONIDE 40 MG/ML
40 INJECTION, SUSPENSION INTRA-ARTICULAR; INTRAMUSCULAR
Status: DISCONTINUED | OUTPATIENT
Start: 2024-10-31 | End: 2024-11-07 | Stop reason: HOSPADM

## 2024-11-07 NOTE — PROGRESS NOTES
Chief Complaint   Patient presents with    Right Knee - Pain    Left Knee - Pain         HPI:   This is a 69 y.o. who presents to clinic today complaining of bilateral knee pain for 1 months after no known trauma. Pain is progressively worsening. No numbness or tingling. No associated signs or symptoms.    Past Medical History:   Diagnosis Date    Diabetes mellitus, type 2     Hypertension     Morbid obesity      Past Surgical History:   Procedure Laterality Date    HERNIA REPAIR      KNEE ARTHROSCOPY W/ DEBRIDEMENT      VASECTOMY       Current Outpatient Medications on File Prior to Visit   Medication Sig Dispense Refill    aspirin (ECOTRIN) 81 MG EC tablet Take 81 mg by mouth once daily.      atorvastatin (LIPITOR) 40 MG tablet TAKE 1 TABLET BY MOUTH EVERY DAY 90 tablet 1    dorzolamide-timolol 2-0.5% (COSOPT) 22.3-6.8 mg/mL ophthalmic solution Place 1 drop into the right eye 2 (two) times daily.      enalapril (VASOTEC) 20 MG tablet TAKE 1 TABLET BY MOUTH EVERY DAY 90 tablet 3    glucosamine/chondr castillo A sod (OSTEO BI-FLEX ORAL) Take 1 tablet by mouth Daily.      latanoprost 0.005 % ophthalmic solution Place 1 drop into both eyes every evening.      metFORMIN (GLUCOPHAGE-XR) 500 MG ER 24hr tablet Take 1 tablet (500 mg total) by mouth daily with breakfast. 90 tablet 3    multivitamin with minerals tablet Take 1 tablet by mouth once daily.      tadalafiL (CIALIS) 5 MG tablet Take 5 mg by mouth once daily.      testosterone cypionate (DEPOTESTOTERONE CYPIONATE) 100 mg/mL injection Inject 200 mg into the muscle every 14 (fourteen) days.       No current facility-administered medications on file prior to visit.     Review of patient's allergies indicates:   Allergen Reactions    Penicillins Rash     Family History   Problem Relation Name Age of Onset    Heart disease Mother Maribeth     Vision loss Mother Maribeth     Diabetes Father Ramon     Heart disease Father Ramon     Hypertension Father Ramon     Diabetes  Maternal Grandmother Kathy     Prostate cancer Maternal Grandfather Vincent      Social History     Socioeconomic History    Marital status:    Occupational History     Employer:  [employer]   Tobacco Use    Smoking status: Never    Smokeless tobacco: Never   Substance and Sexual Activity    Alcohol use: Yes     Comment: Reported 1 per year.    Drug use: No    Sexual activity: Yes     Partners: Female   Social History Narrative    . 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.     Social Drivers of Health     Financial Resource Strain: Low Risk  (7/24/2024)    Overall Financial Resource Strain (CARDIA)     Difficulty of Paying Living Expenses: Not hard at all   Food Insecurity: No Food Insecurity (7/24/2024)    Hunger Vital Sign     Worried About Running Out of Food in the Last Year: Never true     Ran Out of Food in the Last Year: Never true   Transportation Needs: No Transportation Needs (8/11/2020)    PRAPARE - Transportation     Lack of Transportation (Medical): No     Lack of Transportation (Non-Medical): No   Physical Activity: Inactive (7/24/2024)    Exercise Vital Sign     Days of Exercise per Week: 0 days     Minutes of Exercise per Session: 0 min   Stress: No Stress Concern Present (7/24/2024)    [employer] - Occupational Stress Questionnaire     Feeling of Stress : Not at all   Housing Stability: Unknown (7/24/2024)    Housing Stability Vital Sign     Unable to Pay for Housing in the Last Year: No       Review of Systems:  Constitutional:  Denies fever or chills   Eyes:  Denies change in visual acuity   HENT:  Denies nasal congestion or sore throat   Respiratory:  Denies cough or shortness of breath   Cardiovascular:  Denies chest pain or edema   GI:  Denies abdominal pain, nausea, vomiting, bloody stools or diarrhea   :  Denies dysuria   Integument:  Denies rash   Neurologic:  Denies headache, focal weakness or sensory changes   Endocrine:  Denies polyuria or polydipsia    Lymphatic:  Denies swollen glands   Psychiatric:  Denies depression or anxiety     Physical Exam:   Constitutional:  Well developed, well nourished, no acute distress, non-toxic appearance   Integument:  Well hydrated, no rash   Lymphatic:  No lymphadenopathy noted   Neurologic:  Alert & oriented x 3, CN 2-12 normal, normal motor function, normal sensory function, no focal deficits noted   Psychiatric:  Speech and behavior appropriate   Eyes: EOMI  Gi: abdomen soft    Bilateral Knee Exam    Tenderness   The patient is experiencing tenderness in the medial joint line.    Range of Motion   Extension: abnormal   Flexion: abnormal     Muscle Strength     The patient has normal knee strength.    Tests   Eben:  Medial - positive   Lachman:  Anterior - negative      Varus: negative  Valgus: negative  Patellar Apprehension: negative    Other   Erythema: absent  Sensation: normal  Pulse: present  Swelling: mild    X-rays were performed, personally reviewed by me and findings discussed with the patient.  3 views of the bilateral knee show tricompartmental degenerative change most pronounced in the medial compartment with Kellgren 3 changes    Primary osteoarthritis of right knee  -     Prior authorization Order    Primary osteoarthritis of left knee  -     Prior authorization Order            Using an aseptic technique, I injected 5 cc of lidocaine 1% without and 1 cc of kenalog 40mg into the bilateral Knee. The patient tolerated this well. I will have them return to clinic in 3 months. PA gel.

## 2024-11-07 NOTE — PROCEDURES
Large Joint Aspiration/Injection: bilateral knee    Date/Time: 10/31/2024 1:30 PM    Performed by: Casper Nixon MD  Authorized by: Casper Nixon MD    Consent Done?:  Yes (Verbal)  Indications:  Pain  Timeout: prior to procedure the correct patient, procedure, and site was verified    Prep: patient was prepped and draped in usual sterile fashion    Local anesthetic:  Lidocaine 1% without epinephrine  Anesthetic total (ml):  5      Details:  Needle Size:  21 G  Approach:  Anterolateral  Location:  Knee  Laterality:  Bilateral  Site:  Bilateral knee  Medications (Right):  40 mg triamcinolone acetonide 40 mg/mL  Medications (Left):  40 mg triamcinolone acetonide 40 mg/mL  Patient tolerance:  Patient tolerated the procedure well with no immediate complications

## 2024-11-13 ENCOUNTER — INFUSION (OUTPATIENT)
Dept: INFUSION THERAPY | Facility: HOSPITAL | Age: 69
End: 2024-11-13
Payer: MEDICARE

## 2024-11-13 VITALS
HEART RATE: 63 BPM | OXYGEN SATURATION: 97 % | WEIGHT: 259.06 LBS | HEIGHT: 70 IN | BODY MASS INDEX: 37.09 KG/M2 | SYSTOLIC BLOOD PRESSURE: 107 MMHG | DIASTOLIC BLOOD PRESSURE: 61 MMHG | RESPIRATION RATE: 20 BRPM | TEMPERATURE: 97 F

## 2024-11-13 DIAGNOSIS — D50.9 IRON DEFICIENCY ANEMIA, UNSPECIFIED IRON DEFICIENCY ANEMIA TYPE: Primary | ICD-10-CM

## 2024-11-13 PROCEDURE — 25000003 PHARM REV CODE 250: Mod: PN

## 2024-11-13 PROCEDURE — A4216 STERILE WATER/SALINE, 10 ML: HCPCS | Mod: PN

## 2024-11-13 PROCEDURE — 96374 THER/PROPH/DIAG INJ IV PUSH: CPT | Mod: PN

## 2024-11-13 PROCEDURE — 63600175 PHARM REV CODE 636 W HCPCS: Mod: JZ,JG,PN

## 2024-11-13 RX ORDER — SODIUM CHLORIDE 0.9 % (FLUSH) 0.9 %
10 SYRINGE (ML) INJECTION
Status: CANCELLED | OUTPATIENT
Start: 2024-11-13

## 2024-11-13 RX ORDER — DIPHENHYDRAMINE HYDROCHLORIDE 50 MG/ML
50 INJECTION INTRAMUSCULAR; INTRAVENOUS ONCE AS NEEDED
OUTPATIENT
Start: 2024-11-13

## 2024-11-13 RX ORDER — SODIUM CHLORIDE 0.9 % (FLUSH) 0.9 %
10 SYRINGE (ML) INJECTION
Status: DISCONTINUED | OUTPATIENT
Start: 2024-11-13 | End: 2024-11-13 | Stop reason: HOSPADM

## 2024-11-13 RX ORDER — EPINEPHRINE 0.3 MG/.3ML
0.3 INJECTION SUBCUTANEOUS ONCE AS NEEDED
OUTPATIENT
Start: 2024-11-13

## 2024-11-13 RX ADMIN — Medication 10 ML: at 03:11

## 2024-11-13 RX ADMIN — FERUMOXYTOL 510 MG: 510 INJECTION INTRAVENOUS at 02:11

## 2024-11-19 RX ORDER — ATORVASTATIN CALCIUM 40 MG/1
40 TABLET, FILM COATED ORAL
Qty: 90 TABLET | Refills: 2 | Status: SHIPPED | OUTPATIENT
Start: 2024-11-19

## 2024-11-20 NOTE — TELEPHONE ENCOUNTER
Refill Decision Note   Lazarus Zamudio  is requesting a refill authorization.  Brief Assessment and Rationale for Refill:  Approve     Medication Therapy Plan:       Medication Reconciliation Completed: No   Comments:     No Care Gaps recommended.     Note composed:9:06 PM 11/19/2024

## 2024-11-20 NOTE — TELEPHONE ENCOUNTER
No care due was identified.  [facility] Embedded Care Due Messages. Reference number: [id number].   11/19/2024 6:10:47 PM CST

## 2024-12-03 LAB
LEFT EYE DM RETINOPATHY: NEGATIVE
RIGHT EYE DM RETINOPATHY: NEGATIVE

## 2024-12-05 ENCOUNTER — ANESTHESIA (OUTPATIENT)
Dept: ENDOSCOPY | Facility: HOSPITAL | Age: 69
End: 2024-12-05
Payer: MEDICARE

## 2024-12-05 ENCOUNTER — ANESTHESIA EVENT (OUTPATIENT)
Dept: ENDOSCOPY | Facility: HOSPITAL | Age: 69
End: 2024-12-05
Payer: MEDICARE

## 2024-12-05 ENCOUNTER — HOSPITAL ENCOUNTER (OUTPATIENT)
Facility: HOSPITAL | Age: 69
Discharge: HOME | End: 2024-12-05
Attending: INTERNAL MEDICINE | Admitting: INTERNAL MEDICINE
Payer: MEDICARE

## 2024-12-05 DIAGNOSIS — D50.9 IRON DEFICIENCY ANEMIA: ICD-10-CM

## 2024-12-05 DIAGNOSIS — Z86.0100 HISTORY OF COLONIC POLYPS: Primary | ICD-10-CM

## 2024-12-05 DIAGNOSIS — D69.6 THROMBOCYTOPENIA: Chronic | ICD-10-CM

## 2024-12-05 LAB — POCT GLUCOSE: 137 MG/DL (ref 70–110)

## 2024-12-05 PROCEDURE — 37000009 HC ANESTHESIA EA ADD 15 MINS: Performed by: INTERNAL MEDICINE

## 2024-12-05 PROCEDURE — 45388 COLONOSCOPY W/ABLATION: CPT | Mod: ,,, | Performed by: INTERNAL MEDICINE

## 2024-12-05 PROCEDURE — 27202087 HC PROBE, APC: Performed by: INTERNAL MEDICINE

## 2024-12-05 PROCEDURE — 45385 COLONOSCOPY W/LESION REMOVAL: CPT | Mod: 59 | Performed by: INTERNAL MEDICINE

## 2024-12-05 PROCEDURE — 43255 EGD CONTROL BLEEDING ANY: CPT | Mod: 59 | Performed by: INTERNAL MEDICINE

## 2024-12-05 PROCEDURE — 45380 COLONOSCOPY AND BIOPSY: CPT | Mod: 59 | Performed by: INTERNAL MEDICINE

## 2024-12-05 PROCEDURE — 27201089 HC SNARE, DISP (ANY): Performed by: INTERNAL MEDICINE

## 2024-12-05 PROCEDURE — 45380 COLONOSCOPY AND BIOPSY: CPT | Mod: 59,,, | Performed by: INTERNAL MEDICINE

## 2024-12-05 PROCEDURE — 43239 EGD BIOPSY SINGLE/MULTIPLE: CPT | Performed by: INTERNAL MEDICINE

## 2024-12-05 PROCEDURE — 37000008 HC ANESTHESIA 1ST 15 MINUTES: Performed by: INTERNAL MEDICINE

## 2024-12-05 PROCEDURE — 63600175 PHARM REV CODE 636 W HCPCS

## 2024-12-05 PROCEDURE — 88305 TISSUE EXAM BY PATHOLOGIST: CPT | Mod: 59 | Performed by: PATHOLOGY

## 2024-12-05 PROCEDURE — 27201012 HC FORCEPS, HOT/COLD, DISP: Performed by: INTERNAL MEDICINE

## 2024-12-05 PROCEDURE — 45388 COLONOSCOPY W/ABLATION: CPT | Performed by: INTERNAL MEDICINE

## 2024-12-05 PROCEDURE — 43255 EGD CONTROL BLEEDING ANY: CPT | Mod: 59,,, | Performed by: INTERNAL MEDICINE

## 2024-12-05 PROCEDURE — 45385 COLONOSCOPY W/LESION REMOVAL: CPT | Mod: 59,,, | Performed by: INTERNAL MEDICINE

## 2024-12-05 PROCEDURE — 43239 EGD BIOPSY SINGLE/MULTIPLE: CPT | Mod: 51,,, | Performed by: INTERNAL MEDICINE

## 2024-12-05 RX ORDER — PROPOFOL 10 MG/ML
VIAL (ML) INTRAVENOUS
Status: DISCONTINUED | OUTPATIENT
Start: 2024-12-05 | End: 2024-12-05

## 2024-12-05 RX ORDER — LIDOCAINE HYDROCHLORIDE 20 MG/ML
INJECTION INTRAVENOUS
Status: DISCONTINUED | OUTPATIENT
Start: 2024-12-05 | End: 2024-12-05

## 2024-12-05 RX ADMIN — PROPOFOL 60 MG: 10 INJECTION, EMULSION INTRAVENOUS at 10:12

## 2024-12-05 RX ADMIN — PROPOFOL 40 MG: 10 INJECTION, EMULSION INTRAVENOUS at 10:12

## 2024-12-05 RX ADMIN — GLYCOPYRROLATE 0.2 MG: 0.2 INJECTION, SOLUTION INTRAMUSCULAR; INTRAVITREAL at 10:12

## 2024-12-05 RX ADMIN — LIDOCAINE HYDROCHLORIDE 100 MG: 20 INJECTION INTRAVENOUS at 10:12

## 2024-12-05 RX ADMIN — PROPOFOL 150 MG: 10 INJECTION, EMULSION INTRAVENOUS at 10:12

## 2024-12-05 RX ADMIN — PROPOFOL 50 MG: 10 INJECTION, EMULSION INTRAVENOUS at 10:12

## 2024-12-05 NOTE — PROVATION PATIENT INSTRUCTIONS
Discharge Summary/Instructions after an Endoscopic Procedure  Patient Name: Lazarus Zamudio  Patient MRN: [id number]  Patient YOB: 1955  Thursday, December 5, 2024 Stephanie Gonzalez MD  Dear patient,  As a result of recent federal legislation (The Federal Cures Act), you may   receive lab or pathology results from your procedure in your [facility]   account before your physician is able to contact you. Your physician or   their representative will relay the results to you with their   recommendations at their soonest availability.  Thank you,  RESTRICTIONS:  During your procedure today, you received medications for sedation.  These   medications may affect your judgment, balance and coordination.  Therefore,   for 24 hours, you have the following restrictions:   - DO NOT drive a car, operate machinery, make legal/financial decisions,   sign important papers or drink alcohol.    ACTIVITY:  Today: no heavy lifting, straining or running due to procedural   sedation/anesthesia.  The following day: return to full activity including work.  DIET:  Eat and drink normally unless instructed otherwise.     TREATMENT FOR COMMON SIDE EFFECTS:  - Mild abdominal pain, nausea, belching, bloating or excessive gas:  rest,   eat lightly and use a heating pad.  - Sore Throat: treat with throat lozenges and/or gargle with warm salt   water.  - Because air was used during the procedure, expelling large amounts of air   from your rectum or belching is normal.  - If a bowel prep was taken, you may not have a bowel movement for 1-3 days.    This is normal.  SYMPTOMS TO WATCH FOR AND REPORT TO YOUR PHYSICIAN:  1. Abdominal pain or bloating, other than gas cramps.  2. Chest pain.  3. Back pain.  4. Signs of infection such as: chills or fever occurring within 24 hours   after the procedure.  5. Rectal bleeding, which would show as bright red, maroon, or black stools.   (A tablespoon of blood from the rectum is not serious, especially if    hemorrhoids are present.)  6. Vomiting.  7. Weakness or dizziness.  GO DIRECTLY TO THE NEAREST EMERGENCY ROOM IF YOU HAVE ANY OF THE FOLLOWING:      Difficulty breathing              Chills and/or fever over 101 F   Persistent vomiting and/or vomiting blood   Severe abdominal pain   Severe chest pain   Black, tarry stools   Bleeding- more than one tablespoon   Any other symptom or condition that you feel may need urgent attention  Your doctor recommends these additional instructions:  If any biopsies were taken, your doctors clinic will contact you in 1 to 2   weeks with any results.  - Discharge patient to home (with escort).   - Resume previous diet.   - Continue present medications.   - No ibuprofen, naproxen, or other non-steroidal anti-inflammatory drugs for   3 weeks.   - Hold Aspirin for 5 days.  - Await pathology results.   - Repeat colonoscopy in 5 years for surveillance based on pathology results.     - To visualize the small bowel, perform video capsule endoscopy. You will be   contacted to schedule this study by our GI department.   - Return to nurse practitioner with Ms. Bibi Fabian NP in   Hematology-Oncology clinic.   - Aggressive bowel prep for all future colonoscopies.  For questions, problems or results please call your physician Stephanie Gonzalez MD at Work:  [phone]  If you have any questions about the above instructions, call the GI   department at [phone] or call the endoscopy unit at [phone]   from 7am until 3 pm.  [facility] [place], EMERGENCY ROOM PHONE NUMBER:   [phone]  IF A COMPLICATION OR EMERGENCY SITUATION ARISES AND YOU ARE UNABLE TO REACH   YOUR PHYSICIAN - GO DIRECTLY TO THE EMERGENCY ROOM.  I have read or have had read to me these discharge instructions for my   procedure and have received a written copy.  I understand these   instructions and will follow-up with my physician if I have any questions.      __________________________________       _____________________________________  Nurse Signature                                          Patient/Designated   Responsible Party Signature  MD Stephanie Rivera MD  12/5/2024 10:57:38 AM  This report has been verified and signed electronically.  Dear patient,  As a result of recent federal legislation (The Federal Cures Act), you may   receive lab or pathology results from your procedure in your [facility]   account before your physician is able to contact you. Your physician or   their representative will relay the results to you with their   recommendations at their soonest availability.  Thank you,  PROVATION

## 2024-12-05 NOTE — PLAN OF CARE
DR LOVE AT BEDSIDE TO SPEAK TO PT. REGARDING RESULTS.  VSS, NO GI BLEEDING, NO ABD. PAIN, NO N/V. PT. DISCHARGED FROM UNIT.

## 2024-12-05 NOTE — TRANSFER OF CARE
"Anesthesia Transfer of Care Note    Patient: Lazarus Zamudio    Procedure(s) Performed: Procedure(s) (LRB):  EGD (ESOPHAGOGASTRODUODENOSCOPY) (N/A)  COLONOSCOPY (N/A)    Patient location: GI    Anesthesia Type: MAC    Transport from OR: Transported from OR on room air with adequate spontaneous ventilation    Post pain: adequate analgesia    Post assessment: no apparent anesthetic complications    Post vital signs: stable    Level of consciousness: responds to stimulation    Nausea/Vomiting: no nausea/vomiting    Complications: none    Transfer of care protocol was followed      Last vitals: Visit Vitals  BP (!) 148/64 (BP Location: Left arm, Patient Position: Lying)   Pulse 71   Temp 36.7 °C (98.1 °F) (Temporal)   Resp 18   Ht 5' 10" (1.778 m)   Wt 113.4 kg (250 lb)   SpO2 98%   BMI 35.87 kg/m²     "

## 2024-12-05 NOTE — ANESTHESIA PREPROCEDURE EVALUATION
12/05/2024  Lazarus Zamudio is a 69 y.o., male.    Patient Active Problem List   Diagnosis    Type 2 diabetes mellitus with hyperglycemia, without long-term current use of insulin    Hypertension associated with diabetes    History of colonic polyps    Hypersomnia with sleep apnea    Obstructive sleep apnea syndrome    Encounter for long-term (current) use of medications    Venous stasis dermatitis of left lower extremity    Class 3 obesity with alveolar hypoventilation, serious comorbidity, and body mass index (BMI) of 40.0 to 44.9 in adult    Sciatica, left side    Pain of left lower extremity    Chronic left-sided low back pain without sciatica    Decreased functional mobility    Muscle weakness of lower extremity    Decreased ROM of lumbar spine    Polycythemia    Hypogonadism male    Chronic pain of both knees    Thrombocytopenia    Class 2 severe obesity with serious comorbidity and body mass index (BMI) of 39.0 to 39.9 in adult    Onychogryposis    Impaired functional mobility and activity tolerance    Iron deficiency anemia     Past Medical History:   Diagnosis Date    Diabetes mellitus, type 2     Hypertension     Morbid obesity      Past Surgical History:   Procedure Laterality Date    HERNIA REPAIR      KNEE ARTHROSCOPY W/ DEBRIDEMENT      VASECTOMY         Pre-op Assessment    I have reviewed the Patient Summary Reports.     I have reviewed the Nursing Notes. I have reviewed the NPO Status.   I have reviewed the Medications.     Review of Systems  Anesthesia Hx:               Denies Personal Hx of Anesthesia complications.                    Cardiovascular:     Hypertension              ECG has been reviewed.                            Pulmonary:        Sleep Apnea                Hepatic/GI:  Bowel Prep.                   Neurological:    Neuromuscular Disease,                                    Endocrine:  Diabetes         Obesity / BMI > 30    Echo 2015: CONCLUSIONS     1 - Concentric hypertrophy.     2 - Normal left ventricular systolic function (EF 60-65%).     3 - Normal left ventricular diastolic function.     4 - Normal right ventricular systolic function .     5 - Limited echo study.         Physical Exam  General: Well nourished, Cooperative, Alert and Oriented    Airway:  Mallampati: II   Mouth Opening: Normal  TM Distance: Normal  Tongue: Normal  Neck ROM: Normal ROM    Chest/Lungs:  Normal Respiratory Rate    Heart:  Rate: Normal  Rhythm: Regular Rhythm        Anesthesia Plan  Type of Anesthesia, risks & benefits discussed:    Anesthesia Type: MAC, Gen Natural Airway  Intra-op Monitoring Plan: Standard ASA Monitors  Induction:  IV  Informed Consent: Informed consent signed with the Patient and all parties understand the risks and agree with anesthesia plan.  All questions answered.   ASA Score: 3  Day of Surgery Review of History & Physical: H&P Update referred to the surgeon/provider.    Ready For Surgery From Anesthesia Perspective.     .

## 2024-12-05 NOTE — H&P
PRE PROCEDURE H&P    Patient Name: Lazarus Zamudio  MRN: [id number]  : 1955  Date of Procedure:  2024  Referring Physician: Bibi Mcgee, JOHANNYP-C  Primary Physician: Casper Barrett MD  Procedure Physician: Stephanie Gonzalez MD       Planned Procedure: Colonoscopy and EGD  Diagnosis: previous adenomatous polyp, iron deficiency anemia and thrombocytopenia      Chief Complaint: Same as above    HPI: Patient is an 69 y.o. male is here for the above. Referred by Bibi Fabian NP of Heme-Onc for SAM and thrombocytopenia. Patient is DM with steatohepatitis and splenomegaly on imaging. Platelets low at 76. Labs also show normal H&H but iron low at 42. Last colonoscopy 6 years ago in 2018 at [place]. Outside records not available for review. Wife at bedside reports he had a polyp removed. No Fhx of CRC. Takes ASA, no other blood thinners.     Last colonoscopy: 2018  Family history: none  Anticoagulation: ASA, 2 days ago    Past Medical History:   Past Medical History:   Diagnosis Date    Diabetes mellitus, type 2     Hypertension     Morbid obesity         Past Surgical History:  Past Surgical History:   Procedure Laterality Date    HERNIA REPAIR      KNEE ARTHROSCOPY W/ DEBRIDEMENT      VASECTOMY          Home Medications:  Prior to Admission medications    Medication Sig Start Date End Date Taking? Authorizing Provider   aspirin (ECOTRIN) 81 MG EC tablet Take 81 mg by mouth once daily.   Yes Provider, Historical   atorvastatin (LIPITOR) 40 MG tablet TAKE 1 TABLET BY MOUTH EVERY DAY 24  Yes Casper Barrett MD   dorzolamide-timolol 2-0.5% (COSOPT) 22.3-6.8 mg/mL ophthalmic solution Place 1 drop into the right eye 2 (two) times daily. 23  Yes Provider, Historical   enalapril (VASOTEC) 20 MG tablet TAKE 1 TABLET BY MOUTH EVERY DAY 10/7/24  Yes Casper Barrett MD   latanoprost 0.005 % ophthalmic solution Place 1 drop into both eyes every evening. 23  Yes Provider, Historical   metFORMIN  (GLUCOPHAGE-XR) 500 MG ER 24hr tablet Take 1 tablet (500 mg total) by mouth daily with breakfast. 7/12/24 7/12/25 Yes Casper Barrett MD   multivitamin with minerals tablet Take 1 tablet by mouth once daily.   Yes Provider, Historical   tirzepatide (MOUNJARO) 10 mg/0.5 mL PnIj Inject 10 mg into the skin every 7 days. 11/1/24  Yes Quyen Koch NP   glucosamine/chondr castillo A sod (OSTEO BI-FLEX ORAL) Take 1 tablet by mouth Daily.    Provider, Historical   tadalafiL (CIALIS) 5 MG tablet Take 5 mg by mouth once daily. 8/15/24   Provider, Historical   testosterone cypionate (DEPOTESTOTERONE CYPIONATE) 100 mg/mL injection Inject 200 mg into the muscle every 14 (fourteen) days.    Provider, Historical        Allergies:  Review of patient's allergies indicates:   Allergen Reactions    Penicillins Rash        Social History:   Social History     Socioeconomic History    Marital status:    Occupational History     Employer:  [employer]   Tobacco Use    Smoking status: Never    Smokeless tobacco: Never   Substance and Sexual Activity    Alcohol use: Yes     Comment: Reported 1 per year.    Drug use: No    Sexual activity: Yes     Partners: Female   Social History Narrative    . 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     Social Drivers of Health     Financial Resource Strain: Low Risk  (7/24/2024)    Overall Financial Resource Strain (CARDIA)     Difficulty of Paying Living Expenses: Not hard at all   Food Insecurity: No Food Insecurity (7/24/2024)    Hunger Vital Sign     Worried About Running Out of Food in the Last Year: Never true     Ran Out of Food in the Last Year: Never true   Transportation Needs: No Transportation Needs (8/11/2020)    PRAPARE - Transportation     Lack of Transportation (Medical): No     Lack of Transportation (Non-Medical): No   Physical Activity: Inactive (7/24/2024)    Exercise Vital Sign     Days of Exercise per Week: 0 days     Minutes of Exercise per Session: 0 min   Stress: No Stress  "Concern Present (7/24/2024)    [employer] - Occupational Stress Questionnaire     Feeling of Stress : Not at all   Housing Stability: Unknown (7/24/2024)    Housing Stability Vital Sign     Unable to Pay for Housing in the Last Year: No       Family History:  Family History   Problem Relation Name Age of Onset    Heart disease Mother Maribeth     Vision loss Mother Maribeth     Diabetes Father Ramon     Heart disease Father Ramon     Hypertension Father Ramon     Diabetes Maternal Grandmother Kathy     Prostate cancer Maternal Grandfather Garrison        ROS: No acute cardiac events, no acute respiratory complaints.     Physical Exam (all patients):    BP (!) 148/64 (BP Location: Left arm, Patient Position: Lying)   Pulse 71   Temp 98.1 °F (36.7 °C) (Temporal)   Resp 18   Ht 5' 10" (1.778 m)   Wt 113.4 kg (250 lb)   SpO2 98%   BMI 35.87 kg/m²   Lungs: Clear to auscultation bilaterally, respirations unlabored  Heart: Regular rate and rhythm, S1 and S2 normal, no obvious murmurs  Abdomen:         Soft, non-tender, bowel sounds normal, no masses, no organomegaly    Lab Results   Component Value Date    WBC 5.63 10/28/2024    MCV 90 10/28/2024    RDW 13.2 10/28/2024    PLT 76 (L) 10/28/2024    INR 1.1 06/30/2023     (H) 10/28/2024    HGBA1C 6.3 (H) 10/11/2024    BUN 16 10/28/2024     10/28/2024    K 5.0 10/28/2024     10/28/2024        SEDATION PLAN: per anesthesia      History reviewed, vital signs satisfactory, cardiopulmonary status satisfactory, sedation options, risks and plans have been discussed with the patient  All their questions were answered and the patient agrees to the sedation procedures as planned and the patient is deemed an appropriate candidate for the sedation as planned.    The risks, benefits and alternatives of the procedure were discussed with the patient in detail. This discussion was had in the presence of his wife and endoscopy staff. The " risks include, risks of adverse reaction to sedation requiring the use of reversal agents, bleeding requiring blood transfusion, perforation requiring surgical intervention and technical failure. Other risks include aspiration leading to respiratory distress and respiratory failure resulting in endotracheal intubation and mechanical ventilation including death. If anesthesia is being utilized for this procedure, it is up to the anesthesiologist to determine airway safety including elective endotracheal intubation. Questions were answered, they agree to proceed. There was no language barriers.      Procedure explained to patient, informed consent obtained and placed in chart.    Stephanie Gonzalez  12/5/2024  10:04 AM

## 2024-12-05 NOTE — ANESTHESIA POSTPROCEDURE EVALUATION
Anesthesia Post Evaluation    Patient: Lazarus Zamudio    Procedure(s) Performed: Procedure(s) (LRB):  EGD (ESOPHAGOGASTRODUODENOSCOPY) (N/A)  COLONOSCOPY (N/A)    Final Anesthesia Type: MAC      Patient location during evaluation: GI PACU  Patient participation: Yes- Able to Participate  Level of consciousness: awake and alert  Post-procedure vital signs: reviewed and stable  Pain management: adequate  Airway patency: patent    PONV status at discharge: No PONV  Anesthetic complications: no      Cardiovascular status: blood pressure returned to baseline and hemodynamically stable  Respiratory status: unassisted, spontaneous ventilation and room air  Hydration status: euvolemic  Follow-up not needed.              Vitals Value Taken Time   /68 12/05/24 1118   Temp 36.7 °C (98 °F) 12/05/24 1058   Pulse 66 12/05/24 1118   Resp 14 12/05/24 1118   SpO2 98 % 12/05/24 1118         Event Time   Out of Recovery 11:38:37         Pain/Jennifer Score: Jennifer Score: 10 (12/5/2024 11:18 AM)

## 2024-12-05 NOTE — PROVATION PATIENT INSTRUCTIONS
Discharge Summary/Instructions after an Endoscopic Procedure  Patient Name: Lazarus Zamudio  Patient MRN: [id number]  Patient YOB: 1955  Thursday, December 5, 2024 Stephanie Gonzalez MD  Dear patient,  As a result of recent federal legislation (The Federal Cures Act), you may   receive lab or pathology results from your procedure in your [facility]   account before your physician is able to contact you. Your physician or   their representative will relay the results to you with their   recommendations at their soonest availability.  Thank you,  RESTRICTIONS:  During your procedure today, you received medications for sedation.  These   medications may affect your judgment, balance and coordination.  Therefore,   for 24 hours, you have the following restrictions:   - DO NOT drive a car, operate machinery, make legal/financial decisions,   sign important papers or drink alcohol.    ACTIVITY:  Today: no heavy lifting, straining or running due to procedural   sedation/anesthesia.  The following day: return to full activity including work.  DIET:  Eat and drink normally unless instructed otherwise.     TREATMENT FOR COMMON SIDE EFFECTS:  - Mild abdominal pain, nausea, belching, bloating or excessive gas:  rest,   eat lightly and use a heating pad.  - Sore Throat: treat with throat lozenges and/or gargle with warm salt   water.  - Because air was used during the procedure, expelling large amounts of air   from your rectum or belching is normal.  - If a bowel prep was taken, you may not have a bowel movement for 1-3 days.    This is normal.  SYMPTOMS TO WATCH FOR AND REPORT TO YOUR PHYSICIAN:  1. Abdominal pain or bloating, other than gas cramps.  2. Chest pain.  3. Back pain.  4. Signs of infection such as: chills or fever occurring within 24 hours   after the procedure.  5. Rectal bleeding, which would show as bright red, maroon, or black stools.   (A tablespoon of blood from the rectum is not serious, especially if    hemorrhoids are present.)  6. Vomiting.  7. Weakness or dizziness.  GO DIRECTLY TO THE NEAREST EMERGENCY ROOM IF YOU HAVE ANY OF THE FOLLOWING:      Difficulty breathing              Chills and/or fever over 101 F   Persistent vomiting and/or vomiting blood   Severe abdominal pain   Severe chest pain   Black, tarry stools   Bleeding- more than one tablespoon   Any other symptom or condition that you feel may need urgent attention  Your doctor recommends these additional instructions:  If any biopsies were taken, your doctors clinic will contact you in 1 to 2   weeks with any results.  - Discharge patient to home after colonoscopy.   - Resume previous diet.   - Continue present medications.   - Hold Aspirin for 5 days.  - Await pathology results.   - To visualize the small bowel, perform video capsule endoscopy. You will be   contacted to schedule this study by our GI department.   - Proceed with colonoscopy.  For questions, problems or results please call your physician Stephanie Gonzalez MD at Work:  [phone]  If you have any questions about the above instructions, call the GI   department at [phone] or call the endoscopy unit at [phone]   from 7am until 3 pm.  [facility] [place], EMERGENCY ROOM PHONE NUMBER:   [phone]  IF A COMPLICATION OR EMERGENCY SITUATION ARISES AND YOU ARE UNABLE TO REACH   YOUR PHYSICIAN - GO DIRECTLY TO THE EMERGENCY ROOM.  I have read or have had read to me these discharge instructions for my   procedure and have received a written copy.  I understand these   instructions and will follow-up with my physician if I have any questions.     __________________________________       _____________________________________  Nurse Signature                                          Patient/Designated   Responsible Party Signature  MD Stephanie Rivera MD  12/5/2024 11:02:25 AM  This report has been verified and signed electronically.  Dear  patient,  As a result of recent federal legislation (The Federal Cures Act), you may   receive lab or pathology results from your procedure in your [facility]   account before your physician is able to contact you. Your physician or   their representative will relay the results to you with their   recommendations at their soonest availability.  Thank you,  PROVATION

## 2024-12-06 VITALS
BODY MASS INDEX: 35.79 KG/M2 | HEART RATE: 66 BPM | WEIGHT: 250 LBS | HEIGHT: 70 IN | DIASTOLIC BLOOD PRESSURE: 68 MMHG | OXYGEN SATURATION: 98 % | TEMPERATURE: 98 F | RESPIRATION RATE: 14 BRPM | SYSTOLIC BLOOD PRESSURE: 129 MMHG

## 2024-12-09 ENCOUNTER — PATIENT MESSAGE (OUTPATIENT)
Dept: ENDOSCOPY | Facility: HOSPITAL | Age: 69
End: 2024-12-09
Payer: MEDICARE

## 2024-12-11 LAB
FINAL PATHOLOGIC DIAGNOSIS: NORMAL
GROSS: NORMAL
Lab: NORMAL

## 2024-12-13 ENCOUNTER — PATIENT MESSAGE (OUTPATIENT)
Dept: GASTROENTEROLOGY | Facility: CLINIC | Age: 69
End: 2024-12-13
Payer: MEDICARE

## 2024-12-13 NOTE — PROGRESS NOTES
"The function and absorption of the stomach and small intestine are normal. There is no signs of infection. Also, the polyps removed were precancerous also known as adenomas. They were completely removed. Guidelines recommend a repeat colonoscopy in 3 years instead of the 5 years initially recommended. We will update your health care reminder and send you a reminder as the time nears.    The likely cause of your iron loss is from the small collection of blood vessels called angiectasias or "AVMs" as we discussed a few of them were cauterized in your small intestine and colon. A video capsule endoscopy was recommended. Please keep your 12/23/24 appointment. We will be in touch once the results are available.      Thanks for trusting us with your healthcare needs and using [facility].     Sincerely,    Stephanie Gonzalez M.D. "

## 2024-12-18 ENCOUNTER — PATIENT OUTREACH (OUTPATIENT)
Dept: ADMINISTRATIVE | Facility: HOSPITAL | Age: 69
End: 2024-12-18
Payer: MEDICARE

## 2024-12-23 ENCOUNTER — HOSPITAL ENCOUNTER (OUTPATIENT)
Facility: HOSPITAL | Age: 69
Discharge: HOME | End: 2024-12-23
Attending: INTERNAL MEDICINE | Admitting: INTERNAL MEDICINE
Payer: MEDICARE

## 2024-12-23 PROCEDURE — 27201489 HC PILLCAM CAPSULE

## 2024-12-23 PROCEDURE — 91110 GI TRC IMG INTRAL ESOPH-ILE: CPT | Mod: TC | Performed by: INTERNAL MEDICINE

## 2024-12-31 DIAGNOSIS — E11.65 TYPE 2 DIABETES MELLITUS WITH HYPERGLYCEMIA, WITHOUT LONG-TERM CURRENT USE OF INSULIN: ICD-10-CM

## 2024-12-31 NOTE — TELEPHONE ENCOUNTER
Refill Routing Note   Medication(s) are not appropriate for processing by [facility] for the following reason(s):        Non-participating provider    [facility] action(s):  Route               Appointments  past 12m or future 3m with PCP    Date Provider   Last Visit   Visit date not found Quyen Koch NP   Next Visit   Visit date not found Quyen Koch NP   ED visits in past 90 days: 0        Note composed:1:10 PM 12/31/2024

## 2025-01-02 RX ORDER — TIRZEPATIDE 10 MG/.5ML
10 INJECTION, SOLUTION SUBCUTANEOUS
Qty: 4 PEN | Refills: 1 | Status: SHIPPED | OUTPATIENT
Start: 2025-01-02

## 2025-01-07 ENCOUNTER — TELEPHONE (OUTPATIENT)
Dept: GASTROENTEROLOGY | Facility: CLINIC | Age: 70
End: 2025-01-07
Payer: MEDICARE

## 2025-01-07 DIAGNOSIS — D50.9 IRON DEFICIENCY ANEMIA, UNSPECIFIED IRON DEFICIENCY ANEMIA TYPE: Primary | ICD-10-CM

## 2025-01-07 NOTE — TELEPHONE ENCOUNTER
Called and spoke to the pt and his wife. They report they do not know if he passed the VCE or not, they have not seen it but the pt does not have any complaints at this time.     Scheduled xray for tomorrow 01/08/25 at [facility]. Pt is already scheduled for lab work on the same day in [place]. They agreed to plan and verbalized understanding.

## 2025-01-07 NOTE — TELEPHONE ENCOUNTER
----- Message from Guanakito Matta MD sent at 1/7/2025  2:19 PM CST -----  Elizabeth,    This is the patient that needs a KUB.   Im ezekielry.   Sent you a message on the wrong patient.     Dr Matta

## 2025-01-08 ENCOUNTER — HOSPITAL ENCOUNTER (OUTPATIENT)
Dept: RADIOLOGY | Facility: HOSPITAL | Age: 70
Discharge: HOME | End: 2025-01-08
Attending: INTERNAL MEDICINE
Payer: MEDICARE

## 2025-01-08 DIAGNOSIS — D50.9 IRON DEFICIENCY ANEMIA, UNSPECIFIED IRON DEFICIENCY ANEMIA TYPE: ICD-10-CM

## 2025-01-08 PROCEDURE — 74018 RADEX ABDOMEN 1 VIEW: CPT | Mod: TC,PO

## 2025-01-08 PROCEDURE — 74018 RADEX ABDOMEN 1 VIEW: CPT | Mod: 26,,, | Performed by: RADIOLOGY

## 2025-01-09 ENCOUNTER — PATIENT MESSAGE (OUTPATIENT)
Dept: GASTROENTEROLOGY | Facility: CLINIC | Age: 70
End: 2025-01-09
Payer: MEDICARE

## 2025-01-10 DIAGNOSIS — D50.0 IRON DEFICIENCY ANEMIA DUE TO CHRONIC BLOOD LOSS: Primary | ICD-10-CM

## 2025-01-13 ENCOUNTER — PATIENT MESSAGE (OUTPATIENT)
Dept: GASTROENTEROLOGY | Facility: CLINIC | Age: 70
End: 2025-01-13
Payer: MEDICARE

## 2025-01-15 ENCOUNTER — OFFICE VISIT (OUTPATIENT)
Dept: HEMATOLOGY/ONCOLOGY | Facility: CLINIC | Age: 70
End: 2025-01-15
Payer: MEDICARE

## 2025-01-15 VITALS
BODY MASS INDEX: 36.79 KG/M2 | HEART RATE: 61 BPM | SYSTOLIC BLOOD PRESSURE: 139 MMHG | HEIGHT: 70 IN | WEIGHT: 257 LBS | DIASTOLIC BLOOD PRESSURE: 75 MMHG

## 2025-01-15 DIAGNOSIS — E61.1 IRON DEFICIENCY: ICD-10-CM

## 2025-01-15 DIAGNOSIS — D12.6 TUBULAR ADENOMA OF COLON: ICD-10-CM

## 2025-01-15 DIAGNOSIS — K31.819 GASTRIC AV MALFORMATION: ICD-10-CM

## 2025-01-15 DIAGNOSIS — K21.9 GASTROESOPHAGEAL REFLUX DISEASE, UNSPECIFIED WHETHER ESOPHAGITIS PRESENT: ICD-10-CM

## 2025-01-15 DIAGNOSIS — D53.9 NUTRITIONAL ANEMIA, UNSPECIFIED: ICD-10-CM

## 2025-01-15 DIAGNOSIS — R16.0 HEPATOMEGALY: Chronic | ICD-10-CM

## 2025-01-15 DIAGNOSIS — D69.6 THROMBOCYTOPENIA: ICD-10-CM

## 2025-01-15 DIAGNOSIS — E66.812 CLASS 2 SEVERE OBESITY WITH SERIOUS COMORBIDITY AND BODY MASS INDEX (BMI) OF 39.0 TO 39.9 IN ADULT, UNSPECIFIED OBESITY TYPE: Chronic | ICD-10-CM

## 2025-01-15 DIAGNOSIS — E66.01 CLASS 2 SEVERE OBESITY WITH SERIOUS COMORBIDITY AND BODY MASS INDEX (BMI) OF 39.0 TO 39.9 IN ADULT, UNSPECIFIED OBESITY TYPE: Chronic | ICD-10-CM

## 2025-01-15 DIAGNOSIS — R16.1 SPLENOMEGALY: Chronic | ICD-10-CM

## 2025-01-15 DIAGNOSIS — Z79.890 LONG-TERM CURRENT USE OF TESTOSTERONE REPLACEMENT THERAPY: ICD-10-CM

## 2025-01-15 DIAGNOSIS — D73.1 THROMBOCYTOPENIA DUE TO HYPERSPLENISM: Chronic | ICD-10-CM

## 2025-01-15 DIAGNOSIS — D75.1 SECONDARY POLYCYTHEMIA: Chronic | ICD-10-CM

## 2025-01-15 DIAGNOSIS — D69.59 THROMBOCYTOPENIA DUE TO HYPERSPLENISM: Chronic | ICD-10-CM

## 2025-01-15 DIAGNOSIS — Q27.30 AVM (ARTERIOVENOUS MALFORMATION): ICD-10-CM

## 2025-01-15 PROCEDURE — 99999 PR PBB SHADOW E&M-EST. PATIENT-LVL III: CPT | Mod: PBBFAC,,, | Performed by: NURSE PRACTITIONER

## 2025-01-15 PROCEDURE — 99215 OFFICE O/P EST HI 40 MIN: CPT | Mod: S$PBB,,, | Performed by: NURSE PRACTITIONER

## 2025-01-15 PROCEDURE — 99213 OFFICE O/P EST LOW 20 MIN: CPT | Mod: PBBFAC,PO | Performed by: NURSE PRACTITIONER

## 2025-01-15 PROCEDURE — G2211 COMPLEX E/M VISIT ADD ON: HCPCS | Mod: S$PBB,,, | Performed by: NURSE PRACTITIONER

## 2025-01-15 RX ORDER — LIDOCAINE HYDROCHLORIDE 10 MG/ML
1 INJECTION, SOLUTION EPIDURAL; INFILTRATION; INTRACAUDAL; PERINEURAL ONCE
OUTPATIENT
Start: 2025-01-15 | End: 2025-01-15

## 2025-01-15 RX ORDER — PANTOPRAZOLE SODIUM 40 MG/1
40 TABLET, DELAYED RELEASE ORAL DAILY
Qty: 90 TABLET | Refills: 1 | Status: SHIPPED | OUTPATIENT
Start: 2025-01-15 | End: 2026-01-15

## 2025-01-15 NOTE — PROGRESS NOTES
Subjective     Patient ID: Lazarus Zamudio is a 69 y.o. male.    Chief Complaint: Fatigue    69 year old male who presents today to the hematology clinic for further evaluation and recommendations of recent diagnosis thrombocytopenia.  He has been referred to the clinic by his PCP, Dr. Casper Barrett.  Hep B, Hep C, HÉCTOR, HIV negative.  Blue top 83 K.  Thrombocytopenia present on/off since 2012 with more consisted low platelet count since 5/2021 ranging from 101-148K.  Denies any abnormal bleeding     Is currently taking aspirin for a long time now per patient.  States at least since 2012     Has to donate blood d/t polycythemia d/t testosterone use; however states was unable to donate because his iron was too high. States that he is not taking iron.  Denies a large consumption of iron rich foods.      Patient is accompanied by his wife.       Other diagnosis include:      Very seldom drinks alcohol.  Denies cigarette smoking.     Was a  at [employer].       Family hx of cancer:  Maternal uncle: lymphoma  Maternal grandfather: prostate cancer     Denies f/c/ns or unintentional weight loss.  Denies any known lymphadenopathy.       2015 colonoscopy - polyps - due to repeat     States that he has early satiety and thinks due to Ozempic.       3/21/2023 Abdominal US Impression:     1. Hepatomegaly and fatty infiltration of the liver.  2. Splenomegaly.     Interval History:  3/30/2023  Presents today to review results of workup thrombocytopenia.  Has no complaints today. Is accompanied by his wife. Has seen hepatology today and will be having fibroscan soon.      Interval History:  8/28/2024  had liver biopsy done 7/18/2023 1. Liver, biopsy:   Mild macrovesicular steatosis without active steatohepatitis.   Mild mixed portal inflammation.   Denies any known abnormal bleeding.  Scheduled for phlebotomies every 8 weeks due to polycythemia from testosterone therapy.  States that he was told that  "his iron is too high. No hereditary hemochromatosis labs seen in system. Last time he had a phlebotomy was June 2023.  Is scheduled to see new hepatologist in September.  Denies f/c/ns or unintentional weight loss.  Denies any known abnormal lymphadenopathy.   I have reviewed all of the patient's relevant lab work available in the medical record and have utilized this in my evaluation and management recommendations today.     Interval history:  10/30/24 Pt returns today for thrombocytopenia follow-up. Endorses bruising to both arms and prolonged bleeding after cutting himself while shaving. He is feeling fatigued and "sluggish". No hematuria/melena/hematochezia. No f/c/night sweats. Pt is taking Monjouro but has not had any unintentional weight loss. He endorses intermittent heartburn/indigestion. No abdominal pain or early satiety.     Interval History:  1/15/2024   On 11/6/2024 and 11/19/2024 received Feraheme infusions for SAM.  Hgb normal - iron studies good now.  Continues to get testosterone injections every 2 weeks.  Was also evaluated with upper/lower GI and VCS  EGD 12/5/2024  - Normal duodenal bulb and second portion of the duodenum. Biopsied.   A single recently bleeding angioectasia in the duodenum. Treated with argon plasma coagulation (APC). Three non-bleeding angioectasias in the stomach. Treated with argon plasma coagulation (APC). No gross lesions in the gastric body, in the incisura and in the antrum. Biopsied.  Z-line variable, 40 cm from the incisors. No gross lesions in the entire esophagus.   12/5/2024 ColonoscopyThe examined portion of the ileum was normal. Two 1 to 3 mm polyps in the cecum, removed with a cold snare. Resected and retrieved. A single colonic angioectasia. Treated with argon plasma coagulation (APC).   One 1 mm polyp in the descending colon, removed with a barbo cold forceps. Resected and retrieved. Stool in the sigmoid colon, in the descending colon and in the ascending colon. "   The examination was otherwise normal.   Hemorrhoids found on perianal exam.   VCE was done however there was poor bowel prep in small bowel so vc did not reach the cecum at the termination of recording.    Patient to repeat colonoscopy in 3 years per recommendations.     Pathology:   Final Pathologic Diagnosis 1. SMALL BOWEL BIOPSIES:  -  No significant histopathologic abnormality; there is no evidence of celiac disease.    2. GASTRIC BIOPSIES:  -  Chemical gastritis / reactive gastropathy with mild chronic inflammation.    -  Helicobacter pylori are not identified on routine staining.  -  No evidence of intestinal metaplasia, dysplasia or malignancy.    3. CECUM BIOPSIES, POLYPECTOMY X2:  -  Tubular adenoma.    -  Serrated polyp / lesion.    -  No evidence of high-grade dysplasia or malignancy.    4. DESCENDING COLON BIOPSY, POLYPECTOMY:  -  Tubular adenoma.    -  No evidence of high-grade dysplasia or malignancy.         Review of Systems   Constitutional:  Positive for fatigue (improved). Negative for appetite change, chills, fever, night sweats and unexpected weight change.   HENT:  Negative for mouth sores and nosebleeds.    Eyes:  Negative for visual disturbance.   Respiratory:  Negative for cough and shortness of breath.    Cardiovascular:  Negative for chest pain.   Gastrointestinal:  Positive for reflux (a little every now and then - trying to control with diet). Negative for abdominal pain, anal bleeding, blood in stool and diarrhea.   Genitourinary:  Negative for frequency and hematuria.   Musculoskeletal:  Positive for arthralgias (knee pain - seeing Dr. Nixon and getting gel shots in both knees.). Negative for back pain.   Integumentary:  Negative for rash.   Allergic/Immunologic: Negative for frequent infections.   Neurological:  Negative for dizziness, weakness, light-headedness, numbness and headaches.   Hematological:  Negative for adenopathy. Bruises/bleeds easily (improved).    Psychiatric/Behavioral:  The patient is not nervous/anxious.        Current Outpatient Medications:     aspirin (ECOTRIN) 81 MG EC tablet, Take 81 mg by mouth once daily., Disp: , Rfl:     atorvastatin (LIPITOR) 40 MG tablet, TAKE 1 TABLET BY MOUTH EVERY DAY, Disp: 90 tablet, Rfl: 2    dorzolamide-timolol 2-0.5% (COSOPT) 22.3-6.8 mg/mL ophthalmic solution, Place 1 drop into the right eye 2 (two) times daily., Disp: , Rfl:     enalapril (VASOTEC) 20 MG tablet, TAKE 1 TABLET BY MOUTH EVERY DAY, Disp: 90 tablet, Rfl: 3    latanoprost 0.005 % ophthalmic solution, Place 1 drop into both eyes every evening., Disp: , Rfl:     metFORMIN (GLUCOPHAGE-XR) 500 MG ER 24hr tablet, Take 1 tablet (500 mg total) by mouth daily with breakfast., Disp: 90 tablet, Rfl: 3    multivitamin with minerals tablet, Take 1 tablet by mouth once daily., Disp: , Rfl:     tadalafiL (CIALIS) 5 MG tablet, Take 5 mg by mouth once daily., Disp: , Rfl:     testosterone cypionate (DEPOTESTOTERONE CYPIONATE) 100 mg/mL injection, Inject 200 mg into the muscle every 14 (fourteen) days., Disp: , Rfl:     tirzepatide (MOUNJARO) 10 mg/0.5 mL PnIj, INJECT 10 MG INTO THE SKIN EVERY 7 DAYS, Disp: 4 Pen, Rfl: 1    pantoprazole (PROTONIX) 40 MG tablet, Take 1 tablet (40 mg total) by mouth once daily., Disp: 90 tablet, Rfl: 1     Patient Active Problem List   Diagnosis    Type 2 diabetes mellitus with hyperglycemia, without long-term current use of insulin    Hypertension associated with diabetes    History of colonic polyps    Hypersomnia with sleep apnea    Obstructive sleep apnea syndrome    Encounter for long-term (current) use of medications    Venous stasis dermatitis of left lower extremity    Class 3 obesity with alveolar hypoventilation, serious comorbidity, and body mass index (BMI) of 40.0 to 44.9 in adult    Sciatica, left side    Pain of left lower extremity    Chronic left-sided low back pain without sciatica    Decreased functional mobility     Muscle weakness of lower extremity    Decreased ROM of lumbar spine    Polycythemia    Hypogonadism male    Chronic pain of both knees    Thrombocytopenia    Class 2 severe obesity with serious comorbidity and body mass index (BMI) of 39.0 to 39.9 in adult    Onychogryposis    Impaired functional mobility and activity tolerance    Iron deficiency anemia    Secondary polycythemia    Long-term current use of testosterone replacement therapy      Past Medical History:   Diagnosis Date    Diabetes mellitus, type 2     Hypertension     Morbid obesity       Past Surgical History:   Procedure Laterality Date    COLONOSCOPY N/A 12/5/2024    Procedure: COLONOSCOPY;  Surgeon: Stephanie Gonzalez MD;  Location: [facility];  Service: Gastroenterology;  Laterality: N/A;    ESOPHAGOGASTRODUODENOSCOPY N/A 12/5/2024    Procedure: EGD (ESOPHAGOGASTRODUODENOSCOPY);  Surgeon: Stephanie Gonzalez MD;  Location: [facility];  Service: Gastroenterology;  Laterality: N/A;    HERNIA REPAIR      INTRALUMINAL GASTROINTESTINAL TRACT IMAGING VIA CAPSULE N/A 12/23/2024    Procedure: IMAGING PROCEDURE, GI TRACT, INTRALUMINAL, VIA CAPSULE;  Surgeon: Ihsan Rodriguez RN;  Location: [facility];  Service: Endoscopy;  Laterality: N/A;    KNEE ARTHROSCOPY W/ DEBRIDEMENT      VASECTOMY        Social History     Socioeconomic History    Marital status:    Occupational History     Employer:  [employer]   Tobacco Use    Smoking status: Never    Smokeless tobacco: Never   Substance and Sexual Activity    Alcohol use: Yes     Comment: Reported 1 per year.    Drug use: No    Sexual activity: Yes     Partners: Female   Social History Narrative    . 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     Social Drivers of Health     Financial Resource Strain: Low Risk  (7/24/2024)    Overall Financial Resource Strain (CARDIA)     Difficulty of Paying Living Expenses: Not hard at all   Food Insecurity: No Food Insecurity (7/24/2024)    Hunger Vital Sign     Worried About Running  Out of Food in the Last Year: Never true     Ran Out of Food in the Last Year: Never true   Transportation Needs: No Transportation Needs (8/11/2020)    PRAPARE - Transportation     Lack of Transportation (Medical): No     Lack of Transportation (Non-Medical): No   Physical Activity: Inactive (7/24/2024)    Exercise Vital Sign     Days of Exercise per Week: 0 days     Minutes of Exercise per Session: 0 min   Stress: No Stress Concern Present (7/24/2024)    [place] [employer] - Occupational Stress Questionnaire     Feeling of Stress : Not at all   Housing Stability: Unknown (7/24/2024)    Housing Stability Vital Sign     Unable to Pay for Housing in the Last Year: No           Objective     Physical Exam  Vitals reviewed.   Constitutional:       Appearance: He is obese.   HENT:      Head: Normocephalic.      Right Ear: External ear normal.      Left Ear: External ear normal.      Nose: Nose normal.   Cardiovascular:      Rate and Rhythm: Normal rate and regular rhythm.      Heart sounds: Normal heart sounds.   Pulmonary:      Effort: Pulmonary effort is normal.      Breath sounds: Normal breath sounds.   Abdominal:      General: Abdomen is flat. Bowel sounds are normal.      Palpations: Abdomen is soft.      Tenderness: There is no abdominal tenderness.   Musculoskeletal:         General: Normal range of motion.      Cervical back: Normal range of motion.   Lymphadenopathy:      Head:      Right side of head: No submental, submandibular, tonsillar, preauricular, posterior auricular or occipital adenopathy.      Left side of head: No submental, submandibular, tonsillar, preauricular, posterior auricular or occipital adenopathy.      Cervical: No cervical adenopathy.      Upper Body:      Right upper body: No supraclavicular adenopathy.      Left upper body: No supraclavicular adenopathy.   Skin:     General: Skin is warm and dry.      Findings: No bruising.   Neurological:      Mental Status: He  is alert and oriented to person, place, and time.   Psychiatric:         Mood and Affect: Mood normal.         Behavior: Behavior normal.         Thought Content: Thought content normal.         Judgment: Judgment normal.        Assessment and Plan   Diagnoses and all orders for this visit:    Hepatomegaly  Comments:  continue to work on weight loss with diet and exercise - avoid refined sugars and high carb foods.  Orders:  -     CBC Auto Differential; Future  -     Comprehensive Metabolic Panel; Future    Splenomegaly  Comments:  secondary due to liver congestion.  continue to work on weight loss with diet and excersise.  Orders:  -     CBC Auto Differential; Future  -     Comprehensive Metabolic Panel; Future    Thrombocytopenia due to hypersplenism  Comments:  due to enlarged spleen.  stable  Orders:  -     CBC Auto Differential; Future  -     Comprehensive Metabolic Panel; Future    AVM (arteriovenous malformation)  -     CBC Auto Differential; Future  -     Comprehensive Metabolic Panel; Future  -     Ferritin; Future  -     Iron and TIBC; Future    Gastric AV malformation  Comments:  treated with argon plasma with EGD.  Continue to monitor for SAM or any abnormal bleeding.  Start protonix 40 mg PO daily  Orders:  -     CBC Auto Differential; Future  -     Comprehensive Metabolic Panel; Future  -     Ferritin; Future  -     Iron and TIBC; Future    Tubular adenoma of colon  Comments:  continue recs per GI.  Orders:  -     CBC Auto Differential; Future  -     Comprehensive Metabolic Panel; Future    Gastroesophageal reflux disease, unspecified whether esophagitis present  -     pantoprazole (PROTONIX) 40 MG tablet; Take 1 tablet (40 mg total) by mouth once daily.  -     CBC Auto Differential; Future  -     Comprehensive Metabolic Panel; Future  -     Ferritin; Future  -     Iron and TIBC; Future    Secondary polycythemia  Comments:  secondary due to testosterone use.  Currently no need for phelbotomy with hct <  55%  Orders:  -     CBC Auto Differential; Future  -     Comprehensive Metabolic Panel; Future    Long-term current use of testosterone replacement therapy  -     CBC Auto Differential; Future  -     Comprehensive Metabolic Panel; Future    Iron deficiency  Comments:  resolved s/p IV iron infusions. Continue to monitor for any abnormal gI blood loss.  Orders:  -     Ferritin; Future    Nutritional anemia, unspecified  -     Iron and TIBC; Future    Class 2 severe obesity with serious comorbidity and body mass index (BMI) of 39.0 to 39.9 in adult, unspecified obesity type  Comments:  Continue to work on weight loss with diet and excercise. Avoid refined sugars and high carb foods.    Thrombocytopenia  Comments:  due to splenic sesquestration due to fatty liver.  Continue to work on weight loss with diet and excercise.  Avoid refined sugars and high carb foods.    Other orders  -     LIDOcaine (PF) 10 mg/ml (1%) injection 10 mg         Route Chart for Scheduling    Med Onc Chart Routing      Follow up with physician    Follow up with CY 6 weeks. labs prior - VV   Infusion scheduling note   possible phlebotomy is hct > 55%   Injection scheduling note n/a   Labs   Scheduling:  Preferred lab: [facility]  Lab interval:  cbc, cmp, iron studies   Imaging   N/a   Pharmacy appointment No pharmacy appointment needed      Other referrals       No additional referrals needed  n/a         Total time spent on encounter: 60 minutes

## 2025-02-04 ENCOUNTER — OFFICE VISIT (OUTPATIENT)
Dept: ORTHOPEDICS | Facility: CLINIC | Age: 70
End: 2025-02-04
Payer: MEDICARE

## 2025-02-04 VITALS — BODY MASS INDEX: 36.8 KG/M2 | WEIGHT: 257.06 LBS | HEIGHT: 70 IN

## 2025-02-04 DIAGNOSIS — M17.12 PRIMARY OSTEOARTHRITIS OF LEFT KNEE: ICD-10-CM

## 2025-02-04 DIAGNOSIS — M17.11 PRIMARY OSTEOARTHRITIS OF RIGHT KNEE: Primary | ICD-10-CM

## 2025-02-04 PROCEDURE — 99213 OFFICE O/P EST LOW 20 MIN: CPT | Mod: PBBFAC,PO,25 | Performed by: ORTHOPAEDIC SURGERY

## 2025-02-04 PROCEDURE — 20610 DRAIN/INJ JOINT/BURSA W/O US: CPT | Mod: 50,PBBFAC,PO | Performed by: ORTHOPAEDIC SURGERY

## 2025-02-04 PROCEDURE — 99999 PR PBB SHADOW E&M-EST. PATIENT-LVL III: CPT | Mod: PBBFAC,,, | Performed by: ORTHOPAEDIC SURGERY

## 2025-02-04 RX ADMIN — Medication 88 MG: at 01:02

## 2025-02-18 NOTE — PROCEDURES
Large Joint Aspiration/Injection: bilateral knee    Date/Time: 2/4/2025 1:15 PM    Performed by: Casper Nixon MD  Authorized by: Casper Nixon MD    Consent Done?:  Yes (Verbal)  Indications:  Pain  Timeout: prior to procedure the correct patient, procedure, and site was verified    Prep: patient was prepped and draped in usual sterile fashion    Local anesthetic:  Lidocaine 1% without epinephrine  Anesthetic total (ml):  5      Details:  Needle Size:  21 G  Approach:  Anterolateral  Location:  Knee  Laterality:  Bilateral  Site:  Bilateral knee  Medications (Right):  88 mg hyaluronate sodium, stabilized 88 mg/4 mL  Medications (Left):  88 mg hyaluronate sodium, stabilized 88 mg/4 mL  Patient tolerance:  Patient tolerated the procedure well with no immediate complications

## 2025-02-18 NOTE — PROGRESS NOTES
Chief Complaint   Patient presents with    Left Knee - Pain     Monovisc     Right Knee - Pain     Monovisc        HPI:  69 y.o. returns to clinic today for the Monovisc injection    Knee exam  No interval change    Primary osteoarthritis of right knee    Primary osteoarthritis of left knee          Using an aseptic technique, I injected Monovisc into the bilateral Knee. The patient tolerated this well. I will have them return to clinic as needed.

## 2025-02-21 ENCOUNTER — PATIENT MESSAGE (OUTPATIENT)
Dept: ENDOSCOPY | Facility: HOSPITAL | Age: 70
End: 2025-02-21
Payer: MEDICARE

## 2025-02-21 ENCOUNTER — TELEPHONE (OUTPATIENT)
Dept: HEPATOLOGY | Facility: CLINIC | Age: 70
End: 2025-02-21
Payer: MEDICARE

## 2025-02-21 ENCOUNTER — PATIENT MESSAGE (OUTPATIENT)
Dept: GASTROENTEROLOGY | Facility: CLINIC | Age: 70
End: 2025-02-21
Payer: MEDICARE

## 2025-02-21 NOTE — TELEPHONE ENCOUNTER
----- Message from Rahul sent at 2/21/2025  9:26 AM CST -----  Contact: self  .Type:  Patient Returning CallWho Called:..Lazarus Snyder Left Message for Patient:Does the patient know what this is regarding?:yesWould the patient rather a call back or a response via [facility]? Call [facility] Call Back Number:.[phone] Information: Pt is returning a call to this office regarding his conrad being cancelled.

## 2025-02-21 NOTE — TELEPHONE ENCOUNTER
Returned patient's call and rescheduled his 3/19 appt to 5/8/25 and rescheduled his labs to be done at our [place] location per his request.

## 2025-02-24 ENCOUNTER — LAB VISIT (OUTPATIENT)
Dept: LAB | Facility: HOSPITAL | Age: 70
End: 2025-02-24
Payer: MEDICARE

## 2025-02-24 DIAGNOSIS — K21.9 GASTROESOPHAGEAL REFLUX DISEASE, UNSPECIFIED WHETHER ESOPHAGITIS PRESENT: ICD-10-CM

## 2025-02-24 DIAGNOSIS — Z79.890 LONG-TERM CURRENT USE OF TESTOSTERONE REPLACEMENT THERAPY: ICD-10-CM

## 2025-02-24 DIAGNOSIS — R16.0 HEPATOMEGALY: Chronic | ICD-10-CM

## 2025-02-24 DIAGNOSIS — D53.9 NUTRITIONAL ANEMIA, UNSPECIFIED: ICD-10-CM

## 2025-02-24 DIAGNOSIS — Z79.899 ENCOUNTER FOR LONG-TERM (CURRENT) USE OF MEDICATIONS: ICD-10-CM

## 2025-02-24 DIAGNOSIS — Z00.00 WELL ADULT EXAM: ICD-10-CM

## 2025-02-24 DIAGNOSIS — D12.6 TUBULAR ADENOMA OF COLON: ICD-10-CM

## 2025-02-24 DIAGNOSIS — E61.1 IRON DEFICIENCY: ICD-10-CM

## 2025-02-24 DIAGNOSIS — D73.1 THROMBOCYTOPENIA DUE TO HYPERSPLENISM: Chronic | ICD-10-CM

## 2025-02-24 DIAGNOSIS — Q27.30 AVM (ARTERIOVENOUS MALFORMATION): ICD-10-CM

## 2025-02-24 DIAGNOSIS — K31.819 GASTRIC AV MALFORMATION: ICD-10-CM

## 2025-02-24 DIAGNOSIS — D75.1 SECONDARY POLYCYTHEMIA: Chronic | ICD-10-CM

## 2025-02-24 DIAGNOSIS — Z00.00 ENCOUNTER FOR MEDICARE ANNUAL WELLNESS EXAM: ICD-10-CM

## 2025-02-24 DIAGNOSIS — D69.59 THROMBOCYTOPENIA DUE TO HYPERSPLENISM: Chronic | ICD-10-CM

## 2025-02-24 DIAGNOSIS — R16.1 SPLENOMEGALY: Chronic | ICD-10-CM

## 2025-02-24 LAB
ALBUMIN SERPL BCP-MCNC: 4 G/DL (ref 3.5–5.2)
ALP SERPL-CCNC: 95 U/L (ref 40–150)
ALT SERPL W/O P-5'-P-CCNC: 19 U/L (ref 10–44)
ANION GAP SERPL CALC-SCNC: 6 MMOL/L (ref 8–16)
AST SERPL-CCNC: 18 U/L (ref 10–40)
BASOPHILS # BLD AUTO: 0.03 K/UL (ref 0–0.2)
BASOPHILS NFR BLD: 0.6 % (ref 0–1.9)
BILIRUB SERPL-MCNC: 0.8 MG/DL (ref 0.1–1)
BUN SERPL-MCNC: 15 MG/DL (ref 8–23)
CALCIUM SERPL-MCNC: 9.5 MG/DL (ref 8.7–10.5)
CHLORIDE SERPL-SCNC: 103 MMOL/L (ref 95–110)
CO2 SERPL-SCNC: 30 MMOL/L (ref 23–29)
CREAT SERPL-MCNC: 1 MG/DL (ref 0.5–1.4)
DIFFERENTIAL METHOD BLD: ABNORMAL
EOSINOPHIL # BLD AUTO: 0 K/UL (ref 0–0.5)
EOSINOPHIL NFR BLD: 0.7 % (ref 0–8)
ERYTHROCYTE [DISTWIDTH] IN BLOOD BY AUTOMATED COUNT: 13.2 % (ref 11.5–14.5)
EST. GFR  (NO RACE VARIABLE): >60 ML/MIN/1.73 M^2
FERRITIN SERPL-MCNC: 197 NG/ML (ref 20–300)
GLUCOSE SERPL-MCNC: 131 MG/DL (ref 70–110)
HCT VFR BLD AUTO: 48 % (ref 40–54)
HGB BLD-MCNC: 15.8 G/DL (ref 14–18)
IMM GRANULOCYTES # BLD AUTO: 0.01 K/UL (ref 0–0.04)
IMM GRANULOCYTES NFR BLD AUTO: 0.2 % (ref 0–0.5)
IRON SERPL-MCNC: 66 UG/DL (ref 45–160)
LYMPHOCYTES # BLD AUTO: 1.9 K/UL (ref 1–4.8)
LYMPHOCYTES NFR BLD: 35.4 % (ref 18–48)
MCH RBC QN AUTO: 29.6 PG (ref 27–31)
MCHC RBC AUTO-ENTMCNC: 32.9 G/DL (ref 32–36)
MCV RBC AUTO: 90 FL (ref 82–98)
MONOCYTES # BLD AUTO: 0.4 K/UL (ref 0.3–1)
MONOCYTES NFR BLD: 8.2 % (ref 4–15)
NEUTROPHILS # BLD AUTO: 3 K/UL (ref 1.8–7.7)
NEUTROPHILS NFR BLD: 54.9 % (ref 38–73)
NRBC BLD-RTO: 0 /100 WBC
PLATELET # BLD AUTO: 76 K/UL (ref 150–450)
PLATELET BLD QL SMEAR: ABNORMAL
PMV BLD AUTO: 8.8 FL (ref 9.2–12.9)
POTASSIUM SERPL-SCNC: 5.2 MMOL/L (ref 3.5–5.1)
PROT SERPL-MCNC: 6.6 G/DL (ref 6–8.4)
RBC # BLD AUTO: 5.33 M/UL (ref 4.6–6.2)
SATURATED IRON: 20 % (ref 20–50)
SODIUM SERPL-SCNC: 139 MMOL/L (ref 136–145)
TOTAL IRON BINDING CAPACITY: 324 UG/DL (ref 250–450)
TRANSFERRIN SERPL-MCNC: 219 MG/DL (ref 200–375)
WBC # BLD AUTO: 5.37 K/UL (ref 3.9–12.7)

## 2025-02-24 PROCEDURE — 83540 ASSAY OF IRON: CPT | Performed by: NURSE PRACTITIONER

## 2025-02-24 PROCEDURE — 85025 COMPLETE CBC W/AUTO DIFF WBC: CPT | Mod: PO | Performed by: NURSE PRACTITIONER

## 2025-02-24 PROCEDURE — 80053 COMPREHEN METABOLIC PANEL: CPT | Performed by: NURSE PRACTITIONER

## 2025-02-24 PROCEDURE — 83036 HEMOGLOBIN GLYCOSYLATED A1C: CPT | Performed by: FAMILY MEDICINE

## 2025-02-24 PROCEDURE — 82728 ASSAY OF FERRITIN: CPT | Performed by: NURSE PRACTITIONER

## 2025-02-24 PROCEDURE — 36415 COLL VENOUS BLD VENIPUNCTURE: CPT | Mod: PO | Performed by: NURSE PRACTITIONER

## 2025-02-25 ENCOUNTER — RESULTS FOLLOW-UP (OUTPATIENT)
Dept: FAMILY MEDICINE | Facility: CLINIC | Age: 70
End: 2025-02-25

## 2025-02-25 LAB
ESTIMATED AVG GLUCOSE: 123 MG/DL (ref 68–131)
HBA1C MFR BLD: 5.9 % (ref 4–5.6)

## 2025-02-25 NOTE — PROGRESS NOTES
Make follow-up lab appointment per recommendation below.  Check to see if patient has seen the results through my chart.  If not then,  #CALL THE PATIENT# to discuss results/see if they have questions and document verification of contact. Make F/U appt if needed. [phone]      #My interpretation that was sent to them through [employer]:  Lazarus, I have reviewed your recent blood work.     Your complete blood count is normal.    Your metabolic panel which shows your glucose, kidney function, electrolytes, and liver function is mostly stable.  Potassium level is slightly elevated.  I recommend a low-potassium diet, increase hydration with water.  Avoid potassium supplements.  =========================  Also please address any outstanding health maintenance that may be due:   Health Maintenance Due   Topic Date Due    Shingles Vaccine (1 of 2) Never done    RSV Vaccine (Age 60+ and Pregnant patients) (1 - Risk 60-74 years 1-dose series) Never done    TETANUS VACCINE  10/18/2021    PROSTATE-SPECIFIC ANTIGEN  12/14/2024

## 2025-02-26 ENCOUNTER — LAB VISIT (OUTPATIENT)
Dept: LAB | Facility: HOSPITAL | Age: 70
End: 2025-02-26
Attending: NURSE PRACTITIONER
Payer: MEDICARE

## 2025-02-26 ENCOUNTER — OFFICE VISIT (OUTPATIENT)
Dept: HEMATOLOGY/ONCOLOGY | Facility: CLINIC | Age: 70
End: 2025-02-26
Payer: MEDICARE

## 2025-02-26 VITALS
HEIGHT: 70 IN | DIASTOLIC BLOOD PRESSURE: 70 MMHG | SYSTOLIC BLOOD PRESSURE: 113 MMHG | HEART RATE: 68 BPM | BODY MASS INDEX: 36.11 KG/M2 | WEIGHT: 252.25 LBS

## 2025-02-26 DIAGNOSIS — D73.2 SPLENOMEGALY, CONGESTIVE, CHRONIC: Chronic | ICD-10-CM

## 2025-02-26 DIAGNOSIS — D75.1 POLYCYTHEMIA: Primary | Chronic | ICD-10-CM

## 2025-02-26 DIAGNOSIS — Z79.890 LONG-TERM CURRENT USE OF TESTOSTERONE REPLACEMENT THERAPY: Chronic | ICD-10-CM

## 2025-02-26 DIAGNOSIS — D75.1 POLYCYTHEMIA: ICD-10-CM

## 2025-02-26 DIAGNOSIS — G47.33 OBSTRUCTIVE SLEEP APNEA SYNDROME: ICD-10-CM

## 2025-02-26 DIAGNOSIS — E83.19 INCREASED STORAGE IRON: ICD-10-CM

## 2025-02-26 DIAGNOSIS — D75.1 SECONDARY POLYCYTHEMIA: ICD-10-CM

## 2025-02-26 DIAGNOSIS — Z79.890 LONG-TERM CURRENT USE OF TESTOSTERONE REPLACEMENT THERAPY: ICD-10-CM

## 2025-02-26 DIAGNOSIS — G47.33 OBSTRUCTIVE SLEEP APNEA SYNDROME: Chronic | ICD-10-CM

## 2025-02-26 DIAGNOSIS — D73.1 THROMBOCYTOPENIA DUE TO HYPERSPLENISM: Chronic | ICD-10-CM

## 2025-02-26 DIAGNOSIS — D69.59 THROMBOCYTOPENIA DUE TO HYPERSPLENISM: Chronic | ICD-10-CM

## 2025-02-26 DIAGNOSIS — E83.19 INCREASED STORAGE IRON: Chronic | ICD-10-CM

## 2025-02-26 DIAGNOSIS — D69.6 THROMBOCYTOPENIA: Primary | Chronic | ICD-10-CM

## 2025-02-26 DIAGNOSIS — R16.0 HEPATOMEGALY: Chronic | ICD-10-CM

## 2025-02-26 LAB
ALBUMIN SERPL BCP-MCNC: 4.2 G/DL (ref 3.5–5.2)
ALP SERPL-CCNC: 102 U/L (ref 40–150)
ALT SERPL W/O P-5'-P-CCNC: 19 U/L (ref 10–44)
ANION GAP SERPL CALC-SCNC: 7 MMOL/L (ref 8–16)
AST SERPL-CCNC: 25 U/L (ref 10–40)
BASOPHILS # BLD AUTO: 0.03 K/UL (ref 0–0.2)
BASOPHILS NFR BLD: 0.5 % (ref 0–1.9)
BILIRUB SERPL-MCNC: 0.9 MG/DL (ref 0.1–1)
BUN SERPL-MCNC: 18 MG/DL (ref 8–23)
CALCIUM SERPL-MCNC: 9.4 MG/DL (ref 8.7–10.5)
CHLORIDE SERPL-SCNC: 103 MMOL/L (ref 95–110)
CO2 SERPL-SCNC: 27 MMOL/L (ref 23–29)
CREAT SERPL-MCNC: 0.9 MG/DL (ref 0.5–1.4)
DIFFERENTIAL METHOD BLD: ABNORMAL
EOSINOPHIL # BLD AUTO: 0.1 K/UL (ref 0–0.5)
EOSINOPHIL NFR BLD: 0.9 % (ref 0–8)
ERYTHROCYTE [DISTWIDTH] IN BLOOD BY AUTOMATED COUNT: 13.2 % (ref 11.5–14.5)
EST. GFR  (NO RACE VARIABLE): >60 ML/MIN/1.73 M^2
GLUCOSE SERPL-MCNC: 108 MG/DL (ref 70–110)
HCT VFR BLD AUTO: 49.7 % (ref 40–54)
HGB BLD-MCNC: 16.4 G/DL (ref 14–18)
IMM GRANULOCYTES # BLD AUTO: 0.02 K/UL (ref 0–0.04)
IMM GRANULOCYTES NFR BLD AUTO: 0.3 % (ref 0–0.5)
LYMPHOCYTES # BLD AUTO: 2.2 K/UL (ref 1–4.8)
LYMPHOCYTES NFR BLD: 33 % (ref 18–48)
MCH RBC QN AUTO: 29.6 PG (ref 27–31)
MCHC RBC AUTO-ENTMCNC: 33 G/DL (ref 32–36)
MCV RBC AUTO: 90 FL (ref 82–98)
MONOCYTES # BLD AUTO: 0.5 K/UL (ref 0.3–1)
MONOCYTES NFR BLD: 7.8 % (ref 4–15)
NEUTROPHILS # BLD AUTO: 3.7 K/UL (ref 1.8–7.7)
NEUTROPHILS NFR BLD: 57.5 % (ref 38–73)
NRBC BLD-RTO: 0 /100 WBC
PLATELET # BLD AUTO: 80 K/UL (ref 150–450)
PLATELET BLD QL SMEAR: ABNORMAL
PMV BLD AUTO: 9.1 FL (ref 9.2–12.9)
POTASSIUM SERPL-SCNC: 4.8 MMOL/L (ref 3.5–5.1)
PROT SERPL-MCNC: 7 G/DL (ref 6–8.4)
RBC # BLD AUTO: 5.54 M/UL (ref 4.6–6.2)
SODIUM SERPL-SCNC: 137 MMOL/L (ref 136–145)
WBC # BLD AUTO: 6.51 K/UL (ref 3.9–12.7)

## 2025-02-26 PROCEDURE — 85025 COMPLETE CBC W/AUTO DIFF WBC: CPT | Mod: PO | Performed by: NURSE PRACTITIONER

## 2025-02-26 PROCEDURE — 99215 OFFICE O/P EST HI 40 MIN: CPT | Mod: S$PBB,,, | Performed by: NURSE PRACTITIONER

## 2025-02-26 PROCEDURE — 80053 COMPREHEN METABOLIC PANEL: CPT | Performed by: NURSE PRACTITIONER

## 2025-02-26 PROCEDURE — 99213 OFFICE O/P EST LOW 20 MIN: CPT | Mod: PBBFAC,PO | Performed by: NURSE PRACTITIONER

## 2025-02-26 PROCEDURE — G2211 COMPLEX E/M VISIT ADD ON: HCPCS | Mod: S$PBB,,, | Performed by: NURSE PRACTITIONER

## 2025-02-26 PROCEDURE — 36415 COLL VENOUS BLD VENIPUNCTURE: CPT | Mod: PO | Performed by: NURSE PRACTITIONER

## 2025-02-26 PROCEDURE — 99999 PR PBB SHADOW E&M-EST. PATIENT-LVL III: CPT | Mod: PBBFAC,,, | Performed by: NURSE PRACTITIONER

## 2025-02-26 NOTE — PROGRESS NOTES
Subjective     Patient ID: Lazarus Zamudio is a 69 y.o. male.    Chief Complaint: Fatigue    69 year old male who presents today to the hematology clinic for further evaluation and recommendations of recent diagnosis thrombocytopenia.  He has been referred to the clinic by his PCP, Dr. Casper Barrett.  Hep B, Hep C, HÉCTOR, HIV negative.  Blue top 83 K.  Thrombocytopenia present on/off since 2012 with more consisted low platelet count since 5/2021 ranging from 101-148K.  Denies any abnormal bleeding     Is currently taking aspirin for a long time now per patient.  States at least since 2012     Has to donate blood d/t polycythemia d/t testosterone use; however states was unable to donate because his iron was too high. States that he is not taking iron.  Denies a large consumption of iron rich foods.      Patient is accompanied by his wife.       Other diagnosis include:      Very seldom drinks alcohol.  Denies cigarette smoking.     Was a  at [employer].       Family hx of cancer:  Maternal uncle: lymphoma  Maternal grandfather: prostate cancer     Denies f/c/ns or unintentional weight loss.  Denies any known lymphadenopathy.       2015 colonoscopy - polyps - due to repeat     States that he has early satiety and thinks due to Ozempic.       3/21/2023 Abdominal US Impression:     1. Hepatomegaly and fatty infiltration of the liver.  2. Splenomegaly.     Interval History:  3/30/2023  Presents today to review results of workup thrombocytopenia.  Has no complaints today. Is accompanied by his wife. Has seen hepatology today and will be having fibroscan soon.      Interval History:  8/28/2024  had liver biopsy done 7/18/2023 1. Liver, biopsy:   Mild macrovesicular steatosis without active steatohepatitis.   Mild mixed portal inflammation.   Denies any known abnormal bleeding.  Scheduled for phlebotomies every 8 weeks due to polycythemia from testosterone therapy.  States that he was told that  "his iron is too high. No hereditary hemochromatosis labs seen in system. Last time he had a phlebotomy was June 2023.  Is scheduled to see new hepatologist in September.  Denies f/c/ns or unintentional weight loss.  Denies any known abnormal lymphadenopathy.   I have reviewed all of the patient's relevant lab work available in the medical record and have utilized this in my evaluation and management recommendations today.     Interval history:  10/30/24 Pt returns today for thrombocytopenia follow-up. Endorses bruising to both arms and prolonged bleeding after cutting himself while shaving. He is feeling fatigued and "sluggish". No hematuria/melena/hematochezia. No f/c/night sweats. Pt is taking Monjouro but has not had any unintentional weight loss. He endorses intermittent heartburn/indigestion. No abdominal pain or early satiety.     Interval History:  2/26/2025  Presents today to review labs to see iron phlebotomy needed for secondary polycythemia d/t chronic testosterone use and Rosey treated with cpap.  Has been found with hepatosplenomegaly being followed by hepatology.  With known chronic stable thrombocytopenia.  Has not had phlebotomy since 9/2024.  Was found with iron deficiency and required feraheme infusions  x 2 with last infusion in 9/2024.  Iron is currently repleated.  He states originally he was told to have a phlebotomy because his iron levels were to high.  No HH labs found in the system.  Unable to order MPN reflexive studies due to insurance constraints. Currently with hct 48%.  Platelet count 78K.      Interval History:  1/15/2024   On 11/6/2024 and 11/19/2024 received Feraheme infusions for SAM.  Hgb normal - iron studies good now.  Continues to get testosterone injections every 2 weeks.  Was also evaluated with upper/lower GI and VCS  EGD 12/5/2024  - Normal duodenal bulb and second portion of the duodenum. Biopsied.   A single recently bleeding angioectasia in the duodenum. Treated with argon " plasma coagulation (APC). Three non-bleeding angioectasias in the stomach. Treated with argon plasma coagulation (APC). No gross lesions in the gastric body, in the incisura and in the antrum. Biopsied.  Z-line variable, 40 cm from the incisors. No gross lesions in the entire esophagus.   12/5/2024 ColonoscopyThe examined portion of the ileum was normal. Two 1 to 3 mm polyps in the cecum, removed with a cold snare. Resected and retrieved. A single colonic angioectasia. Treated with argon plasma coagulation (APC).   One 1 mm polyp in the descending colon, removed with a jumbo cold forceps. Resected and retrieved. Stool in the sigmoid colon, in the descending colon and in the ascending colon.   The examination was otherwise normal.   Hemorrhoids found on perianal exam.   VCE was done however there was poor bowel prep in small bowel so vc did not reach the cecum at the termination of recording.    Patient to repeat colonoscopy in 3 years per recommendations.     Pathology:   Final Pathologic Diagnosis 1. SMALL BOWEL BIOPSIES:  -  No significant histopathologic abnormality; there is no evidence of celiac disease.    2. GASTRIC BIOPSIES:  -  Chemical gastritis / reactive gastropathy with mild chronic inflammation.    -  Helicobacter pylori are not identified on routine staining.  -  No evidence of intestinal metaplasia, dysplasia or malignancy.    3. CECUM BIOPSIES, POLYPECTOMY X2:  -  Tubular adenoma.    -  Serrated polyp / lesion.    -  No evidence of high-grade dysplasia or malignancy.    4. DESCENDING COLON BIOPSY, POLYPECTOMY:  -  Tubular adenoma.    -  No evidence of high-grade dysplasia or malignancy.         Review of Systems   Constitutional:  Positive for fatigue (improved). Negative for appetite change, chills, fever, night sweats and unexpected weight change.   HENT:  Negative for mouth sores and nosebleeds.    Eyes:  Negative for visual disturbance.   Respiratory:  Negative for cough and shortness of breath.     Cardiovascular:  Negative for chest pain.   Gastrointestinal:  Positive for reflux (a little every now and then - trying to control with diet). Negative for abdominal pain, anal bleeding, blood in stool and diarrhea.   Genitourinary:  Negative for frequency and hematuria.   Musculoskeletal:  Positive for arthralgias (knee pain - seeing Dr. Nixon and getting gel shots in both knees.). Negative for back pain.   Integumentary:  Negative for rash.   Allergic/Immunologic: Negative for frequent infections.   Neurological:  Negative for dizziness, weakness, light-headedness, numbness and headaches.   Hematological:  Negative for adenopathy. Bruises/bleeds easily (improved).   Psychiatric/Behavioral:  The patient is not nervous/anxious.        Current Outpatient Medications:     aspirin (ECOTRIN) 81 MG EC tablet, Take 81 mg by mouth once daily., Disp: , Rfl:     atorvastatin (LIPITOR) 40 MG tablet, TAKE 1 TABLET BY MOUTH EVERY DAY, Disp: 90 tablet, Rfl: 2    dorzolamide-timolol 2-0.5% (COSOPT) 22.3-6.8 mg/mL ophthalmic solution, Place 1 drop into the right eye 2 (two) times daily., Disp: , Rfl:     enalapril (VASOTEC) 20 MG tablet, TAKE 1 TABLET BY MOUTH EVERY DAY, Disp: 90 tablet, Rfl: 3    latanoprost 0.005 % ophthalmic solution, Place 1 drop into both eyes every evening., Disp: , Rfl:     metFORMIN (GLUCOPHAGE-XR) 500 MG ER 24hr tablet, Take 1 tablet (500 mg total) by mouth daily with breakfast., Disp: 90 tablet, Rfl: 3    multivitamin with minerals tablet, Take 1 tablet by mouth once daily., Disp: , Rfl:     pantoprazole (PROTONIX) 40 MG tablet, Take 1 tablet (40 mg total) by mouth once daily., Disp: 90 tablet, Rfl: 1    tadalafiL (CIALIS) 5 MG tablet, Take 5 mg by mouth once daily., Disp: , Rfl:     testosterone cypionate (DEPOTESTOTERONE CYPIONATE) 100 mg/mL injection, Inject 200 mg into the muscle every 14 (fourteen) days., Disp: , Rfl:     tirzepatide (MOUNJARO) 10 mg/0.5 mL PnIj, INJECT 10 MG INTO THE SKIN  EVERY 7 DAYS, Disp: 4 Pen, Rfl: 1     Patient Active Problem List   Diagnosis    Type 2 diabetes mellitus with hyperglycemia, without long-term current use of insulin    Hypertension associated with diabetes    History of colonic polyps    Hypersomnia with sleep apnea    Obstructive sleep apnea syndrome    Encounter for long-term (current) use of medications    Venous stasis dermatitis of left lower extremity    Class 3 obesity with alveolar hypoventilation, serious comorbidity, and body mass index (BMI) of 40.0 to 44.9 in adult    Sciatica, left side    Pain of left lower extremity    Chronic left-sided low back pain without sciatica    Decreased functional mobility    Muscle weakness of lower extremity    Decreased ROM of lumbar spine    Polycythemia    Hypogonadism male    Chronic pain of both knees    Thrombocytopenia    Class 2 severe obesity with serious comorbidity and body mass index (BMI) of 39.0 to 39.9 in adult    Onychogryposis    Impaired functional mobility and activity tolerance    Iron deficiency anemia    Secondary polycythemia    Long-term current use of testosterone replacement therapy      Past Medical History:   Diagnosis Date    Diabetes mellitus, type 2     Hypertension     Morbid obesity       Past Surgical History:   Procedure Laterality Date    COLONOSCOPY N/A 12/5/2024    Procedure: COLONOSCOPY;  Surgeon: Stephanie Gonzalez MD;  Location: [facility];  Service: Gastroenterology;  Laterality: N/A;    ESOPHAGOGASTRODUODENOSCOPY N/A 12/5/2024    Procedure: EGD (ESOPHAGOGASTRODUODENOSCOPY);  Surgeon: Stephanie Gonzalez MD;  Location: [facility];  Service: Gastroenterology;  Laterality: N/A;    HERNIA REPAIR      INTRALUMINAL GASTROINTESTINAL TRACT IMAGING VIA CAPSULE N/A 12/23/2024    Procedure: IMAGING PROCEDURE, GI TRACT, INTRALUMINAL, VIA CAPSULE;  Surgeon: Ihsan Rodriguez RN;  Location: [facility];  Service: Endoscopy;  Laterality: N/A;    KNEE ARTHROSCOPY W/ DEBRIDEMENT      VASECTOMY         Social History     Socioeconomic History    Marital status:    Occupational History     Employer:  [employer]   Tobacco Use    Smoking status: Never     Passive exposure: Never    Smokeless tobacco: Never   Substance and Sexual Activity    Alcohol use: Yes     Comment: Reported 1 per year.    Drug use: No    Sexual activity: Yes     Partners: Female   Social History Narrative    . 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     Social Drivers of Health     Financial Resource Strain: Patient Declined (2/20/2025)    Overall Financial Resource Strain (CARDIA)     Difficulty of Paying Living Expenses: Patient declined   Food Insecurity: Patient Declined (2/20/2025)    Hunger Vital Sign     Worried About Running Out of Food in the Last Year: Patient declined     Ran Out of Food in the Last Year: Patient declined   Transportation Needs: Patient Declined (2/20/2025)    PRAPARE - Transportation     Lack of Transportation (Medical): Patient declined     Lack of Transportation (Non-Medical): Patient declined   Physical Activity: Insufficiently Active (2/20/2025)    Exercise Vital Sign     Days of Exercise per Week: 2 days     Minutes of Exercise per Session: 20 min   Stress: Patient Declined (2/20/2025)    [employer] [place] [employer] - Occupational Stress Questionnaire     Feeling of Stress : Patient declined   Housing Stability: Patient Declined (2/20/2025)    Housing Stability Vital Sign     Unable to Pay for Housing in the Last Year: Patient declined     Homeless in the Last Year: Patient declined           Objective     Physical Exam  Vitals reviewed.   Constitutional:       Appearance: He is obese.   HENT:      Head: Normocephalic.      Right Ear: External ear normal.      Left Ear: External ear normal.      Nose: Nose normal.   Cardiovascular:      Rate and Rhythm: Normal rate and regular rhythm.      Heart sounds: Normal heart sounds.   Pulmonary:      Effort: Pulmonary effort is normal.      Breath sounds:  Normal breath sounds.   Abdominal:      General: Abdomen is flat. Bowel sounds are normal.      Palpations: Abdomen is soft.      Tenderness: There is no abdominal tenderness.   Musculoskeletal:         General: Normal range of motion.      Cervical back: Normal range of motion.   Lymphadenopathy:      Head:      Right side of head: No submental, submandibular, tonsillar, preauricular, posterior auricular or occipital adenopathy.      Left side of head: No submental, submandibular, tonsillar, preauricular, posterior auricular or occipital adenopathy.      Cervical: No cervical adenopathy.      Upper Body:      Right upper body: No supraclavicular adenopathy.      Left upper body: No supraclavicular adenopathy.   Skin:     General: Skin is warm and dry.      Findings: No bruising.   Neurological:      Mental Status: He is alert and oriented to person, place, and time.   Psychiatric:         Mood and Affect: Mood normal.         Behavior: Behavior normal.         Thought Content: Thought content normal.         Judgment: Judgment normal.        Assessment and Plan   Diagnoses and all orders for this visit:    Polycythemia  Comments:  Assume 2nd d/t testost use and arian; however unable to assess for PV w/MPN reflex studies.No h/o blood clot in the past.splenomegaly most likely d/t fatty liver.  Orders:  -     CBC Auto Differential; Future  -     Comprehensive Metabolic Panel; Future  -     Ferritin; Future  -     Iron and TIBC; Future    Increased storage iron  Comments:  h/o incr iron storeage - no increased iron currently.  Assess for HH with labs today.  if + HH will change parameters for phlebotomy.  Orders:  -     Hemochromatosis DNA Analysis (PCR); Future  -     CBC Auto Differential; Future  -     Comprehensive Metabolic Panel; Future  -     Ferritin; Future  -     Iron and TIBC; Future    Obstructive sleep apnea syndrome  Comments:  Treated with cpap.  Continue f/u with sleep medicine for adjustment/poss repeat  sleep study if needed.  Orders:  -     CBC Auto Differential; Future  -     Comprehensive Metabolic Panel; Future    Long-term current use of testosterone replacement therapy  Comments:  Continues therapy.  Has not required phlebotomy in some time.  Last done is 9/2024.  do no need to do until hct 55% or greater.  Orders:  -     CBC Auto Differential; Future  -     Comprehensive Metabolic Panel; Future    Hepatomegaly  Comments:  Continue f/u with hepatology.  thrombocytopenia stable.  liver enzymes normal.  Follow med diet.  cont to try to lose weight w/diet/excersise.  Orders:  -     Ferritin; Future  -     Iron and TIBC; Future    Splenomegaly, congestive, chronic  Comments:  d/t fatty liver disease. Causing low platelets.  Follow med diet. cont to try to lose weight w/diet/excersise.  Orders:  -     Ferritin; Future  -     Iron and TIBC; Future    Thrombocytopenia due to hypersplenism  Comments:  stable.d/t fatty liver disease. Causing low platelets. Follow med diet. cont to try to lose weight w/diet/excersise           Route Chart for Scheduling    Med Onc Chart Routing      Follow up with physician    Follow up with CY 2 months. labs prior - VV - possible phlebotomy   Infusion scheduling note   possible phlebotomy at the [facility] for hct > 55% after next labs in 2 months   Injection scheduling note n/a   Labs   Scheduling:  Preferred lab: [facility]  Lab interval:  hereditary hemachromatosis today.  cbc, cmp, iron studies prior to next visit   Imaging   N/a   Pharmacy appointment No pharmacy appointment needed      Other referrals       No additional referrals needed  n/a         Total time spent on encounter: 45 minutes

## 2025-03-04 LAB
GENETICIST REVIEW: NORMAL
HFE GENE MUT ANL BLD/T: NORMAL
HFE RELEASED BY: NORMAL
HFE RESULT SUMMARY: NEGATIVE
REF LAB TEST METHOD: NORMAL
SPECIMEN SOURCE: NORMAL
SPECIMEN,  HEMOCHROMATOSIS: NORMAL

## 2025-03-05 ENCOUNTER — RESULTS FOLLOW-UP (OUTPATIENT)
Dept: HEMATOLOGY/ONCOLOGY | Facility: CLINIC | Age: 70
End: 2025-03-05

## 2025-03-06 ENCOUNTER — OFFICE VISIT (OUTPATIENT)
Dept: PODIATRY | Facility: CLINIC | Age: 70
End: 2025-03-06
Payer: MEDICARE

## 2025-03-06 DIAGNOSIS — E11.40 TYPE 2 DIABETES MELLITUS WITH DIABETIC NEUROPATHY, WITHOUT LONG-TERM CURRENT USE OF INSULIN: ICD-10-CM

## 2025-03-06 DIAGNOSIS — I87.2 VENOUS INSUFFICIENCY OF BOTH LOWER EXTREMITIES: ICD-10-CM

## 2025-03-06 DIAGNOSIS — E11.65 TYPE 2 DIABETES MELLITUS WITH HYPERGLYCEMIA, WITHOUT LONG-TERM CURRENT USE OF INSULIN: Primary | ICD-10-CM

## 2025-03-06 DIAGNOSIS — L60.3 ONYCHODYSTROPHY: ICD-10-CM

## 2025-03-06 PROCEDURE — 11721 DEBRIDE NAIL 6 OR MORE: CPT | Mod: Q9,PBBFAC | Performed by: PODIATRIST

## 2025-03-06 PROCEDURE — 99999 PR PBB SHADOW E&M-EST. PATIENT-LVL III: CPT | Mod: PBBFAC,,, | Performed by: PODIATRIST

## 2025-03-06 PROCEDURE — 99213 OFFICE O/P EST LOW 20 MIN: CPT | Mod: PBBFAC | Performed by: PODIATRIST

## 2025-03-06 NOTE — PROGRESS NOTES
Subjective:       Patient ID: Lazarus Zamudio is a 69 y.o. male.    Chief Complaint: Nail Care (4 month nail care: c/o denies pain, pt is diabetic, pt states it been a while since they seen PCP last time was 7.12.24 by Casper Barrett )    HPI: Lazarus Zamudio presents to the office chief complain of thickened, elongated, dystrophic toenails of the right foot and left foot.  Continues to follow with, Casper Barrett MD, for his diabetic management with last appointment on 07/12/2024.  Currently following with Hematology/Oncology due to history of thrombocytopenia and hepatomegaly.  Recent labs reviewed with Dr. Hoffman on, PCP, on 02/25/2025.  Relates 0/10 pain to the right and left foot.  Denies any recent trauma or injury.  Blood sugars have continued to improve with A1c.  Last reading 5.9.    Hemoglobin A1C   Date Value Ref Range Status   02/24/2025 5.9 (H) 4.0 - 5.6 % Final     Comment:     ADA Screening Guidelines:  5.7-6.4%  Consistent with prediabetes  >or=6.5%  Consistent with diabetes    High levels of fetal hemoglobin interfere with the HbA1C  assay. Heterozygous hemoglobin variants (HbS, HgC, etc)do  not significantly interfere with this assay.   However, presence of multiple variants may affect accuracy.     10/11/2024 6.3 (H) 4.0 - 5.6 % Final     Comment:     ADA Screening Guidelines:  5.7-6.4%  Consistent with prediabetes  >or=6.5%  Consistent with diabetes    High levels of fetal hemoglobin interfere with the HbA1C  assay. Heterozygous hemoglobin variants (HbS, HgC, etc)do  not significantly interfere with this assay.   However, presence of multiple variants may affect accuracy.     06/27/2024 10.2 (H) 4.0 - 5.6 % Final     Comment:     ADA Screening Guidelines:  5.7-6.4%  Consistent with prediabetes  >or=6.5%  Consistent with diabetes    High levels of fetal hemoglobin interfere with the HbA1C  assay. Heterozygous hemoglobin variants (HbS, HgC, etc)do  not significantly interfere with this  assay.   However, presence of multiple variants may affect accuracy.       Review of patient's allergies indicates:   Allergen Reactions    Penicillins Rash     Past Medical History:   Diagnosis Date    Diabetes mellitus, type 2     Hypertension     Morbid obesity      Family History   Problem Relation Name Age of Onset    Heart disease Mother Maribeth     Vision loss Mother Maribeth     Diabetes Father Ramon     Heart disease Father Ramon     Hypertension Father Ramon     Diabetes Maternal Grandmother Kathy     Prostate cancer Maternal Grandfather Garrison        Social History     Socioeconomic History    Marital status:    Occupational History     Employer:  [employer]   Tobacco Use    Smoking status: Never     Passive exposure: Never    Smokeless tobacco: Never   Substance and Sexual Activity    Alcohol use: Yes     Comment: Reported 1 per year.    Drug use: No    Sexual activity: Yes     Partners: Female   Social History Narrative    . 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     Social Drivers of Health     Financial Resource Strain: Patient Declined (2/20/2025)    Overall Financial Resource Strain (CARDIA)     Difficulty of Paying Living Expenses: Patient declined   Food Insecurity: Patient Declined (2/20/2025)    Hunger Vital Sign     Worried About Running Out of Food in the Last Year: Patient declined     Ran Out of Food in the Last Year: Patient declined   Transportation Needs: Patient Declined (2/20/2025)    PRAPARE - Transportation     Lack of Transportation (Medical): Patient declined     Lack of Transportation (Non-Medical): Patient declined   Physical Activity: Insufficiently Active (2/20/2025)    Exercise Vital Sign     Days of Exercise per Week: 2 days     Minutes of Exercise per Session: 20 min   Stress: Patient Declined (2/20/2025)    [employer] [place] [employer] - Occupational Stress Questionnaire     Feeling of Stress : Patient declined   Housing Stability: Patient Declined (2/20/2025)     Housing Stability Vital Sign     Unable to Pay for Housing in the Last Year: Patient declined     Homeless in the Last Year: Patient declined       Past Surgical History:   Procedure Laterality Date    COLONOSCOPY N/A 12/5/2024    Procedure: COLONOSCOPY;  Surgeon: Stephanie Gonzalez MD;  Location: [facility];  Service: Gastroenterology;  Laterality: N/A;    ESOPHAGOGASTRODUODENOSCOPY N/A 12/5/2024    Procedure: EGD (ESOPHAGOGASTRODUODENOSCOPY);  Surgeon: Stephanie Gonzalez MD;  Location: [facility];  Service: Gastroenterology;  Laterality: N/A;    HERNIA REPAIR      INTRALUMINAL GASTROINTESTINAL TRACT IMAGING VIA CAPSULE N/A 12/23/2024    Procedure: IMAGING PROCEDURE, GI TRACT, INTRALUMINAL, VIA CAPSULE;  Surgeon: Ihsan Rodriguez RN;  Location: [facility];  Service: Endoscopy;  Laterality: N/A;    KNEE ARTHROSCOPY W/ DEBRIDEMENT      VASECTOMY       Review of Systems    Objective:   There were no vitals taken for this visit.    Physical Exam  LOWER EXTREMITY PHYSICAL EXAMINATION    ORTHOPEDIC:  No pain on palpation of the foot or ankle.   Range of motion within normal limits of the ankle joint, rearfoot, and forefoot.  Manual muscle strength testing is 5/5 with dorsiflexion, plantar flexion, abduction and abduction of the lower extremity.  No pain with or without resistance.  The patient is a full to ambulate without pain or discomfort.  The patient's gait is non antalgic.  The patient does not utilize any assistive device for ambulation.    VASCULAR:  The right dorsalis pedis pulse 2/4 and the right posterior tibial pulse 2/4.  The left dorsalis pedis pulse 2/4 and posterior tibial pulse on the left is 2/4.  Capillary refill is intact.  Pedal hair growth intact.  Telangiectasias and varicosities noted bilaterally    NEUROLOGY:  Protective sensation is not intact to the right left foot.  Vibratory sensation is diminished.  Proprioception is intact.  Sharp/dull is reduced.    DERMATOLOGY:  Skin is supple, moist,  intact. No signs of underlying infection or abscess..  The R1, 2, 5 and left L1,2, 5 are thickened, discolored dystrophic.  There is subungual debris.  Nail plates have area of dark discoloration.  The remaining nails 3-4 on the right foot and the left foot are elongated but of normal color, thickness, and texture.   There is no signs of ingrowing into the medial or lateral borders.  There is no evidence of wounds or skin breakdown.  No edema or erythema.  No obvious lacerations or fissuring.  Interdigital spaces are clean, dry, intact.  No rashes or scars appreciated.    Assessment:     1. Type 2 diabetes mellitus with hyperglycemia, without long-term current use of insulin    2. Type 2 diabetes mellitus with diabetic neuropathy, without long-term current use of insulin    3. Venous insufficiency of both lower extremities    4. Onychodystrophy        Plan:     Type 2 diabetes mellitus with hyperglycemia, without long-term current use of insulin    Type 2 diabetes mellitus with diabetic neuropathy, without long-term current use of insulin    Venous insufficiency of both lower extremities    Onychodystrophy      Thorough discussion is had with the patient concerning the diagnosis, its etiology, and the treatment algorithm at present. Shoe inspection. Foot Education. Patient reminded of the importance of good nutrition and blood sugar control to help prevent podiatric complications of diabetes. Patient instructed on proper foot hygeine. We discussed wearing proper and supportive shoe gear, daily foot inspections, never walking barefooted or sock footed, never putting sharp instruments to feet which can cause major complications associated with infection, ulcers, lacerations.      Dystrophic nail plates, as outlined above (R#1,2,5  ; L#1,2,5 ), are sharply debrided with double action nail nipper, and/or with the assistance of a mechanical rotary arnav, with removal of all offending nail and nail border(s), for reduction  of pains. Nails are reduced in terms of length, width and girth with removal of subungual debris to facilitate pain free weight bearing and ambulation. The elongated nails as outlined in the objective portion of this note, were trimmed to appropriate length, with a double action nail nipper, for alleviation/reduction of pains as well. Follow up in approx. 3-4 months.

## 2025-03-07 ENCOUNTER — PATIENT MESSAGE (OUTPATIENT)
Dept: HEPATOLOGY | Facility: CLINIC | Age: 70
End: 2025-03-07
Payer: MEDICARE

## 2025-03-10 ENCOUNTER — HOSPITAL ENCOUNTER (OUTPATIENT)
Dept: ENDOSCOPY | Facility: HOSPITAL | Age: 70
Discharge: HOME | End: 2025-03-10
Attending: INTERNAL MEDICINE
Payer: MEDICARE

## 2025-03-10 DIAGNOSIS — D50.0 IRON DEFICIENCY ANEMIA DUE TO CHRONIC BLOOD LOSS: ICD-10-CM

## 2025-03-10 PROCEDURE — 27201489 HC PILLCAM CAPSULE

## 2025-03-10 PROCEDURE — 91110 GI TRC IMG INTRAL ESOPH-ILE: CPT | Mod: TC | Performed by: INTERNAL MEDICINE

## 2025-03-19 ENCOUNTER — LAB VISIT (OUTPATIENT)
Dept: LAB | Facility: HOSPITAL | Age: 70
End: 2025-03-19
Attending: NURSE PRACTITIONER
Payer: MEDICARE

## 2025-03-19 ENCOUNTER — PATIENT MESSAGE (OUTPATIENT)
Dept: FAMILY MEDICINE | Facility: CLINIC | Age: 70
End: 2025-03-19
Payer: MEDICARE

## 2025-03-19 DIAGNOSIS — E66.9 OBESITY (BMI 30-39.9): ICD-10-CM

## 2025-03-19 DIAGNOSIS — K74.00 LIVER FIBROSIS: ICD-10-CM

## 2025-03-19 LAB
ALBUMIN SERPL BCP-MCNC: 3.9 G/DL (ref 3.5–5.2)
ALP SERPL-CCNC: 109 U/L (ref 40–150)
ALT SERPL W/O P-5'-P-CCNC: 20 U/L (ref 10–44)
ANION GAP SERPL CALC-SCNC: 9 MMOL/L (ref 8–16)
AST SERPL-CCNC: 20 U/L (ref 10–40)
BILIRUB SERPL-MCNC: 1.2 MG/DL (ref 0.1–1)
BUN SERPL-MCNC: 14 MG/DL (ref 8–23)
CALCIUM SERPL-MCNC: 9.1 MG/DL (ref 8.7–10.5)
CHLORIDE SERPL-SCNC: 101 MMOL/L (ref 95–110)
CO2 SERPL-SCNC: 27 MMOL/L (ref 23–29)
CREAT SERPL-MCNC: 0.8 MG/DL (ref 0.5–1.4)
EST. GFR  (NO RACE VARIABLE): >60 ML/MIN/1.73 M^2
GLUCOSE SERPL-MCNC: 120 MG/DL (ref 70–110)
POTASSIUM SERPL-SCNC: 4.8 MMOL/L (ref 3.5–5.1)
PROT SERPL-MCNC: 6.6 G/DL (ref 6–8.4)
SODIUM SERPL-SCNC: 137 MMOL/L (ref 136–145)

## 2025-03-19 PROCEDURE — 36415 COLL VENOUS BLD VENIPUNCTURE: CPT | Mod: PO | Performed by: NURSE PRACTITIONER

## 2025-03-19 PROCEDURE — 80053 COMPREHEN METABOLIC PANEL: CPT | Performed by: NURSE PRACTITIONER

## 2025-03-20 ENCOUNTER — LAB VISIT (OUTPATIENT)
Dept: LAB | Facility: HOSPITAL | Age: 70
End: 2025-03-20
Attending: FAMILY MEDICINE
Payer: MEDICARE

## 2025-03-20 ENCOUNTER — RESULTS FOLLOW-UP (OUTPATIENT)
Dept: FAMILY MEDICINE | Facility: CLINIC | Age: 70
End: 2025-03-20

## 2025-03-20 DIAGNOSIS — Z00.00 WELL ADULT EXAM: ICD-10-CM

## 2025-03-20 DIAGNOSIS — Z13.220 ENCOUNTER FOR LIPID SCREENING FOR CARDIOVASCULAR DISEASE: ICD-10-CM

## 2025-03-20 DIAGNOSIS — Z79.899 ENCOUNTER FOR LONG-TERM (CURRENT) USE OF MEDICATIONS: ICD-10-CM

## 2025-03-20 DIAGNOSIS — Z12.5 ENCOUNTER FOR PROSTATE CANCER SCREENING: ICD-10-CM

## 2025-03-20 DIAGNOSIS — E29.1 HYPOGONADISM MALE: ICD-10-CM

## 2025-03-20 DIAGNOSIS — Z13.6 ENCOUNTER FOR LIPID SCREENING FOR CARDIOVASCULAR DISEASE: ICD-10-CM

## 2025-03-20 LAB
ALBUMIN SERPL BCP-MCNC: 4 G/DL (ref 3.5–5.2)
ALP SERPL-CCNC: 99 U/L (ref 40–150)
ALT SERPL W/O P-5'-P-CCNC: 21 U/L (ref 10–44)
ANION GAP SERPL CALC-SCNC: 8 MMOL/L (ref 8–16)
AST SERPL-CCNC: 21 U/L (ref 10–40)
BASOPHILS # BLD AUTO: 0.02 K/UL (ref 0–0.2)
BASOPHILS NFR BLD: 0.4 % (ref 0–1.9)
BILIRUB SERPL-MCNC: 1 MG/DL (ref 0.1–1)
BUN SERPL-MCNC: 13 MG/DL (ref 8–23)
CALCIUM SERPL-MCNC: 9.1 MG/DL (ref 8.7–10.5)
CHLORIDE SERPL-SCNC: 103 MMOL/L (ref 95–110)
CHOLEST SERPL-MCNC: 105 MG/DL (ref 120–199)
CHOLEST/HDLC SERPL: 2.8 {RATIO} (ref 2–5)
CO2 SERPL-SCNC: 27 MMOL/L (ref 23–29)
CREAT SERPL-MCNC: 0.8 MG/DL (ref 0.5–1.4)
DIFFERENTIAL METHOD BLD: ABNORMAL
EOSINOPHIL # BLD AUTO: 0 K/UL (ref 0–0.5)
EOSINOPHIL NFR BLD: 0.6 % (ref 0–8)
ERYTHROCYTE [DISTWIDTH] IN BLOOD BY AUTOMATED COUNT: 13.1 % (ref 11.5–14.5)
EST. GFR  (NO RACE VARIABLE): >60 ML/MIN/1.73 M^2
ESTIMATED AVG GLUCOSE: 120 MG/DL (ref 68–131)
GLUCOSE SERPL-MCNC: 115 MG/DL (ref 70–110)
HBA1C MFR BLD: 5.8 % (ref 4–5.6)
HCT VFR BLD AUTO: 49.1 % (ref 40–54)
HDLC SERPL-MCNC: 37 MG/DL (ref 40–75)
HDLC SERPL: 35.2 % (ref 20–50)
HGB BLD-MCNC: 15.8 G/DL (ref 14–18)
IMM GRANULOCYTES # BLD AUTO: 0.01 K/UL (ref 0–0.04)
IMM GRANULOCYTES NFR BLD AUTO: 0.2 % (ref 0–0.5)
LDLC SERPL CALC-MCNC: 60 MG/DL (ref 63–159)
LYMPHOCYTES # BLD AUTO: 1.6 K/UL (ref 1–4.8)
LYMPHOCYTES NFR BLD: 34.8 % (ref 18–48)
MCH RBC QN AUTO: 29.9 PG (ref 27–31)
MCHC RBC AUTO-ENTMCNC: 32.2 G/DL (ref 32–36)
MCV RBC AUTO: 93 FL (ref 82–98)
MONOCYTES # BLD AUTO: 0.4 K/UL (ref 0.3–1)
MONOCYTES NFR BLD: 8 % (ref 4–15)
NEUTROPHILS # BLD AUTO: 2.6 K/UL (ref 1.8–7.7)
NEUTROPHILS NFR BLD: 56 % (ref 38–73)
NONHDLC SERPL-MCNC: 68 MG/DL
NRBC BLD-RTO: 0 /100 WBC
PLATELET # BLD AUTO: 77 K/UL (ref 150–450)
PMV BLD AUTO: 9.2 FL (ref 9.2–12.9)
POTASSIUM SERPL-SCNC: 4.7 MMOL/L (ref 3.5–5.1)
PROT SERPL-MCNC: 6.7 G/DL (ref 6–8.4)
RBC # BLD AUTO: 5.29 M/UL (ref 4.6–6.2)
SODIUM SERPL-SCNC: 138 MMOL/L (ref 136–145)
TESTOST SERPL-MCNC: 1343 NG/DL (ref 304–1227)
TRIGL SERPL-MCNC: 40 MG/DL (ref 30–150)
WBC # BLD AUTO: 4.63 K/UL (ref 3.9–12.7)

## 2025-03-20 PROCEDURE — 84403 ASSAY OF TOTAL TESTOSTERONE: CPT | Performed by: FAMILY MEDICINE

## 2025-03-20 PROCEDURE — 84153 ASSAY OF PSA TOTAL: CPT | Performed by: FAMILY MEDICINE

## 2025-03-20 PROCEDURE — 85025 COMPLETE CBC W/AUTO DIFF WBC: CPT | Performed by: FAMILY MEDICINE

## 2025-03-20 PROCEDURE — 83036 HEMOGLOBIN GLYCOSYLATED A1C: CPT | Performed by: FAMILY MEDICINE

## 2025-03-20 PROCEDURE — 80061 LIPID PANEL: CPT | Performed by: FAMILY MEDICINE

## 2025-03-20 PROCEDURE — 80053 COMPREHEN METABOLIC PANEL: CPT | Performed by: FAMILY MEDICINE

## 2025-03-20 NOTE — PROGRESS NOTES
1st check to see if patient has seen the results.  If not then  CALL patient with results and Document verification.  Schedule follow-up if needed.  [phone]    Shingles Vaccine(1 of 2) Never done  RSV Vaccine (Age 60+ and Pregnant patients)(1 - Risk 60-74 years 1-dose series) Never done  TETANUS VACCINE due on 10/18/2021  PROSTATE-SPECIFIC ANTIGEN due on 12/14/2024  Impression(s):  Small solitary clean based erosion at 1:35:13  Small solitary clean based erosion at 2:31:28   Small solitary clean based erosion at 3:14:55      Recommendation(s):  Continue current medications  Findings are of undetermined clinical significance. Recommend clinical correlation                    Exam Ended: 03/10/25 07:48 CDT

## 2025-03-20 NOTE — PROGRESS NOTES
"Mr. Armenta, it appears Dr. Barrett has already reached out to you to discuss your capsule results. They were reviewed today by my colleague and I am contacting you to inform you what the results mean.    In three very small separate areas of your small intestine small clean base ulcerations called "erosions" were seen. There significance is not clear. They were not bleeding. Erosions can be caused by non-steroidal anti-inflammatory drugs (NSAIDs) such as aspirin or stronger NSAIDs. Please avoid Ibuprofen, Aleve, Motrin, Excedrin, Naprosyn, Naproxen or BC goody powder. Since your labs from 3 weeks ago were normal, we will make note of these tiny findings but do not feel they are the cause of your iron deficiency. It appears your responded well to the iron infusions. Therefore, no additional GI testing is needed at this time. Please follow up with Dr. Barrett in the primary care clinic.     Thank you for allowing me to be part of your care.    Sincerely,    Stephanie Gonzalez MD      "

## 2025-03-21 ENCOUNTER — RESULTS FOLLOW-UP (OUTPATIENT)
Dept: HEPATOLOGY | Facility: HOSPITAL | Age: 70
End: 2025-03-21

## 2025-03-21 LAB — COMPLEXED PSA SERPL-MCNC: 0.53 NG/ML (ref 0–4)

## 2025-03-24 ENCOUNTER — OFFICE VISIT (OUTPATIENT)
Dept: FAMILY MEDICINE | Facility: CLINIC | Age: 70
End: 2025-03-24
Payer: MEDICARE

## 2025-03-24 VITALS
HEIGHT: 70 IN | HEART RATE: 62 BPM | OXYGEN SATURATION: 97 % | WEIGHT: 250 LBS | DIASTOLIC BLOOD PRESSURE: 70 MMHG | BODY MASS INDEX: 35.79 KG/M2 | SYSTOLIC BLOOD PRESSURE: 122 MMHG

## 2025-03-24 DIAGNOSIS — E11.69 HYPERLIPIDEMIA ASSOCIATED WITH TYPE 2 DIABETES MELLITUS: ICD-10-CM

## 2025-03-24 DIAGNOSIS — E66.2 CLASS 2 OBESITY WITH ALVEOLAR HYPOVENTILATION, SERIOUS COMORBIDITY, AND BODY MASS INDEX (BMI) OF 35.0 TO 35.9 IN ADULT: ICD-10-CM

## 2025-03-24 DIAGNOSIS — Z79.899 ENCOUNTER FOR LONG-TERM (CURRENT) USE OF MEDICATIONS: ICD-10-CM

## 2025-03-24 DIAGNOSIS — E11.65 TYPE 2 DIABETES MELLITUS WITH HYPERGLYCEMIA, WITHOUT LONG-TERM CURRENT USE OF INSULIN: ICD-10-CM

## 2025-03-24 DIAGNOSIS — D69.6 THROMBOCYTOPENIA: Primary | Chronic | ICD-10-CM

## 2025-03-24 DIAGNOSIS — E78.5 HYPERLIPIDEMIA ASSOCIATED WITH TYPE 2 DIABETES MELLITUS: ICD-10-CM

## 2025-03-24 DIAGNOSIS — G47.33 OBSTRUCTIVE SLEEP APNEA SYNDROME: ICD-10-CM

## 2025-03-24 DIAGNOSIS — E29.1 HYPOGONADISM MALE: Chronic | ICD-10-CM

## 2025-03-24 DIAGNOSIS — E66.812 CLASS 2 OBESITY WITH ALVEOLAR HYPOVENTILATION, SERIOUS COMORBIDITY, AND BODY MASS INDEX (BMI) OF 35.0 TO 35.9 IN ADULT: ICD-10-CM

## 2025-03-24 PROCEDURE — G2211 COMPLEX E/M VISIT ADD ON: HCPCS | Mod: S$PBB,,, | Performed by: FAMILY MEDICINE

## 2025-03-24 PROCEDURE — 99999 PR PBB SHADOW E&M-EST. PATIENT-LVL IV: CPT | Mod: PBBFAC,,, | Performed by: FAMILY MEDICINE

## 2025-03-24 PROCEDURE — 99214 OFFICE O/P EST MOD 30 MIN: CPT | Mod: PBBFAC,PO | Performed by: FAMILY MEDICINE

## 2025-03-24 PROCEDURE — 99214 OFFICE O/P EST MOD 30 MIN: CPT | Mod: S$PBB,,, | Performed by: FAMILY MEDICINE

## 2025-03-24 NOTE — PROGRESS NOTES
PLAN:      Assessment & Plan  1. 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 He is advised to continue follow-up with his hematologist, CUAUHTEMOC To, on 05/01/2025.    2. Sleep Apnea.  He reports using a new CPAP machine that syncs with his phone, providing daily reports. The machine indicates some episodes of non-breathing and mask leakage. He is advised to follow up with Dr. Aminah Koch for a review of the CPAP machine reports and potential mask adjustments.    3. Gastrointestinal issues.  He had an upper endoscopy and colonoscopy in December, which revealed polyps and small collections of blood vessels that were treated. A video capsule study was recommended to be repeated due to limited views of the small intestines. He is advised to follow up with the gastroenterologist for further evaluation.    4. Low platelet count.  His platelet count is low, but other blood counts are normal. He is advised to avoid ibuprofen and steroids. For joint pain, he can take Tylenol or use topical treatments like Voltaren gel. He should continue follow-up with his hematologist. If his platelet count drops below 50 or if he experiences bleeding, he should seek immediate medical attention.    5. Hyperlipidemia.  His LDL is less than 70, and his HDL is slightly low. He is advised to continue taking atorvastatin and increase exercise to raise HDL levels.    6. Weight management.  His A1c has improved from 5.9 to 5.8, indicating that Mounjaro is effective. His BMI is currently 35. He is advised to continue Mounjaro and increase protein intake through meat consumption or protein shakes. If weight loss plateaus, the Mounjaro dosage may be increased to 12.5 mg.    7. Health maintenance.  His PSA levels are normal. He is advised to schedule a Medicare annual wellness visit with the nurse practitioner.    PROCEDURE  The  patient underwent an upper endoscopy and colonoscopy in 12/2024, during which polyps were identified and treated. The upper GI revealed small collections of blood vessels that were cauterized.    Problem List Items Addressed This Visit       Type 2 diabetes mellitus with hyperglycemia, without long-term current use of insulin (Chronic)    Titrate MOUNJARO up as needed.  We will plan to monitor hemoglobin A1c at designated intervals 3 to 6 months.  I recommend ongoing Education for diabetic diet and exercise protocol.  We will continue to monitor for side effects.    Please be advised of symptoms to monitor for and to notify me immediately if persistent or worsening.  Follow up with Ophthalmology/Optometry and Podiatry at least annually.           Encounter for long-term (current) use of medications (Chronic)    Complete history and physical was completed today.  Complete and thorough medication reconciliation was performed.  Discussed risks and benefits of medications.  Advised patient on orders and health maintenance.  We discussed old records and old labs if available.  Will request any records not available through epic.  Continue current medications listed on your summary sheet.           Thrombocytopenia - Primary (Chronic)    Follow-up with Hematology.  Patient now on MOUNJARO.    Previous plan:  Possibly related to Ozempic?  Switching Ozempic to MOUNJARO.  Thrombocytopenia precaution.         Hyperlipidemia associated with type 2 diabetes mellitus (Chronic)    Counseled on hyperlipidemia disease course, healthy diet and increased need for exercise.  Please be advised of the risk of cardiovascular disease, increase stroke and heart attack risk with uncontrolled/untreated hyperlipidemia.     Patient voiced understanding and understood the treatment plan. All questions were answered.   LDL less than 70.  HDL is low.  Increase exercise.          Future Appointments       Date Provider Specialty Appt Notes     4/28/2025 Noa Rojas FNP Hepatology follow up    4/29/2025  Lab lab    5/1/2025 Basia To NP Hematology and Oncology  Arrive at: Telehealth 2 mo prior lab    5/1/2025  Chemotherapy Phlebotomy after NP VV    5/6/2025 Casper Nixon MD Orthopedics B knee 3mo f/u    7/17/2025 Lani Asencio DPM Podiatry 4 month nail care           Medication Management for assessment above:   Medication List with Changes/Refills   Current Medications    ASPIRIN (ECOTRIN) 81 MG EC TABLET    Take 81 mg by mouth once daily.    ATORVASTATIN (LIPITOR) 40 MG TABLET    TAKE 1 TABLET BY MOUTH EVERY DAY    DORZOLAMIDE-TIMOLOL 2-0.5% (COSOPT) 22.3-6.8 MG/ML OPHTHALMIC SOLUTION    Place 1 drop into the right eye 2 (two) times daily.    ENALAPRIL (VASOTEC) 20 MG TABLET    TAKE 1 TABLET BY MOUTH EVERY DAY    LATANOPROST 0.005 % OPHTHALMIC SOLUTION    Place 1 drop into both eyes every evening.    METFORMIN (GLUCOPHAGE-XR) 500 MG ER 24HR TABLET    Take 1 tablet (500 mg total) by mouth daily with breakfast.    MULTIVITAMIN WITH MINERALS TABLET    Take 1 tablet by mouth once daily.    PANTOPRAZOLE (PROTONIX) 40 MG TABLET    Take 1 tablet (40 mg total) by mouth once daily.    TADALAFIL (CIALIS) 5 MG TABLET    Take 5 mg by mouth once daily.    TESTOSTERONE CYPIONATE (DEPOTESTOTERONE CYPIONATE) 100 MG/ML INJECTION    Inject 200 mg into the muscle every 14 (fourteen) days.    TIRZEPATIDE (MOUNJARO) 10 MG/0.5 ML PNIJ    INJECT 10 MG INTO THE SKIN EVERY 7 DAYS       Casper Barrett M.D.  ==========================================================================  Subjective:   Patient ID: Lazarus Zamudio is a 69 y.o. male.  has a past medical history of Diabetes mellitus, type 2, Hypertension, and Morbid obesity.   Chief Complaint: Results (annual)      History of Present Illness  The patient is a pleasant 69-year-old male who presents for follow-up on recent endoscopy results, sleep apnea, polycythemia secondary to testosterone use  and sleep apnea, weight management, hyperlipidemia, and health maintenance.    He reports no current symptoms but has been undergoing tests due to persistent issues with his platelets. He is under the care of hematologist, CUAUHTEMOC To with a scheduled follow-up on 05/01/2025. During his last visit on 02/26/2025, laboratory tests were ordered to investigate potential hemochromatosis, which was subsequently ruled out. He has a history of low platelets, which began to decrease rapidly in 2020. He reports no bleeding issues but does experience bruising. He recalls an incident in 10/2024 where he cut his lip while shaving, resulting in prolonged bleeding that required the application of super glue to stop.    His sleep apnea is managed by Dr. Aminah Kcoh. It is reported that he exhibits noisy breathing during sleep and occasional cessation of breathing. He recently acquired a new machine that syncs with his phone, providing daily reports. These reports indicate that his mask may require adjustment on certain days due to an imperfect seal, and they also reveal episodes of non-breathing. He plans to follow up with Dr. Koch regarding the new machine.    He underwent an upper endoscopy and colonoscopy in 12/2024, during which polyps were identified and treated. The upper GI revealed small collections of blood vessels that were cauterized. A subsequent capsule endoscopy was performed later in 2024, but due to limited visibility, a repeat procedure was recommended. He had a steroid injection by Dr. Nixon and then another capsule study was performed. He has a scheduled appointment with a liver nurse practitioner at the end of 04/2025. His liver enzymes are being monitored, and he has been advised to lose weight. He is not currently on any medications for this condition.    He has been receiving steroid injections from Dr. Nixon for knee pain, which have been beneficial. He does not take any anti-inflammatories.    He has  been experiencing significant weight loss since starting Mounjaro, which has also reduced his appetite. He is making efforts to improve his dietary choices.    He had some abnormal cholesterol levels. He is on atorvastatin.    He is on testosterone.    MEDICATIONS  Current: Protonix, atorvastatin, Mounjaro, testosterone    Problem List Items Addressed This Visit       Type 2 diabetes mellitus with hyperglycemia, without long-term current use of insulin (Chronic)    Overview   March 2025:   Diabetes Medications              metFORMIN (GLUCOPHAGE-XR) 500 MG ER 24hr tablet Take 1 tablet (500 mg total) by mouth daily with breakfast.    tirzepatide (MOUNJARO) 10 mg/0.5 mL PnIj INJECT 10 MG INTO THE SKIN EVERY 7 DAYS              July 2024:  Patient reports increased stress over the last few months due to the passing of his daughter.  They report noncompliance with diet over these months.  Patient also recently had bilateral knee injection with steroid.  January 2024:  Reports compliance.  No side effects reported.  Weight is stable.  Diabetes Medications               semaglutide (OZEMPIC) 2 mg/dose (8 mg/3 mL) PnIj Inject 2 mg into the skin every 7 days.          December 2023:  Patient reports compliance with Ozempic when he can find it in stock.  Patient denies any history of thyroid cancer, pancreatitis, MEN syndrome.   February 2021:  Patient reports compliance with Ozempic 0.5 milligrams weekly.  Patient reports no side effects.  Symptoms are controlled.  Due for labs.  BMI Readings from Last 10 Encounters:   03/24/25 35.87 kg/m²   02/26/25 36.20 kg/m²   02/04/25 36.88 kg/m²   01/15/25 36.88 kg/m²   12/05/24 35.87 kg/m²   11/13/24 37.17 kg/m²   11/06/24 37.17 kg/m²   10/31/24 38.31 kg/m²   09/19/24 38.31 kg/m²   08/28/24 37.81 kg/m²     Diabetes Management Status    Statin: Taking  ACE/ARB: Taking    Screening or Prevention Patient's value Goal Complete/Controlled?   HgA1C Testing and Control   Lab Results  "  Component Value Date    HGBA1C 5.8 (H) 03/20/2025      Annually/Less than 8% Yes   Lipid profile : 03/20/2025 Annually Yes   LDL control Lab Results   Component Value Date    LDLCALC 60.0 (L) 03/20/2025    Annually/Less than 100 mg/dl  Yes   Nephropathy screening Lab Results   Component Value Date    LABMICR <5.0 06/27/2024     Lab Results   Component Value Date    PROTEINUA Negative 03/01/2022     No results found for: "UTPCR"   Annually Yes   Blood pressure BP Readings from Last 1 Encounters:   03/24/25 122/70    Less than 140/90 Yes   Dilated retinal exam : 12/03/2024 Annually Yes   Foot exam   : 06/20/2024 Annually Yes              Current Assessment & Plan   Titrate MOUNJARO up as needed.  We will plan to monitor hemoglobin A1c at designated intervals 3 to 6 months.  I recommend ongoing Education for diabetic diet and exercise protocol.  We will continue to monitor for side effects.    Please be advised of symptoms to monitor for and to notify me immediately if persistent or worsening.  Follow up with Ophthalmology/Optometry and Podiatry at least annually.           Encounter for long-term (current) use of medications (Chronic)    Overview   March 2025:  Reviewed labs.  July 2024:   Reviewed labs.  December 2023: Reviewed labs.  April 2023: Reviewed labs.  CHRONIC. Stable. Compliant with medications for managed conditions. See medication list. No SE reported.   Routine lab analysis is being monitored. Refills were addressed.  Lab Results   Component Value Date    WBC 4.63 03/20/2025    HGB 15.8 03/20/2025    HCT 49.1 03/20/2025    MCV 93 03/20/2025    PLT 77 (L) 03/20/2025         Chemistry        Component Value Date/Time     03/20/2025 0959    K 4.7 03/20/2025 0959     03/20/2025 0959    CO2 27 03/20/2025 0959    BUN 13 03/20/2025 0959    CREATININE 0.8 03/20/2025 0959     (H) 03/20/2025 0959        Component Value Date/Time    CALCIUM 9.1 03/20/2025 0959    ALKPHOS 99 03/20/2025 0959    " AST 21 03/20/2025 0959    ALT 21 03/20/2025 0959    BILITOT 1.0 03/20/2025 0959    ESTGFRAFRICA >60.0 02/14/2022 1028    EGFRNONAA >60.0 02/14/2022 1028          Lab Results   Component Value Date    TSH 2.592 10/11/2024              Current Assessment & Plan   Complete history and physical was completed today.  Complete and thorough medication reconciliation was performed.  Discussed risks and benefits of medications.  Advised patient on orders and health maintenance.  We discussed old records and old labs if available.  Will request any records not available through epic.  Continue current medications listed on your summary sheet.           Thrombocytopenia - Primary (Chronic)    Overview   March 2025:  Platelet count is stable but is not improving.  Following with Hematology closely.    Chronic.  Worsening.  Platelet count less than 100.  Denies any bleeding but does have easy bruising.  Patient is due for follow-up with Hematology.    Lab Results   Component Value Date    IRON 66 02/24/2025    TRANSFERRIN 219 02/24/2025    TIBC 324 02/24/2025    FESATURATED 20 02/24/2025      Lab Results   Component Value Date    RZKSYSIA35 502 10/28/2024     Lab Results   Component Value Date    FOLATE 9.2 10/28/2024     Lab Results   Component Value Date    WBC 4.63 03/20/2025    HGB 15.8 03/20/2025    HCT 49.1 03/20/2025    MCV 93 03/20/2025    PLT 77 (L) 03/20/2025                Current Assessment & Plan   Follow-up with Hematology.  Patient now on MOUNJARO.    Previous plan:  Possibly related to Ozempic?  Switching Ozempic to MOUNJARO.  Thrombocytopenia precaution.         Hyperlipidemia associated with type 2 diabetes mellitus (Chronic)    Overview   CHRONIC. STABLE. Lab analysis reviewed.   (-) CP, SOB, abdominal pain, N/V/D, constipation, jaundice, skin changes.  (-) Myalgias  Lab Results   Component Value Date    CHOL 105 (L) 03/20/2025    CHOL 106 (L) 10/11/2024    CHOL 121 06/27/2024     Lab Results   Component Value  Date    HDL 37 (L) 03/20/2025    HDL 39 (L) 10/11/2024    HDL 42 06/27/2024     Lab Results   Component Value Date    LDLCALC 60.0 (L) 03/20/2025    LDLCALC 59.2 (L) 10/11/2024    LDLCALC 69.4 06/27/2024     Lab Results   Component Value Date    TRIG 40 03/20/2025    TRIG 39 10/11/2024    TRIG 48 06/27/2024     Lab Results   Component Value Date    CHOLHDL 35.2 03/20/2025    CHOLHDL 36.8 10/11/2024    CHOLHDL 34.7 06/27/2024     Lab Results   Component Value Date    TOTALCHOLEST 2.8 03/20/2025    TOTALCHOLEST 2.7 10/11/2024    TOTALCHOLEST 2.9 06/27/2024     Lab Results   Component Value Date    ALT 21 03/20/2025    AST 21 03/20/2025    ALKPHOS 99 03/20/2025    BILITOT 1.0 03/20/2025     ======================================================  The ASCVD Risk score (Jayme ACHARYA, et al., 2019) failed to calculate for the following reasons:    The valid total cholesterol range is 130 to 320 mg/dL  Hyperlipidemia Medications              atorvastatin (LIPITOR) 40 MG tablet TAKE 1 TABLET BY MOUTH EVERY DAY                   Current Assessment & Plan   Counseled on hyperlipidemia disease course, healthy diet and increased need for exercise.  Please be advised of the risk of cardiovascular disease, increase stroke and heart attack risk with uncontrolled/untreated hyperlipidemia.     Patient voiced understanding and understood the treatment plan. All questions were answered.   LDL less than 70.  HDL is low.  Increase exercise.             Review of patient's allergies indicates:   Allergen Reactions    Penicillins Rash     Current Outpatient Medications   Medication Instructions    aspirin (ECOTRIN) 81 mg, Daily    atorvastatin (LIPITOR) 40 mg, Oral    dorzolamide-timolol 2-0.5% (COSOPT) 22.3-6.8 mg/mL ophthalmic solution 1 drop, 2 times daily    enalapril (VASOTEC) 20 mg, Oral    latanoprost 0.005 % ophthalmic solution 1 drop, Nightly    metFORMIN (GLUCOPHAGE-XR) 500 mg, Oral, With breakfast    MOUNJARO 10 mg, Subcutaneous,  "Every 7 days    multivitamin with minerals tablet 1 tablet, Daily    pantoprazole (PROTONIX) 40 mg, Oral, Daily    tadalafiL (CIALIS) 5 mg, Daily    testosterone cypionate (DEPOTESTOTERONE CYPIONATE) 200 mg, Every 14 days      I have reviewed the PMH, social history, FamilyHx, surgical history, allergies and medications documented / confirmed by the patient at the time of this visit.  Review of Systems   Constitutional:  Positive for fatigue. Negative for chills, fever and unexpected weight change.   HENT:  Negative for ear pain and sore throat.    Eyes:  Negative for redness and visual disturbance.   Respiratory:  Negative for cough and shortness of breath.    Cardiovascular:  Negative for chest pain and palpitations.   Gastrointestinal:  Negative for nausea and vomiting.   Endocrine: Negative for cold intolerance and heat intolerance.   Genitourinary:  Negative for difficulty urinating and hematuria.   Musculoskeletal:  Positive for arthralgias. Negative for myalgias.   Skin:  Negative for rash and wound.   Allergic/Immunologic: Negative for environmental allergies and food allergies.   Neurological:  Negative for weakness and headaches.   Hematological:  Negative for adenopathy. Bruises/bleeds easily.   Psychiatric/Behavioral:  Negative for sleep disturbance. The patient is not nervous/anxious.      Objective:   /70   Pulse 62   Ht 5' 10" (1.778 m)   Wt 113.4 kg (250 lb)   SpO2 97%   BMI 35.87 kg/m²   Physical Exam  Vitals and nursing note reviewed.   Constitutional:       General: He is not in acute distress.     Appearance: He is well-developed. He is not diaphoretic.   HENT:      Head: Normocephalic and atraumatic.      Right Ear: External ear normal.      Left Ear: External ear normal.      Nose: Nose normal. No rhinorrhea.   Eyes:      Extraocular Movements: Extraocular movements intact.      Pupils: Pupils are equal, round, and reactive to light.   Cardiovascular:      Rate and Rhythm: Normal " rate.      Pulses: Normal pulses.   Pulmonary:      Effort: Pulmonary effort is normal. No respiratory distress.      Breath sounds: Normal breath sounds.   Abdominal:      General: Bowel sounds are normal.      Palpations: Abdomen is soft.   Musculoskeletal:         General: Normal range of motion.      Cervical back: Normal range of motion and neck supple.   Skin:     General: Skin is warm and dry.      Capillary Refill: Capillary refill takes less than 2 seconds.      Findings: Bruising present. No rash.   Neurological:      General: No focal deficit present.      Mental Status: He is alert and oriented to person, place, and time.      Cranial Nerves: No cranial nerve deficit.      Motor: No weakness.      Gait: Gait normal.   Psychiatric:         Attention and Perception: He is attentive.         Mood and Affect: Mood normal. Mood is not anxious or depressed. Affect is not labile, blunt, angry or inappropriate.         Speech: He is communicative. Speech is not rapid and pressured, delayed, slurred or tangential.         Behavior: Behavior normal. Behavior is not agitated, slowed, aggressive, withdrawn, hyperactive or combative.         Thought Content: Thought content normal. Thought content is not paranoid or delusional. Thought content does not include homicidal or suicidal ideation. Thought content does not include homicidal or suicidal plan.         Cognition and Memory: Memory is not impaired.         Judgment: Judgment normal. Judgment is not impulsive or inappropriate.       Physical Exam  Lungs were auscultated.  Heart sounds normal.    Vital Signs  BMI is 35.    Results  Laboratory Studies  LDL cholesterol is less than 70. HDL cholesterol is a little low. A1c improved from 5.9 to 5.8. PSA was normal. Liver enzymes are normal.    Imaging  Ultrasound done in September showed an enlarged liver with fatty infiltration.    Testing  Upper endoscopy and colonoscopy showed some polyps and small collections of  blood vessels that were burned. Video capsule study showed three spots of significance, not bleeding, possibly caused by NSAIDs or steroids.    Assessment:     1. Thrombocytopenia    2. Encounter for long-term (current) use of medications    3. Type 2 diabetes mellitus with hyperglycemia, without long-term current use of insulin    4. Hyperlipidemia associated with type 2 diabetes mellitus      MDM:   Moderate medical complexity.  Moderate risk.  Total time: 32 minutes.  This includes total time spent on the encounter, which includes face to face time and non-face to face time preparing to see the patient (eg, review of previous medical records, tests), Obtaining and/or reviewing separately obtained history, documenting clinical information in the electronic or other health record, independently interpreting results (not separately reported)/communicating results to the patient/family/caregiver, and/or care coordination (not separately reported).    I have Reviewed and summarized old records.  I have performed thorough medication reconciliation today and discussed risk and benefits of medications.  I have reviewed labs and discussed with patient.  All questions were answered.  I am requesting old records and will review them once they are available.  Gastroenterology.  Hepatology, hematology  Visit today included increased complexity associated with the care of the episodic problem see above assessment addressed and managing the longitudinal care of the patient due to the serious and/or complex managed problem(s) see above.    I have signed for the following orders AND/OR meds.  No orders of the defined types were placed in this encounter.          Follow up in about 6 months (around 9/24/2025), or if symptoms worsen or fail to improve, for Med refills, LAB RESULTS.  Future Appointments       Date Provider Specialty Appt Notes    4/28/2025 Noa Rojas FNP Hepatology follow up    4/29/2025  Lab lab    5/1/2025  Basia To, NP Hematology and Oncology  Arrive at: Telehealth 2 mo prior lab    5/1/2025  Chemotherapy Phlebotomy after NP VV    5/6/2025 Casper Nixon MD Orthopedics B knee 3mo f/u    7/17/2025 Lani Asencio, DPSAURABH Podiatry 4 month nail care          If no improvement in symptoms or symptoms worsen, advised to call/follow-up at clinic or go to ER. Patient voiced understanding and all questions/concerns were addressed.   DISCLAIMER: This note was compiled by using a speech recognition dictation system and therefore please be aware that typographical / speech recognition errors can and do occur.  Please contact me if you see any errors specifically.  Consent was obtained for CAR[employer] recording system prior to the visit.    This note was generated with the assistance of ambient listening technology. Verbal consent was obtained by the patient and accompanying visitor(s) for the recording of patient appointment to facilitate this note. I attest to having reviewed and edited the generated note for accuracy, though some syntax or spelling errors may persist. Please contact the author of this note for any clarification.    Casper Barrett M.D.       Office: [phone]   [id number] [place] [address]  FAX: [phone]

## 2025-03-24 NOTE — ASSESSMENT & PLAN NOTE
Follow-up with Hematology.  Patient now on MOUNJARO.    Previous plan:  Possibly related to Ozempic?  Switching Ozempic to MOUNJARO.  Thrombocytopenia precaution.

## 2025-03-24 NOTE — ASSESSMENT & PLAN NOTE
Counseled on hyperlipidemia disease course, healthy diet and increased need for exercise.  Please be advised of the risk of cardiovascular disease, increase stroke and heart attack risk with uncontrolled/untreated hyperlipidemia.     Patient voiced understanding and understood the treatment plan. All questions were answered.   LDL less than 70.  HDL is low.  Increase exercise.

## 2025-03-24 NOTE — ASSESSMENT & PLAN NOTE
Titrate MOUNJARO up as needed.  We will plan to monitor hemoglobin A1c at designated intervals 3 to 6 months.  I recommend ongoing Education for diabetic diet and exercise protocol.  We will continue to monitor for side effects.    Please be advised of symptoms to monitor for and to notify me immediately if persistent or worsening.  Follow up with Ophthalmology/Optometry and Podiatry at least annually.

## 2025-03-24 NOTE — PATIENT INSTRUCTIONS
Brian Qiu,     If you are due for any health screening(s) below please notify me so we can arrange them to be ordered and scheduled. Most healthy patients at your age complete them, but you are free to accept or refuse.     If you can't do it, I'll definitely understand. If you can, I'd certainly appreciate it!    All of your core healthy metrics are met.

## 2025-04-25 DIAGNOSIS — E11.65 TYPE 2 DIABETES MELLITUS WITH HYPERGLYCEMIA, WITHOUT LONG-TERM CURRENT USE OF INSULIN: ICD-10-CM

## 2025-04-25 RX ORDER — TIRZEPATIDE 10 MG/.5ML
10 INJECTION, SOLUTION SUBCUTANEOUS
Qty: 12 PEN | Refills: 1 | Status: SHIPPED | OUTPATIENT
Start: 2025-04-25

## 2025-04-25 NOTE — TELEPHONE ENCOUNTER
No care due was identified.  Health [facility] Embedded Care Due Messages. Reference number: [id number].   4/25/2025 12:16:19 AM CDT

## 2025-04-25 NOTE — TELEPHONE ENCOUNTER
Refill Routing Note   Medication(s) are not appropriate for processing by [facility] for the following reason(s):        No active prescription written by provider    [facility] action(s):  Defer             Appointments  past 12m or future 3m with PCP    Date Provider   Last Visit   3/24/2025 Casper Barrett MD   Next Visit   Visit date not found Casper Barrett MD   ED visits in past 90 days: 0        Note composed:6:33 AM 04/25/2025

## 2025-04-28 ENCOUNTER — OFFICE VISIT (OUTPATIENT)
Dept: HEPATOLOGY | Facility: CLINIC | Age: 70
End: 2025-04-28
Payer: MEDICARE

## 2025-04-28 ENCOUNTER — TELEPHONE (OUTPATIENT)
Dept: HEPATOLOGY | Facility: CLINIC | Age: 70
End: 2025-04-28

## 2025-04-28 ENCOUNTER — LAB VISIT (OUTPATIENT)
Dept: LAB | Facility: HOSPITAL | Age: 70
End: 2025-04-28
Attending: NURSE PRACTITIONER
Payer: MEDICARE

## 2025-04-28 VITALS
SYSTOLIC BLOOD PRESSURE: 114 MMHG | HEIGHT: 70 IN | HEART RATE: 64 BPM | WEIGHT: 250.25 LBS | BODY MASS INDEX: 35.83 KG/M2 | DIASTOLIC BLOOD PRESSURE: 69 MMHG

## 2025-04-28 DIAGNOSIS — R16.2 HEPATOSPLENOMEGALY: ICD-10-CM

## 2025-04-28 DIAGNOSIS — D69.6 THROMBOCYTOPENIA: Chronic | ICD-10-CM

## 2025-04-28 DIAGNOSIS — K74.00 LIVER FIBROSIS: ICD-10-CM

## 2025-04-28 DIAGNOSIS — D69.6 THROMBOCYTOPENIA: ICD-10-CM

## 2025-04-28 DIAGNOSIS — K76.0 HEPATIC STEATOSIS: ICD-10-CM

## 2025-04-28 DIAGNOSIS — Z78.9 HEPATITIS VACCINATION STATUS UNKNOWN: ICD-10-CM

## 2025-04-28 DIAGNOSIS — E66.812 CLASS 2 OBESITY WITH BODY MASS INDEX (BMI) OF 35.0 TO 35.9 IN ADULT, UNSPECIFIED OBESITY TYPE, UNSPECIFIED WHETHER SERIOUS COMORBIDITY PRESENT: ICD-10-CM

## 2025-04-28 DIAGNOSIS — K76.0 HEPATIC STEATOSIS: Primary | ICD-10-CM

## 2025-04-28 PROCEDURE — 85025 COMPLETE CBC W/AUTO DIFF WBC: CPT

## 2025-04-28 PROCEDURE — 86381 MITOCHONDRIAL ANTIBODY EACH: CPT

## 2025-04-28 PROCEDURE — 86706 HEP B SURFACE ANTIBODY: CPT

## 2025-04-28 PROCEDURE — 36415 COLL VENOUS BLD VENIPUNCTURE: CPT

## 2025-04-28 PROCEDURE — 86704 HEP B CORE ANTIBODY TOTAL: CPT

## 2025-04-28 PROCEDURE — 87340 HEPATITIS B SURFACE AG IA: CPT

## 2025-04-28 PROCEDURE — 82040 ASSAY OF SERUM ALBUMIN: CPT

## 2025-04-28 PROCEDURE — 86015 ACTIN ANTIBODY EACH: CPT

## 2025-04-28 PROCEDURE — 99214 OFFICE O/P EST MOD 30 MIN: CPT | Mod: PBBFAC | Performed by: NURSE PRACTITIONER

## 2025-04-28 PROCEDURE — 86790 VIRUS ANTIBODY NOS: CPT

## 2025-04-28 PROCEDURE — 82390 ASSAY OF CERULOPLASMIN: CPT

## 2025-04-28 PROCEDURE — 82784 ASSAY IGA/IGD/IGG/IGM EACH: CPT

## 2025-04-28 PROCEDURE — 86376 MICROSOMAL ANTIBODY EACH: CPT

## 2025-04-28 PROCEDURE — 99999 PR PBB SHADOW E&M-EST. PATIENT-LVL IV: CPT | Mod: PBBFAC,,, | Performed by: NURSE PRACTITIONER

## 2025-04-28 PROCEDURE — 86364 TISS TRNSGLTMNASE EA IG CLAS: CPT

## 2025-04-28 NOTE — TELEPHONE ENCOUNTER
Patient will call back to schedule his 6 month follow up (October) appt. He's waiting on the new Nurse Practitioner  to start her at [facility].

## 2025-04-28 NOTE — PROGRESS NOTES
Hepatology Note    PATIENT: Lazarus Zamudio  MRN: [id number]  DATE: 2025    Urgency of review: non-urgent  Referring provider: No ref. provider found    Diagnosis:   1. Hepatic steatosis    2. Hepatosplenomegaly    3. Thrombocytopenia    4. Class 2 obesity with body mass index (BMI) of 35.0 to 35.9 in adult, unspecified obesity type, unspecified whether serious comorbidity present          Chief complaint:   Chief Complaint   Patient presents with    Follow-up     Patient comes in today as a follow up fatty liver       Subjective:    Initial History: Lazarus Zamudio is a 70 y.o. male who was referred to Hepatology Clinic for consultation of fatty liver.  The patient reports medical history of HTN, HLD, DM, liver fibrosis, colon polyps, hepatosplenomegaly, thrombocytopenia and obesity     3/30/2023  Pt referred by hematology for further evaluation of noted NAFLD with hepatomegaly in a setting of thrombocytopenia.   Pt denies any previously known liver disease.   LFTs are WNL but on the higher end.   Denies any ETOH.    Fibroscan readin.6 KPa  Fibrosis:F 0-1   CAP readin dB/m  Steatosis: :S3    2023  Pt states that since his last visit, he has been feeling overall well without any new complaints.   He has been working on weight loss with improved nutrition and has lost about 10 pounds.     Recent labs show a continued worsening of thrombocytopenia.   LFTs are stable with a slight increase in total bili  Fibroscan was without concern for cirrhosis, however the IQR was on the higher side.    FIB-4 Calculation: 3.74  Given the continued, worsening thrombocytopenia and the above noted FIB4, there is still concern for possible cirrhosis  - discussed with pt the option for continued monitoring vs liver biopsy for confirmation of fibrosis  - pt agrees to liver biopsy for further diagnostic evaluation.     2023  Overall, the biopsy shows mild macrovesicular steatosis without active  steatohepatitis. The NAFLD activity  score (RAHAT) = 3 (1+2+0) with stage 1 fibrosis.  The histologic differential diagnosis for the portal inflammation includes drug induced liver injury (DILI) and  infection.    6/27/2024  A1c 10.2  7/29/2024  FIB-4 Calculation: 3.43  , cr 0.9, , bilirubin 0.8, AST 22, ALT 26,     8/28/2024  Platelet 88  US abd showed   1. Hepatomegaly and hepatic steatosis.  2. Splenomegaly.    9/19/24  Patient does not have any new complains from liver standpoint. Liver enzymes are stable on 7/29/2024.    The patient reported that his last alcoholic drink was around 12/2024 and he usually only drinks alcoholic drink once per year. He reported that he does not use IV drug, does not use any OTC supplement or herb.    He just saw a hematologist for low platelet, had labs done and is waiting for his hematologist's treatment plan.    He just started Mounjaro and his doctor is monitoring his A1C q 3 months.    He denies recent hematemesis, coffee ground emesis, melena, hematochezia, jaundice, confusion, LE edema, abdominal distension.    4/28/2025  The patient reported that he is on Mounjaro now and he used to be around 325 lbs. He is around 250 lbs now.     The patient reported that he follow up with hematologist for low platelet. He reported that he is going to see his hematologist this week.    He denies recent hematemesis, coffee ground emesis, melena, hematochezia, jaundice, confusion, LE edema, abdominal distension.    The patient reported right eye lesion and he is going to visit a eye doctor next week.    Prior Relevant History:    He  denies hepatotoxic medication.    Review of systems:  A review of 12+ systems was conducted with pertinent positive and negative findings documented in HPI with all other systems reviewed and negative.      PFSH:  Past medical, family, and social history reviewed as documented in chart with pertinent positive medical, family, and social history  detailed in HPI.    Past Medical History:   Past Medical History:   Diagnosis Date    Diabetes mellitus, type 2     Hypertension     Morbid obesity        Past Surgical HIstory:   Past Surgical History:   Procedure Laterality Date    COLONOSCOPY N/A 12/5/2024    Procedure: COLONOSCOPY;  Surgeon: Stephanie Gonzalez MD;  Location: [facility];  Service: Gastroenterology;  Laterality: N/A;    ESOPHAGOGASTRODUODENOSCOPY N/A 12/5/2024    Procedure: EGD (ESOPHAGOGASTRODUODENOSCOPY);  Surgeon: Stephanie Gonzalez MD;  Location: [facility];  Service: Gastroenterology;  Laterality: N/A;    HERNIA REPAIR      INTRALUMINAL GASTROINTESTINAL TRACT IMAGING VIA CAPSULE N/A 12/23/2024    Procedure: IMAGING PROCEDURE, GI TRACT, INTRALUMINAL, VIA CAPSULE;  Surgeon: Ihsan Rodriguez RN;  Location: [facility];  Service: Endoscopy;  Laterality: N/A;    KNEE ARTHROSCOPY W/ DEBRIDEMENT      VASECTOMY         Family History:   Family History   Problem Relation Name Age of Onset    Heart disease Mother Maribeth     Vision loss Mother Maribeth     Diabetes Father Ramon     Heart disease Father Ramon     Hypertension Father Ramon     Diabetes Maternal Grandmother Kathy     Prostate cancer Maternal Grandfather Garrison     Cancer Maternal Grandfather Garrison      He has no known family history of liver disease.     Social History:  reports that he has never smoked. He has never been exposed to tobacco smoke. He has never used smokeless tobacco. He reports current alcohol use. He reports that he does not use drugs.    He has no significant history of alcohol consumption.    He denies history of IV drug use/Tatto  He  denies high-risk sexual contacts, no raw seafood, no sick contacts.      Allergies:  Review of patient's allergies indicates:   Allergen Reactions    Penicillins Rash       Medications:  Current Outpatient Medications   Medication Sig Dispense Refill    aspirin (ECOTRIN) 81 MG EC tablet Take 81 mg by mouth once daily.      atorvastatin  (LIPITOR) 40 MG tablet TAKE 1 TABLET BY MOUTH EVERY DAY 90 tablet 2    dorzolamide-timolol 2-0.5% (COSOPT) 22.3-6.8 mg/mL ophthalmic solution Place 1 drop into the right eye 2 (two) times daily.      enalapril (VASOTEC) 20 MG tablet TAKE 1 TABLET BY MOUTH EVERY DAY 90 tablet 3    latanoprost 0.005 % ophthalmic solution Place 1 drop into both eyes every evening.      metFORMIN (GLUCOPHAGE-XR) 500 MG ER 24hr tablet Take 1 tablet (500 mg total) by mouth daily with breakfast. 90 tablet 3    MOUNJARO 10 mg/0.5 mL PnIj INJECT 10 MG INTO THE SKIN EVERY 7 DAYS 12 Pen 1    multivitamin with minerals tablet Take 1 tablet by mouth once daily.      pantoprazole (PROTONIX) 40 MG tablet Take 1 tablet (40 mg total) by mouth once daily. 90 tablet 1    tadalafiL (CIALIS) 5 MG tablet Take 5 mg by mouth once daily.      testosterone cypionate (DEPOTESTOTERONE CYPIONATE) 100 mg/mL injection Inject 200 mg into the muscle every 14 (fourteen) days.       No current facility-administered medications for this visit.       Review of Systems   Constitutional:  Negative for chills, fatigue, fever and unexpected weight change.   HENT:  Negative for facial swelling and nosebleeds.         Right eye lesion.   Eyes:  Negative for pain and redness.   Respiratory:  Negative for cough, chest tightness and shortness of breath.    Cardiovascular:  Negative for chest pain and leg swelling.        HTN, HLD.   Gastrointestinal:  Negative for abdominal distention, abdominal pain, blood in stool, constipation, diarrhea, nausea and vomiting.        Fatty liver, colon polyps, ventral hernia, hepatosplenomegaly.     Endocrine:        DM.   Genitourinary:  Negative for hematuria, penile swelling and scrotal swelling.   Musculoskeletal:  Negative for gait problem and joint swelling.   Skin:  Negative for color change and rash.   Allergic/Immunologic: Negative for food allergies.   Neurological:  Negative for tremors, seizures, syncope and speech difficulty.  "  Hematological:  Does not bruise/bleed easily.        Thrombocytopenia.   Psychiatric/Behavioral:  Negative for behavioral problems and confusion.        Objective:      Vitals:   Vitals:    04/28/25 1425   BP: 114/69   Patient Position: Sitting   Pulse: 64   Weight: 113.5 kg (250 lb 3.6 oz)   Height: 5' 10" (1.778 m)         Physical Exam  HENT:      Head: Atraumatic.      Ears:      Comments: Right eye lesion.     Nose: No congestion.   Eyes:      General: No scleral icterus.  Cardiovascular:      Rate and Rhythm: Normal rate and regular rhythm.   Pulmonary:      Effort: No respiratory distress.      Breath sounds: Normal breath sounds. No wheezing.   Abdominal:      General: Bowel sounds are normal. There is no distension.      Palpations: Abdomen is soft. There is no mass.      Tenderness: There is no guarding.      Hernia: A hernia is present. Hernia is present in the ventral area.   Musculoskeletal:         General: No swelling.      Cervical back: Normal range of motion.      Right lower leg: No edema.      Left lower leg: No edema.   Skin:     Coloration: Skin is not jaundiced.      Findings: No bruising.   Neurological:      General: No focal deficit present.      Mental Status: He is alert and oriented to person, place, and time. Mental status is at baseline.      Comments: No asterixis.   Psychiatric:         Mood and Affect: Mood normal.         Behavior: Behavior normal.         Laboratory Data:  No visits with results within 1 Week(s) from this visit.   Latest known visit with results is:   Lab Visit on 03/20/2025   Component Date Value Ref Range Status    Sodium 03/20/2025 138  136 - 145 mmol/L Final    Potassium 03/20/2025 4.7  3.5 - 5.1 mmol/L Final    Chloride 03/20/2025 103  95 - 110 mmol/L Final    CO2 03/20/2025 27  23 - 29 mmol/L Final    Glucose 03/20/2025 115 (H)  70 - 110 mg/dL Final    BUN 03/20/2025 13  8 - 23 mg/dL Final    Creatinine 03/20/2025 0.8  0.5 - 1.4 mg/dL Final    Calcium " 03/20/2025 9.1  8.7 - 10.5 mg/dL Final    Total Protein 03/20/2025 6.7  6.0 - 8.4 g/dL Final    Albumin 03/20/2025 4.0  3.5 - 5.2 g/dL Final    Total Bilirubin 03/20/2025 1.0  0.1 - 1.0 mg/dL Final    Comment: For infants and newborns, interpretation of results should be based  on gestational age, weight and in agreement with clinical  observations.    Premature Infant recommended reference ranges:  Up to 24 hours.............<8.0 mg/dL  Up to 48 hours............<12.0 mg/dL  3-5 days..................<15.0 mg/dL  6-29 days.................<15.0 mg/dL      Alkaline Phosphatase 03/20/2025 99  40 - 150 U/L Final    AST 03/20/2025 21  10 - 40 U/L Final    ALT 03/20/2025 21  10 - 44 U/L Final    eGFR 03/20/2025 >60.0  >60 mL/min/1.73 m^2 Final    Anion Gap 03/20/2025 8  8 - 16 mmol/L Final    Cholesterol 03/20/2025 105 (L)  120 - 199 mg/dL Final    Comment: The National Cholesterol Education Program (NCEP) has set the  following guidelines (reference ranges) for Cholesterol:  Optimal.....................<200 mg/dL  Borderline High.............200-239 mg/dL  High........................> or = 240 mg/dL      Triglycerides 03/20/2025 40  30 - 150 mg/dL Final    Comment: The National Cholesterol Education Program (NCEP) has set the  following guidelines (reference values) for triglycerides:  Normal......................<150 mg/dL  Borderline High.............150-199 mg/dL  High........................200-499 mg/dL      HDL 03/20/2025 37 (L)  40 - 75 mg/dL Final    Comment: The National Cholesterol Education Program (NCEP) has set the  following guidelines (reference values) for HDL Cholesterol:  Low...............<40 mg/dL  Optimal...........>60 mg/dL      LDL Cholesterol 03/20/2025 60.0 (L)  63.0 - 159.0 mg/dL Final    Comment: The National Cholesterol Education Program (NCEP) has set the  following guidelines (reference values) for LDL Cholesterol:  Optimal.......................<130 mg/dL  Borderline  High...............130-159 mg/dL  High..........................160-189 mg/dL  Very High.....................>190 mg/dL      HDL/Cholesterol Ratio 03/20/2025 35.2  20.0 - 50.0 % Final    Total Cholesterol/HDL Ratio 03/20/2025 2.8  2.0 - 5.0 Final    Non-HDL Cholesterol 03/20/2025 68  mg/dL Final    Comment: Risk category and Non-HDL cholesterol goals:  Coronary heart disease (CHD)or equivalent (10-year risk of CHD >20%):  Non-HDL cholesterol goal     <130 mg/dL  Two or more CHD risk factors and 10-year risk of CHD <= 20%:  Non-HDL cholesterol goal     <160 mg/dL  0 to 1 CHD risk factor:  Non-HDL cholesterol goal     <190 mg/dL      Hemoglobin A1C 03/20/2025 5.8 (H)  4.0 - 5.6 % Final    Comment: ADA Screening Guidelines:  5.7-6.4%  Consistent with prediabetes  >or=6.5%  Consistent with diabetes    High levels of fetal hemoglobin interfere with the HbA1C  assay. Heterozygous hemoglobin variants (HbS, HgC, etc)do  not significantly interfere with this assay.   However, presence of multiple variants may affect accuracy.      Estimated Avg Glucose 03/20/2025 120  68 - 131 mg/dL Final    Testosterone, Total 03/20/2025 1343 (H)  304 - 1227 ng/dL Final    WBC 03/20/2025 4.63  3.90 - 12.70 K/uL Final    RBC 03/20/2025 5.29  4.60 - 6.20 M/uL Final    Hemoglobin 03/20/2025 15.8  14.0 - 18.0 g/dL Final    Hematocrit 03/20/2025 49.1  40.0 - 54.0 % Final    MCV 03/20/2025 93  82 - 98 fL Final    MCH 03/20/2025 29.9  27.0 - 31.0 pg Final    MCHC 03/20/2025 32.2  32.0 - 36.0 g/dL Final    RDW 03/20/2025 13.1  11.5 - 14.5 % Final    Platelets 03/20/2025 77 (L)  150 - 450 K/uL Final    MPV 03/20/2025 9.2  9.2 - 12.9 fL Final    Immature Granulocytes 03/20/2025 0.2  0.0 - 0.5 % Final    Gran # (ANC) 03/20/2025 2.6  1.8 - 7.7 K/uL Final    Immature Grans (Abs) 03/20/2025 0.01  0.00 - 0.04 K/uL Final    Comment: Mild elevation in immature granulocytes is non specific and   can be seen in a variety of conditions including stress  "response,   acute inflammation, trauma and pregnancy. Correlation with other   laboratory and clinical findings is essential.      Lymph # 03/20/2025 1.6  1.0 - 4.8 K/uL Final    Mono # 03/20/2025 0.4  0.3 - 1.0 K/uL Final    Eos # 03/20/2025 0.0  0.0 - 0.5 K/uL Final    Baso # 03/20/2025 0.02  0.00 - 0.20 K/uL Final    nRBC 03/20/2025 0  0 /100 WBC Final    Gran % 03/20/2025 56.0  38.0 - 73.0 % Final    Lymph % 03/20/2025 34.8  18.0 - 48.0 % Final    Mono % 03/20/2025 8.0  4.0 - 15.0 % Final    Eosinophil % 03/20/2025 0.6  0.0 - 8.0 % Final    Basophil % 03/20/2025 0.4  0.0 - 1.9 % Final    Differential Method 03/20/2025 Automated   Final    PSA, Screen 03/20/2025 0.53  0.00 - 4.00 ng/mL Final    Comment: The testing method is a chemiluminescent microparticle immunoassay   manufactured by [employer] and performed on the [employer]   or   [employer] system. Values obtained with different assay manufacturers   for   methods may be different and cannot be used interchangeably.  PSA Expected levels:  Hormonal Therapy: <0.05 ng/ml  Prostatectomy: <0.01 ng/ml  Radiation Therapy: <1.00 ng/ml         Lab Results   Component Value Date    INR 1.1 06/30/2023       No results found for: "SMOOTHMUSCAB", "MITOAB"  Lab Results   Component Value Date    IRON 66 02/24/2025    TIBC 324 02/24/2025    FERRITIN 197 02/24/2025     Lab Results   Component Value Date    HEPCAB Non-reactive 03/06/2023     Lab Results   Component Value Date    TSH 2.592 10/11/2024     No results found for: "HÉCTOR"    No results found for: "ABORH"        Lab Results   Component Value Date    LABA1C 6.3 (H) 07/20/2018    HGBA1C 5.8 (H) 03/20/2025     Lab Results   Component Value Date    CHOL 105 (L) 03/20/2025    CHOL 106 (L) 10/11/2024    CHOL 121 06/27/2024     Lab Results   Component Value Date    HDL 37 (L) 03/20/2025    HDL 39 (L) 10/11/2024    HDL 42 06/27/2024     Lab Results   Component Value Date    LDLCALC 60.0 (L) 03/20/2025    LDLCALC 59.2 " (L) 10/11/2024    LDLCALC 69.4 06/27/2024     Lab Results   Component Value Date    TRIG 40 03/20/2025    TRIG 39 10/11/2024    TRIG 48 06/27/2024     Lab Results   Component Value Date    CHOLHDL 35.2 03/20/2025    CHOLHDL 36.8 10/11/2024    CHOLHDL 34.7 06/27/2024         I personally reviewed imaging studies and outside records..      Assessment:       1. Hepatic steatosis    2. Hepatosplenomegaly    3. Thrombocytopenia    4. Class 2 obesity with body mass index (BMI) of 35.0 to 35.9 in adult, unspecified obesity type, unspecified whether serious comorbidity present            Plan:     Problem List Items Addressed This Visit       Thrombocytopenia (Chronic)    Relevant Orders    FibroScan (Vibration Controlled Transient Elastography)    CBC Auto Differential     Other Visit Diagnoses         Hepatic steatosis    -  Primary    Relevant Orders    Comprehensive Metabolic Panel    FibroScan (Vibration Controlled Transient Elastography)    IgG    Anti-Liver, Kidney, Microsome Ab    Antimitochondrial Antibody    Anti-Smooth Muscle Antibody    Hepatitis A antibody, IgG    Hepatitis B Core Antibody, Total    Hepatitis B Surface Ab, Qualitative    Hepatitis B Surface Antigen    Tissue Transglutaminase, IgA    Ceruloplasmin    Alpha 1 Antitrypsin Phenotype    US Abdomen Limited      Hepatosplenomegaly        Relevant Orders    Comprehensive Metabolic Panel    FibroScan (Vibration Controlled Transient Elastography)    IgG    Anti-Liver, Kidney, Microsome Ab    Antimitochondrial Antibody    Anti-Smooth Muscle Antibody    Hepatitis A antibody, IgG    Hepatitis B Core Antibody, Total    Hepatitis B Surface Ab, Qualitative    Hepatitis B Surface Antigen    Tissue Transglutaminase, IgA    Ceruloplasmin    Alpha 1 Antitrypsin Phenotype    US Abdomen Limited      Class 2 obesity with body mass index (BMI) of 35.0 to 35.9 in adult, unspecified obesity type, unspecified whether serious comorbidity present                  Lazarus  was seen today for follow-up.    Diagnoses and all orders for this visit:    Hepatic steatosis  -     Comprehensive Metabolic Panel; Standing  -     FibroScan (Vibration Controlled Transient Elastography); Future  -     IgG; Future  -     Anti-Liver, Kidney, Microsome Ab; Future  -     Antimitochondrial Antibody; Future  -     Anti-Smooth Muscle Antibody; Future  -     Hepatitis A antibody, IgG; Future  -     Hepatitis B Core Antibody, Total; Future  -     Hepatitis B Surface Ab, Qualitative; Future  -     Hepatitis B Surface Antigen; Future  -     Tissue Transglutaminase, IgA; Future  -     Ceruloplasmin; Future  -     Alpha 1 Antitrypsin Phenotype; Future  -     US Abdomen Limited; Future    Hepatosplenomegaly  -     Comprehensive Metabolic Panel; Standing  -     FibroScan (Vibration Controlled Transient Elastography); Future  -     IgG; Future  -     Anti-Liver, Kidney, Microsome Ab; Future  -     Antimitochondrial Antibody; Future  -     Anti-Smooth Muscle Antibody; Future  -     Hepatitis A antibody, IgG; Future  -     Hepatitis B Core Antibody, Total; Future  -     Hepatitis B Surface Ab, Qualitative; Future  -     Hepatitis B Surface Antigen; Future  -     Tissue Transglutaminase, IgA; Future  -     Ceruloplasmin; Future  -     Alpha 1 Antitrypsin Phenotype; Future  -     US Abdomen Limited; Future    Thrombocytopenia  -     FibroScan (Vibration Controlled Transient Elastography); Future  -     CBC Auto Differential; Standing    Class 2 obesity with body mass index (BMI) of 35.0 to 35.9 in adult, unspecified obesity type, unspecified whether serious comorbidity present          Recommendations    Recommend alcohol abstinence.  CMP, CBC q 6 months before next visit.  Will plan fibrscan this year.  US ABD for hepatosplenomegaly. The patient had over 75 lbs weight loss and his hepatosplenomegaly should improve.   If hepatomegaly does not improve after weight loss, the patient might need CT ABD pelvis to rule out  infiltrative diseae (granulomatous disease, amyloidosis, hepatosplenic T cell lymphoma).     Pending hep A Ab, hep B S Ab labs, the patient might need   Recommend to have hep A & B vaccines. The patient can get vaccines at [facility] or [facility]. [facility] for hep A or B vaccines.  CMP today. Then CBC, CMP, INR in 6 months before next visit.   US abd. AFP for HCC screening Q 6 months.    I recommended a more pragmatic approach to her medical problems which would include the following:  - life-style modifications: weight loss, daily exercise (30 mins of HIIT or cardio), low carb/low fat diet         Educated patient on spectrum of fatty liver disease and potential for cirrhosis if MASH present        Explored dietary habits and discussed dietary recommendations- Low calorie, low carbohydrate, high protein mediterranean diet with goal of loosing >3-5% to improve steatosis and >7-10% to improve histological changes if present  - control of diabetes or insulin resistance   - control of high cholesterol, if needed with statin drugs or other cholesterol-lowering agents  - coffee consumption (low caloric): 2-3 cups per day may reduce liver fat  -I will defer the management of the patient's dyslipidemia and diabetes to patient's primary care physician and/or endocrinologist     --Plan checking serologies to rule out other causes of chronic liver diseases for the sake of thoroughness in work up.     Return to clinic in 6 months. Further recommendation pending test results.    I have sent communication to the referring physician and/or primary care provider.      Time Statement  A total  minutes spent includes time preparing to see patient, reviewing  diagnostic studies and records, direct face-to-face visit, completing orders, medications , reconciliation, prescription management, and care coordination    We discussed in depth the nature of the patient's disease, the  management plan in details. I have provided the patient with an opportunity to ask questions and have all questions answered to his satisfaction.     Discussed with patient that it is likely that he will see results before Myself or my nurse are able to view them and report results due to the Cures Act passed 4/1/21. Results will be sent immediately to the patient who are enrolled in the patient portal. If results come through after business hours or on weekend, we will not see them until the next business day that we are in the office. If resulted during the business day, we will likely not be able to review them until after completing all patient visits in office that day.     Noa Rojas, JOHANNYP  [facility]

## 2025-04-29 ENCOUNTER — LAB VISIT (OUTPATIENT)
Dept: LAB | Facility: HOSPITAL | Age: 70
End: 2025-04-29
Attending: NURSE PRACTITIONER
Payer: MEDICARE

## 2025-04-29 ENCOUNTER — TELEPHONE (OUTPATIENT)
Dept: PODIATRY | Facility: CLINIC | Age: 70
End: 2025-04-29
Payer: MEDICARE

## 2025-04-29 DIAGNOSIS — R16.0 HEPATOMEGALY: Chronic | ICD-10-CM

## 2025-04-29 DIAGNOSIS — D73.2 SPLENOMEGALY, CONGESTIVE, CHRONIC: Chronic | ICD-10-CM

## 2025-04-29 DIAGNOSIS — D75.1 POLYCYTHEMIA: Chronic | ICD-10-CM

## 2025-04-29 DIAGNOSIS — E83.19 INCREASED STORAGE IRON: Chronic | ICD-10-CM

## 2025-04-29 LAB
ABSOLUTE EOSINOPHIL (OHS): 0.04 K/UL
ABSOLUTE MONOCYTE (OHS): 0.49 K/UL (ref 0.3–1)
ABSOLUTE NEUTROPHIL COUNT (OHS): 3.52 K/UL (ref 1.8–7.7)
ALBUMIN SERPL BCP-MCNC: 4 G/DL (ref 3.5–5.2)
ALP SERPL-CCNC: 97 UNIT/L (ref 40–150)
ALT SERPL W/O P-5'-P-CCNC: 21 UNIT/L (ref 10–44)
ANION GAP (OHS): 7 MMOL/L (ref 8–16)
AST SERPL-CCNC: 22 UNIT/L (ref 11–45)
BASOPHILS # BLD AUTO: 0.03 K/UL
BASOPHILS NFR BLD AUTO: 0.5 %
BILIRUB SERPL-MCNC: 0.8 MG/DL (ref 0.1–1)
BUN SERPL-MCNC: 20 MG/DL (ref 8–23)
CALCIUM SERPL-MCNC: 8.8 MG/DL (ref 8.7–10.5)
CERULOPLASMIN SERPL-MCNC: 30 MG/DL (ref 15–45)
CHLORIDE SERPL-SCNC: 105 MMOL/L (ref 95–110)
CO2 SERPL-SCNC: 27 MMOL/L (ref 23–29)
CREAT SERPL-MCNC: 0.8 MG/DL (ref 0.5–1.4)
ERYTHROCYTE [DISTWIDTH] IN BLOOD BY AUTOMATED COUNT: 12.8 % (ref 11.5–14.5)
FERRITIN SERPL-MCNC: 111 NG/ML (ref 20–300)
GFR SERPLBLD CREATININE-BSD FMLA CKD-EPI: >60 ML/MIN/1.73/M2
GLUCOSE SERPL-MCNC: 109 MG/DL (ref 70–110)
HAV AB SER QL IA: REACTIVE
HBV CORE AB SERPL QL IA: NORMAL
HBV SURFACE AB SER-ACNC: 4.8 MIU/ML
HBV SURFACE AB SERPL IA-ACNC: NORMAL M[IU]/ML
HBV SURFACE AG SERPL QL IA: NORMAL
HCT VFR BLD AUTO: 48.8 % (ref 40–54)
HGB BLD-MCNC: 15.8 GM/DL (ref 14–18)
IGG SERPL-MCNC: 865 MG/DL (ref 650–1600)
IMM GRANULOCYTES # BLD AUTO: 0.01 K/UL (ref 0–0.04)
IMM GRANULOCYTES NFR BLD AUTO: 0.2 % (ref 0–0.5)
IRON SATN MFR SERPL: 20 % (ref 20–50)
IRON SERPL-MCNC: 63 UG/DL (ref 45–160)
LYMPHOCYTES # BLD AUTO: 1.99 K/UL (ref 1–4.8)
MCH RBC QN AUTO: 29.7 PG (ref 27–31)
MCHC RBC AUTO-ENTMCNC: 32.4 G/DL (ref 32–36)
MCV RBC AUTO: 92 FL (ref 82–98)
NUCLEATED RBC (/100WBC) (OHS): 0 /100 WBC
PLATELET # BLD AUTO: 82 K/UL (ref 150–450)
PMV BLD AUTO: 9.7 FL (ref 9.2–12.9)
POTASSIUM SERPL-SCNC: 4.2 MMOL/L (ref 3.5–5.1)
PROT SERPL-MCNC: 6.7 GM/DL (ref 6–8.4)
RBC # BLD AUTO: 5.32 M/UL (ref 4.6–6.2)
RELATIVE EOSINOPHIL (OHS): 0.7 %
RELATIVE LYMPHOCYTE (OHS): 32.7 % (ref 18–48)
RELATIVE MONOCYTE (OHS): 8.1 % (ref 4–15)
RELATIVE NEUTROPHIL (OHS): 57.8 % (ref 38–73)
SODIUM SERPL-SCNC: 139 MMOL/L (ref 136–145)
TIBC SERPL-MCNC: 317 UG/DL (ref 250–450)
TRANSFERRIN SERPL-MCNC: 214 MG/DL (ref 200–375)
WBC # BLD AUTO: 6.08 K/UL (ref 3.9–12.7)

## 2025-04-29 PROCEDURE — 83540 ASSAY OF IRON: CPT

## 2025-04-29 PROCEDURE — 82728 ASSAY OF FERRITIN: CPT

## 2025-04-29 PROCEDURE — 36415 COLL VENOUS BLD VENIPUNCTURE: CPT | Mod: PO

## 2025-04-29 NOTE — TELEPHONE ENCOUNTER
Patient told me to give him a call 30- to 45 minutes after speaking about rescheduling appt, I got busy in clinic but due to provider not being in clinic on 7.17.25 I rescheduled appt for 7.24.25 for 10:30 at the same location. If that time doesn't work please give me a call back     My extension is: [phone]

## 2025-05-01 ENCOUNTER — PATIENT MESSAGE (OUTPATIENT)
Dept: HEPATOLOGY | Facility: CLINIC | Age: 70
End: 2025-05-01
Payer: MEDICARE

## 2025-05-01 ENCOUNTER — OFFICE VISIT (OUTPATIENT)
Dept: HEMATOLOGY/ONCOLOGY | Facility: CLINIC | Age: 70
End: 2025-05-01
Payer: MEDICARE

## 2025-05-01 DIAGNOSIS — D75.1 POLYCYTHEMIA: ICD-10-CM

## 2025-05-01 DIAGNOSIS — D69.6 THROMBOCYTOPENIA: Primary | Chronic | ICD-10-CM

## 2025-05-01 DIAGNOSIS — Z79.890 LONG-TERM CURRENT USE OF TESTOSTERONE REPLACEMENT THERAPY: ICD-10-CM

## 2025-05-01 DIAGNOSIS — K76.0 FATTY (CHANGE OF) LIVER, NOT ELSEWHERE CLASSIFIED: ICD-10-CM

## 2025-05-01 LAB
A1AT PHENOTYP SERPL-IMP: NORMAL BANDS
A1AT SERPL NEPH-MCNC: 145 MG/DL (ref 100–190)
MITOCHONDRIA AB TITR SER IF: NORMAL {TITER}
SMOOTH MUSCLE AB TITR SER IF: NORMAL {TITER}

## 2025-05-01 NOTE — PROGRESS NOTES
The patient location is: home  The chief complaint leading to consultation is: no complaints    Visit type: audiovisual    Face to Face time with patient: 15  30 minutes of total time spent on the encounter, which includes face to face time and non-face to face time preparing to see the patient (eg, review of tests), Obtaining and/or reviewing separately obtained history, Documenting clinical information in the electronic or other health record, Independently interpreting results (not separately reported) and communicating results to the patient/family/caregiver, or Care coordination (not separately reported).     Each patient to whom he or she provides medical services by telemedicine is:  (1) informed of the relationship between the physician and patient and the respective role of any other health care provider with respect to management of the patient; and (2) notified that he or she may decline to receive medical services by telemedicine and may withdraw from such care at any time.      Subjective     Patient ID: Lazarus Zamudio is a 70 y.o. male.    Chief Complaint: no complaints    70 year old male who presents today to the hematology clinic for further evaluation and recommendations of recent diagnosis thrombocytopenia.  He has been referred to the clinic by his PCP, Dr. Casper Barrett.  Hep B, Hep C, HÉCTOR, HIV negative.  Blue top 83 K.  Thrombocytopenia present on/off since 2012 with more consisted low platelet count since 5/2021 ranging from 101-148K.  Denies any abnormal bleeding     Is currently taking aspirin for a long time now per patient.  States at least since 2012     Has to donate blood d/t polycythemia d/t testosterone use; however states was unable to donate because his iron was too high. States that he is not taking iron.  Denies a large consumption of iron rich foods.      Patient is accompanied by his wife.       Other diagnosis include:      Very seldom drinks alcohol.  Denies cigarette  "smoking.     Was a  at [employer].       Family hx of cancer:  Maternal uncle: lymphoma  Maternal grandfather: prostate cancer     Denies f/c/ns or unintentional weight loss.  Denies any known lymphadenopathy.       2015 colonoscopy - polyps - due to repeat     States that he has early satiety and thinks due to Ozempic.       3/21/2023 Abdominal US Impression:     1. Hepatomegaly and fatty infiltration of the liver.  2. Splenomegaly.     Interval History:  3/30/2023  Presents today to review results of workup thrombocytopenia.  Has no complaints today. Is accompanied by his wife. Has seen hepatology today and will be having fibroscan soon.      Interval History:  8/28/2024  had liver biopsy done 7/18/2023 1. Liver, biopsy:   Mild macrovesicular steatosis without active steatohepatitis.   Mild mixed portal inflammation.   Denies any known abnormal bleeding.  Scheduled for phlebotomies every 8 weeks due to polycythemia from testosterone therapy.  States that he was told that his iron is too high. No hereditary hemochromatosis labs seen in system. Last time he had a phlebotomy was June 2023.  Is scheduled to see new hepatologist in September.  Denies f/c/ns or unintentional weight loss.  Denies any known abnormal lymphadenopathy.   I have reviewed all of the patient's relevant lab work available in the medical record and have utilized this in my evaluation and management recommendations today.     Interval history:  10/30/24 Pt returns today for thrombocytopenia follow-up. Endorses bruising to both arms and prolonged bleeding after cutting himself while shaving. He is feeling fatigued and "sluggish". No hematuria/melena/hematochezia. No f/c/night sweats. Pt is taking Monjouro but has not had any unintentional weight loss. He endorses intermittent heartburn/indigestion. No abdominal pain or early satiety.     Interval History:  2/26/2025  Presents today to review labs to see iron phlebotomy " needed for secondary polycythemia d/t chronic testosterone use and Rosey treated with cpap.  Has been found with hepatosplenomegaly being followed by hepatology.  With known chronic stable thrombocytopenia.  Has not had phlebotomy since 9/2024.  Was found with iron deficiency and required feraheme infusions  x 2 with last infusion in 9/2024.  Iron is currently repleated.  He states originally he was told to have a phlebotomy because his iron levels were to high.  No HH labs found in the system.  Unable to order MPN reflexive studies due to insurance constraints. Currently with hct 48%.  Platelet count 78K.      Interval History:  5/1/2025 Has no complaints    Interval History:  1/15/2024   On 11/6/2024 and 11/19/2024 received Feraheme infusions for SAM.  Hgb normal - iron studies good now.  Continues to get testosterone injections every 2 weeks.  Was also evaluated with upper/lower GI and VCS  EGD 12/5/2024  - Normal duodenal bulb and second portion of the duodenum. Biopsied.   A single recently bleeding angioectasia in the duodenum. Treated with argon plasma coagulation (APC). Three non-bleeding angioectasias in the stomach. Treated with argon plasma coagulation (APC). No gross lesions in the gastric body, in the incisura and in the antrum. Biopsied.  Z-line variable, 40 cm from the incisors. No gross lesions in the entire esophagus.   12/5/2024 ColonoscopyThe examined portion of the ileum was normal. Two 1 to 3 mm polyps in the cecum, removed with a cold snare. Resected and retrieved. A single colonic angioectasia. Treated with argon plasma coagulation (APC).   One 1 mm polyp in the descending colon, removed with a jumbo cold forceps. Resected and retrieved. Stool in the sigmoid colon, in the descending colon and in the ascending colon.   The examination was otherwise normal.   Hemorrhoids found on perianal exam.   VCE was done however there was poor bowel prep in small bowel so vc did not reach the cecum at the  termination of recording.    Patient to repeat colonoscopy in 3 years per recommendations.     Pathology:   Final Pathologic Diagnosis 1. SMALL BOWEL BIOPSIES:  -  No significant histopathologic abnormality; there is no evidence of celiac disease.    2. GASTRIC BIOPSIES:  -  Chemical gastritis / reactive gastropathy with mild chronic inflammation.    -  Helicobacter pylori are not identified on routine staining.  -  No evidence of intestinal metaplasia, dysplasia or malignancy.    3. CECUM BIOPSIES, POLYPECTOMY X2:  -  Tubular adenoma.    -  Serrated polyp / lesion.    -  No evidence of high-grade dysplasia or malignancy.    4. DESCENDING COLON BIOPSY, POLYPECTOMY:  -  Tubular adenoma.    -  No evidence of high-grade dysplasia or malignancy.     Interval History:  5/1/2025 Has no complaints today.  Platelet count is stable at 88 K.  No need for phlebotomy and continues testosterone injections.  Iron studies look good.      Review of Systems   Constitutional:  Positive for fatigue (improved). Negative for appetite change, chills, fever, night sweats and unexpected weight change.   HENT:  Negative for mouth sores and nosebleeds.    Eyes:  Negative for visual disturbance.   Respiratory:  Negative for cough and shortness of breath.    Cardiovascular:  Negative for chest pain.   Gastrointestinal:  Positive for reflux (a little every now and then - trying to control with diet). Negative for abdominal pain, anal bleeding, blood in stool and diarrhea.   Genitourinary:  Negative for frequency and hematuria.   Musculoskeletal:  Positive for arthralgias (knee pain - seeing Dr. Nixon and getting gel shots in both knees.). Negative for back pain.   Integumentary:  Negative for rash.   Allergic/Immunologic: Negative for frequent infections.   Neurological:  Negative for dizziness, weakness, light-headedness, numbness and headaches.   Hematological:  Negative for adenopathy. Bruises/bleeds easily (improved - not bleeding easily).    Psychiatric/Behavioral:  The patient is not nervous/anxious.        Current Outpatient Medications:     aspirin (ECOTRIN) 81 MG EC tablet, Take 81 mg by mouth once daily., Disp: , Rfl:     atorvastatin (LIPITOR) 40 MG tablet, TAKE 1 TABLET BY MOUTH EVERY DAY, Disp: 90 tablet, Rfl: 2    dorzolamide-timolol 2-0.5% (COSOPT) 22.3-6.8 mg/mL ophthalmic solution, Place 1 drop into the right eye 2 (two) times daily., Disp: , Rfl:     enalapril (VASOTEC) 20 MG tablet, TAKE 1 TABLET BY MOUTH EVERY DAY, Disp: 90 tablet, Rfl: 3    latanoprost 0.005 % ophthalmic solution, Place 1 drop into both eyes every evening., Disp: , Rfl:     metFORMIN (GLUCOPHAGE-XR) 500 MG ER 24hr tablet, Take 1 tablet (500 mg total) by mouth daily with breakfast., Disp: 90 tablet, Rfl: 3    MOUNJARO 10 mg/0.5 mL PnIj, INJECT 10 MG INTO THE SKIN EVERY 7 DAYS, Disp: 12 Pen, Rfl: 1    multivitamin with minerals tablet, Take 1 tablet by mouth once daily., Disp: , Rfl:     pantoprazole (PROTONIX) 40 MG tablet, Take 1 tablet (40 mg total) by mouth once daily., Disp: 90 tablet, Rfl: 1    tadalafiL (CIALIS) 5 MG tablet, Take 5 mg by mouth once daily., Disp: , Rfl:     testosterone cypionate (DEPOTESTOTERONE CYPIONATE) 100 mg/mL injection, Inject 200 mg into the muscle every 14 (fourteen) days., Disp: , Rfl:      Patient Active Problem List   Diagnosis    Type 2 diabetes mellitus with hyperglycemia, without long-term current use of insulin    Hypertension associated with diabetes    History of colonic polyps    Hypersomnia with sleep apnea    Obstructive sleep apnea syndrome    Encounter for long-term (current) use of medications    Venous stasis dermatitis of left lower extremity    Class 3 obesity with alveolar hypoventilation, serious comorbidity, and body mass index (BMI) of 40.0 to 44.9 in adult    Sciatica, left side    Pain of left lower extremity    Chronic left-sided low back pain without sciatica    Decreased functional mobility    Muscle weakness of  lower extremity    Decreased ROM of lumbar spine    Polycythemia    Hypogonadism male    Chronic pain of both knees    Thrombocytopenia    Class 2 obesity with alveolar hypoventilation, serious comorbidity, and body mass index (BMI) of 35.0 to 35.9 in adult    Onychogryposis    Impaired functional mobility and activity tolerance    Iron deficiency anemia    Secondary polycythemia    Long-term current use of testosterone replacement therapy    Hyperlipidemia associated with type 2 diabetes mellitus      Past Medical History:   Diagnosis Date    Diabetes mellitus, type 2     Hypertension     Morbid obesity       Past Surgical History:   Procedure Laterality Date    COLONOSCOPY N/A 12/5/2024    Procedure: COLONOSCOPY;  Surgeon: Stephanie Gonzalez MD;  Location: [facility];  Service: Gastroenterology;  Laterality: N/A;    ESOPHAGOGASTRODUODENOSCOPY N/A 12/5/2024    Procedure: EGD (ESOPHAGOGASTRODUODENOSCOPY);  Surgeon: Stephanie Gonzalez MD;  Location: [facility];  Service: Gastroenterology;  Laterality: N/A;    HERNIA REPAIR      INTRALUMINAL GASTROINTESTINAL TRACT IMAGING VIA CAPSULE N/A 12/23/2024    Procedure: IMAGING PROCEDURE, GI TRACT, INTRALUMINAL, VIA CAPSULE;  Surgeon: Ihsan Rodriguez RN;  Location: [facility];  Service: Endoscopy;  Laterality: N/A;    KNEE ARTHROSCOPY W/ DEBRIDEMENT      VASECTOMY        Social History     Socioeconomic History    Marital status:    Occupational History     Employer:  \[employer]\""   Tobacco Use    Smoking status: Never     Passive exposure: Never    Smokeless tobacco: Never   Substance and Sexual Activity    Alcohol use: Not Currently     Comment: Reported none.    Drug use: No    Sexual activity: Yes     Partners: Female   Social History Narrative    . 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     Social Drivers of Health     Financial Resource Strain: Patient Declined (2/20/2025)    Overall Financial Resource Strain (CARDIA)     Difficulty of Paying Living Expenses: Patient  declined   Food Insecurity: Patient Declined (2/20/2025)    Hunger Vital Sign     Worried About Running Out of Food in the Last Year: Patient declined     Ran Out of Food in the Last Year: Patient declined   Transportation Needs: Patient Declined (2/20/2025)    PRAPARE - Transportation     Lack of Transportation (Medical): Patient declined     Lack of Transportation (Non-Medical): Patient declined   Physical Activity: Insufficiently Active (2/20/2025)    Exercise Vital Sign     Days of Exercise per Week: 2 days     Minutes of Exercise per Session: 20 min   Stress: Patient Declined (2/20/2025)    [employer] [place] [employer] - Occupational Stress Questionnaire     Feeling of Stress : Patient declined   Housing Stability: Patient Declined (2/20/2025)    Housing Stability Vital Sign     Unable to Pay for Housing in the Last Year: Patient declined     Homeless in the Last Year: Patient declined           Objective     Physical Exam  Constitutional:       Appearance: Normal appearance.   Pulmonary:      Effort: Pulmonary effort is normal.   Neurological:      Mental Status: He is alert and oriented to person, place, and time.   Psychiatric:         Mood and Affect: Mood normal.         Behavior: Behavior normal.         Thought Content: Thought content normal.         Judgment: Judgment normal.        Assessment and Plan   Diagnoses and all orders for this visit:    Thrombocytopenia  -     CBC Auto Differential; Future  -     Comprehensive Metabolic Panel; Future    Long-term current use of testosterone replacement therapy  -     CBC Auto Differential; Future    Polycythemia  -     CBC Auto Differential; Future  -     Comprehensive Metabolic Panel; Future  -     Ferritin; Future  -     Iron and TIBC; Future    Fatty (change of) liver, not elsewhere classified  -     Ferritin; Future  -     Iron and TIBC; Future             Route Chart for Scheduling    Med Onc Chart Routing      Follow up with physician     Follow up with CY 2 months. VV with labs prior   Infusion scheduling note   n/a   Injection scheduling note n/a   Labs   Scheduling:  Preferred lab: [facility]  Lab interval:  cbc, cmp, iron studies   Imaging   N/a   Pharmacy appointment No pharmacy appointment needed      Other referrals       No additional referrals needed  n/a

## 2025-05-02 LAB
W LIVER-KIDNEY MICROSOME AB: 1.8 UNITS
W TISSUE TRANSGLUTAMINASE IGA AB: <0.2 U/ML

## 2025-05-03 ENCOUNTER — RESULTS FOLLOW-UP (OUTPATIENT)
Dept: HEPATOLOGY | Facility: CLINIC | Age: 70
End: 2025-05-03
Payer: MEDICARE

## 2025-05-03 DIAGNOSIS — R16.0 HEPATOMEGALY: Primary | ICD-10-CM

## 2025-05-03 DIAGNOSIS — K76.9 LIVER DISEASE, UNSPECIFIED: ICD-10-CM

## 2025-05-05 ENCOUNTER — PATIENT MESSAGE (OUTPATIENT)
Dept: HEPATOLOGY | Facility: CLINIC | Age: 70
End: 2025-05-05
Payer: MEDICARE

## 2025-05-05 ENCOUNTER — HOSPITAL ENCOUNTER (OUTPATIENT)
Dept: RADIOLOGY | Facility: HOSPITAL | Age: 70
Discharge: HOME | End: 2025-05-05
Attending: NURSE PRACTITIONER
Payer: MEDICARE

## 2025-05-05 DIAGNOSIS — R16.2 HEPATOSPLENOMEGALY: ICD-10-CM

## 2025-05-05 DIAGNOSIS — K76.0 HEPATIC STEATOSIS: ICD-10-CM

## 2025-05-05 PROCEDURE — 76705 ECHO EXAM OF ABDOMEN: CPT | Mod: TC,PO

## 2025-05-05 PROCEDURE — 76705 ECHO EXAM OF ABDOMEN: CPT | Mod: 26,,, | Performed by: RADIOLOGY

## 2025-05-06 ENCOUNTER — OFFICE VISIT (OUTPATIENT)
Dept: ORTHOPEDICS | Facility: CLINIC | Age: 70
End: 2025-05-06
Payer: MEDICARE

## 2025-05-06 DIAGNOSIS — M54.16 LUMBAR RADICULOPATHY, CHRONIC: ICD-10-CM

## 2025-05-06 DIAGNOSIS — M17.12 PRIMARY OSTEOARTHRITIS OF LEFT KNEE: ICD-10-CM

## 2025-05-06 DIAGNOSIS — M17.11 PRIMARY OSTEOARTHRITIS OF RIGHT KNEE: ICD-10-CM

## 2025-05-06 DIAGNOSIS — M54.16 LUMBAR RADICULOPATHY: Primary | ICD-10-CM

## 2025-05-06 PROCEDURE — 99214 OFFICE O/P EST MOD 30 MIN: CPT | Mod: S$PBB,25,, | Performed by: ORTHOPAEDIC SURGERY

## 2025-05-06 PROCEDURE — 99999PBSHW PR PBB SHADOW TECHNICAL ONLY FILED TO HB: Mod: PBBFAC,,,

## 2025-05-06 PROCEDURE — 20610 DRAIN/INJ JOINT/BURSA W/O US: CPT | Mod: 50,PBBFAC,PO | Performed by: ORTHOPAEDIC SURGERY

## 2025-05-06 PROCEDURE — 99212 OFFICE O/P EST SF 10 MIN: CPT | Mod: PBBFAC,PO | Performed by: ORTHOPAEDIC SURGERY

## 2025-05-06 PROCEDURE — 99999 PR PBB SHADOW E&M-EST. PATIENT-LVL II: CPT | Mod: PBBFAC,,, | Performed by: ORTHOPAEDIC SURGERY

## 2025-05-06 RX ADMIN — TRIAMCINOLONE ACETONIDE 40 MG: 40 INJECTION, SUSPENSION INTRA-ARTICULAR; INTRAMUSCULAR at 01:05

## 2025-05-08 ENCOUNTER — HOSPITAL ENCOUNTER (OUTPATIENT)
Dept: RADIOLOGY | Facility: HOSPITAL | Age: 70
Discharge: HOME | End: 2025-05-08
Attending: ORTHOPAEDIC SURGERY
Payer: MEDICARE

## 2025-05-08 ENCOUNTER — TELEPHONE (OUTPATIENT)
Dept: ORTHOPEDICS | Facility: CLINIC | Age: 70
End: 2025-05-08
Payer: MEDICARE

## 2025-05-08 DIAGNOSIS — M54.16 LUMBAR RADICULOPATHY: ICD-10-CM

## 2025-05-08 DIAGNOSIS — M54.16 LUMBAR RADICULOPATHY: Primary | ICD-10-CM

## 2025-05-08 DIAGNOSIS — M54.16 LUMBAR RADICULOPATHY, CHRONIC: ICD-10-CM

## 2025-05-08 PROCEDURE — 72148 MRI LUMBAR SPINE W/O DYE: CPT | Mod: 26,,, | Performed by: RADIOLOGY

## 2025-05-08 PROCEDURE — 72148 MRI LUMBAR SPINE W/O DYE: CPT | Mod: TC,PO

## 2025-05-08 NOTE — TELEPHONE ENCOUNTER
----- Message from Svitlana sent at 5/8/2025 11:44 AM CDT -----  Regarding: JOHNY LOPEZ [[id number]]  Type : Patient Call  Who Called :JOHNY LOPEZ [[id number]]  Does the patient know what this is regarding?:patient called and stated that he has just completed his MRI and was advised to call and inform this provider once completed. Thanks!!  Would the patient rather a call back or a response via [facility]?call back   Best Call Back Number:[phone]  Additional Information:

## 2025-05-12 ENCOUNTER — LAB VISIT (OUTPATIENT)
Dept: LAB | Facility: HOSPITAL | Age: 70
End: 2025-05-12
Attending: NURSE PRACTITIONER
Payer: MEDICARE

## 2025-05-12 DIAGNOSIS — R16.0 HEPATOMEGALY: ICD-10-CM

## 2025-05-12 DIAGNOSIS — K76.9 LIVER DISEASE, UNSPECIFIED: ICD-10-CM

## 2025-05-12 LAB
CREAT SERPL-MCNC: 0.6 MG/DL (ref 0.5–1.4)
GFR SERPLBLD CREATININE-BSD FMLA CKD-EPI: >60 ML/MIN/1.73/M2

## 2025-05-12 PROCEDURE — 82565 ASSAY OF CREATININE: CPT

## 2025-05-12 PROCEDURE — 36415 COLL VENOUS BLD VENIPUNCTURE: CPT | Mod: PO

## 2025-05-13 RX ORDER — TRIAMCINOLONE ACETONIDE 40 MG/ML
40 INJECTION, SUSPENSION INTRA-ARTICULAR; INTRAMUSCULAR
Status: DISCONTINUED | OUTPATIENT
Start: 2025-05-06 | End: 2025-05-13 | Stop reason: HOSPADM

## 2025-05-13 NOTE — PROCEDURES
Large Joint Aspiration/Injection: bilateral knee    Date/Time: 5/6/2025 1:15 PM    Performed by: Casper Nixon MD  Authorized by: Casper Nixon MD    Consent Done?:  Yes (Verbal)  Indications:  Pain  Timeout: prior to procedure the correct patient, procedure, and site was verified    Prep: patient was prepped and draped in usual sterile fashion    Local anesthetic:  Lidocaine 1% without epinephrine  Anesthetic total (ml):  5      Details:  Needle Size:  21 G  Approach:  Anterolateral  Location:  Knee  Laterality:  Bilateral  Site:  Bilateral knee  Medications (Right):  40 mg triamcinolone acetonide 40 mg/mL  Medications (Left):  40 mg triamcinolone acetonide 40 mg/mL  Patient tolerance:  Patient tolerated the procedure well with no immediate complications

## 2025-05-14 ENCOUNTER — HOSPITAL ENCOUNTER (OUTPATIENT)
Dept: RADIOLOGY | Facility: HOSPITAL | Age: 70
Discharge: HOME | End: 2025-05-14
Attending: NURSE PRACTITIONER
Payer: MEDICARE

## 2025-05-14 ENCOUNTER — RESULTS FOLLOW-UP (OUTPATIENT)
Dept: HEPATOLOGY | Facility: CLINIC | Age: 70
End: 2025-05-14
Payer: MEDICARE

## 2025-05-14 DIAGNOSIS — R16.0 HEPATOMEGALY: ICD-10-CM

## 2025-05-14 DIAGNOSIS — K76.9 LIVER DISEASE, UNSPECIFIED: ICD-10-CM

## 2025-05-14 DIAGNOSIS — K21.9 GASTROESOPHAGEAL REFLUX DISEASE, UNSPECIFIED WHETHER ESOPHAGITIS PRESENT: ICD-10-CM

## 2025-05-14 PROCEDURE — 74178 CT ABD&PLV WO CNTR FLWD CNTR: CPT | Mod: TC

## 2025-05-14 PROCEDURE — 74178 CT ABD&PLV WO CNTR FLWD CNTR: CPT | Mod: 26,,, | Performed by: RADIOLOGY

## 2025-05-14 PROCEDURE — 25500020 PHARM REV CODE 255: Performed by: NURSE PRACTITIONER

## 2025-05-14 RX ORDER — PANTOPRAZOLE SODIUM 40 MG/1
40 TABLET, DELAYED RELEASE ORAL DAILY
Qty: 90 TABLET | Refills: 1 | Status: SHIPPED | OUTPATIENT
Start: 2025-05-14 | End: 2026-05-14

## 2025-05-14 RX ADMIN — IOHEXOL 100 ML: 350 INJECTION, SOLUTION INTRAVENOUS at 02:05

## 2025-05-15 ENCOUNTER — PATIENT MESSAGE (OUTPATIENT)
Dept: FAMILY MEDICINE | Facility: CLINIC | Age: 70
End: 2025-05-15
Payer: MEDICARE

## 2025-05-15 NOTE — TELEPHONE ENCOUNTER
Noted.  Set up follow-up appointment with me in two weeks.  So we can set up repeat imaging to monitor the lymphadenopathy.

## 2025-05-29 ENCOUNTER — OFFICE VISIT (OUTPATIENT)
Dept: FAMILY MEDICINE | Facility: CLINIC | Age: 70
End: 2025-05-29
Payer: MEDICARE

## 2025-05-29 VITALS
HEIGHT: 70 IN | WEIGHT: 249.31 LBS | HEART RATE: 70 BPM | OXYGEN SATURATION: 98 % | BODY MASS INDEX: 35.69 KG/M2 | DIASTOLIC BLOOD PRESSURE: 72 MMHG | SYSTOLIC BLOOD PRESSURE: 136 MMHG

## 2025-05-29 DIAGNOSIS — I70.0 AORTIC CALCIFICATION: Primary | ICD-10-CM

## 2025-05-29 DIAGNOSIS — E11.65 TYPE 2 DIABETES MELLITUS WITH HYPERGLYCEMIA, WITHOUT LONG-TERM CURRENT USE OF INSULIN: ICD-10-CM

## 2025-05-29 DIAGNOSIS — R16.2 HEPATOSPLENOMEGALY: ICD-10-CM

## 2025-05-29 DIAGNOSIS — R59.0 LOCALIZED ENLARGED LYMPH NODES: ICD-10-CM

## 2025-05-29 DIAGNOSIS — D69.6 THROMBOCYTOPENIA: ICD-10-CM

## 2025-05-29 DIAGNOSIS — K76.0 HEPATIC STEATOSIS: ICD-10-CM

## 2025-05-29 DIAGNOSIS — Z79.899 ENCOUNTER FOR LONG-TERM (CURRENT) USE OF MEDICATIONS: ICD-10-CM

## 2025-05-29 PROCEDURE — 99999 PR PBB SHADOW E&M-EST. PATIENT-LVL IV: CPT | Mod: PBBFAC,,, | Performed by: FAMILY MEDICINE

## 2025-05-29 PROCEDURE — 99214 OFFICE O/P EST MOD 30 MIN: CPT | Mod: PBBFAC,PO | Performed by: FAMILY MEDICINE

## 2025-05-29 PROCEDURE — G2211 COMPLEX E/M VISIT ADD ON: HCPCS | Mod: S$PBB,,, | Performed by: FAMILY MEDICINE

## 2025-05-29 PROCEDURE — 99214 OFFICE O/P EST MOD 30 MIN: CPT | Mod: S$PBB,,, | Performed by: FAMILY MEDICINE

## 2025-05-29 RX ORDER — TIRZEPATIDE 12.5 MG/.5ML
12.5 INJECTION, SOLUTION SUBCUTANEOUS
Qty: 2 ML | Refills: 0 | OUTPATIENT
Start: 2025-05-29

## 2025-05-29 RX ORDER — TIRZEPATIDE 12.5 MG/.5ML
12.5 INJECTION, SOLUTION SUBCUTANEOUS
Qty: 6 ML | Refills: 1 | Status: SHIPPED | OUTPATIENT
Start: 2025-05-29 | End: 2025-11-25

## 2025-05-29 NOTE — PATIENT INSTRUCTIONS
Follow up in about 6 months (around 11/29/2025), or if symptoms worsen or fail to improve.     Dear patient,   As a result of recent federal legislation (The Federal Cures Act), you may receive lab or pathology results from your visit in your [facility] account before your physician is able to contact you. Your physician or their representative will relay the results to you with their recommendations at their soonest availability.     If no improvement in symptoms or symptoms worsen, please be advised to call MD, follow-up at clinic and/or go to ER if becomes severe.    Casper Barrett M.D.        We Offer TELEHEALTH & Same Day Appointments!   Book your Telehealth appointment with me through my nurse or   Clinic appointments on [email or web address]!    [address] [place] [address]    Office: [phone]   FAX: [phone]    Check out my Facebook Page and Follow Me at: [email or web address]    Check out my website at [employer] by clicking on: [email or web address]    To Schedule appointments online, go to [email or web address]: [email or web address]

## 2025-05-29 NOTE — TELEPHONE ENCOUNTER
No care due was identified.  [facility] Embedded Care Due Messages. Reference number: [id number].   5/29/2025 1:22:19 PM CDT

## 2025-05-29 NOTE — PROGRESS NOTES
PLAN:      Assessment & Plan  1. Hepatic steatosis.  - Hepatic steatosis diagnosed; patient advised to continue weight loss efforts.  - Liver enzymes to be monitored regularly.  - FibroScan scheduled for 07/14/2025 to assess liver fibrosis.  - Further management will be discussed based on FibroScan results.    2. Hepatosplenomegaly.  - Hepatosplenomegaly noted; ultrasound showed liver size increase from 8.8 to 20.1 cm.  - Despite weight loss, liver size has increased, possibly indicating a healthier liver rather than cirrhosis.  - Liver enzymes to be monitored regularly.  - Repeat CT scan of the abdomen and pelvis to be scheduled in 6 months to monitor liver size and rule out infiltrative disease.    3. Thrombocytopenia.  - Thrombocytopenia diagnosed; patient following up with hematologist for low platelet counts.  - Advised to continue follow-ups with hematologist.  - If platelet counts drop significantly, leading to excessive bleeding or bruising, a platelet transfusion may be considered.  - Monitoring of platelet counts to continue.    4. Mildly enlarged retroperitoneal lymph nodes.  - Mildly enlarged retroperitoneal lymph nodes measuring up to 2.1 x 1.2 cm noted.  - Nonspecific enlargement possibly due to weight loss or other benign causes.  - Repeat CT scan to be scheduled in 6 months to monitor lymph nodes.  - No immediate concern; monitoring to continue.    5. Aortic atherosclerosis.  - Aortic atherosclerosis diagnosed, common with aging.  - Advised to continue seeing cardiologist for management.  - No immediate intervention required; routine cardiology follow-ups to continue.    6. Weight management.  - Weight stabilized; patient currently on Mounjaro and metformin.  - Mounjaro dosage to be increased to 12.5 mg for 3 months.  - If low blood sugar symptoms such as dizziness, lightheadedness, or weakness occur, metformin discontinuation will be considered.  - Patient to inform provider if wishing to increase  Mounjaro dosage to 15 mg after 3 months.    Follow-up  - Follow-up appointment scheduled in 6 months.    Problem List Items Addressed This Visit       Thrombocytopenia (Chronic)    Type 2 diabetes mellitus with hyperglycemia, without long-term current use of insulin (Chronic)    Titrating MOUNJARO up to 15 as tolerated.  We will plan to monitor hemoglobin A1c at designated intervals 3 to 6 months.  I recommend ongoing Education for diabetic diet and exercise protocol.  We will continue to monitor for side effects.    Please be advised of symptoms to monitor for and to notify me immediately if persistent or worsening.  Follow up with Ophthalmology/Optometry and Podiatry at least annually.           Relevant Medications    tirzepatide (MOUNJARO) 12.5 mg/0.5 mL PnIj    Encounter for long-term (current) use of medications (Chronic)    Complete history and physical was completed today.  Complete and thorough medication reconciliation was performed.  Discussed risks and benefits of medications.  Advised patient on orders and health maintenance.  We discussed old records and old labs if available.  Will request any records not available through epic.  Continue current medications listed on your summary sheet.           Localized enlarged lymph nodes    Set up repeat CT scan in six months for monitoring.  No lymphadenopathy on physical exam.         Relevant Orders    CT Abdomen Pelvis W Wo Contrast    Creatinine, serum    Aortic calcification - Primary    LDL less than 70.  Continue statin.         Hepatosplenomegaly    Hepatic steatosis     Future Appointments       Date Provider Specialty Appt Notes    6/9/2025 Lani Asencio DPM Podiatry Nail trim    6/20/2025 Giovanni Durand MD Pain Medicine Lumbar radiculopathy [M54.16]    7/7/2025 Guerda Suárez PA-C Family Medicine     7/10/2025  Lab [facility]    7/14/2025 Basia To NP Hematology and Oncology  Arrive at: Telehealth 2mthfu/labsprior/kj    7/23/2025   Hepatology Hepatic steatosis [K76.0]  Hepatosplenomegaly [R16.2]  Thrombocytopenia [D69.6]    7/24/2025 Lani Asencio DPM Podiatry 4 month nail care    8/7/2025 Casper Nixon MD Orthopedics B knee 3mo f/u    10/28/2025  Lab lab    11/28/2025  Radiology .           Medication Management for assessment above:   Medication List with Changes/Refills   New Medications    TIRZEPATIDE (MOUNJARO) 12.5 MG/0.5 ML PNIJ    Inject 12.5 mg into the skin every 7 days.   Current Medications    ASPIRIN (ECOTRIN) 81 MG EC TABLET    Take 81 mg by mouth once daily.    ATORVASTATIN (LIPITOR) 40 MG TABLET    TAKE 1 TABLET BY MOUTH EVERY DAY    DORZOLAMIDE-TIMOLOL 2-0.5% (COSOPT) 22.3-6.8 MG/ML OPHTHALMIC SOLUTION    Place 1 drop into the right eye 2 (two) times daily.    ENALAPRIL (VASOTEC) 20 MG TABLET    TAKE 1 TABLET BY MOUTH EVERY DAY    LATANOPROST 0.005 % OPHTHALMIC SOLUTION    Place 1 drop into both eyes every evening.    METFORMIN (GLUCOPHAGE-XR) 500 MG ER 24HR TABLET    Take 1 tablet (500 mg total) by mouth daily with breakfast.    MULTIVITAMIN WITH MINERALS TABLET    Take 1 tablet by mouth once daily.    PANTOPRAZOLE (PROTONIX) 40 MG TABLET    Take 1 tablet (40 mg total) by mouth once daily.    TADALAFIL (CIALIS) 5 MG TABLET    Take 5 mg by mouth once daily.    TESTOSTERONE CYPIONATE (DEPOTESTOTERONE CYPIONATE) 100 MG/ML INJECTION    Inject 200 mg into the muscle every 14 (fourteen) days.   Discontinued Medications    MOUNJARO 10 MG/0.5 ML PNIJ    INJECT 10 MG INTO THE SKIN EVERY 7 DAYS       Casper Barrett M.D.  ==========================================================================  Subjective:   Patient ID: Lazarus Zamudio is a 70 y.o. male.  has a past medical history of Diabetes mellitus, type 2, Hypertension, and Morbid obesity.   Chief Complaint: Follow-up      History of Present Illness  The patient is a 70-year-old male who presents for review of CT results ordered by an external provider.    He was  referred to our facility by a nurse practitioner specializing in hepatology, whom he last consulted on 04/28/2025. The nurse practitioner had previously ordered a series of blood tests, which were conducted, but the results have not been communicated to him yet. A FibroScan has been scheduled for 07/2025. He reports no pain in the right upper quadrant, nausea, vomiting, or jaundice. He has not completed the hepatitis B vaccine series, having only received the first dose.    He is under the care of a cardiologist and is also seeing a hematologist for low platelet count. The hematologist initially suspected a liver-related issue and referred him to a hepatologist, who subsequently referred him back to the hematologist.    His weight has remained stable, and he reports no episodes of hypoglycemia. He is currently on Mounjaro and metformin.    Problem List Items Addressed This Visit       Thrombocytopenia (Chronic)    Overview   March 2025:  Platelet count is stable but is not improving.  Following with Hematology closely.    Chronic.  Worsening.  Platelet count less than 100.  Denies any bleeding but does have easy bruising.  Patient is due for follow-up with Hematology.    Lab Results   Component Value Date    IRON 66 02/24/2025    TRANSFERRIN 219 02/24/2025    TIBC 324 02/24/2025    FESATURATED 20 02/24/2025      Lab Results   Component Value Date    ZAJCESRN00 502 10/28/2024     Lab Results   Component Value Date    FOLATE 9.2 10/28/2024     Lab Results   Component Value Date    WBC 4.63 03/20/2025    HGB 15.8 03/20/2025    HCT 49.1 03/20/2025    MCV 93 03/20/2025    PLT 77 (L) 03/20/2025                Type 2 diabetes mellitus with hyperglycemia, without long-term current use of insulin (Chronic)    Overview   May 2025:  Diabetes Medications              metFORMIN (GLUCOPHAGE-XR) 500 MG ER 24hr tablet Take 1 tablet (500 mg total) by mouth daily with breakfast.    tirzepatide (MOUNJARO) 12.5 mg/0.5 mL PnIj Inject  "12.5 mg into the skin every 7 days.          March 2025:   Diabetes Medications              metFORMIN (GLUCOPHAGE-XR) 500 MG ER 24hr tablet Take 1 tablet (500 mg total) by mouth daily with breakfast.    tirzepatide (MOUNJARO) 10 mg/0.5 mL PnIj INJECT 10 MG INTO THE SKIN EVERY 7 DAYS              July 2024:  Patient reports increased stress over the last few months due to the passing of his daughter.  They report noncompliance with diet over these months.  Patient also recently had bilateral knee injection with steroid.  January 2024:  Reports compliance.  No side effects reported.  Weight is stable.  Diabetes Medications               semaglutide (OZEMPIC) 2 mg/dose (8 mg/3 mL) PnIj Inject 2 mg into the skin every 7 days.          December 2023:  Patient reports compliance with Ozempic when he can find it in stock.  Patient denies any history of thyroid cancer, pancreatitis, MEN syndrome.   February 2021:  Patient reports compliance with Ozempic 0.5 milligrams weekly.  Patient reports no side effects.  Symptoms are controlled.  Due for labs.  BMI Readings from Last 10 Encounters:   03/24/25 35.87 kg/m²   02/26/25 36.20 kg/m²   02/04/25 36.88 kg/m²   01/15/25 36.88 kg/m²   12/05/24 35.87 kg/m²   11/13/24 37.17 kg/m²   11/06/24 37.17 kg/m²   10/31/24 38.31 kg/m²   09/19/24 38.31 kg/m²   08/28/24 37.81 kg/m²     Diabetes Management Status    Statin: Taking  ACE/ARB: Taking    Screening or Prevention Patient's value Goal Complete/Controlled?   HgA1C Testing and Control   Lab Results   Component Value Date    HGBA1C 5.8 (H) 03/20/2025      Annually/Less than 8% Yes   Lipid profile : 03/20/2025 Annually Yes   LDL control Lab Results   Component Value Date    LDLCALC 60.0 (L) 03/20/2025    Annually/Less than 100 mg/dl  Yes   Nephropathy screening Lab Results   Component Value Date    LABMICR <5.0 06/27/2024     Lab Results   Component Value Date    PROTEINUA Negative 03/01/2022     No results found for: "UTPCR"   Annually " Yes   Blood pressure BP Readings from Last 1 Encounters:   03/24/25 122/70    Less than 140/90 Yes   Dilated retinal exam : 12/03/2024 Annually Yes   Foot exam   : 06/20/2024 Annually Yes              Current Assessment & Plan   Titrating MOUNJARO up to 15 as tolerated.  We will plan to monitor hemoglobin A1c at designated intervals 3 to 6 months.  I recommend ongoing Education for diabetic diet and exercise protocol.  We will continue to monitor for side effects.    Please be advised of symptoms to monitor for and to notify me immediately if persistent or worsening.  Follow up with Ophthalmology/Optometry and Podiatry at least annually.           Encounter for long-term (current) use of medications (Chronic)    Overview   May 2025:  Reviewed labs.  March 2025:  Reviewed labs.  July 2024:   Reviewed labs.  December 2023: Reviewed labs.  April 2023: Reviewed labs.  CHRONIC. Stable. Compliant with medications for managed conditions. See medication list. No SE reported.   Routine lab analysis is being monitored. Refills were addressed.  Lab Results   Component Value Date    WBC 6.08 04/28/2025    HGB 15.8 04/28/2025    HCT 48.8 04/28/2025    MCV 92 04/28/2025    PLT 82 (L) 04/28/2025         Chemistry        Component Value Date/Time     04/28/2025 1602     03/20/2025 0959    K 4.2 04/28/2025 1602    K 4.7 03/20/2025 0959     04/28/2025 1602     03/20/2025 0959    CO2 27 04/28/2025 1602    CO2 27 03/20/2025 0959    BUN 20 04/28/2025 1602    CREATININE 0.6 05/12/2025 1328     04/28/2025 1602     (H) 03/20/2025 0959        Component Value Date/Time    CALCIUM 8.8 04/28/2025 1602    CALCIUM 9.1 03/20/2025 0959    ALKPHOS 97 04/28/2025 1602    ALKPHOS 99 03/20/2025 0959    AST 22 04/28/2025 1602    AST 21 03/20/2025 0959    ALT 21 04/28/2025 1602    ALT 21 03/20/2025 0959    BILITOT 0.8 04/28/2025 1602    BILITOT 1.0 03/20/2025 0959    ESTGFRAFRICA >60.0 02/14/2022 1028    EGFRNONAA  >60.0 02/14/2022 1028          Lab Results   Component Value Date    TSH 2.592 10/11/2024              Current Assessment & Plan   Complete history and physical was completed today.  Complete and thorough medication reconciliation was performed.  Discussed risks and benefits of medications.  Advised patient on orders and health maintenance.  We discussed old records and old labs if available.  Will request any records not available through epic.  Continue current medications listed on your summary sheet.           Localized enlarged lymph nodes    Overview   Narrative & Impression  EXAMINATION:  CT ABDOMEN PELVIS W WO CONTRAST     CLINICAL HISTORY:  Hepatomegaly, not elsewhere classifiedLiver disease, chronic, tumor screening (Ped 0-18y);To rule otu infiltrative diseae (granulomatous disease, amyloidosis, hepatosplenic T cell lymphoma).;     TECHNIQUE:  Axial CT imaging was performed through the abdomen and pelvis without and with 100cc  of intravenous contrast. Multiplanar reformats were performed and interpreted.     COMPARISON:  None     FINDINGS:  Lung bases are clear.     The liver, kidneys, pancreas, adrenal glands, and gallbladder within normal limits.     Moderate splenomegaly.  No biliary ductal dilatation.     No free fluid, free air, or inflammatory change.     The bowel is nondistended and within normal limits.  Mildly enlarged retroperitoneal lymph nodes measuring up to 2.1 x 1.2 cm.  Retroaortic course of the left renal vein.     Aortic atherosclerosis without evidence of aneurysm.     The urinary bladder is unremarkable.     No significant osseous abnormality is identified.     Impression:     Splenomegaly and mildly enlarged retroperitoneal lymph nodes.  No suspicious liver lesions.     All CT scans at this facility are performed  using dose modulation techniques as appropriate to performed exam including the following:  automated exposure control; adjustment of mA and/or kV according to the patients  size (this includes techniques or standardized protocols for targeted exams where dose is matched to indication/reason for exam: i.e. extremities or head);  iterative reconstruction technique.        Electronically signed by:Everette Bonilla MD  Date:                                            05/14/2025  Time:                                           15:32        Exam Ended: 05/14/25 14:26 CDT            Current Assessment & Plan   Set up repeat CT scan in six months for monitoring.  No lymphadenopathy on physical exam.         Aortic calcification - Primary    Overview   Seen on imaging.    CHRONIC. STABLE. Lab analysis reviewed.   (-) CP, SOB, abdominal pain, N/V/D, constipation, jaundice, skin changes.  (-) Myalgias  Lab Results   Component Value Date    CHOL 105 (L) 03/20/2025    CHOL 106 (L) 10/11/2024    CHOL 121 06/27/2024     Lab Results   Component Value Date    HDL 37 (L) 03/20/2025    HDL 39 (L) 10/11/2024    HDL 42 06/27/2024     Lab Results   Component Value Date    LDLCALC 60.0 (L) 03/20/2025    LDLCALC 59.2 (L) 10/11/2024    LDLCALC 69.4 06/27/2024     Lab Results   Component Value Date    TRIG 40 03/20/2025    TRIG 39 10/11/2024    TRIG 48 06/27/2024     Lab Results   Component Value Date    CHOLHDL 35.2 03/20/2025    CHOLHDL 36.8 10/11/2024    CHOLHDL 34.7 06/27/2024     Lab Results   Component Value Date    TOTALCHOLEST 2.8 03/20/2025    TOTALCHOLEST 2.7 10/11/2024    TOTALCHOLEST 2.9 06/27/2024     Lab Results   Component Value Date    ALT 21 04/28/2025    AST 22 04/28/2025    ALKPHOS 97 04/28/2025    BILITOT 0.8 04/28/2025     ======================================================  Hyperlipidemia Medications              atorvastatin (LIPITOR) 40 MG tablet TAKE 1 TABLET BY MOUTH EVERY DAY                   Current Assessment & Plan   LDL less than 70.  Continue statin.         Hepatosplenomegaly    Hepatic steatosis        Review of patient's allergies indicates:   Allergen Reactions    Penicillins  "Rash     Current Outpatient Medications   Medication Instructions    aspirin (ECOTRIN) 81 mg, Daily    atorvastatin (LIPITOR) 40 mg, Oral    dorzolamide-timolol 2-0.5% (COSOPT) 22.3-6.8 mg/mL ophthalmic solution 1 drop, 2 times daily    enalapril (VASOTEC) 20 mg, Oral    latanoprost 0.005 % ophthalmic solution 1 drop, Nightly    metFORMIN (GLUCOPHAGE-XR) 500 mg, Oral, With breakfast    MOUNJARO 12.5 mg, Subcutaneous, Every 7 days    multivitamin with minerals tablet 1 tablet, Daily    pantoprazole (PROTONIX) 40 mg, Oral, Daily    tadalafiL (CIALIS) 5 mg, Daily    testosterone cypionate (DEPOTESTOTERONE CYPIONATE) 200 mg, Every 14 days      I have reviewed the PMH, social history, FamilyHx, surgical history, allergies and medications documented / confirmed by the patient at the time of this visit.  Review of Systems   Constitutional:  Positive for fatigue. Negative for chills, fever and unexpected weight change.   HENT:  Negative for ear pain and sore throat.    Eyes:  Negative for redness and visual disturbance.   Respiratory:  Negative for cough and shortness of breath.    Cardiovascular:  Negative for chest pain and palpitations.   Gastrointestinal:  Negative for nausea and vomiting.   Endocrine: Negative for cold intolerance and heat intolerance.   Genitourinary:  Negative for difficulty urinating and hematuria.   Musculoskeletal:  Positive for arthralgias. Negative for myalgias.   Skin:  Negative for rash and wound.   Allergic/Immunologic: Negative for environmental allergies and food allergies.   Neurological:  Negative for weakness and headaches.   Hematological:  Negative for adenopathy. Bruises/bleeds easily.   Psychiatric/Behavioral:  Negative for sleep disturbance. The patient is not nervous/anxious.      Objective:   /72   Pulse 70   Ht 5' 10" (1.778 m)   Wt 113.1 kg (249 lb 4.8 oz)   SpO2 98%   BMI 35.77 kg/m²   Physical Exam  Vitals and nursing note reviewed.   Constitutional:       General: " He is not in acute distress.     Appearance: He is well-developed. He is not diaphoretic.   HENT:      Head: Normocephalic and atraumatic.      Right Ear: External ear normal.      Left Ear: External ear normal.      Nose: Nose normal. No rhinorrhea.   Eyes:      Extraocular Movements: Extraocular movements intact.      Pupils: Pupils are equal, round, and reactive to light.   Cardiovascular:      Rate and Rhythm: Normal rate.      Pulses: Normal pulses.   Pulmonary:      Effort: Pulmonary effort is normal. No respiratory distress.      Breath sounds: Normal breath sounds.   Abdominal:      General: Bowel sounds are normal.      Palpations: Abdomen is soft.   Musculoskeletal:         General: Normal range of motion.      Cervical back: Normal range of motion and neck supple.   Lymphadenopathy:      Head:      Right side of head: No occipital adenopathy.      Left side of head: No occipital adenopathy.      Cervical: No cervical adenopathy.      Right cervical: No superficial, deep or posterior cervical adenopathy.     Left cervical: No superficial, deep or posterior cervical adenopathy.      Upper Body:      Right upper body: No supraclavicular, axillary or pectoral adenopathy.      Left upper body: No supraclavicular, axillary or pectoral adenopathy.   Skin:     General: Skin is warm and dry.      Capillary Refill: Capillary refill takes less than 2 seconds.      Findings: Bruising present. No rash.   Neurological:      General: No focal deficit present.      Mental Status: He is alert and oriented to person, place, and time.      Cranial Nerves: No cranial nerve deficit.      Motor: No weakness.      Gait: Gait normal.   Psychiatric:         Attention and Perception: He is attentive.         Mood and Affect: Mood normal. Mood is not anxious or depressed. Affect is not labile, blunt, angry or inappropriate.         Speech: He is communicative. Speech is not rapid and pressured, delayed, slurred or tangential.          Behavior: Behavior normal. Behavior is not agitated, slowed, aggressive, withdrawn, hyperactive or combative.         Thought Content: Thought content normal. Thought content is not paranoid or delusional. Thought content does not include homicidal or suicidal ideation. Thought content does not include homicidal or suicidal plan.         Cognition and Memory: Memory is not impaired.         Judgment: Judgment normal. Judgment is not impulsive or inappropriate.       Physical Exam  Neck: Supple, no abnormalities  Respiratory: Clear to auscultation, no wheezing, rales or rhonchi  Cardiovascular: Regular rate and rhythm, no murmurs, rubs, or gallops    Results  Labs   - A1c: 5.8%    Imaging   - CT scan of the abdomen and pelvis: No liver lesions, mildly enlarged retroperitoneal lymph nodes measuring up to 2.1 x 1.2 cm. Liver, kidney, pancreas, adrenal glands, gallbladder all within normal limits. Spleen is moderate size. Aortic atherosclerosis present.    Assessment:     1. Aortic calcification    2. Localized enlarged lymph nodes    3. Type 2 diabetes mellitus with hyperglycemia, without long-term current use of insulin    4. Encounter for long-term (current) use of medications    5. Hepatic steatosis    6. Hepatosplenomegaly    7. Thrombocytopenia      MDM:   Moderate medical complexity. Moderate risk.   Total time: 31 minutes.  This includes total time spent on the encounter, which includes face to face time and non-face to face time preparing to see the patient (eg, review of previous medical records, tests), Obtaining and/or reviewing separately obtained history, documenting clinical information in the electronic or other health record, independently interpreting results (not separately reported)/communicating results to the patient/family/caregiver, and/or care coordination (not separately reported).    I have Reviewed and summarized old records.  I have performed thorough medication reconciliation today and  discussed risk and benefits of medications.  I have reviewed labs and discussed with patient.  All questions were answered.  I am requesting old records and will review them once they are available.  Visit today included increased complexity associated with the care of the episodic problem see above assessment addressed and managing the longitudinal care of the patient due to the serious and/or complex managed problem(s) see above.    I have signed for the following orders AND/OR meds.  Orders Placed This Encounter   Procedures    CT Abdomen Pelvis W Wo Contrast     Standing Status:   Future     Expected Date:   5/29/2025     Expiration Date:   5/29/2026     Is the patient allergic to iodine contrast?:   No     Does this patient have impaired renal function?:   No     May the Radiologist modify the order per protocol to meet the clinical needs of the patient?:   Yes     Oral/Rectal Contrast instructions::   NO Oral Contrast     Special CT ABD Protocol Request?:   Routine    Creatinine, serum     Standing Status:   Future     Expected Date:   5/29/2025     Expiration Date:   8/27/2026     Send normal result to authorizing provider's In Basket if patient is active on MyChart::   Yes     Medications Ordered This Encounter   Medications    tirzepatide (MOUNJARO) 12.5 mg/0.5 mL PnIj     Sig: Inject 12.5 mg into the skin every 7 days.     Dispense:  6 mL     Refill:  1        Follow up in about 6 months (around 11/29/2025), or if symptoms worsen or fail to improve.  Future Appointments       Date Provider Specialty Appt Notes    6/9/2025 Lani Asencio DPM Podiatry Nail trim    6/20/2025 Giovanni Durand MD Pain Medicine Lumbar radiculopathy [M54.16]    7/7/2025 Guerda Suárez PA-C Family Medicine     7/10/2025  Lab [facility]    7/14/2025 Basia To, NP Hematology and Oncology  Arrive at: Telehealth 2mthfu/labsprior/kj    7/23/2025  Hepatology Hepatic steatosis [K76.0]  Hepatosplenomegaly  [R16.2]  Thrombocytopenia [D69.6]    7/24/2025 Lani Asencio, DPM Podiatry 4 month nail care    8/7/2025 Casper Nixon MD Orthopedics B knee 3mo f/u    10/28/2025  Lab lab    11/28/2025  Radiology .          If no improvement in symptoms or symptoms worsen, advised to call/follow-up at clinic or go to ER. Patient voiced understanding and all questions/concerns were addressed.   DISCLAIMER: This note was compiled by using a speech recognition dictation system and therefore please be aware that typographical / speech recognition errors can and do occur.  Please contact me if you see any errors specifically.  Consent was obtained for [employer] recording system prior to the visit.    This note was generated with the assistance of ambient listening technology. Verbal consent was obtained by the patient and accompanying visitor(s) for the recording of patient appointment to facilitate this note. I attest to having reviewed and edited the generated note for accuracy, though some syntax or spelling errors may persist. Please contact the author of this note for any clarification.    Casper Barrett M.D.       Office: [phone] [id number] [place] [address]  FAX: [phone]

## 2025-05-29 NOTE — TELEPHONE ENCOUNTER
Refill Decision Note   Lazarus Zamudio  is requesting a refill authorization.  Brief Assessment and Rationale for Refill:  Quick Discontinue     Medication Therapy Plan:        Medication reconciliation completed: Yes   Comments:     Note composed:3:50 PM 05/29/2025           Pharmacy Call Documentation    Pharmacy Called:  [facility] Call Time: 3:48pm   Spoke with: HUYEN 05/29/2025      Called to verify that Mounjaro was covered on insurance. Confirmed that the claim went through with $0 copay.       Current Medication Requested: (refill encounter)   Requested Prescriptions     Refused Prescriptions Disp Refills    tirzepatide (MOUNJARO) 12.5 mg/0.5 mL PnIj 2 mL 0     Sig: Inject 12.5 mg into the skin every 7 days.      [facility]     Note composed: 05/29/2025 3:50 PM

## 2025-05-29 NOTE — ASSESSMENT & PLAN NOTE
Titrating MOUNJARO up to 15 as tolerated.  We will plan to monitor hemoglobin A1c at designated intervals 3 to 6 months.  I recommend ongoing Education for diabetic diet and exercise protocol.  We will continue to monitor for side effects.    Please be advised of symptoms to monitor for and to notify me immediately if persistent or worsening.  Follow up with Ophthalmology/Optometry and Podiatry at least annually.

## 2025-05-29 NOTE — TELEPHONE ENCOUNTER
Refill Routing Note   Medication(s) are not appropriate for processing by [facility] for the following reason(s):        Med affordability      [facility] action(s):  Route        Medication Therapy Plan: Pharmacy comment: Alternative Requested.      Appointments  past 12m or future 3m with PCP    Date Provider   Last Visit   5/29/2025 Casper Barrett MD   Next Visit   Visit date not found Casper Barrett MD   ED visits in past 90 days: 0        Note composed:3:08 PM 05/29/2025

## 2025-06-03 LAB
LEFT EYE DM RETINOPATHY: NEGATIVE
RIGHT EYE DM RETINOPATHY: NEGATIVE

## 2025-06-20 ENCOUNTER — OFFICE VISIT (OUTPATIENT)
Dept: PAIN MEDICINE | Facility: CLINIC | Age: 70
End: 2025-06-20
Payer: MEDICARE

## 2025-06-20 VITALS
HEIGHT: 70 IN | DIASTOLIC BLOOD PRESSURE: 70 MMHG | SYSTOLIC BLOOD PRESSURE: 124 MMHG | BODY MASS INDEX: 35.32 KG/M2 | WEIGHT: 246.69 LBS | HEART RATE: 60 BPM

## 2025-06-20 DIAGNOSIS — M54.16 LUMBAR RADICULOPATHY: ICD-10-CM

## 2025-06-20 DIAGNOSIS — M47.816 LUMBAR SPONDYLOSIS: Primary | ICD-10-CM

## 2025-06-20 PROCEDURE — 99999 PR PBB SHADOW E&M-EST. PATIENT-LVL IV: CPT | Mod: PBBFAC,,, | Performed by: ANESTHESIOLOGY

## 2025-06-20 PROCEDURE — 99214 OFFICE O/P EST MOD 30 MIN: CPT | Mod: PBBFAC,PO | Performed by: ANESTHESIOLOGY

## 2025-06-20 PROCEDURE — 99204 OFFICE O/P NEW MOD 45 MIN: CPT | Mod: S$PBB,,, | Performed by: ANESTHESIOLOGY

## 2025-06-20 RX ORDER — GABAPENTIN 300 MG/1
300 CAPSULE ORAL 2 TIMES DAILY
Qty: 60 CAPSULE | Refills: 1 | Status: SHIPPED | OUTPATIENT
Start: 2025-06-20 | End: 2025-08-19

## 2025-06-20 NOTE — PROGRESS NOTES
[facility] New Patient Evaluation      Referred by: Dr. Casper Nixon    PCP:     CC:   Chief Complaint   Patient presents with    Pain    Low-back Pain    Back Pain          6/20/2025     1:13 PM   Last 3 PDI Scores   Pain Disability Index (PDI) 37         HPI:   Lazarus Zamudio is a 70 y.o. male patient who has a past medical history of Diabetes mellitus, type 2, Hypertension, and Morbid obesity. He presents with complaints of back pain that sometimes radiates down his leg. Pain worsens with standing and walking, and severity varies, currently at 2/10. On some days, pain does not bother him at all, while on others, he feels a catching sensation in his back when he gets up. His wife notes that he has a tendency to walk bent over, which she attributes to narrowing of the spinal canal. This bent posture has become more pronounced since he started having knee problems, to the point where his posture appears significantly aged and people have commented on his appearance. While sitting in the chair during the appointment, he experienced lower back pain.    He has been intentionally losing weight, which is noted as a positive change for his health. He reports having taken Gabapentin in the past.    He has a history of low platelets, which is still being investigated. He mentions having had an iron infusion six months ago due to low iron levels, which have since improved.    He denies fevers, chills, night sweats, or any numbness in his legs.    Pain Intervention History:      Past Spine Surgical History:      Past and current medications:  Antineuropathics:  NSAIDs:  Physical therapy:  Antidepressants:  Muscle relaxers:  Opioids:  Antiplatelets/Anticoagulants: aspirin     History:  Current Medications[1]    Past Medical History:   Diagnosis Date    Diabetes mellitus, type 2     Hypertension     Morbid obesity        Past Surgical History:   Procedure Laterality Date    COLONOSCOPY N/A 12/5/2024    Procedure:  COLONOSCOPY;  Surgeon: Stephanie Gonzalez MD;  Location: [facility];  Service: Gastroenterology;  Laterality: N/A;    ESOPHAGOGASTRODUODENOSCOPY N/A 12/5/2024    Procedure: EGD (ESOPHAGOGASTRODUODENOSCOPY);  Surgeon: Stephanie Gonzalez MD;  Location: [facility];  Service: Gastroenterology;  Laterality: N/A;    HERNIA REPAIR      INTRALUMINAL GASTROINTESTINAL TRACT IMAGING VIA CAPSULE N/A 12/23/2024    Procedure: IMAGING PROCEDURE, GI TRACT, INTRALUMINAL, VIA CAPSULE;  Surgeon: Ihsan Rodriguez RN;  Location: [facility] END[facility];  Service: Endoscopy;  Laterality: N/A;    KNEE ARTHROSCOPY W/ DEBRIDEMENT      VASECTOMY         Family History   Problem Relation Name Age of Onset    Heart disease Mother Maribeth     Vision loss Mother Maribeth     Hypothyroidism Mother Maribeth     Diabetes Father Ramon     Heart disease Father Ramon     Hypertension Father Ramon     Skin cancer Father Ramon     Diabetes Maternal Grandmother Kathy     Prostate cancer Maternal Grandfather Vincent     Cancer Maternal Grandfather Vincent     Hypothyroidism Daughter      Inflammatory bowel disease Neg Hx         Social History     Socioeconomic History    Marital status:    Occupational History     Employer:  [employer]   Tobacco Use    Smoking status: Never     Passive exposure: Never    Smokeless tobacco: Never   Substance and Sexual Activity    Alcohol use: Not Currently     Comment: Reported none.    Drug use: No    Sexual activity: Yes     Partners: Female   Social History Narrative    . 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     Social Drivers of Health     Financial Resource Strain: Patient Declined (2/20/2025)    Overall Financial Resource Strain (CARDIA)     Difficulty of Paying Living Expenses: Patient declined   Food Insecurity: Patient Declined (2/20/2025)    Hunger Vital Sign     Worried About Running Out of Food in the Last Year: Patient declined     Ran Out of Food in the Last Year: Patient declined   Transportation Needs: Patient  "Declined (2/20/2025)    PRAPARE - Transportation     Lack of Transportation (Medical): Patient declined     Lack of Transportation (Non-Medical): Patient declined   Physical Activity: Insufficiently Active (2/20/2025)    Exercise Vital Sign     Days of Exercise per Week: 2 days     Minutes of Exercise per Session: 20 min   Stress: Patient Declined (2/20/2025)    [facility] [employer] - Occupational Stress Questionnaire     Feeling of Stress : Patient declined   Housing Stability: Patient Declined (2/20/2025)    Housing Stability Vital Sign     Unable to Pay for Housing in the Last Year: Patient declined     Homeless in the Last Year: Patient declined       Review of patient's allergies indicates:   Allergen Reactions    Penicillins Rash       Review of Systems:  12 point review of systems is negative.    Physical Exam:  Vitals:    06/20/25 1315   BP: 124/70   Pulse: 60   Weight: 111.9 kg (246 lb 11.1 oz)   Height: 5' 10" (1.778 m)   PainSc:   2   PainLoc: Back     Body mass index is 35.4 kg/m².    Gen: NAD  Psych: mood appropriate for given condition  HEENT: eyes anicteric   CV: RRR  HEENT: anicteric   Respiratory: non-labored, no signs of respiratory distress  Abd: non-distended  Skin: warm, dry and intact.  Gait: No antalgic gait.     Reproducible pain with lumbar axial facet loading    Sensory:  Intact and symmetrical to light touch in L1-S1 dermatomes bilaterally.    Motor:     Right Left   L2/3 Iliacus Hip flexion  5  5   L3/4 Qudratus Femoris Knee Extension  5  5   L4/5 Tib Anterior Ankle Dorsiflexion   5  5   L5/S1 Extensor Hallicus Longus Great toe extension  5  5   S1/S2 Gastroc/Soleus Plantar Flexion  5  5      Right Left                  Patellar DTR 2+ 2+   Achilles DTR 2+ 2+                      Labs:  Lab Results   Component Value Date    LABA1C 6.3 (H) 07/20/2018    HGBA1C 5.8 (H) 03/20/2025       Lab Results   Component Value Date    WBC 6.08 04/28/2025    HGB 15.8 04/28/2025    " HCT 48.8 04/28/2025    MCV 92 04/28/2025    PLT 82 (L) 04/28/2025           Imaging:  MRI lumbar spine 5/8/25  FINDINGS:  Morphology: Diffuse low T1 marrow signal without evidence of any focal concerning lesions/marrow edema.  Incidental T12 and smaller L1 and L3 vertebral body hemangiomas as well as prominent multilevel endplate centered osteophytes and chronic appearing Schmorl's nodes throughout.  No fractures..     Alignment: Mild broad dextrocurvature.  Sagittal alignment is within normal limits.     Cord: Normal in contour, caliber, and signal intensity.  Conus positioning is within normal limits.     Disc levels:  T11-T12: Severe degenerative disc height loss.  No disc herniation or significant spinal canal/foraminal narrowing.  T12-L1: Moderate degenerative disc height loss and mild bilateral facet arthrosis.  The spinal canal and foramina remain patent.  L1-L2: Mild degenerative disc height loss and moderate bilateral facet arthrosis.  No disc herniation.  The spinal canal and foramina remain patent.  L2-L3: Mild degenerative disc height loss.  Presumed reactive discal fluid signal as well as disc bulging and prominent central osteophyte complex as well as moderate facet arthrosis and ligamentum flavum thickening producing moderate narrowing of the spinal canal centrally as well as mild to moderate narrowing of the subarticular recesses asymmetric to the left.  Endplate spurring contributes to moderate right and mild left foraminal narrowing.  L3-L4: Moderate degenerative disc height loss and desiccation with disc bulging and superimposed central osteophyte complex and moderate facet arthrosis with ligamentum flavum thickening producing moderate narrowing of the spinal canal and subarticular recesses asymmetric to the as well as moderate left and mild right foraminal narrowing.  L4-L5: Moderate degenerative disc height loss with presumed reactive discal fluid signal.  Disc bulging and moderate facet  arthrosis produces mild narrowing of the spinal canal.  There is moderate bilateral foraminal narrowing, left greater than right.  L5-S1: Moderate degenerative disc height loss and desiccation with disc bulging as well as a superimposed right lateralizing disc protrusion with osseous spurring and moderate bilateral facet arthrosis producing moderate narrowing of the right subarticular recess and mild to moderate spinal canal narrowing with mild narrowing of the left subarticular recess as well.  Moderate to severe right and moderate left foraminal narrowing.     Other: Visualized paraspinal soft tissues are within normal limits.    Diagnosis:  1. Lumbar spondylosis  -     Ambulatory Referral/Consult to Physical Therapy    2. Lumbar radiculopathy  -     Ambulatory referral/consult to Pain Clinic  -     Ambulatory Referral/Consult to Physical Therapy  -     gabapentin (NEURONTIN) 300 MG capsule; Take 1 capsule (300 mg total) by mouth 2 (two) times daily.  Dispense: 60 capsule; Refill: 1          Lazarus Zamudio is a 70 y.o. male patient who has a past medical history of Diabetes mellitus, type 2, Hypertension, and Morbid obesity. He presents with complaints of back pain that sometimes radiates down his leg. Pain worsens with standing and walking, and severity varies, currently at 2/10. On some days, pain does not bother him at all, while on others, he feels a catching sensation in his back when he gets up. His wife notes that he has a tendency to walk bent over, which she attributes to narrowing of the spinal canal. This bent posture has become more pronounced since he started having knee problems, to the point where his posture appears significantly aged and people have commented on his appearance. While sitting in the chair during the appointment, he experienced lower back pain.  He has been intentionally losing weight, which is noted as a positive change for his health. He reports having taken Gabapentin in the  past.  He has a history of low platelets, which is still being investigated. He mentions having had an iron infusion six months ago due to low iron levels, which have since improved.  He denies fevers, chills, night sweats, or any numbness in his legs.    - on exam he has full strength in his lower extremities intact sensation to light touch bilateral L2-S1.  He has reproducible pain with lumbar axial facet loading  - I independently reviewed his lumbar MRI and he has multilevel bilateral facet arthropathy.  He has moderate degenerative disc disease at L3-4, L4-5, L5-S1.  Multilevel bilateral foraminal narrowing.  Diffuse low T1 marrow signal without evidence of any focal concerning lesions/marrow edema.  He denies any constitutional symptoms or red flag symptoms  - at this time he feels like he has lower back pain that is well controlled with PT and PT directed home exercises  - we discussed a trial of gabapentin for any neuropathic component of his pain and he would like to do this.  I have prescribed gabapentin 300 mg to start q.h.s. for week and then increase to b.i.d. as tolerated.  He understands side effects can include drowsiness.  - I have him follow up in 2-3 months to assess how he is doing with his conservative treatment.  We can consider interventional treatments in the future if needed      : Not applicable    Giovanni Durand M.D.  Interventional Pain Medicine / Anesthesiology    This note was completed with dictation software and grammatical errors may exist.         [1]   Current Outpatient Medications:     aspirin (ECOTRIN) 81 MG EC tablet, Take 81 mg by mouth once daily., Disp: , Rfl:     atorvastatin (LIPITOR) 40 MG tablet, TAKE 1 TABLET BY MOUTH EVERY DAY, Disp: 90 tablet, Rfl: 2    dorzolamide-timolol 2-0.5% (COSOPT) 22.3-6.8 mg/mL ophthalmic solution, Place 1 drop into the right eye 2 (two) times daily., Disp: , Rfl:     enalapril (VASOTEC) 20 MG tablet, TAKE 1 TABLET BY MOUTH EVERY DAY, Disp:  90 tablet, Rfl: 3    latanoprost 0.005 % ophthalmic solution, Place 1 drop into both eyes every evening., Disp: , Rfl:     metFORMIN (GLUCOPHAGE-XR) 500 MG ER 24hr tablet, Take 1 tablet (500 mg total) by mouth daily with breakfast., Disp: 90 tablet, Rfl: 3    multivitamin with minerals tablet, Take 1 tablet by mouth once daily., Disp: , Rfl:     pantoprazole (PROTONIX) 40 MG tablet, Take 1 tablet (40 mg total) by mouth once daily., Disp: 90 tablet, Rfl: 1    tadalafiL (CIALIS) 5 MG tablet, Take 5 mg by mouth once daily., Disp: , Rfl:     testosterone cypionate (DEPOTESTOTERONE CYPIONATE) 100 mg/mL injection, Inject 200 mg into the muscle every 14 (fourteen) days., Disp: , Rfl:     tirzepatide (MOUNJARO) 12.5 mg/0.5 mL PnIj, Inject 12.5 mg into the skin every 7 days., Disp: 6 mL, Rfl: 1    gabapentin (NEURONTIN) 300 MG capsule, Take 1 capsule (300 mg total) by mouth 2 (two) times daily., Disp: 60 capsule, Rfl: 1

## 2025-06-24 ENCOUNTER — PATIENT OUTREACH (OUTPATIENT)
Dept: ADMINISTRATIVE | Facility: HOSPITAL | Age: 70
End: 2025-06-24
Payer: MEDICARE

## 2025-06-25 ENCOUNTER — CLINICAL SUPPORT (OUTPATIENT)
Dept: REHABILITATION | Facility: HOSPITAL | Age: 70
End: 2025-06-25
Payer: MEDICARE

## 2025-06-25 DIAGNOSIS — R68.89 DECREASED FUNCTIONAL ACTIVITY TOLERANCE: Primary | ICD-10-CM

## 2025-06-25 DIAGNOSIS — R29.898 DECREASED STRENGTH OF LOWER EXTREMITY: ICD-10-CM

## 2025-06-25 PROCEDURE — 97161 PT EVAL LOW COMPLEX 20 MIN: CPT | Mod: PN

## 2025-06-25 PROCEDURE — 97110 THERAPEUTIC EXERCISES: CPT | Mod: PN

## 2025-06-25 NOTE — PROGRESS NOTES
Outpatient Rehab  Physical Therapy Evaluation    Patient Name: Lazarus Zamudio  MRN: [id number]  YOB: 1955  Encounter Date: 6/25/2025    Therapy Diagnosis:   Encounter Diagnoses   Name Primary?    Decreased functional activity tolerance Yes    Decreased strength of lower extremity      Physician: Giovanni Durand MD    Physician Orders: Eval and Treat  Medical Diagnosis: Lumbar radiculopathy  Lumbar spondylosis  Surgical Diagnosis: Not applicable for this Episode   Surgical Date: Not applicable for this Episode  Days Since Last Surgery: Not applicable for this Episode  Visit # / Visits Authorized:  1 / 1  Insurance Authorization Period: 6/20/2025 to 6/20/2026  Date of Evaluation: 6/25/2025  Plan of Care Certification: 6/25/2025 to 8/25/25     Time In: 0800   Time Out: 0840  Total Time (in minutes): 40   Total Billable Time (in minutes): 40    Intake Outcome Measure for FOTO Survey    Therapist reviewed FOTO scores for Lazarus Zamudio on 6/25/2025.   FOTO report - see Media section or FOTO account episode details.     Intake Score: 59 (Lumbar Spine)%    Precautions:     Type 2 Diabetes, HTN, Obesity      Subjective   History of Present Illness  Lazarus is a 70 y.o. male who reports to physical therapy with a chief concern of Lower Back Pain B.     The patient reports a medical diagnosis of Lumbar radiculopathy (M54.16), Lumbar spondylosis (M47.816).    Diagnostic tests related to this condition: MRI studies.   MRI Studies Details: Impression:     1. Diffuse low T1 marrow signal in a pattern suggestive of potential red marrow reconversion with numerous etiologies requiring clinical correlation.  No focal or concerning bony lesion or acute process.  2. Multilevel/multifactorial degenerative changes producing moderate narrowing of the spinal canal at L2-L3 and L3-L4 as well as additional multilevel subarticular recess narrowing.  Foraminal narrowing is moderate at multiple levels with more moderate to severe  right-sided narrowing at L5-S1.  Level by level details as above.        Electronically signed by:Sawyer Wilder  Date:                                            05/08/2025  Time:                                           10:38    Dominant Hand: Right  History of Present Condition/Illness: Patient reports that his back started hurting about 2 years ago and has progressively gotten worse over time. Has had an MRI on the back about 3 weeks ago. Also saw pain management and suggested physical therapy. Started taking pain medication yesterday. No muscle relaxers. No prior injections up to this back regarding the spine specifically. The pain in the lower back is intermittent in nature - sleeping on his back or side is more comfortable usually. Not using any ice or heat. Has not noticed anything that makes it feel better. Standing and walking for longer distances bothers him. Willing to try anything to make the symptoms better. Reports having type 2 diabetes and high blood pressure - both managed at this time.    Activities of Daily Living  Social history was obtained from Patient.                     Currently independent with activities of daily living? Yes     Pushes through the pain - difficulty with standing and walking.        Pain     Patient reports a current pain level of 5/10. Pain at best is reported as 3/10. Pain at worst is reported as 9/10.   Location: Lower Back B  Clinical Progression (since onset): Stable  Pain Qualities: Aching, Discomfort, Tightness, Throbbing, Sharp  Pain-Relieving Factors: Medications - prescription, Sleeping  Pain-Aggravating Factors: Transfers, Walking, Stair climbing, Standing, Lifting, Bending         Living Arrangements  Living Situation  Housing: Home independently  Living Arrangements: Spouse/significant other  Support Systems: Spouse/significant other    Home Setup  Type of Structure: House  Home Access: Stairs with rails  Number of Levels in Home: One  level        Employment  Employment Status: Retired   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    Lazarus Zamudio  has a past surgical history that includes Hernia repair; Knee arthroscopy w/ debridement; Vasectomy; Esophagogastroduodenoscopy (N/A, 12/5/2024); Colonoscopy (N/A, 12/5/2024); and Intraluminal gastrointestinal tract imaging via capsule (N/A, 12/23/2024).    Lazarus has a current medication list which includes the following prescription(s): aspirin, atorvastatin, dorzolamide-timolol 2-0.5%, enalapril, gabapentin, latanoprost, metformin, multivitamin with minerals, pantoprazole, tadalafil, testosterone cypionate, and mounjaro.    Review of patient's allergies indicates:   Allergen Reactions    Penicillins Rash        Objective      Lower Extremity Sensation  General Lumbar/Lower Extremity Sensation  Intact: Right and Left  Right Lumbar/Lower Extremity Sensation  Intact: Light Touch, Sharp/Dull Discrimination, Static Two Point Discrimination, Dynamic Two Point Discrimination, Kinesthesia, and Proprioception  Right Lumbar/Lower Extremity Sensation Stocking Glove Pattern: No    Left Lumbar/Lower Extremity Sensation  Intact: Light Touch, Static Two Point Discrimination, Dynamic Two Point Discrimination, Sharp/Dull Discrimination, Kinesthesia, and Proprioception  Left Lumbar/Lower Extremity Sensation Stocking Glove Pattern: No             Lumbar Range of Motion   Active (deg) Passive (deg) Pain   Flexion 50 (increased tightness)       Extension 50       Right Lateral Flexion 10 (limited mobility)       Right Rotation 50       Left Lateral Flexion 10 (limited mobility)       Left Rotation 50                           Hip Strength - Planes of Motion   Right Strength Right Pain Left Strength Left  Pain   Flexion (L2) 4+   4 Yes   Extension 4+   4+      ABduction 4+   4+     ADduction 4+   4+     Internal Rotation           External Rotation               Knee Strength   Right Strength Right Pain Left Strength Left  Pain   Flexion (S2) 4+   4+     Prone Flexion           Extension (L3) 4+   4+            Ankle/Foot Strength - Planes of Motion   Right Strength Right Pain Left Strength Left  Pain   Dorsiflexion (L4) 5   5     Plantar Flexion (S1) 5   5     Inversion           Eversion           Great Toe Flexion           Great Toe Extension (L5)           Lesser Toes Flexion           Lesser Toes Extension                     Sit to Stand Testing  The patient completed 5 sit to stand transfers in 20 sec. hands in lap The patient completed 9 repetitions of a sit to stand transfer in 30 seconds. hands in lap - no increase in pain              Treatment:  Therapeutic Exercise  TE 1: Supine Hip Bridge x5 reps  TE 2: Supine LTR x5 reps  TE 3: Standing Lumbar Extensions at Counter x5 reps    Time Entry(in minutes):  PT Evaluation (Low) Time Entry: 30  Therapeutic Exercise Time Entry: 10    Assessment & Plan   Assessment  Lazarus presents with a condition of Low complexity.   Presentation of Symptoms: Stable       Functional Limitations: Activity tolerance, Ambulating on uneven surfaces, Bed mobility, Carrying objects, Completing self-care activities, Completing work/school activities, Decreased ambulation distance/endurance, Maintaining balance, Getting off the floor, Functional mobility, Gait limitations, Driving, Disrupted sleep pattern, Manipulating objects, Pain with ADLs/IADLs, Painful locomotion/ambulation, Participating in leisure activities, Performing household chores, Proprioception, Range of motion, Transfers, Standing tolerance, Sitting tolerance, Reaching, Squatting  Impairments: Pain with functional activity  Personal Factors Affecting Prognosis: Pain    Patient Goal for Therapy (PT): Decrease pain and improve overall mobiltiy  Prognosis: Fair  Assessment  Details: Noted patient to demonstrate limited lumbo pelvic and lumbo thoracic mobility. Extension based exercises seem to feel more beneficial at this time. Treatment will focus on improving lumbar mobility while preventing exacerbation of symptoms.    Plan  From a physical therapy perspective, the patient would benefit from: Skilled Rehab Services    Planned therapy interventions include: Therapeutic exercise, Therapeutic activities, Neuromuscular re-education, Manual therapy, ADLs/IADLs, Aquatic therapy, Gait training, and Other (Comment). Dry Needling (by a certified therapist)  Planned modalities to include: Biofeedback, Electrical stimulation - passive/unattended, Mechanical traction, Ultrasound, Thermotherapy (hot pack), and Cryotherapy (cold pack).        Visit Frequency: 2 times Per Week for 8 Weeks.       This plan was discussed with Patient.   Discussion participants: Agreed Upon Plan of Care           The patient's spiritual, cultural, and educational needs were considered, and the patient is agreeable to the plan of care and goals.     Education  Education was done with Patient. The patient's learning style includes Demonstration, Listening, and Pictures/video. The patient Verbalizes understanding and Demonstrates understanding.               Goals:   Active       LTG       Patient to increase lumbar flexion and extension range of motion by 25%, in order to improve available range of motion for ADL's.         Start:  06/25/25    Expected End:  08/25/25            Patient to increase hip abduction and hip adduction strength by 1/2 grade, in order to improve endurance and increase ability to perform all functional activities for increased time.         Start:  06/25/25    Expected End:  08/25/25            Patient to improve score on the FOTO predicted score or better, to improve QOL.        Start:  06/25/25    Expected End:  08/25/25            Patient to have decreased pain to 3/10 at worst, to improve  QOL.        Start:  06/25/25    Expected End:  08/25/25            Patient will be able to stand and walk for at least 10 minutes with minimal to no increase in symptoms for improved ability to perform ADL's.       Start:  06/25/25    Expected End:  08/25/25               STG       Patient to be educated on HEP.        Start:  06/25/25    Expected End:  07/25/25            Patient to increase lumbar side-bending range of motion by at least 5 degrees, in order to improve available range of motion for ADL's.         Start:  06/25/25    Expected End:  07/25/25            Patient to increase hip flexion and extension strength by 1/2 grade, in order to improve endurance and increase ability to perform all functional activities for increased time.         Start:  06/25/25    Expected End:  07/25/25            Patient to have pain less than 5/10 at worst, to improve QOL.        Start:  06/25/25    Expected End:  07/25/25            Patient to improve score on the FOTO, to improve QOL.        Start:  06/25/25    Expected End:  07/25/25              Krupa Linton PT, DPT

## 2025-07-01 ENCOUNTER — CLINICAL SUPPORT (OUTPATIENT)
Dept: REHABILITATION | Facility: HOSPITAL | Age: 70
End: 2025-07-01
Payer: MEDICARE

## 2025-07-01 DIAGNOSIS — R68.89 DECREASED FUNCTIONAL ACTIVITY TOLERANCE: ICD-10-CM

## 2025-07-01 DIAGNOSIS — M53.86 DECREASED ROM OF LUMBAR SPINE: Primary | ICD-10-CM

## 2025-07-01 DIAGNOSIS — R29.898 DECREASED STRENGTH OF LOWER EXTREMITY: ICD-10-CM

## 2025-07-01 PROCEDURE — 97110 THERAPEUTIC EXERCISES: CPT | Mod: PN

## 2025-07-01 PROCEDURE — 97032 APPL MODALITY 1+ESTIM EA 15: CPT | Mod: PN

## 2025-07-01 PROCEDURE — 97112 NEUROMUSCULAR REEDUCATION: CPT | Mod: PN

## 2025-07-01 NOTE — PROGRESS NOTES
Outpatient Rehab  Physical Therapy Visit    Patient Name: Lazarus Zamudio  MRN: [id number]  YOB: 1955  Encounter Date: 7/1/2025    Therapy Diagnosis:   Encounter Diagnoses   Name Primary?    Decreased ROM of lumbar spine Yes    Decreased functional activity tolerance     Decreased strength of lower extremity      Physician: Giovanni Durand MD    Physician Orders: Eval and Treat  Medical Diagnosis: Lumbar radiculopathy  Lumbar spondylosis  Surgical Diagnosis: Not applicable for this Episode   Surgical Date: Not applicable for this Episode  Days Since Last Surgery: Not applicable for this Episode  Visit # / Visits Authorized:  1 / 12  Insurance Authorization Period: 6/26/2025 to 12/31/2025  Date of Evaluation: 6/25/2025  Plan of Care Certification: 6/25/2025 to 8/25/2025      PT/PTA: PT   Number of PTA visits since last PT visit:0  Time In: 0830   Time Out: 0925  Total Time (in minutes): 55   Total Billable Time (in minutes): 45    FOTO:  Intake Score: 59 (Lumbar Spine)%  Survey Score 2:  %  Survey Score 3:  %    Precautions:     Type 2 Diabetes, HTN, Obesity      Subjective   Patient states his back is feeling better this morning. Exercises at home went well for the most part..  Pain reported as 0/10. Lower Back    Objective            Treatment:  Therapeutic Exercise  TE 1: NuStep x10 minutes (Level 7)  TE 2: Supine LTR over SB 2x10 reps  TE 3: Standing Lumbar Extensions at Counter x15 reps  TE 4: Supine DKTC x20 reps with SB  TE 5: Standing Hip Extensions 2x10 reps B - leaning over counter with pillow  Balance/Neuromuscular Re-Education  NMR 1: Supine Hip Bridge x20 reps -emphasis on glute squeeze  NMR 2: Supine Hip Adduction x20 reps (3s holds) - small ball between knees  NMR 3: Supine SLR 2x10 reps B - emphasis on core engagement  NMR 4: Sit to Stand x10 reps from 20in box (emphasis on neutral spine in standing and glute activation)  Modalities  Moist Heat (min): x10 minutes to the lower  back  Electrical Stimulation (Parameters): IFC x10 minutes to the lower back in combination with moist heat - intensity increased to patient tolerance. No adverse reactions observed.    Time Entry(in minutes):  E-Stim (Attended) Time Entry: 10 (IFC x10 minutes to the lumbar spine)  Hot/Cold Pack Time Entry: 10 (lumbar spine)  Neuromuscular Re-Education Time Entry: 25  Therapeutic Exercise Time Entry: 20    Assessment & Plan   Assessment: Patient tolerated lumbar extension better compared to lumbar flexion. Focus placed on glute activation with all activities. Encouraged patient to perform Home Exercise Program daily.  Evaluation/Treatment Tolerance: Patient tolerated treatment well    The patient will continue to benefit from skilled outpatient physical therapy in order to address the deficits listed in the problem list on the initial evaluation, provide patient and family education, and maximize the patients level of independence in the home and community environments.     The patient's spiritual, cultural, and educational needs were considered, and the patient is agreeable to the plan of care and goals.     Education  Education was done with Patient. The patient's learning style includes Demonstration and Listening. The patient Verbalizes understanding and Demonstrates understanding.         Educated patient on dry needling this date. Informed patient that he may or may not receive a bill if dry needling services are performed. Instructed patient to call insurance to discuss dry needling coverage directly. Patient verbalized understanding.     Plan: Continue with established POC for improved ability to perform ADL's.    Goals:   Active       LTG       Patient to increase lumbar flexion and extension range of motion by 25%, in order to improve available range of motion for ADL's.         Start:  06/25/25    Expected End:  08/25/25            Patient to increase hip abduction and hip adduction strength by 1/2  grade, in order to improve endurance and increase ability to perform all functional activities for increased time.         Start:  06/25/25    Expected End:  08/25/25            Patient to improve score on the FOTO predicted score or better, to improve QOL.        Start:  06/25/25    Expected End:  08/25/25            Patient to have decreased pain to 3/10 at worst, to improve QOL.        Start:  06/25/25    Expected End:  08/25/25            Patient will be able to stand and walk for at least 10 minutes with minimal to no increase in symptoms for improved ability to perform ADL's.       Start:  06/25/25    Expected End:  08/25/25               STG       Patient to be educated on HEP.        Start:  06/25/25    Expected End:  07/25/25            Patient to increase lumbar side-bending range of motion by at least 5 degrees, in order to improve available range of motion for ADL's.         Start:  06/25/25    Expected End:  07/25/25            Patient to increase hip flexion and extension strength by 1/2 grade, in order to improve endurance and increase ability to perform all functional activities for increased time.         Start:  06/25/25    Expected End:  07/25/25            Patient to have pain less than 5/10 at worst, to improve QOL.        Start:  06/25/25    Expected End:  07/25/25            Patient to improve score on the FOTO, to improve QOL.        Start:  06/25/25    Expected End:  07/25/25              Krupa Linton PT, DPT

## 2025-07-03 ENCOUNTER — CLINICAL SUPPORT (OUTPATIENT)
Dept: REHABILITATION | Facility: HOSPITAL | Age: 70
End: 2025-07-03
Payer: MEDICARE

## 2025-07-03 DIAGNOSIS — R68.89 DECREASED FUNCTIONAL ACTIVITY TOLERANCE: ICD-10-CM

## 2025-07-03 DIAGNOSIS — M53.86 DECREASED ROM OF LUMBAR SPINE: Primary | ICD-10-CM

## 2025-07-03 DIAGNOSIS — R29.898 DECREASED STRENGTH OF LOWER EXTREMITY: ICD-10-CM

## 2025-07-03 PROCEDURE — 97112 NEUROMUSCULAR REEDUCATION: CPT | Mod: PN | Performed by: PHYSICAL THERAPIST

## 2025-07-03 PROCEDURE — 97110 THERAPEUTIC EXERCISES: CPT | Mod: PN | Performed by: PHYSICAL THERAPIST

## 2025-07-03 PROCEDURE — 97014 ELECTRIC STIMULATION THERAPY: CPT | Mod: PN | Performed by: PHYSICAL THERAPIST

## 2025-07-03 NOTE — PROGRESS NOTES
Outpatient Rehab    Physical Therapy Visit    Patient Name: Lazarus Zamudio  MRN: [id number]  YOB: 1955  Encounter Date: 7/3/2025    Therapy Diagnosis:   Encounter Diagnoses   Name Primary?    Decreased ROM of lumbar spine Yes    Decreased functional activity tolerance     Decreased strength of lower extremity      Physician: Giovanni Durand MD    Physician Orders: Eval and Treat  Medical Diagnosis: Lumbar radiculopathy  Lumbar spondylosis  Surgical Diagnosis: Not applicable for this Episode   Surgical Date: Not applicable for this Episode  Days Since Last Surgery: Not applicable for this Episode    Visit # / Visits Authorized:  2 / 12  Insurance Authorization Period: 6/26/2025 to 12/31/2025  Date of Evaluation: 6/25/2025  Plan of Care Certification: 6/25/2025 to 8/25/2025      PT/PTA:     Number of PTA visits since last PT visit:   Time In: 0830   Time Out: 0925  Total Time (in minutes): 55   Total Billable Time (in minutes): 40    FOTO:  Intake Score:  %  Survey Score 2:  %  Survey Score 3:  %    Precautions:       Subjective   patient had increased pain last night after cutting grass, he took a pain pill before going to sleep.  Pain reported as 0/10. Lower Back    Objective            Treatment:  Therapeutic Exercise  TE 1: NuStep x10 minutes (Level 7)  TE 2: Supine LTR over SB 2x10 reps  TE 3: Standing Lumbar Extensions at Counter x15 reps  TE 4: Supine DKTC x20 reps with SB  TE 5: Standing Hip Extensions 2x10 reps B - leaning over counter with pillow  Balance/Neuromuscular Re-Education  NMR 1: Supine Hip Bridge x20 reps -emphasis on glute squeeze  NMR 2: Supine Hip Adduction x20 reps (3s holds) - small ball between knees  NMR 3: Supine SLR 2x10 reps B - emphasis on core engagement  NMR 4: Sit to Stand x10 reps from 20in box (emphasis on neutral spine in standing and glute activation)  Modalities  Moist Heat (min): x10 minutes to the lower back  Electrical Stimulation (Parameters): IFC x10 minutes  to the lower back in combination with moist heat - intensity increased to patient tolerance. No adverse reactions observed.    Time Entry(in minutes):  E-Stim (Unattended) Time Entry: 10  Hot/Cold Pack Time Entry: 10  Neuromuscular Re-Education Time Entry: 25  Therapeutic Exercise Time Entry: 20    Assessment & Plan   Assessment: Pt with good tolerance to all prescribed exercises and positive response following use of modalities to lower back.   Evaluation/Treatment Tolerance: Patient tolerated treatment well    The patient will continue to benefit from skilled outpatient physical therapy in order to address the deficits listed in the problem list on the initial evaluation, provide patient and family education, and maximize the patients level of independence in the home and community environments.     The patient's spiritual, cultural, and educational needs were considered, and the patient is agreeable to the plan of care and goals.           Plan: Continue with established POC for improved ability to perform ADL's.    Goals:   Active       LTG       Patient to increase lumbar flexion and extension range of motion by 25%, in order to improve available range of motion for ADL's.         Start:  06/25/25    Expected End:  08/25/25            Patient to increase hip abduction and hip adduction strength by 1/2 grade, in order to improve endurance and increase ability to perform all functional activities for increased time.         Start:  06/25/25    Expected End:  08/25/25            Patient to improve score on the FOTO predicted score or better, to improve QOL.        Start:  06/25/25    Expected End:  08/25/25            Patient to have decreased pain to 3/10 at worst, to improve QOL.        Start:  06/25/25    Expected End:  08/25/25            Patient will be able to stand and walk for at least 10 minutes with minimal to no increase in symptoms for improved ability to perform ADL's.       Start:  06/25/25     Expected End:  08/25/25               STG       Patient to be educated on HEP.        Start:  06/25/25    Expected End:  07/25/25            Patient to increase lumbar side-bending range of motion by at least 5 degrees, in order to improve available range of motion for ADL's.         Start:  06/25/25    Expected End:  07/25/25            Patient to increase hip flexion and extension strength by 1/2 grade, in order to improve endurance and increase ability to perform all functional activities for increased time.         Start:  06/25/25    Expected End:  07/25/25            Patient to have pain less than 5/10 at worst, to improve QOL.        Start:  06/25/25    Expected End:  07/25/25            Patient to improve score on the FOTO, to improve QOL.        Start:  06/25/25    Expected End:  07/25/25                Shorty Rosenbaum, PT, DPT

## 2025-07-05 DIAGNOSIS — E11.65 TYPE 2 DIABETES MELLITUS WITH HYPERGLYCEMIA, WITHOUT LONG-TERM CURRENT USE OF INSULIN: ICD-10-CM

## 2025-07-05 NOTE — TELEPHONE ENCOUNTER
Care Due:                  Date            Visit Type   Department     Provider  --------------------------------------------------------------------------------                                EP -                              PRIMARY      [facility] FAMILY  Last Visit: 05-      CARE ([facility])   MEDICINE       Casper Barrett  Next Visit: None Scheduled  None         None Found                                                            Last  Test          Frequency    Reason                     Performed    Due Date  --------------------------------------------------------------------------------    HBA1C.......  6 months...  metFORMIN, tirzepatide...  03- 09-    [facility] Embedded Care Due Messages. Reference number: [id number].   7/05/2025 6:52:56 AM CDT

## 2025-07-07 ENCOUNTER — OFFICE VISIT (OUTPATIENT)
Dept: FAMILY MEDICINE | Facility: CLINIC | Age: 70
End: 2025-07-07
Payer: MEDICARE

## 2025-07-07 VITALS
DIASTOLIC BLOOD PRESSURE: 63 MMHG | WEIGHT: 246.5 LBS | BODY MASS INDEX: 35.29 KG/M2 | HEIGHT: 70 IN | RESPIRATION RATE: 16 BRPM | SYSTOLIC BLOOD PRESSURE: 119 MMHG | HEART RATE: 66 BPM | OXYGEN SATURATION: 98 %

## 2025-07-07 DIAGNOSIS — G47.33 OBSTRUCTIVE SLEEP APNEA SYNDROME: ICD-10-CM

## 2025-07-07 DIAGNOSIS — M54.50 CHRONIC LEFT-SIDED LOW BACK PAIN WITHOUT SCIATICA: ICD-10-CM

## 2025-07-07 DIAGNOSIS — M25.561 CHRONIC PAIN OF BOTH KNEES: Chronic | ICD-10-CM

## 2025-07-07 DIAGNOSIS — Z00.00 ENCOUNTER FOR MEDICARE ANNUAL WELLNESS EXAM: Primary | ICD-10-CM

## 2025-07-07 DIAGNOSIS — I15.2 HYPERTENSION ASSOCIATED WITH DIABETES: Chronic | ICD-10-CM

## 2025-07-07 DIAGNOSIS — K76.0 HEPATIC STEATOSIS: ICD-10-CM

## 2025-07-07 DIAGNOSIS — G89.29 CHRONIC LEFT-SIDED LOW BACK PAIN WITHOUT SCIATICA: ICD-10-CM

## 2025-07-07 DIAGNOSIS — D50.9 IRON DEFICIENCY ANEMIA, UNSPECIFIED IRON DEFICIENCY ANEMIA TYPE: ICD-10-CM

## 2025-07-07 DIAGNOSIS — E11.69 HYPERLIPIDEMIA ASSOCIATED WITH TYPE 2 DIABETES MELLITUS: Chronic | ICD-10-CM

## 2025-07-07 DIAGNOSIS — G89.29 CHRONIC PAIN OF BOTH KNEES: Chronic | ICD-10-CM

## 2025-07-07 DIAGNOSIS — Z86.0100 HISTORY OF COLONIC POLYPS: ICD-10-CM

## 2025-07-07 DIAGNOSIS — E66.812 CLASS 2 OBESITY WITH ALVEOLAR HYPOVENTILATION, SERIOUS COMORBIDITY, AND BODY MASS INDEX (BMI) OF 35.0 TO 35.9 IN ADULT: ICD-10-CM

## 2025-07-07 DIAGNOSIS — I70.0 AORTIC CALCIFICATION: ICD-10-CM

## 2025-07-07 DIAGNOSIS — E66.2 CLASS 2 OBESITY WITH ALVEOLAR HYPOVENTILATION, SERIOUS COMORBIDITY, AND BODY MASS INDEX (BMI) OF 35.0 TO 35.9 IN ADULT: ICD-10-CM

## 2025-07-07 DIAGNOSIS — J01.90 ACUTE BACTERIAL SINUSITIS: ICD-10-CM

## 2025-07-07 DIAGNOSIS — D69.6 THROMBOCYTOPENIA: Chronic | ICD-10-CM

## 2025-07-07 DIAGNOSIS — B96.89 ACUTE BACTERIAL SINUSITIS: ICD-10-CM

## 2025-07-07 DIAGNOSIS — E11.65 TYPE 2 DIABETES MELLITUS WITH HYPERGLYCEMIA, WITHOUT LONG-TERM CURRENT USE OF INSULIN: Chronic | ICD-10-CM

## 2025-07-07 DIAGNOSIS — E29.1 HYPOGONADISM MALE: Chronic | ICD-10-CM

## 2025-07-07 DIAGNOSIS — D75.1 POLYCYTHEMIA: ICD-10-CM

## 2025-07-07 DIAGNOSIS — M25.562 CHRONIC PAIN OF BOTH KNEES: Chronic | ICD-10-CM

## 2025-07-07 DIAGNOSIS — E78.5 HYPERLIPIDEMIA ASSOCIATED WITH TYPE 2 DIABETES MELLITUS: Chronic | ICD-10-CM

## 2025-07-07 DIAGNOSIS — E11.59 HYPERTENSION ASSOCIATED WITH DIABETES: Chronic | ICD-10-CM

## 2025-07-07 PROCEDURE — 99999 PR PBB SHADOW E&M-EST. PATIENT-LVL V: CPT | Mod: PBBFAC,,, | Performed by: PHYSICIAN ASSISTANT

## 2025-07-07 PROCEDURE — 99215 OFFICE O/P EST HI 40 MIN: CPT | Mod: PBBFAC,PO | Performed by: PHYSICIAN ASSISTANT

## 2025-07-07 PROCEDURE — G0439 PPPS, SUBSEQ VISIT: HCPCS | Mod: ,,, | Performed by: PHYSICIAN ASSISTANT

## 2025-07-07 RX ORDER — AZITHROMYCIN 250 MG/1
TABLET, FILM COATED ORAL
Qty: 6 TABLET | Refills: 0 | Status: SHIPPED | OUTPATIENT
Start: 2025-07-07 | End: 2025-07-12

## 2025-07-07 RX ORDER — FLUTICASONE PROPIONATE 50 MCG
SPRAY, SUSPENSION (ML) NASAL
Qty: 16 G | Refills: 0 | Status: SHIPPED | OUTPATIENT
Start: 2025-07-07

## 2025-07-07 RX ORDER — METFORMIN HYDROCHLORIDE 500 MG/1
500 TABLET, EXTENDED RELEASE ORAL
Qty: 90 TABLET | Refills: 0 | Status: SHIPPED | OUTPATIENT
Start: 2025-07-07

## 2025-07-07 NOTE — TELEPHONE ENCOUNTER
Provider Staff:  Action required for this patient     Please see care gap opportunities below in Care Due Message.    Thanks!  [facility]     Appointments      Date Provider   Last Visit   5/29/2025 Casper Barrett MD   Next Visit   Visit date not found Casper Barrett MD      Refill Decision Note   Lazarus Zamudio  is requesting a refill authorization.  Brief Assessment and Rationale for Refill:  Approve     Medication Therapy Plan:         Comments:     Note composed:1:55 AM 07/07/2025

## 2025-07-07 NOTE — PATIENT INSTRUCTIONS
Brian Qiu,     If you are due for any health screening(s) below please notify me so we can arrange them to be ordered and scheduled. Most healthy patients at your age complete them, but you are free to accept or refuse.     If you can't do it, I'll definitely understand. If you can, I'd certainly appreciate it!    All of your core healthy metrics are met.        Counseling and Referral of Other Preventative  (Italic type indicates deductible and co-insurance are waived)    Patient Name: Lazarus Zamudio  Today's Date: 7/7/2025    Health Maintenance         Date Due Completion Date    RSV Vaccine (Age 60+ and Pregnant patients) (1 - Risk 60-74 years 1-dose series) Never done ---    TETANUS VACCINE 10/18/2021 10/18/2011    Foot Exam 06/20/2025 6/20/2024    Override on 6/20/2024: Done    Override on 10/19/2022: Done    Override on 4/11/2016: Done    Override on 10/7/2015: Done    Diabetes Urine Screening 06/27/2025 6/27/2024    Shingles Vaccine (1 of 2) 03/24/2026 (Originally 3/27/2004) ---    Hemoglobin A1c 09/20/2025 3/20/2025    Override on 11/20/2015: Done    PROSTATE-SPECIFIC ANTIGEN 03/20/2026 3/20/2025    Lipid Panel 03/20/2026 3/20/2025    Diabetic Eye Exam 06/03/2026 6/3/2025    Colorectal Cancer Screening 12/05/2029 12/5/2024          No orders of the defined types were placed in this encounter.      The following information is provided to all patients.  This information is to help you find resources for any of the problems found today that may be affecting your health:                  Living healthy guide: www.[facility].[place].gov      Understanding Diabetes: www.diabetes.org      Eating healthy: www.cdc.gov/healthyweight      CDC home safety checklist: www.cdc.gov/steadi/patient.html      Agency on Aging: www.goea.[place].gov      Alcoholics anonymous (AA): www.aa.org      Physical Activity: www.joellen.nih.gov/da9tvuz      Tobacco use: www.quitwithusla.org

## 2025-07-07 NOTE — PROGRESS NOTES
"  Lazarus Zamudio presented for a follow-up Medicare AWV today. The following components were reviewed and updated:    Medical history  Family History  Social history  Allergies and Current Medications  Health Risk Assessment  Health Maintenance  Care Team    **See Completed Assessments for Annual Wellness visit with in the encounter summary    The following assessments were completed:  Depression Screening  Cognitive function Screening  Timed Get Up Test  Whisper Test      Opioid documentation:      Patient does not have a current opioid prescription.          Vitals:    07/07/25 1058   BP: 119/63   Pulse: 66   Resp: 16   SpO2: 98%   Weight: 111.8 kg (246 lb 8 oz)   Height: 5' 10" (1.778 m)     Body mass index is 35.37 kg/m².       Physical Exam  Constitutional:       General: He is not in acute distress.     Appearance: Normal appearance.   HENT:      Head: Normocephalic and atraumatic.      Right Ear: Tympanic membrane normal.      Left Ear: Tympanic membrane normal.      Nose: Congestion present.      Mouth/Throat:      Mouth: Mucous membranes are moist.      Pharynx: Oropharynx is clear.   Eyes:      Pupils: Pupils are equal, round, and reactive to light.   Cardiovascular:      Rate and Rhythm: Normal rate and regular rhythm.      Pulses: Normal pulses.      Heart sounds: Normal heart sounds. No murmur heard.  Pulmonary:      Effort: Pulmonary effort is normal. No respiratory distress.      Breath sounds: Normal breath sounds. No wheezing.   Abdominal:      General: Bowel sounds are normal. There is no distension.      Palpations: Abdomen is soft.      Tenderness: There is no abdominal tenderness.   Musculoskeletal:         General: Normal range of motion.      Cervical back: Normal range of motion and neck supple.   Lymphadenopathy:      Cervical: No cervical adenopathy.   Skin:     General: Skin is warm and dry.      Findings: No rash.   Neurological:      General: No focal deficit present.      Mental Status: " He is alert and oriented to person, place, and time.   Psychiatric:         Mood and Affect: Mood normal.         Behavior: Behavior normal.           Diagnoses and health risks identified today and associated recommendations/orders:  1. Encounter for Medicare annual wellness exam  Complete history and physical was completed today. Complete and thorough medication reconciliation was performed. Discussed risks and benefits of medications. Advised patient on orders and health maintenance. Continue current medications listed on your summary sheet. Discussed immunizations due.   - Referral to Enhanced Annual Wellness Visit (eAWV) W+1  Has urine leakage ever interrupted your daily activites or sleep? No  Do you think you could use some help to better manage urine leakage?No    2. Hyperlipidemia associated with type 2 diabetes mellitus  Chronic. Stable  Remains on Lipitor  Continue current medications and plan of care per PCP and specialists   Most recent labs listed below:  Lab Results   Component Value Date    CHOL 105 (L) 03/20/2025     Lab Results   Component Value Date    HDL 37 (L) 03/20/2025     Lab Results   Component Value Date    LDLCALC 60.0 (L) 03/20/2025     Lab Results   Component Value Date    TRIG 40 03/20/2025     Lab Results   Component Value Date    ALT 21 04/28/2025    AST 22 04/28/2025    ALKPHOS 97 04/28/2025    BILITOT 0.8 04/28/2025     3. Hypertension associated with diabetes  Chronic. Stable  BP at goal today  Remains on Enalapril  Continue lifestyle modification with low sodium diet and exercise   Continue current medications and plan of care per PCP and specialists   Hypertension Medications              enalapril (VASOTEC) 20 MG tablet TAKE 1 TABLET BY MOUTH EVERY DAY     4. Aortic calcification  Chronic. Stable  Remains on ASA and statin  Continue current medications and plan of care per PCP and specialists     5. Thrombocytopenia  Chronic. Stable  Followed by hematology  Continue current  medications and plan of care per PCP and specialists   Lab Results   Component Value Date    WBC 6.08 04/28/2025    HGB 15.8 04/28/2025    HCT 48.8 04/28/2025    MCV 92 04/28/2025    PLT 82 (L) 04/28/2025     6. Iron deficiency anemia, unspecified iron deficiency anemia type  Chronic. Stable  S/p iron infusion   Followed by hematology  Continue current medications and plan of care per PCP and specialists     7. Polycythemia  Chronic. Stable  Followed by hematology  Continue current medications and plan of care per PCP and specialists     8. Hypogonadism male  Chronic. Stable  Remains on testosterone injections  Followed by urology  Continue current medications and plan of care per PCP and specialists     9. Type 2 diabetes mellitus with hyperglycemia, without long-term current use of insulin  Chronic. Stable  Remains on Metformin and Mounjaro  Due for microalbumin urine test  Counseling provided on importance of diabetic diet and medication compliance in order to treat diabetes  Discussed diabetes disease course and potential complications  Continue current medications and plan of care per PCP and specialists   Diabetes Medications              metFORMIN (GLUCOPHAGE-XR) 500 MG ER 24hr tablet TAKE 1 TABLET BY MOUTH EVERY DAY WITH BREAKFAST    tirzepatide (MOUNJARO) 12.5 mg/0.5 mL PnIj Inject 12.5 mg into the skin every 7 days.     Lab Results   Component Value Date    HGBA1C 5.8 (H) 03/20/2025     - Microalbumin/Creatinine Ratio, Urine; Future    10. Class 2 obesity with alveolar hypoventilation, serious comorbidity, and body mass index (BMI) of 35.0 to 35.9 in adult  Chronic. Stable  General weight loss/lifestyle modification strategies discussed (elicit support from others; identify saboteurs; non-food rewards, etc).  Informal exercise measures discussed, e.g. taking stairs instead of elevator.  Regular aerobic exercise program discussed.  Continue current medications and plan of care per PCP and specialists      11. Hepatic steatosis  Chronic. Stable  Recommend low fat, low cholesterol diet, maintain good control of blood sugars and cholesterol levels, exercise, weight loss (if overweight), minimize/avoid alcohol and tylenol products  Followed by hepatology  Continue current medications and plan of care per PCP and specialists     12. History of colonic polyps  Chronic. Stable  Noted on previous c-scope  Continue current medications and plan of care per PCP and specialists     13. Chronic pain of both knees  Chronic. Stable  Receiving injections  Followed by ortho   Continue current medications and plan of care per PCP and specialists     14. Chronic left-sided low back pain without sciatica  Chronic. Stable  Remains in physical therapy   Followed by pain management  Continue current medications and plan of care per PCP and specialists     15. Obstructive sleep apnea syndrome  Chronic. Stable  Compliant with CPAP  Followed by sleep medicine  Continue current medications and plan of care per PCP and specialists     16. Acute bacterial sinusitis  New Problem.  Reports nasal congestion and sinus pressure over the past few days  Start antibiotics as prescribed  Recommend Flonase and Mucinex  Supportive care- rest, increase hydration with water, OTC Tylenol/Ibuprofen for pain/fever  Follow up if no improvement in symptoms   ER precautions for severe or worsening of symptoms   - azithromycin (Z-MEGAN) 250 MG tablet; Take 2 tablets by mouth on day 1; Take 1 tablet by mouth on days 2-5  Dispense: 6 tablet; Refill: 0  - fluticasone propionate (FLONASE) 50 mcg/actuation nasal spray; Use 2 sprays to each nostril daily  Dispense: 16 g; Refill: 0    Visit today included increased complexity associated with the care of the episodic problem addressed and managing the longitudinal care of the patient due to the serious and/or complex managed problem(s).    Provided Lazarus with a 5-10 year written screening schedule and personal  prevention plan. Recommendations were developed using the USPSTF age appropriate recommendations. Education, counseling, and referrals were provided as needed.  After Visit Summary printed and given to patient which includes a list of additional screenings\tests needed.    Follow up in about 6 months (around 1/7/2026), or if symptoms worsen or fail to improve.      Guerda Suárez PA-C      I offered to discuss advanced care planning, including how to pick a person who would make decisions for you if you were unable to make them for yourself, called a health care power of , and what kind of decisions you might make such as use of life sustaining treatments such as ventilators and tube feeding when faced with a life limiting illness recorded on a living will that they will need to know. (How you want to be cared for as you near the end of your natural life)     X Patient is interested in learning more about how to make advanced directives. I provided them paperwork and offered to discuss this with them.

## 2025-07-08 ENCOUNTER — PATIENT OUTREACH (OUTPATIENT)
Dept: ADMINISTRATIVE | Facility: HOSPITAL | Age: 70
End: 2025-07-08
Payer: MEDICARE

## 2025-07-08 ENCOUNTER — CLINICAL SUPPORT (OUTPATIENT)
Dept: REHABILITATION | Facility: HOSPITAL | Age: 70
End: 2025-07-08
Payer: MEDICARE

## 2025-07-08 DIAGNOSIS — M53.86 DECREASED ROM OF LUMBAR SPINE: Primary | ICD-10-CM

## 2025-07-08 DIAGNOSIS — R29.898 DECREASED STRENGTH OF LOWER EXTREMITY: ICD-10-CM

## 2025-07-08 DIAGNOSIS — R68.89 DECREASED FUNCTIONAL ACTIVITY TOLERANCE: ICD-10-CM

## 2025-07-08 DIAGNOSIS — R26.89 DECREASED FUNCTIONAL MOBILITY: ICD-10-CM

## 2025-07-08 PROCEDURE — 97014 ELECTRIC STIMULATION THERAPY: CPT | Mod: PN

## 2025-07-08 PROCEDURE — 97112 NEUROMUSCULAR REEDUCATION: CPT | Mod: PN

## 2025-07-08 NOTE — PROGRESS NOTES
Outpatient Rehab  Physical Therapy Visit    Patient Name: Lazarus Zamudio  MRN: [id number]  YOB: 1955  Encounter Date: 7/8/2025    Therapy Diagnosis:   Encounter Diagnoses   Name Primary?    Decreased ROM of lumbar spine Yes    Decreased functional activity tolerance     Decreased strength of lower extremity     Decreased functional mobility      Physician: Giovanni Durand MD    Physician Orders: Eval and Treat  Medical Diagnosis: Lumbar radiculopathy  Lumbar spondylosis  Surgical Diagnosis: Not applicable for this Episode   Surgical Date: Not applicable for this Episode  Days Since Last Surgery: Not applicable for this Episode  Visit # / Visits Authorized:  3 / 12  Insurance Authorization Period: 6/26/2025 to 12/31/2025  Date of Evaluation: 6/25/2025 7/8/2025  Plan of Care Certification:       PT/PTA: PT   Number of PTA visits since last PT visit:0  Time In: 0830   Time Out: 0935  Total Time (in minutes): 65   Total Billable Time (in minutes): 25    FOTO:  Intake Score: 59 (Lumbar Spine)%  Survey Score 2:  %  Survey Score 3:  %    Precautions:     Type 2 Diabetes, HTN, Obesity      Subjective   Patient doing okay this morning but still having pain - walked a good bit this weekend due to holiday events. Felt good after last session though..  Pain reported as 8/10. Lower Back    Objective            Treatment:  Therapeutic Exercise  TE 1: NuStep x10 minutes (Level 7)  TE 2: Supine LTR over SB 2x10 reps  TE 3: Standing Lumbar Extensions at Counter 2x10 reps  TE 4: Supine DKTC x20 reps with SB  TE 5: Standing Hip Extensions 2x10 reps B - leaning over counter with pillow  TE 6: Standing Gastroc Stretch 10x10s B - slant board level 3  TE 7: Shuttle Press Double Leg x20 reps (2 black cords, 1 red cord)  Balance/Neuromuscular Re-Education  NMR 1: Supine Hip Bridge x30 reps -emphasis on glute squeeze  NMR 2: Supine Hip Adduction x30 reps (3s holds) - small ball between knees  NMR 3: Supine SLR 2x10 reps B -  emphasis on core engagement  NMR 4: Sit to Stand 2x10 reps from 20in box (emphasis on neutral spine in standing and glute activation)  Modalities  Moist Heat (min): x10 minutes to the lower back  Electrical Stimulation (Parameters): IFC x10 minutes to the lower back in combination with moist heat - intensity increased to patient tolerance. No adverse reactions observed.    *A portion of this treatment session is provided with the assistance of a skilled rehbailitation technician under the supervision of a licensed physical therapist.    Time Entry(in minutes):  E-Stim (Unattended) Time Entry: 10 (IFC)  Hot/Cold Pack Time Entry: 10 (hot pack)  Neuromuscular Re-Education Time Entry: 25  Therapeutic Exercise Time Entry: 30    Assessment & Plan   Assessment: Patient continues to yield a positive response to modalities. Tolerated prescribed exercises without exacerbation of symptoms. Treatment focusing on improving hip, glute, and core strengthening specifically.  Evaluation/Treatment Tolerance: Patient tolerated treatment well    The patient will continue to benefit from skilled outpatient physical therapy in order to address the deficits listed in the problem list on the initial evaluation, provide patient and family education, and maximize the patients level of independence in the home and community environments.     The patient's spiritual, cultural, and educational needs were considered, and the patient is agreeable to the plan of care and goals.     Education  Education was done with Patient. The patient's learning style includes Demonstration and Listening. The patient Verbalizes understanding and Demonstrates understanding.               Plan: Continue with established POC for improved ability to perform ADL's.    Goals:   Active       LTG       Patient to increase lumbar flexion and extension range of motion by 25%, in order to improve available range of motion for ADL's.         Start:  06/25/25    Expected  End:  08/25/25            Patient to increase hip abduction and hip adduction strength by 1/2 grade, in order to improve endurance and increase ability to perform all functional activities for increased time.         Start:  06/25/25    Expected End:  08/25/25            Patient to improve score on the FOTO predicted score or better, to improve QOL.        Start:  06/25/25    Expected End:  08/25/25            Patient to have decreased pain to 3/10 at worst, to improve QOL.        Start:  06/25/25    Expected End:  08/25/25            Patient will be able to stand and walk for at least 10 minutes with minimal to no increase in symptoms for improved ability to perform ADL's.       Start:  06/25/25    Expected End:  08/25/25               STG       Patient to be educated on HEP.        Start:  06/25/25    Expected End:  07/25/25            Patient to increase lumbar side-bending range of motion by at least 5 degrees, in order to improve available range of motion for ADL's.         Start:  06/25/25    Expected End:  07/25/25            Patient to increase hip flexion and extension strength by 1/2 grade, in order to improve endurance and increase ability to perform all functional activities for increased time.         Start:  06/25/25    Expected End:  07/25/25            Patient to have pain less than 5/10 at worst, to improve QOL.        Start:  06/25/25    Expected End:  07/25/25            Patient to improve score on the FOTO, to improve QOL.        Start:  06/25/25    Expected End:  07/25/25              Krupa Linton PT, DPT

## 2025-07-10 ENCOUNTER — LAB VISIT (OUTPATIENT)
Dept: LAB | Facility: HOSPITAL | Age: 70
End: 2025-07-10
Attending: NURSE PRACTITIONER
Payer: MEDICARE

## 2025-07-10 ENCOUNTER — CLINICAL SUPPORT (OUTPATIENT)
Dept: REHABILITATION | Facility: HOSPITAL | Age: 70
End: 2025-07-10
Payer: MEDICARE

## 2025-07-10 DIAGNOSIS — Z79.890 LONG-TERM CURRENT USE OF TESTOSTERONE REPLACEMENT THERAPY: ICD-10-CM

## 2025-07-10 DIAGNOSIS — D75.1 POLYCYTHEMIA: ICD-10-CM

## 2025-07-10 DIAGNOSIS — E11.65 TYPE 2 DIABETES MELLITUS WITH HYPERGLYCEMIA, WITHOUT LONG-TERM CURRENT USE OF INSULIN: Chronic | ICD-10-CM

## 2025-07-10 DIAGNOSIS — R29.898 DECREASED STRENGTH OF LOWER EXTREMITY: ICD-10-CM

## 2025-07-10 DIAGNOSIS — R26.89 DECREASED FUNCTIONAL MOBILITY: Primary | ICD-10-CM

## 2025-07-10 DIAGNOSIS — K76.0 FATTY (CHANGE OF) LIVER, NOT ELSEWHERE CLASSIFIED: ICD-10-CM

## 2025-07-10 DIAGNOSIS — D69.6 THROMBOCYTOPENIA: ICD-10-CM

## 2025-07-10 LAB
ABSOLUTE EOSINOPHIL (OHS): 0.06 K/UL
ABSOLUTE MONOCYTE (OHS): 0.49 K/UL (ref 0.3–1)
ABSOLUTE NEUTROPHIL COUNT (OHS): 2.92 K/UL (ref 1.8–7.7)
ALBUMIN SERPL BCP-MCNC: 3.9 G/DL (ref 3.5–5.2)
ALBUMIN/CREAT UR: NORMAL
ALP SERPL-CCNC: 97 UNIT/L (ref 40–150)
ALT SERPL W/O P-5'-P-CCNC: 28 UNIT/L (ref 10–44)
ANION GAP (OHS): 6 MMOL/L (ref 8–16)
AST SERPL-CCNC: 25 UNIT/L (ref 11–45)
BASOPHILS # BLD AUTO: 0.03 K/UL
BASOPHILS NFR BLD AUTO: 0.6 %
BILIRUB SERPL-MCNC: 0.8 MG/DL (ref 0.1–1)
BUN SERPL-MCNC: 20 MG/DL (ref 8–23)
CALCIUM SERPL-MCNC: 9.1 MG/DL (ref 8.7–10.5)
CHLORIDE SERPL-SCNC: 103 MMOL/L (ref 95–110)
CO2 SERPL-SCNC: 28 MMOL/L (ref 23–29)
CREAT SERPL-MCNC: 0.8 MG/DL (ref 0.5–1.4)
CREAT UR-MCNC: 89 MG/DL (ref 23–375)
ERYTHROCYTE [DISTWIDTH] IN BLOOD BY AUTOMATED COUNT: 12.4 % (ref 11.5–14.5)
FERRITIN SERPL-MCNC: 196 NG/ML (ref 20–300)
GFR SERPLBLD CREATININE-BSD FMLA CKD-EPI: >60 ML/MIN/1.73/M2
GLUCOSE SERPL-MCNC: 115 MG/DL (ref 70–110)
HCT VFR BLD AUTO: 46.4 % (ref 40–54)
HGB BLD-MCNC: 15.9 GM/DL (ref 14–18)
IMM GRANULOCYTES # BLD AUTO: 0.03 K/UL (ref 0–0.04)
IMM GRANULOCYTES NFR BLD AUTO: 0.6 % (ref 0–0.5)
IRON SATN MFR SERPL: 20 % (ref 20–50)
IRON SERPL-MCNC: 66 UG/DL (ref 45–160)
LYMPHOCYTES # BLD AUTO: 1.62 K/UL (ref 1–4.8)
MCH RBC QN AUTO: 30.3 PG (ref 27–31)
MCHC RBC AUTO-ENTMCNC: 34.3 G/DL (ref 32–36)
MCV RBC AUTO: 88 FL (ref 82–98)
MICROALBUMIN UR-MCNC: <5 UG/ML (ref ?–5000)
NUCLEATED RBC (/100WBC) (OHS): 0 /100 WBC
PLATELET # BLD AUTO: 85 K/UL (ref 150–450)
PMV BLD AUTO: 8.6 FL (ref 9.2–12.9)
POTASSIUM SERPL-SCNC: 4.7 MMOL/L (ref 3.5–5.1)
PROT SERPL-MCNC: 6.9 GM/DL (ref 6–8.4)
RBC # BLD AUTO: 5.25 M/UL (ref 4.6–6.2)
RELATIVE EOSINOPHIL (OHS): 1.2 %
RELATIVE LYMPHOCYTE (OHS): 31.5 % (ref 18–48)
RELATIVE MONOCYTE (OHS): 9.5 % (ref 4–15)
RELATIVE NEUTROPHIL (OHS): 56.6 % (ref 38–73)
SODIUM SERPL-SCNC: 137 MMOL/L (ref 136–145)
TIBC SERPL-MCNC: 326 UG/DL (ref 250–450)
TRANSFERRIN SERPL-MCNC: 220 MG/DL (ref 200–375)
WBC # BLD AUTO: 5.15 K/UL (ref 3.9–12.7)

## 2025-07-10 PROCEDURE — 97112 NEUROMUSCULAR REEDUCATION: CPT | Mod: PN

## 2025-07-10 PROCEDURE — 82570 ASSAY OF URINE CREATININE: CPT

## 2025-07-10 PROCEDURE — 83540 ASSAY OF IRON: CPT

## 2025-07-10 PROCEDURE — 97014 ELECTRIC STIMULATION THERAPY: CPT | Mod: PN

## 2025-07-10 PROCEDURE — 85025 COMPLETE CBC W/AUTO DIFF WBC: CPT | Mod: PO

## 2025-07-10 PROCEDURE — 36415 COLL VENOUS BLD VENIPUNCTURE: CPT | Mod: PO

## 2025-07-10 PROCEDURE — 82728 ASSAY OF FERRITIN: CPT

## 2025-07-10 PROCEDURE — 97110 THERAPEUTIC EXERCISES: CPT | Mod: PN

## 2025-07-10 PROCEDURE — 80053 COMPREHEN METABOLIC PANEL: CPT

## 2025-07-10 NOTE — PROGRESS NOTES
Outpatient Rehab  Physical Therapy Visit    Patient Name: Lazarus Zamudio  MRN: [id number]  YOB: 1955  Encounter Date: 7/10/2025    Therapy Diagnosis:   Encounter Diagnoses   Name Primary?    Decreased functional mobility Yes    Decreased strength of lower extremity      Physician: Giovanni Durand MD    Physician Orders: Eval and Treat  Medical Diagnosis: Lumbar radiculopathy  Lumbar spondylosis  Surgical Diagnosis: Not applicable for this Episode   Surgical Date: Not applicable for this Episode  Days Since Last Surgery: Not applicable for this Episode  Visit # / Visits Authorized:  4 / 12  Insurance Authorization Period: 6/26/2025 to 12/31/2025  Date of Evaluation: 7/8/2025  Plan of Care Certification:  6/25/25 to 8/25/25     PT/PTA: PT   Number of PTA visits since last PT visit:0  Time In: 0827   Time Out: 0920  Total Time (in minutes): 53   Total Billable Time (in minutes): 33    FOTO:  Intake Score: 59 (Lumbar Spine)%  Survey Score 2:  %  Survey Score 3:  %    Precautions:     Type 2 Diabetes, HTN, Obesity      Subjective   Patient feeling better this morning but still having some back pain. Thought last session was helpful..  Pain reported as 6/10. Lower Back    Objective            Treatment:  Therapeutic Exercise  TE 1: NuStep x10 minutes (Level 7)  TE 2: Supine LTR over SB 2x10 reps  TE 3: Standing Lumbar Extensions at Counter 2x10 reps  TE 4: Supine DKTC x20 reps with SB  TE 5: Standing Hip Extensions 2x10 reps B - leaning over counter with pillow  TE 6: Standing Gastroc Stretch 10x10s B - slant board level 3  TE 7: Shuttle Press Double Leg x30 reps (2 black cords)  Balance/Neuromuscular Re-Education  NMR 1: Supine Hip Bridge x30 reps -emphasis on glute squeeze  NMR 2: Supine Hip Adduction x30 reps (3s holds) - small ball between knees  NMR 3: Supine SLR 2x10 reps B - emphasis on core engagement  NMR 4: Sit to Stand 2x10 reps from 20in box (emphasis on neutral spine in standing and glute  activation)  Modalities  Moist Heat (min): x10 minutes to the lower back  Electrical Stimulation (Parameters): IFC x10 minutes to the lower back in combination with moist heat - intensity increased to patient tolerance. No adverse reactions observed.    *A portion of this treatment session is provided with the assistance of a skilled rehbailitation technician under the supervision of a licensed physical therapist.    Time Entry(in minutes):  E-Stim (Unattended) Time Entry: 10 (IFC)  Hot/Cold Pack Time Entry: 10 (hot pack)  Neuromuscular Re-Education Time Entry: 20  Therapeutic Exercise Time Entry: 23    Assessment & Plan   Assessment: Patient progressed well through therapy this day. Continuing to emphasize lumbar extension and overall lumbar mobility combined with lower extremity strengthening. No exacerbation of symptoms reported this date. Encourage patient to continue with home exercise program performance daily.  Evaluation/Treatment Tolerance: Patient tolerated treatment well    The patient will continue to benefit from skilled outpatient physical therapy in order to address the deficits listed in the problem list on the initial evaluation, provide patient and family education, and maximize the patients level of independence in the home and community environments.     The patient's spiritual, cultural, and educational needs were considered, and the patient is agreeable to the plan of care and goals.     Education  Education was done with Patient. The patient's learning style includes Demonstration and Listening. The patient Verbalizes understanding and Demonstrates understanding.               Plan: Continue with established POC for improved ability to perform ADL's.    Goals:   Active         LTG         Patient to increase lumbar flexion and extension range of motion by 25%, in order to improve available range of motion for ADL's.           Start:  06/25/25    Expected End:  08/25/25              Patient to  increase hip abduction and hip adduction strength by 1/2 grade, in order to improve endurance and increase ability to perform all functional activities for increased time.           Start:  06/25/25    Expected End:  08/25/25              Patient to improve score on the FOTO predicted score or better, to improve QOL.          Start:  06/25/25    Expected End:  08/25/25              Patient to have decreased pain to 3/10 at worst, to improve QOL.          Start:  06/25/25    Expected End:  08/25/25              Patient will be able to stand and walk for at least 10 minutes with minimal to no increase in symptoms for improved ability to perform ADL's.         Start:  06/25/25    Expected End:  08/25/25                 STG         Patient to be educated on HEP.          Start:  06/25/25    Expected End:  07/25/25              Patient to increase lumbar side-bending range of motion by at least 5 degrees, in order to improve available range of motion for ADL's.           Start:  06/25/25    Expected End:  07/25/25              Patient to increase hip flexion and extension strength by 1/2 grade, in order to improve endurance and increase ability to perform all functional activities for increased time.           Start:  06/25/25    Expected End:  07/25/25              Patient to have pain less than 5/10 at worst, to improve QOL.          Start:  06/25/25    Expected End:  07/25/25              Patient to improve score on the FOTO, to improve QOL.          Start:  06/25/25    Expected End:  07/25/25              Krupa Linton PT, DPT

## 2025-07-14 ENCOUNTER — OFFICE VISIT (OUTPATIENT)
Dept: HEMATOLOGY/ONCOLOGY | Facility: CLINIC | Age: 70
End: 2025-07-14
Payer: MEDICARE

## 2025-07-14 DIAGNOSIS — Z79.890 LONG-TERM CURRENT USE OF TESTOSTERONE REPLACEMENT THERAPY: ICD-10-CM

## 2025-07-14 DIAGNOSIS — E83.19 INCREASED STORAGE IRON: ICD-10-CM

## 2025-07-14 DIAGNOSIS — D73.2 SPLENOMEGALY, CONGESTIVE, CHRONIC: ICD-10-CM

## 2025-07-14 DIAGNOSIS — R59.0 RETROPERITONEAL LYMPHADENOPATHY: ICD-10-CM

## 2025-07-14 DIAGNOSIS — R16.0 HEPATOMEGALY: ICD-10-CM

## 2025-07-14 DIAGNOSIS — D75.1 SECONDARY POLYCYTHEMIA: Primary | ICD-10-CM

## 2025-07-14 DIAGNOSIS — D69.6 THROMBOCYTOPENIA: Chronic | ICD-10-CM

## 2025-07-14 DIAGNOSIS — K76.0 NAFLD (NONALCOHOLIC FATTY LIVER DISEASE): ICD-10-CM

## 2025-07-14 PROCEDURE — 98006 SYNCH AUDIO-VIDEO EST MOD 30: CPT | Mod: 95,,, | Performed by: NURSE PRACTITIONER

## 2025-07-14 NOTE — PROGRESS NOTES
The patient location is: [place]  The chief complaint leading to consultation is: no complaints    Visit type: audiovisual    Face to Face time with patient: 15  30 minutes of total time spent on the encounter, which includes face to face time and non-face to face time preparing to see the patient (eg, review of tests), Obtaining and/or reviewing separately obtained history, Documenting clinical information in the electronic or other health record, Independently interpreting results (not separately reported) and communicating results to the patient/family/caregiver, or Care coordination (not separately reported).     Each patient to whom he or she provides medical services by telemedicine is:  (1) informed of the relationship between the physician and patient and the respective role of any other health care provider with respect to management of the patient; and (2) notified that he or she may decline to receive medical services by telemedicine and may withdraw from such care at any time.    Subjective     Patient ID: Lazarus Zamudio is a 70 y.o. male.    Chief Complaint: no complaints    70 year old male who presents today to the hematology clinic for further evaluation and recommendations of recent diagnosis thrombocytopenia.  He has been referred to the clinic by his PCP, Dr. Casper Barrett.  Hep B, Hep C, HÉCTOR, HIV negative.  Blue top 83 K.  Thrombocytopenia present on/off since 2012 with more consisted low platelet count since 5/2021 ranging from 101-148K.  Denies any abnormal bleeding     Is currently taking aspirin for a long time now per patient.  States at least since 2012     Has to donate blood d/t polycythemia d/t testosterone use; however states was unable to donate because his iron was too high. States that he is not taking iron.  Denies a large consumption of iron rich foods.      Patient is accompanied by his wife.       Other diagnosis include:      Very seldom drinks alcohol.  Denies cigarette  "smoking.     Was a  at [employer].       Family hx of cancer:  Maternal uncle: lymphoma  Maternal grandfather: prostate cancer     Denies f/c/ns or unintentional weight loss.  Denies any known lymphadenopathy.       2015 colonoscopy - polyps - due to repeat     States that he has early satiety and thinks due to Ozempic.       3/21/2023 Abdominal US Impression:     1. Hepatomegaly and fatty infiltration of the liver.  2. Splenomegaly.     Interval History:  3/30/2023  Presents today to review results of workup thrombocytopenia.  Has no complaints today. Is accompanied by his wife. Has seen hepatology today and will be having fibroscan soon.      Interval History:  8/28/2024  had liver biopsy done 7/18/2023 1. Liver, biopsy:   Mild macrovesicular steatosis without active steatohepatitis.   Mild mixed portal inflammation.   Denies any known abnormal bleeding.  Scheduled for phlebotomies every 8 weeks due to polycythemia from testosterone therapy.  States that he was told that his iron is too high. No hereditary hemochromatosis labs seen in system. Last time he had a phlebotomy was June 2023.  Is scheduled to see new hepatologist in September.  Denies f/c/ns or unintentional weight loss.  Denies any known abnormal lymphadenopathy.   I have reviewed all of the patient's relevant lab work available in the medical record and have utilized this in my evaluation and management recommendations today.     Interval history:  10/30/24 Pt returns today for thrombocytopenia follow-up. Endorses bruising to both arms and prolonged bleeding after cutting himself while shaving. He is feeling fatigued and "sluggish". No hematuria/melena/hematochezia. No f/c/night sweats. Pt is taking Monjouro but has not had any unintentional weight loss. He endorses intermittent heartburn/indigestion. No abdominal pain or early satiety.     Interval History:  2/26/2025  Presents today to review labs to see iron phlebotomy " needed for secondary polycythemia d/t chronic testosterone use and Rosey treated with cpap.  Has been found with hepatosplenomegaly being followed by hepatology.  With known chronic stable thrombocytopenia.  Has not had phlebotomy since 9/2024.  Was found with iron deficiency and required feraheme infusions  x 2 with last infusion in 9/2024.  Iron is currently repleated.  He states originally he was told to have a phlebotomy because his iron levels were to high.  No HH labs found in the system.  Unable to order MPN reflexive studies due to insurance constraints. Currently with hct 48%.  Platelet count 78K.      Interval History:  5/1/2025 Has no complaints    Interval History:  1/15/2024   On 11/6/2024 and 11/19/2024 received Feraheme infusions for SAM.  Hgb normal - iron studies good now.  Continues to get testosterone injections every 2 weeks.  Was also evaluated with upper/lower GI and VCS  EGD 12/5/2024  - Normal duodenal bulb and second portion of the duodenum. Biopsied.   A single recently bleeding angioectasia in the duodenum. Treated with argon plasma coagulation (APC). Three non-bleeding angioectasias in the stomach. Treated with argon plasma coagulation (APC). No gross lesions in the gastric body, in the incisura and in the antrum. Biopsied.  Z-line variable, 40 cm from the incisors. No gross lesions in the entire esophagus.   12/5/2024 ColonoscopyThe examined portion of the ileum was normal. Two 1 to 3 mm polyps in the cecum, removed with a cold snare. Resected and retrieved. A single colonic angioectasia. Treated with argon plasma coagulation (APC).   One 1 mm polyp in the descending colon, removed with a jumbo cold forceps. Resected and retrieved. Stool in the sigmoid colon, in the descending colon and in the ascending colon.   The examination was otherwise normal.   Hemorrhoids found on perianal exam.   VCE was done however there was poor bowel prep in small bowel so vc did not reach the cecum at the  termination of recording.    Patient to repeat colonoscopy in 3 years per recommendations.     Pathology:   Final Pathologic Diagnosis 1. SMALL BOWEL BIOPSIES:  -  No significant histopathologic abnormality; there is no evidence of celiac disease.    2. GASTRIC BIOPSIES:  -  Chemical gastritis / reactive gastropathy with mild chronic inflammation.    -  Helicobacter pylori are not identified on routine staining.  -  No evidence of intestinal metaplasia, dysplasia or malignancy.    3. CECUM BIOPSIES, POLYPECTOMY X2:  -  Tubular adenoma.    -  Serrated polyp / lesion.    -  No evidence of high-grade dysplasia or malignancy.    4. DESCENDING COLON BIOPSY, POLYPECTOMY:  -  Tubular adenoma.    -  No evidence of high-grade dysplasia or malignancy.     Interval History:  5/1/2025 Has no complaints today.  Platelet count is stable at 88 K.  No need for phlebotomy and continues testosterone injections.  Iron studies look good.      Interval History:  7/14/2025 presents today to assess labs with h/o elevated irons studies and secondary polycythemia d/t testosterone use. Chronic thrombocytopenia.  Had Ct abdomen with contrast done 5/2025 found with splenomegaly and mildly enlarged retroperitoneal lymph nodes.      Review of Systems   Constitutional:  Positive for fatigue (improved). Negative for appetite change, chills, fever, night sweats and unexpected weight change.   HENT:  Negative for mouth sores and nosebleeds.    Eyes:  Negative for visual disturbance.   Respiratory:  Negative for cough and shortness of breath.    Cardiovascular:  Negative for chest pain.   Gastrointestinal:  Positive for reflux (a little every now and then - trying to control with diet). Negative for abdominal pain, anal bleeding, blood in stool and diarrhea.   Genitourinary:  Negative for frequency and hematuria.   Musculoskeletal:  Positive for arthralgias (knee pain - seeing Dr. Nixon and getting gel shots in both knees.). Negative for back pain.    Integumentary:  Negative for rash.   Allergic/Immunologic: Negative for frequent infections.   Neurological:  Negative for dizziness, weakness, light-headedness, numbness and headaches.   Hematological:  Negative for adenopathy. Bruises/bleeds easily (improved - not bleeding easily).   Psychiatric/Behavioral:  The patient is not nervous/anxious.        Current Outpatient Medications:     aspirin (ECOTRIN) 81 MG EC tablet, Take 81 mg by mouth once daily., Disp: , Rfl:     atorvastatin (LIPITOR) 40 MG tablet, TAKE 1 TABLET BY MOUTH EVERY DAY, Disp: 90 tablet, Rfl: 2    dorzolamide-timolol 2-0.5% (COSOPT) 22.3-6.8 mg/mL ophthalmic solution, Place 1 drop into the right eye 2 (two) times daily., Disp: , Rfl:     enalapril (VASOTEC) 20 MG tablet, TAKE 1 TABLET BY MOUTH EVERY DAY, Disp: 90 tablet, Rfl: 3    fluticasone propionate (FLONASE) 50 mcg/actuation nasal spray, Use 2 sprays to each nostril daily, Disp: 16 g, Rfl: 0    gabapentin (NEURONTIN) 300 MG capsule, Take 1 capsule (300 mg total) by mouth 2 (two) times daily., Disp: 60 capsule, Rfl: 1    latanoprost 0.005 % ophthalmic solution, Place 1 drop into both eyes every evening., Disp: , Rfl:     metFORMIN (GLUCOPHAGE-XR) 500 MG ER 24hr tablet, TAKE 1 TABLET BY MOUTH EVERY DAY WITH BREAKFAST, Disp: 90 tablet, Rfl: 0    multivitamin with minerals tablet, Take 1 tablet by mouth once daily., Disp: , Rfl:     pantoprazole (PROTONIX) 40 MG tablet, Take 1 tablet (40 mg total) by mouth once daily., Disp: 90 tablet, Rfl: 1    tadalafiL (CIALIS) 5 MG tablet, Take 5 mg by mouth once daily., Disp: , Rfl:     testosterone cypionate (DEPOTESTOTERONE CYPIONATE) 100 mg/mL injection, Inject 200 mg into the muscle every 14 (fourteen) days., Disp: , Rfl:     tirzepatide (MOUNJARO) 12.5 mg/0.5 mL PnIj, Inject 12.5 mg into the skin every 7 days., Disp: 6 mL, Rfl: 1     Patient Active Problem List   Diagnosis    Type 2 diabetes mellitus with hyperglycemia, without long-term current use  of insulin    Hypertension associated with diabetes    History of colonic polyps    Hypersomnia with sleep apnea    Obstructive sleep apnea syndrome    Encounter for long-term (current) use of medications    Venous stasis dermatitis of left lower extremity    Class 3 obesity with alveolar hypoventilation, serious comorbidity, and body mass index (BMI) of 40.0 to 44.9 in adult    Sciatica, left side    Pain of left lower extremity    Chronic left-sided low back pain without sciatica    Decreased functional mobility    Muscle weakness of lower extremity    Polycythemia    Hypogonadism male    Chronic pain of both knees    Thrombocytopenia    Class 2 obesity with alveolar hypoventilation, serious comorbidity, and body mass index (BMI) of 35.0 to 35.9 in adult    Onychogryposis    Impaired functional mobility and activity tolerance    Iron deficiency anemia    Secondary polycythemia    Long-term current use of testosterone replacement therapy    Hyperlipidemia associated with type 2 diabetes mellitus    Localized enlarged lymph nodes    Aortic calcification    Hepatosplenomegaly    Hepatic steatosis    Decreased strength of lower extremity      Past Medical History:   Diagnosis Date    Diabetes mellitus, type 2     Hypertension     Morbid obesity       Past Surgical History:   Procedure Laterality Date    COLONOSCOPY N/A 12/5/2024    Procedure: COLONOSCOPY;  Surgeon: Stephanie Gonzalez MD;  Location: [facility];  Service: Gastroenterology;  Laterality: N/A;    ESOPHAGOGASTRODUODENOSCOPY N/A 12/5/2024    Procedure: EGD (ESOPHAGOGASTRODUODENOSCOPY);  Surgeon: Stephanie Gonzalez MD;  Location: [facility];  Service: Gastroenterology;  Laterality: N/A;    HERNIA REPAIR      INTRALUMINAL GASTROINTESTINAL TRACT IMAGING VIA CAPSULE N/A 12/23/2024    Procedure: IMAGING PROCEDURE, GI TRACT, INTRALUMINAL, VIA CAPSULE;  Surgeon: Ihsan Rodriguez RN;  Location: [facility];  Service: Endoscopy;  Laterality: N/A;    KNEE ARTHROSCOPY W/  DEBRIDEMENT      VASECTOMY        Social History     Socioeconomic History    Marital status:    Occupational History     Employer:  [employer]   Tobacco Use    Smoking status: Never     Passive exposure: Never    Smokeless tobacco: Never   Substance and Sexual Activity    Alcohol use: Not Currently     Comment: Reported none.    Drug use: No    Sexual activity: Yes     Partners: Female   Social History Narrative    . [facility] at [employer] includes the [facility].     Social Drivers of Health     Financial Resource Strain: Low Risk  (7/7/2025)    Overall Financial Resource Strain (CARDIA)     Difficulty of Paying Living Expenses: Not hard at all   Food Insecurity: No Food Insecurity (7/7/2025)    Hunger Vital Sign     Worried About Running Out of Food in the Last Year: Never true     Ran Out of Food in the Last Year: Never true   Transportation Needs: No Transportation Needs (7/7/2025)    PRAPARE - Transportation     Lack of Transportation (Medical): No     Lack of Transportation (Non-Medical): No   Physical Activity: Insufficiently Active (7/7/2025)    Exercise Vital Sign     Days of Exercise per Week: 2 days     Minutes of Exercise per Session: 60 min   Stress: No Stress Concern Present (7/7/2025)    [employer] - Occupational Stress Questionnaire     Feeling of Stress : Not at all   Housing Stability: Low Risk  (7/7/2025)    Housing Stability Vital Sign     Unable to Pay for Housing in the Last Year: No     Number of Times Moved in the Last Year: 0     Homeless in the Last Year: No           Objective     Physical Exam  Constitutional:       Appearance: Normal appearance.   Pulmonary:      Effort: Pulmonary effort is normal.   Neurological:      Mental Status: He is alert and oriented to person, place, and time.   Psychiatric:         Mood and Affect: Mood normal.         Behavior: Behavior normal.         Thought Content: Thought content normal.         Judgment: Judgment normal.         Assessment and Plan   Diagnoses and all orders for this visit:    Secondary polycythemia  -     CBC Auto Differential; Future  -     Comprehensive Metabolic Panel; Future  -     Ferritin; Future  -     Iron and TIBC; Future    Increased storage iron  -     CBC Auto Differential; Future  -     Comprehensive Metabolic Panel; Future  -     Ferritin; Future  -     Iron and TIBC; Future    Splenomegaly, congestive, chronic  -     CBC Auto Differential; Future  -     Comprehensive Metabolic Panel; Future    Long-term current use of testosterone replacement therapy  -     CBC Auto Differential; Future  -     Comprehensive Metabolic Panel; Future    Hepatomegaly  -     CBC Auto Differential; Future  -     Comprehensive Metabolic Panel; Future    Thrombocytopenia  -     CBC Auto Differential; Future  -     Comprehensive Metabolic Panel; Future    NAFLD (nonalcoholic fatty liver disease)  -     CBC Auto Differential; Future  -     Comprehensive Metabolic Panel; Future    Retroperitoneal lymphadenopathy               Route Chart for Scheduling    Med Onc Chart Routing      Follow up with physician    Follow up with CY 4 months. VV - labs prior in [place] - VV after   Infusion scheduling note   n/a   Injection scheduling note possible phlebotomy   Labs   Scheduling:  Preferred lab: [facility]  Lab interval:  cbc,cmp, iron studies   Imaging   N/a   Pharmacy appointment No pharmacy appointment needed      Other referrals No nutrition appointment needed -        No additional referrals needed  n/a       Will continue to monitor for increased iron storage - recommend phlebotomy for sat iron >50%.    Also continue monitor for increased hematocrit >55% secondary polycythemia due to current testosterone use for treatment low T.    Retroperitoneal lymphadenopathy being monitored for changes by pcp with CT scan abdomen ordered.    Hepatology continue to monitor fatty enlarged liver.  Continue to work on weight loss with  diet/exercise/medication assistance. Patient is doing well with this.     Thrombocytopenia due to congestive splenomegaly from enlarged fatty liver.  Platelet count is stable.

## 2025-07-15 ENCOUNTER — CLINICAL SUPPORT (OUTPATIENT)
Dept: REHABILITATION | Facility: HOSPITAL | Age: 70
End: 2025-07-15
Payer: MEDICARE

## 2025-07-15 DIAGNOSIS — R29.898 DECREASED STRENGTH OF LOWER EXTREMITY: ICD-10-CM

## 2025-07-15 DIAGNOSIS — R26.89 DECREASED FUNCTIONAL MOBILITY: Primary | ICD-10-CM

## 2025-07-15 PROCEDURE — 97110 THERAPEUTIC EXERCISES: CPT | Mod: PN

## 2025-07-15 PROCEDURE — 97112 NEUROMUSCULAR REEDUCATION: CPT | Mod: PN

## 2025-07-15 PROCEDURE — 97014 ELECTRIC STIMULATION THERAPY: CPT | Mod: PN

## 2025-07-15 NOTE — PROGRESS NOTES
Outpatient Rehab  Physical Therapy Visit    Patient Name: Lazarus Zamudio  MRN: [id number]  YOB: 1955  Encounter Date: 7/15/2025    Therapy Diagnosis:   Encounter Diagnoses   Name Primary?    Decreased functional mobility Yes    Decreased strength of lower extremity      Physician: Giovanni Durand MD    Physician Orders: Eval and Treat  Medical Diagnosis: Lumbar radiculopathy  Lumbar spondylosis  Surgical Diagnosis: Not applicable for this Episode   Surgical Date: Not applicable for this Episode  Days Since Last Surgery: Not applicable for this Episode  Visit # / Visits Authorized:  5 / 12  Insurance Authorization Period: 6/26/2025 to 12/31/2025  Date of Evaluation: 7/8/2025  Plan of Care Certification: 7/15/2025 to 9/15/2025     PT/PTA: PT   Number of PTA visits since last PT visit:0  Time In: 0830   Time Out: 0925  Total Time (in minutes): 55   Total Billable Time (in minutes): 30    FOTO:  Intake Score: 59%  Survey Score 2: 63%  Survey Score 3: Not applicable for this Episode%    Precautions:  Additional Precautions and Protocol Details: Type 2 Diabetes, HTN, Obesity      Subjective   Patient feeling pretty good today. Minimal pain in the back. No issues over the weekend..  Pain reported as 2/10. Lower Back    Objective            Treatment:  Therapeutic Exercise  TE 1: NuStep x10 minutes (Level 7)  TE 2: Supine LTR over SB 2x10 reps  TE 3: Standing Lumbar Extensions at Counter 2x10 reps  TE 4: Supine DKTC x20 reps with SB  TE 5: Standing Hip Extensions 2x10 reps B - leaning over counter with pillow  TE 6: Standing Gastroc Stretch 10x10s B - slant board level 3  TE 7: Shuttle Press Double Leg x30 reps (2 black cords, 1 red cord)  Balance/Neuromuscular Re-Education  NMR 1: Supine Hip Bridge x30 reps -emphasis on glute squeeze  NMR 2: Supine Hip Adduction x30 reps (3s holds) - small ball between knees  NMR 3: Supine SLR 2x10 reps B - emphasis on core engagement  NMR 4: Sit to Stand 2x10 reps from 20in  box (emphasis on neutral spine in standing and glute activation) + #15 KB  Modalities  Moist Heat (min): x10 minutes to the lower back  Electrical Stimulation (Parameters): IFC x10 minutes to the lower back in combination with moist heat - intensity increased to patient tolerance. No adverse reactions observed.    *A portion of this treatment session is provided with the assistance of a skilled rehbailitation technician under the supervision of a licensed physical therapist.    Time Entry(in minutes):  E-Stim (Unattended) Time Entry: 10 (IFC)  Hot/Cold Pack Time Entry: 10 (hot pack)  Neuromuscular Re-Education Time Entry: 20  Therapeutic Exercise Time Entry: 25    Assessment & Plan   Assessment: Patient with no exacerbation of pain this morning. Continuing to focus on lumbar mobility and hip, glute, core strengthening. Will continue to progress the patient as able next session. Positive response to modalities post treatment session.  Evaluation/Treatment Tolerance: Patient tolerated treatment well    The patient will continue to benefit from skilled outpatient physical therapy in order to address the deficits listed in the problem list on the initial evaluation, provide patient and family education, and maximize the patients level of independence in the home and community environments.     The patient's spiritual, cultural, and educational needs were considered, and the patient is agreeable to the plan of care and goals.     Education  Education was done with Patient. The patient's learning style includes Demonstration and Listening. The patient Verbalizes understanding and Demonstrates understanding.               Plan: Continue with established POC for improved ability to perform ADL's.    Goals:   Active         LTG         Patient to increase lumbar flexion and extension range of motion by 25%, in order to improve available range of motion for ADL's.           Start:  06/25/25    Expected End:  08/25/25               Patient to increase hip abduction and hip adduction strength by 1/2 grade, in order to improve endurance and increase ability to perform all functional activities for increased time.           Start:  06/25/25    Expected End:  08/25/25              Patient to improve score on the FOTO predicted score or better, to improve QOL.          Start:  06/25/25    Expected End:  08/25/25              Patient to have decreased pain to 3/10 at worst, to improve QOL.          Start:  06/25/25    Expected End:  08/25/25              Patient will be able to stand and walk for at least 10 minutes with minimal to no increase in symptoms for improved ability to perform ADL's.         Start:  06/25/25    Expected End:  08/25/25                 STG         Patient to be educated on HEP.          Start:  06/25/25    Expected End:  07/25/25              Patient to increase lumbar side-bending range of motion by at least 5 degrees, in order to improve available range of motion for ADL's.           Start:  06/25/25    Expected End:  07/25/25              Patient to increase hip flexion and extension strength by 1/2 grade, in order to improve endurance and increase ability to perform all functional activities for increased time.           Start:  06/25/25    Expected End:  07/25/25              Patient to have pain less than 5/10 at worst, to improve QOL.          Start:  06/25/25    Expected End:  07/25/25              Patient to improve score on the FOTO, to improve QOL.          Start:  06/25/25    Expected End:  07/25/25              Krupa Linton, PT, DPT

## 2025-07-17 ENCOUNTER — CLINICAL SUPPORT (OUTPATIENT)
Dept: REHABILITATION | Facility: HOSPITAL | Age: 70
End: 2025-07-17
Payer: MEDICARE

## 2025-07-17 DIAGNOSIS — D69.6 THROMBOCYTOPENIA: Primary | Chronic | ICD-10-CM

## 2025-07-17 DIAGNOSIS — D50.9 IRON DEFICIENCY ANEMIA, UNSPECIFIED IRON DEFICIENCY ANEMIA TYPE: Primary | ICD-10-CM

## 2025-07-17 DIAGNOSIS — D69.6 THROMBOCYTOPENIA: Chronic | ICD-10-CM

## 2025-07-17 DIAGNOSIS — R26.89 DECREASED FUNCTIONAL MOBILITY: Primary | ICD-10-CM

## 2025-07-17 DIAGNOSIS — D50.9 IRON DEFICIENCY ANEMIA, UNSPECIFIED IRON DEFICIENCY ANEMIA TYPE: ICD-10-CM

## 2025-07-17 DIAGNOSIS — R29.898 DECREASED STRENGTH OF LOWER EXTREMITY: ICD-10-CM

## 2025-07-17 PROCEDURE — 97014 ELECTRIC STIMULATION THERAPY: CPT | Mod: PN

## 2025-07-17 PROCEDURE — 97110 THERAPEUTIC EXERCISES: CPT | Mod: PN

## 2025-07-17 PROCEDURE — 97112 NEUROMUSCULAR REEDUCATION: CPT | Mod: PN

## 2025-07-17 NOTE — PROGRESS NOTES
Outpatient Rehab  Physical Therapy Visit    Patient Name: Lazarus Zamudio  MRN: [id number]  YOB: 1955  Encounter Date: 7/17/2025    Therapy Diagnosis:   Encounter Diagnoses   Name Primary?    Decreased functional mobility Yes    Decreased strength of lower extremity      Physician: Giovanni Durand MD    Physician Orders: Eval and Treat  Medical Diagnosis: Lumbar radiculopathy  Lumbar spondylosis  Surgical Diagnosis: Not applicable for this Episode   Surgical Date: Not applicable for this Episode  Days Since Last Surgery: Not applicable for this Episode  Visit # / Visits Authorized:  6 / 12  Insurance Authorization Period: 6/26/2025 to 12/31/2025  Date of Evaluation: 7/8/2025  Plan of Care Certification: 7/15/2025 to 9/15/2025      PT/PTA: PT   Number of PTA visits since last PT visit:0  Time In: 0830   Time Out: 0925  Total Time (in minutes): 55   Total Billable Time (in minutes): 45    FOTO:  Intake Score: 59%  Survey Score 2: 63%  Survey Score 3: Not applicable for this Episode%    Precautions:  Additional Precautions and Protocol Details: Type 2 Diabetes, HTN, Obesity      Subjective   Patient continues to feel better. Minimal pain in the lower back this date. No issues over the weekend..  Pain reported as 2/10. Lower Back    Objective            Treatment:  Therapeutic Exercise  TE 1: NuStep x10 minutes (Level 9)  TE 3: Standing Lumbar Extensions at Counter 2x10 reps  TE 5: Standing Hip Extensions 2x15 reps B - leaning over counter with pillow  TE 6: Standing Gastroc Stretch 10x10s B - slant board level 3  TE 7: Shuttle Press Double Leg x30 reps (2 black cords, 1 red cord)  TE 8: Seated Thoracic Extensions over ball x20 reps  Balance/Neuromuscular Re-Education  NMR 1: Supine Hip Bridge x30 reps -emphasis on glute squeeze  NMR 2: Supine Hip Adduction x30 reps (3s holds) - small ball between knees  NMR 3: Supine SLR 2x15 reps B - emphasis on core engagement  NMR 4: Sit to Stand 3x10 reps from chair  (emphasis on neutral spine in standing and glute activation) + #15 KB    Time Entry(in minutes):  E-Stim (Unattended) Time Entry: 10 (IFC)  Hot/Cold Pack Time Entry: 10 (hot pack)  Neuromuscular Re-Education Time Entry: 20  Therapeutic Exercise Time Entry: 25    Assessment & Plan   Assessment: Patient progressed well through exercises this date. Continue to work on lumbar extension mobility, as well as lower extremity strengthening, especially into the core and Gluth musculature. No exacerbation of pain reported this day. Encourage patient to continue with home exercise program on a daily basis.  Evaluation/Treatment Tolerance: Patient tolerated treatment well    The patient will continue to benefit from skilled outpatient physical therapy in order to address the deficits listed in the problem list on the initial evaluation, provide patient and family education, and maximize the patients level of independence in the home and community environments.     The patient's spiritual, cultural, and educational needs were considered, and the patient is agreeable to the plan of care and goals.     Education  Education was done with Patient. The patient's learning style includes Demonstration and Listening. The patient Verbalizes understanding and Demonstrates understanding.               Plan: Continue with established POC for improved ability to perform ADL's.    Goals:   Active         LTG         Patient to increase lumbar flexion and extension range of motion by 25%, in order to improve available range of motion for ADL's.           Start:  06/25/25    Expected End:  08/25/25              Patient to increase hip abduction and hip adduction strength by 1/2 grade, in order to improve endurance and increase ability to perform all functional activities for increased time.           Start:  06/25/25    Expected End:  08/25/25              Patient to improve score on the FOTO predicted score or better, to improve QOL.           Start:  06/25/25    Expected End:  08/25/25              Patient to have decreased pain to 3/10 at worst, to improve QOL.          Start:  06/25/25    Expected End:  08/25/25              Patient will be able to stand and walk for at least 10 minutes with minimal to no increase in symptoms for improved ability to perform ADL's.         Start:  06/25/25    Expected End:  08/25/25                 STG         Patient to be educated on HEP.          Start:  06/25/25    Expected End:  07/25/25              Patient to increase lumbar side-bending range of motion by at least 5 degrees, in order to improve available range of motion for ADL's.           Start:  06/25/25    Expected End:  07/25/25              Patient to increase hip flexion and extension strength by 1/2 grade, in order to improve endurance and increase ability to perform all functional activities for increased time.           Start:  06/25/25    Expected End:  07/25/25              Patient to have pain less than 5/10 at worst, to improve QOL.          Start:  06/25/25    Expected End:  07/25/25              Patient to improve score on the FOTO, to improve QOL.          Start:  06/25/25    Expected End:  07/25/25              Krupa Linton PT, DPT

## 2025-07-21 ENCOUNTER — TELEPHONE (OUTPATIENT)
Dept: HEPATOLOGY | Facility: CLINIC | Age: 70
End: 2025-07-21
Payer: MEDICARE

## 2025-07-22 ENCOUNTER — CLINICAL SUPPORT (OUTPATIENT)
Dept: REHABILITATION | Facility: HOSPITAL | Age: 70
End: 2025-07-22
Payer: MEDICARE

## 2025-07-22 DIAGNOSIS — R26.89 DECREASED FUNCTIONAL MOBILITY: Primary | ICD-10-CM

## 2025-07-22 DIAGNOSIS — R29.898 DECREASED STRENGTH OF LOWER EXTREMITY: ICD-10-CM

## 2025-07-22 PROCEDURE — 97014 ELECTRIC STIMULATION THERAPY: CPT | Mod: PN

## 2025-07-22 PROCEDURE — 97112 NEUROMUSCULAR REEDUCATION: CPT | Mod: PN

## 2025-07-22 PROCEDURE — 97110 THERAPEUTIC EXERCISES: CPT | Mod: PN

## 2025-07-22 NOTE — PROGRESS NOTES
Outpatient Rehab  Physical Therapy Visit    Patient Name: Lazarus Zamudio  MRN: [id number]  YOB: 1955  Encounter Date: 7/22/2025    Therapy Diagnosis:   Encounter Diagnoses   Name Primary?    Decreased functional mobility Yes    Decreased strength of lower extremity      Physician: Giovanni Durand MD    Physician Orders: Eval and Treat  Medical Diagnosis: Lumbar radiculopathy  Lumbar spondylosis  Surgical Diagnosis: Not applicable for this Episode   Surgical Date: Not applicable for this Episode  Days Since Last Surgery: Not applicable for this Episode  Visit # / Visits Authorized:  7 / 12  Insurance Authorization Period: 6/26/2025 to 12/31/2025  Date of Evaluation: 7/8/2025  Plan of Care Certification: 7/15/2025 to 9/15/2025      PT/PTA: PT   Number of PTA visits since last PT visit:0  Time In: 0832   Time Out: 0935  Total Time (in minutes): 63   Total Billable Time (in minutes): 30    FOTO:  Intake Score (%): 59  Survey Score 2 (%): 63  Survey Score 3 (%): Not applicable for this Episode    Precautions:  Additional Precautions and Protocol Details: Type 2 Diabetes, HTN, Obesity      Subjective   Patient feels like the back is getting better. Still having some pain along the right lower back this morning..  Pain reported as 2/10. R Lower Back    Objective            Treatment:  Therapeutic Exercise  TE 1: NuStep x10 minutes (Level 9)  TE 2: Supine LTR over SB 2x10 reps  TE 3: Standing Lumbar Extensions at Counter 2x10 reps  TE 4: Supine DKTC x20 reps with SB  TE 5: Standing Hip Extensions 2x15 reps B - leaning over counter with pillow  TE 6: Standing Gastroc Stretch 10x10s B - slant board level 3  TE 7: Shuttle Press Double Leg x30 reps (2 black cords, 1 red cord)  TE 8: Seated Thoracic Extensions over ball x20 reps  TE 9: Standing Hip Abduction 2x10 reps B  Balance/Neuromuscular Re-Education  NMR 1: Supine Hip Bridge x30 reps -emphasis on glute squeeze  NMR 2: Supine Hip Adduction x30 reps (3s holds)  - small ball between knees  NMR 3: Supine SLR 2x15 reps B - emphasis on core engagement  NMR 4: Sit to Stand 3x10 reps from chair (emphasis on neutral spine in standing and glute activation) + #15 KB  Modalities  Moist Heat (min): x10 minutes to the lower back  Electrical Stimulation (Parameters): IFC x10 minutes to the lower back in combination with moist heat - intensity increased to patient tolerance. No adverse reactions observed.    *A portion of this treatment session is provided with the assistance of a skilled rehbailitation technician under the supervision of a licensed physical therapist.    Time Entry(in minutes):  E-Stim (Unattended) Time Entry: 10 (IFC)  Hot/Cold Pack Time Entry: 10 (hot pack)  Neuromuscular Re-Education Time Entry: 25  Therapeutic Exercise Time Entry: 28    Assessment & Plan   Assessment: Patient progressed well through prescribed exercises this date. Emphasis placed on lumbopelvic mobility without exacerbation of symptoms. No adverse reactions observed this date.  Evaluation/Treatment Tolerance: Patient tolerated treatment well    The patient will continue to benefit from skilled outpatient physical therapy in order to address the deficits listed in the problem list on the initial evaluation, provide patient and family education, and maximize the patients level of independence in the home and community environments.     The patient's spiritual, cultural, and educational needs were considered, and the patient is agreeable to the plan of care and goals.     Education  Education was done with Patient. The patient's learning style includes Demonstration and Listening. The patient Verbalizes understanding and Demonstrates understanding.               Plan: Continue with established POC for improved ability to perform ADL's.    Goals:   Active         LTG         Patient to increase lumbar flexion and extension range of motion by 25%, in order to improve available range of motion for  ADL's.           Start:  06/25/25    Expected End:  08/25/25              Patient to increase hip abduction and hip adduction strength by 1/2 grade, in order to improve endurance and increase ability to perform all functional activities for increased time.           Start:  06/25/25    Expected End:  08/25/25              Patient to improve score on the FOTO predicted score or better, to improve QOL.          Start:  06/25/25    Expected End:  08/25/25              Patient to have decreased pain to 3/10 at worst, to improve QOL.          Start:  06/25/25    Expected End:  08/25/25              Patient will be able to stand and walk for at least 10 minutes with minimal to no increase in symptoms for improved ability to perform ADL's.         Start:  06/25/25    Expected End:  08/25/25                 STG         Patient to be educated on HEP.          Start:  06/25/25    Expected End:  07/25/25              Patient to increase lumbar side-bending range of motion by at least 5 degrees, in order to improve available range of motion for ADL's.           Start:  06/25/25    Expected End:  07/25/25              Patient to increase hip flexion and extension strength by 1/2 grade, in order to improve endurance and increase ability to perform all functional activities for increased time.           Start:  06/25/25    Expected End:  07/25/25              Patient to have pain less than 5/10 at worst, to improve QOL.          Start:  06/25/25    Expected End:  07/25/25              Patient to improve score on the FOTO, to improve QOL.          Start:  06/25/25    Expected End:  07/25/25             Krupa Linton PT, DPT

## 2025-07-29 ENCOUNTER — CLINICAL SUPPORT (OUTPATIENT)
Dept: REHABILITATION | Facility: HOSPITAL | Age: 70
End: 2025-07-29
Payer: MEDICARE

## 2025-07-29 DIAGNOSIS — R26.89 DECREASED FUNCTIONAL MOBILITY: Primary | ICD-10-CM

## 2025-07-29 DIAGNOSIS — R29.898 DECREASED STRENGTH OF LOWER EXTREMITY: ICD-10-CM

## 2025-07-29 DIAGNOSIS — J01.90 ACUTE BACTERIAL SINUSITIS: ICD-10-CM

## 2025-07-29 DIAGNOSIS — B96.89 ACUTE BACTERIAL SINUSITIS: ICD-10-CM

## 2025-07-29 PROCEDURE — 97112 NEUROMUSCULAR REEDUCATION: CPT | Mod: PN

## 2025-07-29 PROCEDURE — 97014 ELECTRIC STIMULATION THERAPY: CPT | Mod: PN

## 2025-07-29 NOTE — PROGRESS NOTES
Outpatient Rehab  Physical Therapy Visit    Patient Name: Lazarus Zamudio  MRN: [id number]  YOB: 1955  Encounter Date: 7/29/2025    Therapy Diagnosis:   Encounter Diagnoses   Name Primary?    Decreased functional mobility Yes    Decreased strength of lower extremity      Physician: Giovanni Durand MD    Physician Orders: Eval and Treat  Medical Diagnosis: Lumbar radiculopathy  Lumbar spondylosis  Surgical Diagnosis: Not applicable for this Episode   Surgical Date: Not applicable for this Episode  Days Since Last Surgery: Not applicable for this Episode  Visit # / Visits Authorized:  8 / 12  Insurance Authorization Period: 6/26/2025 to 12/31/2025  Date of Evaluation: 7/8/2025  Plan of Care Certification: 7/15/2025 to 9/15/2025      PT/PTA: PT   Number of PTA visits since last PT visit:0  Time In: 0825   Time Out: 0930  Total Time (in minutes): 65   Total Billable Time (in minutes): 30    FOTO:  Intake Score (%): 59  Survey Score 2 (%): 63  Survey Score 3 (%): Not applicable for this Episode    Precautions:  Additional Precautions and Protocol Details: Type 2 Diabetes, HTN, Obesity      Subjective   Patient reports that he has been doing pretty good. Still having some instances of discomfort in the right buttocks but overall things are getting better. No pain this morning..  Pain reported as 0/10. R Lower Back    Objective            Treatment:  Therapeutic Exercise  TE 1: NuStep x10 minutes (Level 9)  TE 2: Supine LTR over SB 3x10 reps  TE 3: Standing Lumbar Extensions at Counter 2x10 reps  TE 4: Supine DKTC x30 reps with SB  TE 5: Standing Hip Extensions 2x15 reps B - leaning over counter with pillow  TE 6: Standing Gastroc Stretch 10x10s B - slant board level 3  TE 7: Shuttle Press Double Leg x30 reps (2 black cords, 1 red cord)  TE 8: Seated Thoracic Extensions over ball x20 reps  TE 9: Standing Hip Abduction 3x10 reps B  Balance/Neuromuscular Re-Education  NMR 1: Supine Hip Bridge x30 reps -emphasis  on glute squeeze  NMR 2: Supine Hip Adduction x30 reps (3s holds) - small ball between knees  NMR 3: Supine SLR 2x15 reps B - emphasis on core engagement  NMR 4: Sit to Stand 3x10 reps from chair (emphasis on neutral spine in standing and glute activation) + #15 KB  Modalities  Moist Heat (min): x10 minutes to the lower back  Electrical Stimulation (Parameters): IFC x10 minutes to the lower back in combination with moist heat - intensity increased to patient tolerance. No adverse reactions observed.    *A portion of this treatment session is provided with the assistance of a skilled rehbailitation technician under the supervision of a licensed physical therapist.    Time Entry(in minutes):  E-Stim (Unattended) Time Entry: 10 (IFC)  Hot/Cold Pack Time Entry: 10 (hot pack)  Neuromuscular Re-Education Time Entry: 25  Therapeutic Exercise Time Entry: 30    Assessment & Plan   Assessment: Patient tolerated therapy well overall this state. Continue to focus on proper core activation with all activities as well as proper posture during seated and standing exercises. No exacerbation of lower back pain reported this date. Encourage patient to continue with home exercise program on a daily basis.  Evaluation/Treatment Tolerance: Patient tolerated treatment well    The patient will continue to benefit from skilled outpatient physical therapy in order to address the deficits listed in the problem list on the initial evaluation, provide patient and family education, and maximize the patients level of independence in the home and community environments.     The patient's spiritual, cultural, and educational needs were considered, and the patient is agreeable to the plan of care and goals.     Education  Education was done with Patient. The patient's learning style includes Demonstration and Listening. The patient Verbalizes understanding and Demonstrates understanding.               Plan: Continue with established POC for  improved ability to perform ADL's.    Goals: (progressing - not met)  Active         LTG         Patient to increase lumbar flexion and extension range of motion by 25%, in order to improve available range of motion for ADL's.           Start:  06/25/25    Expected End:  08/25/25              Patient to increase hip abduction and hip adduction strength by 1/2 grade, in order to improve endurance and increase ability to perform all functional activities for increased time.           Start:  06/25/25    Expected End:  08/25/25              Patient to improve score on the FOTO predicted score or better, to improve QOL.          Start:  06/25/25    Expected End:  08/25/25              Patient to have decreased pain to 3/10 at worst, to improve QOL.          Start:  06/25/25    Expected End:  08/25/25              Patient will be able to stand and walk for at least 10 minutes with minimal to no increase in symptoms for improved ability to perform ADL's.         Start:  06/25/25    Expected End:  08/25/25                 STG         Patient to be educated on HEP.          Start:  06/25/25    Expected End:  07/25/25              Patient to increase lumbar side-bending range of motion by at least 5 degrees, in order to improve available range of motion for ADL's.           Start:  06/25/25    Expected End:  07/25/25              Patient to increase hip flexion and extension strength by 1/2 grade, in order to improve endurance and increase ability to perform all functional activities for increased time.           Start:  06/25/25    Expected End:  07/25/25              Patient to have pain less than 5/10 at worst, to improve QOL.          Start:  06/25/25    Expected End:  07/25/25              Patient to improve score on the FOTO, to improve QOL.          Start:  06/25/25    Expected End:  07/25/25             Krupa Linton PT, DPT

## 2025-07-29 NOTE — TELEPHONE ENCOUNTER
No care due was identified.  [facility] Embedded Care Due Messages. Reference number: [id number].   7/29/2025 5:00:53 PM CDT

## 2025-07-30 RX ORDER — FLUTICASONE PROPIONATE 50 MCG
2 SPRAY, SUSPENSION (ML) NASAL
Qty: 48 ML | Refills: 1 | Status: SHIPPED | OUTPATIENT
Start: 2025-07-30

## 2025-07-30 NOTE — TELEPHONE ENCOUNTER
Refill Routing Note   Medication(s) are not appropriate for processing by [facility] for the following reason(s):        No active prescription written by provider  New or recently adjusted medication    [facility] action(s):  Defer             Appointments  past 12m or future 3m with PCP    Date Provider   Last Visit   5/29/2025 Casper Barrett MD   Next Visit   Visit date not found Casper Barrett MD   ED visits in past 90 days: 0        Note composed:7:14 PM 07/29/2025

## 2025-08-05 ENCOUNTER — PROCEDURE VISIT (OUTPATIENT)
Dept: HEPATOLOGY | Facility: CLINIC | Age: 70
End: 2025-08-05
Payer: MEDICARE

## 2025-08-05 DIAGNOSIS — K76.0 HEPATIC STEATOSIS: ICD-10-CM

## 2025-08-05 DIAGNOSIS — D69.6 THROMBOCYTOPENIA: Chronic | ICD-10-CM

## 2025-08-05 DIAGNOSIS — K74.00 LIVER FIBROSIS: ICD-10-CM

## 2025-08-05 DIAGNOSIS — R16.2 HEPATOSPLENOMEGALY: ICD-10-CM

## 2025-08-05 DIAGNOSIS — E66.812 CLASS 2 OBESITY WITH BODY MASS INDEX (BMI) OF 35.0 TO 35.9 IN ADULT, UNSPECIFIED OBESITY TYPE, UNSPECIFIED WHETHER SERIOUS COMORBIDITY PRESENT: ICD-10-CM

## 2025-08-05 PROCEDURE — 91200 LIVER ELASTOGRAPHY: CPT | Mod: PBBFAC | Performed by: INTERNAL MEDICINE

## 2025-08-05 NOTE — PROCEDURES
FibroScan (Vibration Controlled Transient Elastography)    Date/Time: 8/5/2025 9:30 AM    Performed by: Ramon Parisi MD  Authorized by: Noa Rojas FNP    Diagnosis:  NAFLD    Probe:  M    Universal Protocol: Patient's identity, procedure and site were verified, confirmatory pause was performed.  Discussed procedure including risks and potential complications.  Questions answered.  Patient verbalizes understanding and wishes to proceed with VCTE.     Procedure: After providing explanations of the procedure, patient was placed in the supine position with right arm in maximum abduction to allow optimal exposure of right lateral abdomen.  Patient was briefly assessed, Testing was performed in the mid-axillary location, 50Hz Shear Wave pulses were applied and the resulting Shear Wave and Propagation Speed detected with a 3.5 MHz ultrasonic signal, using the FibroScan probe, Skin to liver capsule distance and liver parenchyma were accessed during the entire examination with the FibroScan probe, Patient was instructed to breathe normally and to abstain from sudden movements during the procedure, allowing for random measurements of liver stiffness. At least 10 Shear Waves were produced, Individual measurements of each Shear Wave were calculated.  Patient tolerated the procedure well with no complications.  Meets discharge criteria as was dismissed.  Rates pain 0 out of 10.  Patient will follow up with ordering provider to review results.    Findings  Median liver stiffness score:  5.7  CAP Reading: dB/m:  314    IQR/med %:  23  Interpretation  Fibrosis interpretation is based on medial liver stiffness - Kilopascal (kPa).    Fibrosis Stage:  F 0-1  Steatosis interpretation is based on controlled attenuation parameter - (dB/m).    Steatosis Grade:  S2  Comments/Plan:  Severe steatosis but no fibrosis

## 2025-08-07 ENCOUNTER — OFFICE VISIT (OUTPATIENT)
Dept: ORTHOPEDICS | Facility: CLINIC | Age: 70
End: 2025-08-07
Payer: MEDICARE

## 2025-08-07 VITALS — BODY MASS INDEX: 35.29 KG/M2 | HEIGHT: 70 IN | WEIGHT: 246.5 LBS

## 2025-08-07 DIAGNOSIS — M17.11 PRIMARY OSTEOARTHRITIS OF RIGHT KNEE: Primary | ICD-10-CM

## 2025-08-07 DIAGNOSIS — M17.12 PRIMARY OSTEOARTHRITIS OF LEFT KNEE: ICD-10-CM

## 2025-08-07 PROCEDURE — 99999PBSHW PR PBB SHADOW TECHNICAL ONLY FILED TO HB: Mod: PBBFAC,,,

## 2025-08-07 PROCEDURE — 99999 PR PBB SHADOW E&M-EST. PATIENT-LVL III: CPT | Mod: PBBFAC,,, | Performed by: ORTHOPAEDIC SURGERY

## 2025-08-07 PROCEDURE — 99213 OFFICE O/P EST LOW 20 MIN: CPT | Mod: PBBFAC,PO | Performed by: ORTHOPAEDIC SURGERY

## 2025-08-07 PROCEDURE — 20610 DRAIN/INJ JOINT/BURSA W/O US: CPT | Mod: 50,PBBFAC,PO | Performed by: ORTHOPAEDIC SURGERY

## 2025-08-07 PROCEDURE — 99499 UNLISTED E&M SERVICE: CPT | Mod: S$PBB,,, | Performed by: ORTHOPAEDIC SURGERY

## 2025-08-07 RX ADMIN — TRIAMCINOLONE ACETONIDE 40 MG: 40 INJECTION, SUSPENSION INTRA-ARTICULAR; INTRAMUSCULAR at 01:08

## 2025-08-12 ENCOUNTER — CLINICAL SUPPORT (OUTPATIENT)
Dept: REHABILITATION | Facility: HOSPITAL | Age: 70
End: 2025-08-12
Payer: MEDICARE

## 2025-08-12 DIAGNOSIS — R26.89 DECREASED FUNCTIONAL MOBILITY: Primary | ICD-10-CM

## 2025-08-12 DIAGNOSIS — R29.898 DECREASED STRENGTH OF LOWER EXTREMITY: ICD-10-CM

## 2025-08-12 PROCEDURE — 97014 ELECTRIC STIMULATION THERAPY: CPT | Mod: PN | Performed by: PHYSICAL THERAPIST

## 2025-08-14 RX ORDER — TRIAMCINOLONE ACETONIDE 40 MG/ML
40 INJECTION, SUSPENSION INTRA-ARTICULAR; INTRAMUSCULAR
Status: DISCONTINUED | OUTPATIENT
Start: 2025-08-07 | End: 2025-08-14 | Stop reason: HOSPADM

## 2025-08-15 ENCOUNTER — CLINICAL SUPPORT (OUTPATIENT)
Dept: REHABILITATION | Facility: HOSPITAL | Age: 70
End: 2025-08-15
Payer: MEDICARE

## 2025-08-15 ENCOUNTER — PATIENT MESSAGE (OUTPATIENT)
Dept: FAMILY MEDICINE | Facility: CLINIC | Age: 70
End: 2025-08-15
Payer: MEDICARE

## 2025-08-15 DIAGNOSIS — R29.898 DECREASED STRENGTH OF LOWER EXTREMITY: ICD-10-CM

## 2025-08-15 DIAGNOSIS — R26.89 DECREASED FUNCTIONAL MOBILITY: Primary | ICD-10-CM

## 2025-08-15 PROCEDURE — 97110 THERAPEUTIC EXERCISES: CPT | Mod: PN | Performed by: PHYSICAL THERAPIST

## 2025-08-15 PROCEDURE — 97112 NEUROMUSCULAR REEDUCATION: CPT | Mod: PN | Performed by: PHYSICAL THERAPIST

## 2025-08-15 PROCEDURE — 97014 ELECTRIC STIMULATION THERAPY: CPT | Mod: PN | Performed by: PHYSICAL THERAPIST

## 2025-08-19 ENCOUNTER — CLINICAL SUPPORT (OUTPATIENT)
Dept: REHABILITATION | Facility: HOSPITAL | Age: 70
End: 2025-08-19
Payer: MEDICARE

## 2025-08-19 DIAGNOSIS — R29.898 DECREASED STRENGTH OF LOWER EXTREMITY: ICD-10-CM

## 2025-08-19 DIAGNOSIS — R26.89 DECREASED FUNCTIONAL MOBILITY: Primary | ICD-10-CM

## 2025-08-19 PROCEDURE — 97112 NEUROMUSCULAR REEDUCATION: CPT | Mod: PN | Performed by: PHYSICAL THERAPIST

## 2025-08-19 PROCEDURE — 97014 ELECTRIC STIMULATION THERAPY: CPT | Mod: PN | Performed by: PHYSICAL THERAPIST

## 2025-08-21 RX ORDER — ATORVASTATIN CALCIUM 40 MG/1
40 TABLET, FILM COATED ORAL
Qty: 90 TABLET | Refills: 1 | Status: SHIPPED | OUTPATIENT
Start: 2025-08-21

## 2025-08-26 ENCOUNTER — CLINICAL SUPPORT (OUTPATIENT)
Dept: REHABILITATION | Facility: HOSPITAL | Age: 70
End: 2025-08-26
Payer: MEDICARE

## 2025-08-26 DIAGNOSIS — R26.89 DECREASED FUNCTIONAL MOBILITY: Primary | ICD-10-CM

## 2025-08-26 DIAGNOSIS — R29.898 DECREASED STRENGTH OF LOWER EXTREMITY: ICD-10-CM

## 2025-08-26 PROCEDURE — 97112 NEUROMUSCULAR REEDUCATION: CPT | Mod: PN | Performed by: PHYSICAL THERAPIST

## 2025-08-26 PROCEDURE — 97014 ELECTRIC STIMULATION THERAPY: CPT | Mod: PN | Performed by: PHYSICAL THERAPIST

## 2025-09-03 ENCOUNTER — OFFICE VISIT (OUTPATIENT)
Dept: PODIATRY | Facility: CLINIC | Age: 70
End: 2025-09-03
Payer: MEDICARE

## 2025-09-03 VITALS — WEIGHT: 246.5 LBS | HEIGHT: 70 IN | BODY MASS INDEX: 35.29 KG/M2

## 2025-09-03 DIAGNOSIS — E11.40 TYPE 2 DIABETES MELLITUS WITH DIABETIC NEUROPATHY, WITHOUT LONG-TERM CURRENT USE OF INSULIN: ICD-10-CM

## 2025-09-03 DIAGNOSIS — L60.3 ONYCHODYSTROPHY: ICD-10-CM

## 2025-09-03 DIAGNOSIS — E11.65 TYPE 2 DIABETES MELLITUS WITH HYPERGLYCEMIA, WITHOUT LONG-TERM CURRENT USE OF INSULIN: Primary | ICD-10-CM

## 2025-09-03 DIAGNOSIS — I87.2 VENOUS INSUFFICIENCY OF BOTH LOWER EXTREMITIES: ICD-10-CM

## 2025-09-03 PROCEDURE — 99999 PR PBB SHADOW E&M-EST. PATIENT-LVL III: CPT | Mod: PBBFAC,,, | Performed by: PODIATRIST

## 2025-09-03 PROCEDURE — 11721 DEBRIDE NAIL 6 OR MORE: CPT | Mod: Q9,PBBFAC | Performed by: PODIATRIST

## 2025-09-03 PROCEDURE — 99213 OFFICE O/P EST LOW 20 MIN: CPT | Mod: PBBFAC | Performed by: PODIATRIST
